# Patient Record
Sex: FEMALE | Race: WHITE | Employment: OTHER | URBAN - METROPOLITAN AREA
[De-identification: names, ages, dates, MRNs, and addresses within clinical notes are randomized per-mention and may not be internally consistent; named-entity substitution may affect disease eponyms.]

---

## 2015-11-24 LAB — DIABETIC RETINOPATHY: NEGATIVE

## 2017-11-14 PROBLEM — R10.10 PAIN OF UPPER ABDOMEN: Status: ACTIVE | Noted: 2017-11-14

## 2018-01-24 LAB
CHOLESTEROL, TOTAL: 176 MG/DL
CHOLESTEROL, TOTAL: 176 MG/DL
CHOLESTEROL/HDL RATIO: 2.9
CHOLESTEROL/HDL RATIO: 2.9
HDLC SERPL-MCNC: 60 MG/DL (ref 35–70)
HDLC SERPL-MCNC: 60 MG/DL (ref 35–70)
LDL CHOLESTEROL CALCULATED: 87 MG/DL (ref 0–160)
LDL CHOLESTEROL CALCULATED: 87 MG/DL (ref 0–160)
TRIGL SERPL-MCNC: 203 MG/DL
TRIGL SERPL-MCNC: 203 MG/DL
VLDLC SERPL CALC-MCNC: NORMAL MG/DL
VLDLC SERPL CALC-MCNC: NORMAL MG/DL

## 2018-08-08 ENCOUNTER — HOSPITAL ENCOUNTER (OUTPATIENT)
Dept: ULTRASOUND IMAGING | Age: 73
Discharge: HOME | End: 2018-08-10
Payer: MEDICARE

## 2018-08-08 DIAGNOSIS — R10.9 ABDOMINAL PAIN, UNSPECIFIED ABDOMINAL LOCATION: ICD-10-CM

## 2018-08-08 PROCEDURE — 76700 US EXAM ABDOM COMPLETE: CPT

## 2018-10-21 ENCOUNTER — APPOINTMENT (OUTPATIENT)
Dept: GENERAL RADIOLOGY | Age: 73
End: 2018-10-21
Payer: MEDICARE

## 2018-10-21 ENCOUNTER — HOSPITAL ENCOUNTER (EMERGENCY)
Age: 73
Discharge: HOME | End: 2018-10-21
Attending: EMERGENCY MEDICINE
Payer: MEDICARE

## 2018-10-21 ENCOUNTER — APPOINTMENT (OUTPATIENT)
Dept: CT IMAGING | Age: 73
End: 2018-10-21
Payer: MEDICARE

## 2018-10-21 VITALS
RESPIRATION RATE: 16 BRPM | TEMPERATURE: 98.1 F | WEIGHT: 145 LBS | HEIGHT: 67 IN | OXYGEN SATURATION: 96 % | DIASTOLIC BLOOD PRESSURE: 82 MMHG | HEART RATE: 91 BPM | SYSTOLIC BLOOD PRESSURE: 162 MMHG | BODY MASS INDEX: 22.76 KG/M2

## 2018-10-21 DIAGNOSIS — S09.90XA INJURY OF HEAD, INITIAL ENCOUNTER: ICD-10-CM

## 2018-10-21 DIAGNOSIS — S22.42XA MULTIPLE FRACTURES OF RIBS, LEFT SIDE, INITIAL ENCOUNTER FOR CLOSED FRACTURE: Primary | ICD-10-CM
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
## 2018-10-21 DIAGNOSIS — M79.672 LEFT FOOT PAIN: ICD-10-CM

## 2018-10-21 PROCEDURE — 70450 CT HEAD/BRAIN W/O DYE: CPT

## 2018-10-21 PROCEDURE — 6370000000 HC RX 637 (ALT 250 FOR IP): Performed by: EMERGENCY MEDICINE

## 2018-10-21 PROCEDURE — 71250 CT THORAX DX C-: CPT

## 2018-10-21 PROCEDURE — 99284 EMERGENCY DEPT VISIT MOD MDM: CPT

## 2018-10-21 PROCEDURE — 73630 X-RAY EXAM OF FOOT: CPT

## 2018-10-21 PROCEDURE — 72125 CT NECK SPINE W/O DYE: CPT

## 2018-10-21 PROCEDURE — 73010 X-RAY EXAM OF SHOULDER BLADE: CPT

## 2018-10-21 RX ORDER — IBUPROFEN 600 MG/1
600 TABLET ORAL ONCE
Status: COMPLETED | OUTPATIENT
Start: 2018-10-21 | End: 2018-10-21

## 2018-10-21 RX ORDER — HYDROCODONE BITARTRATE AND ACETAMINOPHEN 5; 325 MG/1; MG/1
1 TABLET ORAL ONCE
Status: COMPLETED | OUTPATIENT
Start: 2018-10-21 | End: 2018-10-21

## 2018-10-21 RX ORDER — HYDROCODONE BITARTRATE AND ACETAMINOPHEN 5; 325 MG/1; MG/1
1 TABLET ORAL EVERY 6 HOURS PRN
Qty: 12 TABLET | Refills: 0 | Status: SHIPPED | OUTPATIENT
Start: 2018-10-21 | End: 2018-10-25

## 2018-10-21 RX ADMIN — HYDROCODONE BITARTRATE AND ACETAMINOPHEN 1 TABLET: 5; 325 TABLET ORAL at 12:56

## 2018-10-21 RX ADMIN — IBUPROFEN 600 MG: 600 TABLET ORAL at 10:53

## 2018-10-21 ASSESSMENT — ENCOUNTER SYMPTOMS
SHORTNESS OF BREATH: 0
VOMITING: 0
BLOOD IN STOOL: 0
NAUSEA: 0
VISUAL CHANGE: 0
BACK PAIN: 1
COUGH: 0
ABDOMINAL PAIN: 0

## 2018-10-21 ASSESSMENT — PAIN DESCRIPTION - FREQUENCY
FREQUENCY: INTERMITTENT
FREQUENCY: CONTINUOUS

## 2018-10-21 ASSESSMENT — PAIN DESCRIPTION - ORIENTATION
ORIENTATION: LEFT
ORIENTATION: LEFT

## 2018-10-21 ASSESSMENT — PAIN SCALES - GENERAL
PAINLEVEL_OUTOF10: 7

## 2018-10-21 ASSESSMENT — PAIN DESCRIPTION - DESCRIPTORS: DESCRIPTORS: ACHING

## 2018-10-21 ASSESSMENT — PAIN DESCRIPTION - PAIN TYPE
TYPE: ACUTE PAIN
TYPE: ACUTE PAIN

## 2018-10-21 ASSESSMENT — PAIN DESCRIPTION - PROGRESSION: CLINICAL_PROGRESSION: GRADUALLY WORSENING

## 2018-10-21 ASSESSMENT — PAIN DESCRIPTION - LOCATION: LOCATION: BACK;SHOULDER

## 2018-10-21 ASSESSMENT — PAIN DESCRIPTION - ONSET: ONSET: GRADUAL

## 2018-10-21 NOTE — ED PROVIDER NOTES
in addition to providing specific details for the plan of care and counseling regarding the diagnosis and prognosis. Their questions are answered at this time and they are agreeable with the plan. I discussed at length with them reasons for immediate return here for re evaluation. They will followup with their primary care physician by calling their office tomorrow. --------------------------------- ADDITIONAL PROVIDER NOTES ---------------------------------  At this time the patient is without objective evidence of an acute process requiring hospitalization or inpatient management. They have remained hemodynamically stable throughout their entire ED visit and are stable for discharge with outpatient follow-up. The plan has been discussed in detail and they are aware of the specific conditions for emergent return, as well as the importance of follow-up. New Prescriptions    HYDROCODONE-ACETAMINOPHEN (NORCO) 5-325 MG PER TABLET    Take 1 tablet by mouth every 6 hours as needed for Pain for up to 4 days. Take 1 to 2 every 6 hours as needed for pain. Diagnosis:  1. Multiple fractures of ribs, left side, initial encounter for closed fracture    2. Fall (on) (from) other stairs and steps, initial encounter    3. Left foot pain    4. Injury of head, initial encounter        Disposition:  Patient's disposition: Discharge to home  Patient's condition is stable.        Marisel Sheikh DO  Resident  10/21/18 [id number]

## 2018-10-31 ENCOUNTER — APPOINTMENT (OUTPATIENT)
Dept: ULTRASOUND IMAGING | Age: 73
DRG: 603 | End: 2018-10-31
Payer: MEDICARE

## 2018-10-31 ENCOUNTER — APPOINTMENT (OUTPATIENT)
Dept: GENERAL RADIOLOGY | Age: 73
DRG: 603 | End: 2018-10-31
Payer: MEDICARE

## 2018-10-31 ENCOUNTER — HOSPITAL ENCOUNTER (INPATIENT)
Age: 73
LOS: 3 days | Discharge: HOME | DRG: 603 | End: 2018-11-03
Attending: EMERGENCY MEDICINE | Admitting: INTERNAL MEDICINE
Payer: MEDICARE

## 2018-10-31 DIAGNOSIS — L03.119 CELLULITIS OF LOWER EXTREMITY, UNSPECIFIED LATERALITY: Primary | ICD-10-CM

## 2018-10-31 PROBLEM — L03.90 CELLULITIS: Status: ACTIVE | Noted: 2018-10-31

## 2018-10-31 LAB
ANION GAP SERPL CALCULATED.3IONS-SCNC: 12 MMOL/L (ref 7–16)
APTT: 30.4 SEC (ref 24.5–35.1)
BASOPHILS ABSOLUTE: 0.03 E9/L (ref 0–0.2)
BASOPHILS RELATIVE PERCENT: 0.4 % (ref 0–2)
BUN BLDV-MCNC: 13 MG/DL (ref 8–23)
CALCIUM SERPL-MCNC: 8.9 MG/DL (ref 8.6–10.2)
CHLORIDE BLD-SCNC: 104 MMOL/L (ref 98–107)
CO2: 26 MMOL/L (ref 22–29)
CREAT SERPL-MCNC: 0.5 MG/DL (ref 0.5–1)
EOSINOPHILS ABSOLUTE: 0.09 E9/L (ref 0.05–0.5)
EOSINOPHILS RELATIVE PERCENT: 1.3 % (ref 0–6)
GFR AFRICAN AMERICAN: >60
GFR NON-AFRICAN AMERICAN: >60 ML/MIN/1.73
GLUCOSE BLD-MCNC: 142 MG/DL (ref 74–109)
HCT VFR BLD CALC: 36.8 % (ref 34–48)
HEMOGLOBIN: 11.5 G/DL (ref 11.5–15.5)
IMMATURE GRANULOCYTES #: 0.04 E9/L
IMMATURE GRANULOCYTES %: 0.6 % (ref 0–5)
INR BLD: 1
LYMPHOCYTES ABSOLUTE: 1.04 E9/L (ref 1.5–4)
LYMPHOCYTES RELATIVE PERCENT: 15.1 % (ref 20–42)
MCH RBC QN AUTO: 30.7 PG (ref 26–35)
MCHC RBC AUTO-ENTMCNC: 31.3 % (ref 32–34.5)
MCV RBC AUTO: 98.4 FL (ref 80–99.9)
MONOCYTES ABSOLUTE: 0.74 E9/L (ref 0.1–0.95)
MONOCYTES RELATIVE PERCENT: 10.7 % (ref 2–12)
NEUTROPHILS ABSOLUTE: 4.95 E9/L (ref 1.8–7.3)
NEUTROPHILS RELATIVE PERCENT: 71.9 % (ref 43–80)
PDW BLD-RTO: 14.6 FL (ref 11.5–15)
PLATELET # BLD: 227 E9/L (ref 130–450)
PMV BLD AUTO: 10 FL (ref 7–12)
POTASSIUM SERPL-SCNC: 4.6 MMOL/L (ref 3.5–5)
PROTHROMBIN TIME: 11.2 SEC (ref 9.3–12.4)
RBC # BLD: 3.74 E12/L (ref 3.5–5.5)
SODIUM BLD-SCNC: 142 MMOL/L (ref 132–146)
WBC # BLD: 6.9 E9/L (ref 4.5–11.5)

## 2018-10-31 PROCEDURE — 90715 TDAP VACCINE 7 YRS/> IM: CPT | Performed by: STUDENT IN AN ORGANIZED HEALTH CARE EDUCATION/TRAINING PROGRAM

## 2018-10-31 PROCEDURE — 99222 1ST HOSP IP/OBS MODERATE 55: CPT | Performed by: INTERNAL MEDICINE

## 2018-10-31 PROCEDURE — 80048 BASIC METABOLIC PNL TOTAL CA: CPT

## 2018-10-31 PROCEDURE — 90471 IMMUNIZATION ADMIN: CPT | Performed by: STUDENT IN AN ORGANIZED HEALTH CARE EDUCATION/TRAINING PROGRAM

## 2018-10-31 PROCEDURE — 87040 BLOOD CULTURE FOR BACTERIA: CPT

## 2018-10-31 PROCEDURE — 2580000003 HC RX 258: Performed by: EMERGENCY MEDICINE

## 2018-10-31 PROCEDURE — 99285 EMERGENCY DEPT VISIT HI MDM: CPT

## 2018-10-31 PROCEDURE — 93971 EXTREMITY STUDY: CPT

## 2018-10-31 PROCEDURE — 6360000002 HC RX W HCPCS: Performed by: STUDENT IN AN ORGANIZED HEALTH CARE EDUCATION/TRAINING PROGRAM

## 2018-10-31 PROCEDURE — 85025 COMPLETE CBC W/AUTO DIFF WBC: CPT

## 2018-10-31 PROCEDURE — 85730 THROMBOPLASTIN TIME PARTIAL: CPT

## 2018-10-31 PROCEDURE — 6360000002 HC RX W HCPCS: Performed by: EMERGENCY MEDICINE

## 2018-10-31 PROCEDURE — 1200000000 HC SEMI PRIVATE

## 2018-10-31 PROCEDURE — 73590 X-RAY EXAM OF LOWER LEG: CPT

## 2018-10-31 PROCEDURE — 2580000003 HC RX 258: Performed by: INTERNAL MEDICINE

## 2018-10-31 PROCEDURE — 85610 PROTHROMBIN TIME: CPT

## 2018-10-31 RX ORDER — SODIUM CHLORIDE 0.9 % (FLUSH) 0.9 %
10 SYRINGE (ML) INJECTION PRN
Status: DISCONTINUED | OUTPATIENT
Start: 2018-10-31 | End: 2018-11-01 | Stop reason: SDUPTHER

## 2018-10-31 RX ORDER — 0.9 % SODIUM CHLORIDE 0.9 %
10 VIAL (ML) INJECTION PRN
Status: DISCONTINUED | OUTPATIENT
Start: 2018-10-31 | End: 2018-11-01 | Stop reason: SDUPTHER

## 2018-10-31 RX ORDER — ACETAMINOPHEN 325 MG/1
650 TABLET ORAL EVERY 4 HOURS PRN
Status: DISCONTINUED | OUTPATIENT
Start: 2018-10-31 | End: 2018-11-03 | Stop reason: HOSPADM

## 2018-10-31 RX ORDER — CLINDAMYCIN HYDROCHLORIDE 150 MG/1
300 CAPSULE ORAL ONCE
Status: DISCONTINUED | OUTPATIENT
Start: 2018-10-31 | End: 2018-10-31

## 2018-10-31 RX ORDER — CEPHALEXIN 500 MG/1
500 CAPSULE ORAL EVERY 4 HOURS
Status: ON HOLD | COMMUNITY
Start: 2018-10-29 | End: 2018-11-02 | Stop reason: HOSPADM

## 2018-10-31 RX ORDER — SODIUM CHLORIDE 0.9 % (FLUSH) 0.9 %
10 SYRINGE (ML) INJECTION EVERY 12 HOURS SCHEDULED
Status: DISCONTINUED | OUTPATIENT
Start: 2018-10-31 | End: 2018-11-01 | Stop reason: SDUPTHER

## 2018-10-31 RX ADMIN — Medication 10 ML: at 21:30

## 2018-10-31 RX ADMIN — TETANUS TOXOID, REDUCED DIPHTHERIA TOXOID AND ACELLULAR PERTUSSIS VACCINE, ADSORBED 0.5 ML: 5; 2.5; 8; 8; 2.5 SUSPENSION INTRAMUSCULAR at 16:11

## 2018-10-31 RX ADMIN — CEFAZOLIN 2 G: 1 INJECTION, POWDER, FOR SOLUTION INTRAMUSCULAR; INTRAVENOUS at 16:11

## 2018-10-31 RX ADMIN — VANCOMYCIN HYDROCHLORIDE 1250 MG: 5 INJECTION, POWDER, LYOPHILIZED, FOR SOLUTION INTRAVENOUS at 16:50

## 2018-10-31 ASSESSMENT — PAIN DESCRIPTION - DESCRIPTORS: DESCRIPTORS: ACHING;CONSTANT;DULL

## 2018-10-31 ASSESSMENT — PAIN SCALES - GENERAL
PAINLEVEL_OUTOF10: 10
PAINLEVEL_OUTOF10: 8

## 2018-10-31 ASSESSMENT — ENCOUNTER SYMPTOMS
DIARRHEA: 0
WHEEZING: 0
EYE PAIN: 0
NAUSEA: 0
COUGH: 0
VOMITING: 0
SHORTNESS OF BREATH: 0
SORE THROAT: 0
EYE REDNESS: 0
SINUS PRESSURE: 0
COLOR CHANGE: 0
BACK PAIN: 0
ABDOMINAL DISTENTION: 0
EYE DISCHARGE: 0

## 2018-10-31 ASSESSMENT — PAIN DESCRIPTION - PAIN TYPE
TYPE: ACUTE PAIN
TYPE: ACUTE PAIN

## 2018-10-31 ASSESSMENT — PAIN DESCRIPTION - ORIENTATION
ORIENTATION: RIGHT
ORIENTATION: RIGHT

## 2018-10-31 ASSESSMENT — PAIN DESCRIPTION - LOCATION
LOCATION: LEG
LOCATION: LEG

## 2018-10-31 NOTE — ED PROVIDER NOTES
Negative for agitation, behavioral problems, confusion and decreased concentration. All other systems reviewed and are negative. Physical Exam   Constitutional: She is oriented to person, place, and time. She appears well-developed and well-nourished. No distress. HENT:   Head: Normocephalic and atraumatic. Eyes: Conjunctivae and EOM are normal. Right eye exhibits no discharge. Left eye exhibits no discharge. Neck: Normal range of motion. Neck supple. Cardiovascular: Normal rate, regular rhythm, normal heart sounds and intact distal pulses. No murmur heard. Pulmonary/Chest: Effort normal and breath sounds normal. No respiratory distress. She has no wheezes. She has no rales. Abdominal: Soft. Bowel sounds are normal. There is no tenderness. There is no rebound and no guarding. Musculoskeletal: She exhibits edema (2+ pitting edema LE bilaterally R>L) and tenderness. She exhibits no deformity. Neurological: She is alert and oriented to person, place, and time. No cranial nerve deficit. Coordination normal.   Skin: Skin is warm and dry. No rash noted. She is not diaphoretic. There is erythema. No pallor. Psychiatric: She has a normal mood and affect. Her behavior is normal.   Nursing note and vitals reviewed. Procedures    MDM  Number of Diagnoses or Management Options  Cellulitis of lower extremity, unspecified laterality:   Diagnosis management comments: Patient did not have a leukocytosis. Patient is afebrile, vitals have been stable. Blood cultures pending. Patient failed outpatient antibiotics with bactrim and omnicef. Dr. Gloria Childers requested patient be admitted for IV abx per patient. Dr. Lindsey Khanna will admit patient. Patient's vitals were stable while in the ED.                --------------------------------------------- PAST HISTORY ---------------------------------------------  Past Medical History:  has a past medical history of Diabetes type 2, controlled ([address]  Former smoker; Hyperlipidemia; Hypertension; and Macular degeneration. Past Surgical History:  has a past surgical history that includes Tubal ligation (1970'a); Foot surgery (1976); and Liposuction. Social History:  reports that she has quit smoking. Her smoking use included Cigarettes. She has a 120.00 pack-year smoking history. She has never used smokeless tobacco. She reports that she does not drink alcohol or use drugs. Family History: family history includes Diabetes in her mother; High Blood Pressure in her father and mother; Lexington Grosser in her father and sister. The patients home medications have been reviewed. Allergies: Patient has no known allergies.     -------------------------------------------------- RESULTS -------------------------------------------------    LABS:  Results for orders placed or performed during the hospital encounter of 10/31/18   Protime-INR   Result Value Ref Range    Protime 11.2 9.3 - 12.4 sec    INR 1.0    APTT   Result Value Ref Range    aPTT 30.4 24.5 - 35.1 sec   CBC Auto Differential   Result Value Ref Range    WBC 6.9 4.5 - 11.5 E9/L    RBC 3.74 3.50 - 5.50 E12/L    Hemoglobin 11.5 11.5 - 15.5 g/dL    Hematocrit 36.8 34.0 - 48.0 %    MCV 98.4 80.0 - 99.9 fL    MCH 30.7 26.0 - 35.0 pg    MCHC 31.3 (L) 32.0 - 34.5 %    RDW 14.6 11.5 - 15.0 fL    Platelets 750 515 - 985 E9/L    MPV 10.0 7.0 - 12.0 fL    Neutrophils % 71.9 43.0 - 80.0 %    Immature Granulocytes % 0.6 0.0 - 5.0 %    Lymphocytes % 15.1 (L) 20.0 - 42.0 %    Monocytes % 10.7 2.0 - 12.0 %    Eosinophils % 1.3 0.0 - 6.0 %    Basophils % 0.4 0.0 - 2.0 %    Neutrophils # 4.95 1.80 - 7.30 E9/L    Immature Granulocytes # 0.04 E9/L    Lymphocytes # 1.04 (L) 1.50 - 4.00 E9/L    Monocytes # 0.74 0.10 - 0.95 E9/L    Eosinophils # 0.09 0.05 - 0.50 E9/L    Basophils # 0.03 0.00 - 0.20 S3/R   Basic Metabolic Panel   Result Value Ref Range    Sodium 142 132 - 146 mmol/L    Potassium 4.6 3.5 - 5.0 mmol/L    Chloride 104 98 -

## 2018-11-01 PROBLEM — J44.9 COPD (CHRONIC OBSTRUCTIVE PULMONARY DISEASE) (HCC): Chronic | Status: ACTIVE | Noted: 2018-10-31

## 2018-11-01 PROBLEM — L03.115 CELLULITIS OF RIGHT LOWER EXTREMITY: Status: ACTIVE | Noted: 2018-10-31

## 2018-11-01 LAB
ALBUMIN SERPL-MCNC: 3.5 G/DL (ref 3.5–5.2)
ALP BLD-CCNC: 116 U/L (ref 35–104)
ALT SERPL-CCNC: 16 U/L (ref 0–32)
ANION GAP SERPL CALCULATED.3IONS-SCNC: 11 MMOL/L (ref 7–16)
AST SERPL-CCNC: 19 U/L (ref 0–31)
BASOPHILS ABSOLUTE: 0.03 E9/L (ref 0–0.2)
BASOPHILS RELATIVE PERCENT: 0.5 % (ref 0–2)
BILIRUB SERPL-MCNC: 0.2 MG/DL (ref 0–1.2)
BILIRUBIN DIRECT: <0.2 MG/DL (ref 0–0.3)
BILIRUBIN, INDIRECT: ABNORMAL MG/DL (ref 0–1)
BUN BLDV-MCNC: 13 MG/DL (ref 8–23)
C-REACTIVE PROTEIN: 4.3 MG/DL (ref 0–0.4)
CALCIUM SERPL-MCNC: 9.1 MG/DL (ref 8.6–10.2)
CHLORIDE BLD-SCNC: 104 MMOL/L (ref 98–107)
CO2: 24 MMOL/L (ref 22–29)
CREAT SERPL-MCNC: 0.6 MG/DL (ref 0.5–1)
EOSINOPHILS ABSOLUTE: 0.12 E9/L (ref 0.05–0.5)
EOSINOPHILS RELATIVE PERCENT: 1.8 % (ref 0–6)
GFR AFRICAN AMERICAN: >60
GFR NON-AFRICAN AMERICAN: >60 ML/MIN/1.73
GLUCOSE BLD-MCNC: 137 MG/DL (ref 74–109)
HCT VFR BLD CALC: 36.4 % (ref 34–48)
HEMOGLOBIN: 11.8 G/DL (ref 11.5–15.5)
IMMATURE GRANULOCYTES #: 0.03 E9/L
IMMATURE GRANULOCYTES %: 0.5 % (ref 0–5)
LYMPHOCYTES ABSOLUTE: 0.99 E9/L (ref 1.5–4)
LYMPHOCYTES RELATIVE PERCENT: 15 % (ref 20–42)
MCH RBC QN AUTO: 31 PG (ref 26–35)
MCHC RBC AUTO-ENTMCNC: 32.4 % (ref 32–34.5)
MCV RBC AUTO: 95.5 FL (ref 80–99.9)
METER GLUCOSE: 133 MG/DL (ref 70–110)
METER GLUCOSE: 137 MG/DL (ref 70–110)
METER GLUCOSE: 164 MG/DL (ref 70–110)
METER GLUCOSE: 176 MG/DL (ref 70–110)
MONOCYTES ABSOLUTE: 0.77 E9/L (ref 0.1–0.95)
MONOCYTES RELATIVE PERCENT: 11.6 % (ref 2–12)
NEUTROPHILS ABSOLUTE: 4.68 E9/L (ref 1.8–7.3)
NEUTROPHILS RELATIVE PERCENT: 70.6 % (ref 43–80)
PDW BLD-RTO: 14.7 FL (ref 11.5–15)
PLATELET # BLD: 231 E9/L (ref 130–450)
PMV BLD AUTO: 10.1 FL (ref 7–12)
POTASSIUM REFLEX MAGNESIUM: 4.5 MMOL/L (ref 3.5–5)
RBC # BLD: 3.81 E12/L (ref 3.5–5.5)
SEDIMENTATION RATE, ERYTHROCYTE: 89 MM/HR (ref 0–20)
SODIUM BLD-SCNC: 139 MMOL/L (ref 132–146)
TOTAL PROTEIN: 6.9 G/DL (ref 6.4–8.3)
WBC # BLD: 6.6 E9/L (ref 4.5–11.5)

## 2018-11-01 PROCEDURE — 2580000003 HC RX 258: Performed by: INTERNAL MEDICINE

## 2018-11-01 PROCEDURE — 80048 BASIC METABOLIC PNL TOTAL CA: CPT

## 2018-11-01 PROCEDURE — 6370000000 HC RX 637 (ALT 250 FOR IP): Performed by: INTERNAL MEDICINE

## 2018-11-01 PROCEDURE — 1200000000 HC SEMI PRIVATE

## 2018-11-01 PROCEDURE — 99233 SBSQ HOSP IP/OBS HIGH 50: CPT | Performed by: INTERNAL MEDICINE

## 2018-11-01 PROCEDURE — APPSS30 APP SPLIT SHARED TIME 16-30 MINUTES: Performed by: NURSE PRACTITIONER

## 2018-11-01 PROCEDURE — 6360000002 HC RX W HCPCS: Performed by: INTERNAL MEDICINE

## 2018-11-01 PROCEDURE — 82962 GLUCOSE BLOOD TEST: CPT

## 2018-11-01 PROCEDURE — 6360000002 HC RX W HCPCS: Performed by: SPECIALIST

## 2018-11-01 PROCEDURE — 86140 C-REACTIVE PROTEIN: CPT

## 2018-11-01 PROCEDURE — 85651 RBC SED RATE NONAUTOMATED: CPT

## 2018-11-01 PROCEDURE — 6370000000 HC RX 637 (ALT 250 FOR IP): Performed by: SPECIALIST

## 2018-11-01 PROCEDURE — 80076 HEPATIC FUNCTION PANEL: CPT

## 2018-11-01 PROCEDURE — 85025 COMPLETE CBC W/AUTO DIFF WBC: CPT

## 2018-11-01 PROCEDURE — 36415 COLL VENOUS BLD VENIPUNCTURE: CPT

## 2018-11-01 PROCEDURE — 94640 AIRWAY INHALATION TREATMENT: CPT

## 2018-11-01 RX ORDER — CEFAZOLIN SODIUM 1 G/50ML
1 SOLUTION INTRAVENOUS EVERY 8 HOURS
Status: DISCONTINUED | OUTPATIENT
Start: 2018-11-01 | End: 2018-11-01

## 2018-11-01 RX ORDER — PRAVASTATIN SODIUM 20 MG
40 TABLET ORAL NIGHTLY
Status: DISCONTINUED | OUTPATIENT
Start: 2018-11-01 | End: 2018-11-03 | Stop reason: HOSPADM

## 2018-11-01 RX ORDER — TRAMADOL HYDROCHLORIDE 50 MG/1
50 TABLET ORAL EVERY 6 HOURS PRN
Status: DISCONTINUED | OUTPATIENT
Start: 2018-11-01 | End: 2018-11-03 | Stop reason: HOSPADM

## 2018-11-01 RX ORDER — ALBUTEROL SULFATE 2.5 MG/3ML
2.5 SOLUTION RESPIRATORY (INHALATION) EVERY 4 HOURS PRN
Status: DISCONTINUED | OUTPATIENT
Start: 2018-11-01 | End: 2018-11-03 | Stop reason: HOSPADM

## 2018-11-01 RX ORDER — SODIUM CHLORIDE 0.9 % (FLUSH) 0.9 %
10 SYRINGE (ML) INJECTION PRN
Status: DISCONTINUED | OUTPATIENT
Start: 2018-11-01 | End: 2018-11-03 | Stop reason: HOSPADM

## 2018-11-01 RX ORDER — MORPHINE SULFATE 2 MG/ML
1 INJECTION, SOLUTION INTRAMUSCULAR; INTRAVENOUS EVERY 4 HOURS PRN
Status: DISCONTINUED | OUTPATIENT
Start: 2018-11-01 | End: 2018-11-03 | Stop reason: HOSPADM

## 2018-11-01 RX ORDER — DEXTROSE MONOHYDRATE 50 MG/ML
100 INJECTION, SOLUTION INTRAVENOUS PRN
Status: DISCONTINUED | OUTPATIENT
Start: 2018-11-01 | End: 2018-11-03 | Stop reason: HOSPADM

## 2018-11-01 RX ORDER — AMOXICILLIN 500 MG
2400 CAPSULE ORAL EVERY MORNING
Status: DISCONTINUED | OUTPATIENT
Start: 2018-11-01 | End: 2018-11-01 | Stop reason: RX

## 2018-11-01 RX ORDER — VITAMIN E 268 MG
400 CAPSULE ORAL EVERY MORNING
Status: DISCONTINUED | OUTPATIENT
Start: 2018-11-01 | End: 2018-11-03 | Stop reason: HOSPADM

## 2018-11-01 RX ORDER — NICOTINE POLACRILEX 4 MG
15 LOZENGE BUCCAL PRN
Status: DISCONTINUED | OUTPATIENT
Start: 2018-11-01 | End: 2018-11-03 | Stop reason: HOSPADM

## 2018-11-01 RX ORDER — SODIUM CHLORIDE 0.9 % (FLUSH) 0.9 %
10 SYRINGE (ML) INJECTION EVERY 12 HOURS SCHEDULED
Status: DISCONTINUED | OUTPATIENT
Start: 2018-11-01 | End: 2018-11-03 | Stop reason: HOSPADM

## 2018-11-01 RX ORDER — ASCORBIC ACID 500 MG
1000 TABLET ORAL EVERY MORNING
Status: DISCONTINUED | OUTPATIENT
Start: 2018-11-01 | End: 2018-11-03 | Stop reason: HOSPADM

## 2018-11-01 RX ORDER — DEXTROSE MONOHYDRATE 25 G/50ML
12.5 INJECTION, SOLUTION INTRAVENOUS PRN
Status: DISCONTINUED | OUTPATIENT
Start: 2018-11-01 | End: 2018-11-03 | Stop reason: HOSPADM

## 2018-11-01 RX ORDER — SODIUM CHLORIDE 9 MG/ML
INJECTION, SOLUTION INTRAVENOUS CONTINUOUS
Status: ACTIVE | OUTPATIENT
Start: 2018-11-01 | End: 2018-11-01

## 2018-11-01 RX ORDER — ZINC 25 MG
25 TABLET ORAL EVERY MORNING
Status: DISCONTINUED | OUTPATIENT
Start: 2018-11-01 | End: 2018-11-01 | Stop reason: RX

## 2018-11-01 RX ORDER — DOXYCYCLINE HYCLATE 100 MG/1
100 CAPSULE ORAL EVERY 12 HOURS SCHEDULED
Status: DISCONTINUED | OUTPATIENT
Start: 2018-11-01 | End: 2018-11-03 | Stop reason: HOSPADM

## 2018-11-01 RX ORDER — CHOLECALCIFEROL (VITAMIN D3) 50 MCG
2000 TABLET ORAL EVERY MORNING
Status: DISCONTINUED | OUTPATIENT
Start: 2018-11-01 | End: 2018-11-03 | Stop reason: HOSPADM

## 2018-11-01 RX ORDER — ONDANSETRON 2 MG/ML
4 INJECTION INTRAMUSCULAR; INTRAVENOUS EVERY 6 HOURS PRN
Status: DISCONTINUED | OUTPATIENT
Start: 2018-11-01 | End: 2018-11-03 | Stop reason: HOSPADM

## 2018-11-01 RX ORDER — VITAMIN C
1 TAB ORAL EVERY MORNING
Status: DISCONTINUED | OUTPATIENT
Start: 2018-11-01 | End: 2018-11-03 | Stop reason: HOSPADM

## 2018-11-01 RX ORDER — FLUTICASONE FUROATE AND VILANTEROL 200; 25 UG/1; UG/1
1 POWDER RESPIRATORY (INHALATION) EVERY MORNING
Status: DISCONTINUED | OUTPATIENT
Start: 2018-11-01 | End: 2018-11-01 | Stop reason: CLARIF

## 2018-11-01 RX ORDER — FAMOTIDINE 20 MG/1
20 TABLET, FILM COATED ORAL 2 TIMES DAILY
Status: DISCONTINUED | OUTPATIENT
Start: 2018-11-01 | End: 2018-11-03 | Stop reason: HOSPADM

## 2018-11-01 RX ORDER — CEFAZOLIN SODIUM 2 G/50ML
2 SOLUTION INTRAVENOUS EVERY 8 HOURS
Status: DISCONTINUED | OUTPATIENT
Start: 2018-11-01 | End: 2018-11-02

## 2018-11-01 RX ADMIN — TRAMADOL HYDROCHLORIDE 50 MG: 50 TABLET, FILM COATED ORAL at 17:22

## 2018-11-01 RX ADMIN — FAMOTIDINE 20 MG: 20 TABLET ORAL at 22:10

## 2018-11-01 RX ADMIN — CHOLECALCIFEROL TAB 50 MCG (2000 UNIT) 2000 UNITS: 50 TAB at 08:32

## 2018-11-01 RX ADMIN — TRAMADOL HYDROCHLORIDE 50 MG: 50 TABLET, FILM COATED ORAL at 08:33

## 2018-11-01 RX ADMIN — FAMOTIDINE 20 MG: 20 TABLET ORAL at 08:33

## 2018-11-01 RX ADMIN — CEFAZOLIN SODIUM 1 G: 1 SOLUTION INTRAVENOUS at 01:30

## 2018-11-01 RX ADMIN — CEFAZOLIN SODIUM 1 G: 1 SOLUTION INTRAVENOUS at 08:32

## 2018-11-01 RX ADMIN — SODIUM CHLORIDE: 9 INJECTION, SOLUTION INTRAVENOUS at 02:07

## 2018-11-01 RX ADMIN — Medication 1000 MG: at 08:32

## 2018-11-01 RX ADMIN — VITAMIN C 1 TABLET: TAB at 08:32

## 2018-11-01 RX ADMIN — PRAVASTATIN SODIUM 40 MG: 20 TABLET ORAL at 02:06

## 2018-11-01 RX ADMIN — INSULIN LISPRO 2 UNITS: 100 INJECTION, SOLUTION INTRAVENOUS; SUBCUTANEOUS at 22:09

## 2018-11-01 RX ADMIN — FAMOTIDINE 20 MG: 20 TABLET ORAL at 02:06

## 2018-11-01 RX ADMIN — INSULIN LISPRO 2 UNITS: 100 INJECTION, SOLUTION INTRAVENOUS; SUBCUTANEOUS at 11:24

## 2018-11-01 RX ADMIN — MOMETASONE FUROATE AND FORMOTEROL FUMARATE DIHYDRATE 2 PUFF: 200; 5 AEROSOL RESPIRATORY (INHALATION) at 19:14

## 2018-11-01 RX ADMIN — VITAMIN E CAP 400 UNIT 400 UNITS: 400 CAP at 08:32

## 2018-11-01 RX ADMIN — ENOXAPARIN SODIUM 40 MG: 40 INJECTION SUBCUTANEOUS at 08:32

## 2018-11-01 RX ADMIN — DOXYCYCLINE HYCLATE 100 MG: 100 CAPSULE ORAL at 22:10

## 2018-11-01 RX ADMIN — TRAMADOL HYDROCHLORIDE 50 MG: 50 TABLET, FILM COATED ORAL at 02:06

## 2018-11-01 RX ADMIN — PRAVASTATIN SODIUM 40 MG: 20 TABLET ORAL at 22:10

## 2018-11-01 RX ADMIN — MOMETASONE FUROATE AND FORMOTEROL FUMARATE DIHYDRATE 2 PUFF: 200; 5 AEROSOL RESPIRATORY (INHALATION) at 07:55

## 2018-11-01 RX ADMIN — CEFAZOLIN SODIUM 2 G: 2 SOLUTION INTRAVENOUS at 17:22

## 2018-11-01 ASSESSMENT — PAIN DESCRIPTION - PAIN TYPE: TYPE: ACUTE PAIN

## 2018-11-01 ASSESSMENT — PAIN SCALES - GENERAL
PAINLEVEL_OUTOF10: 0
PAINLEVEL_OUTOF10: 8
PAINLEVEL_OUTOF10: 8
PAINLEVEL_OUTOF10: 0
PAINLEVEL_OUTOF10: 5

## 2018-11-01 ASSESSMENT — PAIN DESCRIPTION - LOCATION: LOCATION: LEG

## 2018-11-01 ASSESSMENT — PAIN DESCRIPTION - ONSET: ONSET: PROGRESSIVE

## 2018-11-01 ASSESSMENT — PAIN DESCRIPTION - ORIENTATION: ORIENTATION: RIGHT

## 2018-11-01 ASSESSMENT — PAIN DESCRIPTION - PROGRESSION: CLINICAL_PROGRESSION: GRADUALLY WORSENING

## 2018-11-01 ASSESSMENT — PAIN DESCRIPTION - DESCRIPTORS: DESCRIPTORS: ACHING;DISCOMFORT;SORE;TENDER;THROBBING

## 2018-11-01 ASSESSMENT — PAIN DESCRIPTION - FREQUENCY: FREQUENCY: CONTINUOUS

## 2018-11-01 NOTE — H&P
clubbing, cyanosis. LLE nl. Feet dry and warm. Skin: Warm and dry, no open lesions. Neuro: Pt is awake, alert and oriented to time, place and person. Speech and cognition normal. Cranial nerves intact with no focal deficits. Motor strength 5/5 in UEs and LEs b/l. KJs symmetric and normal. Light touch sense at the toes normal b/l. Breast: deferred  Rectal: deferred  Genitalia:  Deferred      LABS:  Recent Labs      10/31/18   1352   NA  142   K  4.6   CL  104   CO2  26   BUN  13   CREATININE  0.5   GLUCOSE  142*   CALCIUM  8.9   eGFR non-aa >60. AG 12.    Recent Labs      10/31/18   1352   WBC  6.9   RBC  3.74   HGB  11.5   HCT  36.8   MCV  98.4   MCH  30.7   MCHC  31.3*   RDW  14.6   PLT  227   MPV  10.0   Diff: N 71.9, L 15.1, M 10.7, E 1.3, B 0.4%. Recent Labs      10/31/18   1352   GLUCOSE  142*      Ref. Range 10/31/2018 13:52   Prothrombin Time Latest Ref Range: 9.3 - 12.4 sec 11.2   INR Unknown 1.0   aPTT Latest Ref Range: 24.5 - 35.1 sec 30.4       Radiology:   Xr Tibia Fibula Right (2 Views)  Result Date: 10/31/2018  Indication: Fall. Pain. Comparison: None Technique: 2 views of the right tibia and fibula were obtained. Findings:  No acute fracture or dislocation. The bones are demineralized. No aggressive-appearing osteolytic or osteoblastic lesions. Mild enthesopathy changes of the plantar and posterior calcaneus. Soft tissue edema of the lower extremity from the knee to the foot without radial opaque foreign body or soft tissue gas. No acute fracture or dislocation. Mild enthesopathy changes of the plantar and posterior calcaneus. 3. Diffuse soft tissue edema.     Us Dup Lower Extremity Right Fauzia  Result Date: 10/31/2018  Patient MRN:  [id number] : 1945 Age: 75 years Gender: Female Order Date:  10/31/2018 1:45 PM EXAM: US DUP LOWER EXTREMITY RIGHT FAUZIA NUMBER OF IMAGES:  37 INDICATION:  pain redness and swelling COMPARISON: None There is no evidence for deep venous thrombosis There is good

## 2018-11-01 NOTE — CONSULTS
and S2 are rhythmic and regular. No murmurs appreciated. Abdomen: Positive bowel sounds to auscultation. Benign to palpation. No masses felt. No hepatosplenomegaly. Extremities: The right leg shows a hematoma on the distal 3rd. There is a significant erythema with warmth and pain over the distal two thirds of the leg. Warm to the touch. Lines: peripheral      CBC+dif:  Recent Labs      10/31/18   1352  11/01/18   0411   WBC  6.9  6.6   HGB  11.5  11.8   HCT  36.8  36.4   MCV  98.4  95.5   PLT  227  231   NEUTROABS  4.95  4.68     No results found for: CRP   No results found for: CRP  Lab Results   Component Value Date    SEDRATE 89 (H) 11/01/2018     Lab Results   Component Value Date    ALT 16 11/01/2018    AST 19 11/01/2018    ALKPHOS 116 (H) 11/01/2018    BILITOT 0.2 11/01/2018     Lab Results   Component Value Date     11/01/2018    K 4.5 11/01/2018     11/01/2018    CO2 24 11/01/2018    BUN 13 11/01/2018    CREATININE 0.6 11/01/2018    GFRAA >60 11/01/2018    LABGLOM >60 11/01/2018    GLUCOSE 137 11/01/2018    PROT 6.9 11/01/2018    LABALBU 3.5 11/01/2018    CALCIUM 9.1 11/01/2018    BILITOT 0.2 11/01/2018    ALKPHOS 116 11/01/2018    AST 19 11/01/2018    ALT 16 11/01/2018       Lab Results   Component Value Date    PROTIME 11.2 10/31/2018    INR 1.0 10/31/2018       No results found for: TSH    Lab Results   Component Value Date    COLORU Yellow 11/15/2017    PHUR 5.5 11/15/2017    WBCUA >20 11/15/2017    RBCUA 5-10 11/15/2017    BACTERIA MODERATE 11/15/2017    CLARITYU CLOUDY 11/15/2017    SPECGRAV 1.025 11/15/2017    LEUKOCYTESUR MODERATE 11/15/2017    UROBILINOGEN 0.2 11/15/2017    BILIRUBINUR Negative 11/15/2017    BLOODU TRACE 11/15/2017    GLUCOSEU Negative 11/15/2017     Radiology:  Noted    Microbiology:  Pending  No results for input(s): BC in the last 72 hours. No results for input(s): ORG in the last 72 hours. No results for input(s): Angy Garcia in the last 72 hours.   No results

## 2018-11-02 LAB
ANION GAP SERPL CALCULATED.3IONS-SCNC: 11 MMOL/L (ref 7–16)
ANION GAP SERPL CALCULATED.3IONS-SCNC: 11 MMOL/L (ref 7–16)
ANTISTREPTOLYSIN-O: <20 IU/ML (ref 0–200)
BASOPHILS ABSOLUTE: 0.03 E9/L (ref 0–0.2)
BASOPHILS RELATIVE PERCENT: 0.3 % (ref 0–2)
BUN BLDV-MCNC: 11 MG/DL (ref 8–23)
BUN BLDV-MCNC: 13 MG/DL (ref 8–23)
C-REACTIVE PROTEIN: 4.2 MG/DL (ref 0–0.4)
CALCIUM SERPL-MCNC: 9.3 MG/DL (ref 8.6–10.2)
CALCIUM SERPL-MCNC: 9.5 MG/DL (ref 8.6–10.2)
CHLORIDE BLD-SCNC: 100 MMOL/L (ref 98–107)
CHLORIDE BLD-SCNC: 104 MMOL/L (ref 98–107)
CO2: 26 MMOL/L (ref 22–29)
CO2: 26 MMOL/L (ref 22–29)
CREAT SERPL-MCNC: 0.4 MG/DL (ref 0.5–1)
CREAT SERPL-MCNC: 0.5 MG/DL (ref 0.5–1)
EOSINOPHILS ABSOLUTE: 0.11 E9/L (ref 0.05–0.5)
EOSINOPHILS RELATIVE PERCENT: 1.2 % (ref 0–6)
GFR AFRICAN AMERICAN: >60
GFR AFRICAN AMERICAN: >60
GFR NON-AFRICAN AMERICAN: >60 ML/MIN/1.73
GFR NON-AFRICAN AMERICAN: >60 ML/MIN/1.73
GLUCOSE BLD-MCNC: 138 MG/DL (ref 74–109)
GLUCOSE BLD-MCNC: 160 MG/DL (ref 74–109)
HCT VFR BLD CALC: 38.2 % (ref 34–48)
HEMOGLOBIN: 12 G/DL (ref 11.5–15.5)
IMMATURE GRANULOCYTES #: 0.08 E9/L
IMMATURE GRANULOCYTES %: 0.9 % (ref 0–5)
LYMPHOCYTES ABSOLUTE: 1.24 E9/L (ref 1.5–4)
LYMPHOCYTES RELATIVE PERCENT: 13.3 % (ref 20–42)
MCH RBC QN AUTO: 30.5 PG (ref 26–35)
MCHC RBC AUTO-ENTMCNC: 31.4 % (ref 32–34.5)
MCV RBC AUTO: 97.2 FL (ref 80–99.9)
METER GLUCOSE: 154 MG/DL (ref 70–110)
METER GLUCOSE: 155 MG/DL (ref 70–110)
METER GLUCOSE: 156 MG/DL (ref 70–110)
MONOCYTES ABSOLUTE: 0.78 E9/L (ref 0.1–0.95)
MONOCYTES RELATIVE PERCENT: 8.4 % (ref 2–12)
NEUTROPHILS ABSOLUTE: 7.09 E9/L (ref 1.8–7.3)
NEUTROPHILS RELATIVE PERCENT: 75.9 % (ref 43–80)
PDW BLD-RTO: 14.8 FL (ref 11.5–15)
PLATELET # BLD: 243 E9/L (ref 130–450)
PMV BLD AUTO: 10.2 FL (ref 7–12)
POTASSIUM REFLEX MAGNESIUM: 5.2 MMOL/L (ref 3.5–5)
POTASSIUM SERPL-SCNC: 4 MMOL/L (ref 3.5–5)
RBC # BLD: 3.93 E12/L (ref 3.5–5.5)
SEDIMENTATION RATE, ERYTHROCYTE: 105 MM/HR (ref 0–20)
SODIUM BLD-SCNC: 137 MMOL/L (ref 132–146)
SODIUM BLD-SCNC: 141 MMOL/L (ref 132–146)
WBC # BLD: 9.3 E9/L (ref 4.5–11.5)

## 2018-11-02 PROCEDURE — 6370000000 HC RX 637 (ALT 250 FOR IP): Performed by: SPECIALIST

## 2018-11-02 PROCEDURE — 6370000000 HC RX 637 (ALT 250 FOR IP): Performed by: INTERNAL MEDICINE

## 2018-11-02 PROCEDURE — 94640 AIRWAY INHALATION TREATMENT: CPT

## 2018-11-02 PROCEDURE — 99239 HOSP IP/OBS DSCHRG MGMT >30: CPT | Performed by: INTERNAL MEDICINE

## 2018-11-02 PROCEDURE — 85651 RBC SED RATE NONAUTOMATED: CPT

## 2018-11-02 PROCEDURE — 6360000002 HC RX W HCPCS: Performed by: INTERNAL MEDICINE

## 2018-11-02 PROCEDURE — 86140 C-REACTIVE PROTEIN: CPT

## 2018-11-02 PROCEDURE — 6360000002 HC RX W HCPCS: Performed by: SPECIALIST

## 2018-11-02 PROCEDURE — 36415 COLL VENOUS BLD VENIPUNCTURE: CPT

## 2018-11-02 PROCEDURE — 82962 GLUCOSE BLOOD TEST: CPT

## 2018-11-02 PROCEDURE — 80048 BASIC METABOLIC PNL TOTAL CA: CPT

## 2018-11-02 PROCEDURE — 2580000003 HC RX 258: Performed by: INTERNAL MEDICINE

## 2018-11-02 PROCEDURE — APPSS45 APP SPLIT SHARED TIME 31-45 MINUTES: Performed by: NURSE PRACTITIONER

## 2018-11-02 PROCEDURE — 86060 ANTISTREPTOLYSIN O TITER: CPT

## 2018-11-02 PROCEDURE — 1200000000 HC SEMI PRIVATE

## 2018-11-02 PROCEDURE — 85025 COMPLETE CBC W/AUTO DIFF WBC: CPT

## 2018-11-02 RX ORDER — DOXYCYCLINE HYCLATE 100 MG/1
100 CAPSULE ORAL EVERY 12 HOURS SCHEDULED
Qty: 20 CAPSULE | Refills: 0 | Status: SHIPPED | OUTPATIENT
Start: 2018-11-02 | End: 2018-11-12

## 2018-11-02 RX ORDER — CEPHALEXIN 500 MG/1
1000 CAPSULE ORAL 3 TIMES DAILY
Qty: 60 CAPSULE | Refills: 0 | Status: SHIPPED | OUTPATIENT
Start: 2018-11-02 | End: 2018-11-12

## 2018-11-02 RX ORDER — CEPHALEXIN 500 MG/1
1000 CAPSULE ORAL EVERY 8 HOURS SCHEDULED
Status: DISCONTINUED | OUTPATIENT
Start: 2018-11-02 | End: 2018-11-03 | Stop reason: HOSPADM

## 2018-11-02 RX ADMIN — MOMETASONE FUROATE AND FORMOTEROL FUMARATE DIHYDRATE 2 PUFF: 200; 5 AEROSOL RESPIRATORY (INHALATION) at 08:36

## 2018-11-02 RX ADMIN — Medication 10 ML: at 21:59

## 2018-11-02 RX ADMIN — MOMETASONE FUROATE AND FORMOTEROL FUMARATE DIHYDRATE 2 PUFF: 200; 5 AEROSOL RESPIRATORY (INHALATION) at 19:38

## 2018-11-02 RX ADMIN — CEFAZOLIN SODIUM 2 G: 2 SOLUTION INTRAVENOUS at 08:47

## 2018-11-02 RX ADMIN — VITAMIN C 1 TABLET: TAB at 08:48

## 2018-11-02 RX ADMIN — FAMOTIDINE 20 MG: 20 TABLET ORAL at 21:58

## 2018-11-02 RX ADMIN — Medication 10 ML: at 01:07

## 2018-11-02 RX ADMIN — CHOLECALCIFEROL TAB 50 MCG (2000 UNIT) 2000 UNITS: 50 TAB at 08:48

## 2018-11-02 RX ADMIN — VITAMIN E CAP 400 UNIT 400 UNITS: 400 CAP at 08:48

## 2018-11-02 RX ADMIN — CEFAZOLIN SODIUM 2 G: 2 SOLUTION INTRAVENOUS at 01:07

## 2018-11-02 RX ADMIN — FAMOTIDINE 20 MG: 20 TABLET ORAL at 08:48

## 2018-11-02 RX ADMIN — PRAVASTATIN SODIUM 40 MG: 20 TABLET ORAL at 21:58

## 2018-11-02 RX ADMIN — DOXYCYCLINE HYCLATE 100 MG: 100 CAPSULE ORAL at 21:58

## 2018-11-02 RX ADMIN — INSULIN LISPRO 2 UNITS: 100 INJECTION, SOLUTION INTRAVENOUS; SUBCUTANEOUS at 11:15

## 2018-11-02 RX ADMIN — ENOXAPARIN SODIUM 40 MG: 40 INJECTION SUBCUTANEOUS at 08:47

## 2018-11-02 RX ADMIN — INSULIN LISPRO 2 UNITS: 100 INJECTION, SOLUTION INTRAVENOUS; SUBCUTANEOUS at 22:01

## 2018-11-02 RX ADMIN — CEPHALEXIN 1000 MG: 500 CAPSULE ORAL at 14:30

## 2018-11-02 RX ADMIN — TRAMADOL HYDROCHLORIDE 50 MG: 50 TABLET, FILM COATED ORAL at 08:48

## 2018-11-02 RX ADMIN — Medication 1000 MG: at 08:48

## 2018-11-02 RX ADMIN — Medication 10 ML: at 08:48

## 2018-11-02 RX ADMIN — INSULIN LISPRO 2 UNITS: 100 INJECTION, SOLUTION INTRAVENOUS; SUBCUTANEOUS at 06:18

## 2018-11-02 RX ADMIN — DOXYCYCLINE HYCLATE 100 MG: 100 CAPSULE ORAL at 08:48

## 2018-11-02 RX ADMIN — CEPHALEXIN 1000 MG: 500 CAPSULE ORAL at 21:58

## 2018-11-02 RX ADMIN — TRAMADOL HYDROCHLORIDE 50 MG: 50 TABLET, FILM COATED ORAL at 19:39

## 2018-11-02 ASSESSMENT — PAIN DESCRIPTION - DESCRIPTORS
DESCRIPTORS: ACHING;DISCOMFORT;SORE;TENDER
DESCRIPTORS: ACHING;SORE;TENDER
DESCRIPTORS: ACHING;SORE;TENDER

## 2018-11-02 ASSESSMENT — PAIN DESCRIPTION - LOCATION
LOCATION: LEG

## 2018-11-02 ASSESSMENT — PAIN DESCRIPTION - PAIN TYPE
TYPE: ACUTE PAIN

## 2018-11-02 ASSESSMENT — PAIN SCALES - GENERAL
PAINLEVEL_OUTOF10: 4
PAINLEVEL_OUTOF10: 6
PAINLEVEL_OUTOF10: 4
PAINLEVEL_OUTOF10: 6
PAINLEVEL_OUTOF10: 0
PAINLEVEL_OUTOF10: 0

## 2018-11-02 ASSESSMENT — PAIN DESCRIPTION - ONSET
ONSET: PROGRESSIVE
ONSET: ON-GOING
ONSET: ON-GOING

## 2018-11-02 ASSESSMENT — PAIN DESCRIPTION - FREQUENCY
FREQUENCY: CONTINUOUS

## 2018-11-02 ASSESSMENT — PAIN DESCRIPTION - ORIENTATION
ORIENTATION: RIGHT

## 2018-11-02 ASSESSMENT — PAIN DESCRIPTION - PROGRESSION: CLINICAL_PROGRESSION: GRADUALLY WORSENING

## 2018-11-02 NOTE — DISCHARGE SUMMARY
[facility] Physician Discharge Summary       [address] [facility], [address] [facility] [address]  [phone]    In 1 week  hospital follow up      Activity level: As tolerated    Diet: DIET CARB CONTROL; Carb Control: 4 carb choices (60 gms)/meal    Dispo:Home    Patient ID:  Gilberto De La Cruz  [id number]  75 y.o.  1945    Admit date: 10/31/2018    Discharge date and time:  11/2/2018  4:41 PM    Admission Diagnoses: Principal Problem:    Cellulitis of right lower extremity  Active Problems:    Former smoker    COPD (chronic obstructive pulmonary disease) ([facility] [address]    Diabetes type 2, controlled ([facility] [address]    Hypertension    Macular degeneration    Hyperlipidemia  Resolved Problems:    * No resolved hospital problems. *      Discharge Diagnoses: Principal Problem:    Cellulitis of right lower extremity  Active Problems:    Former smoker    COPD (chronic obstructive pulmonary disease) ([facility] [address]    Diabetes type 2, controlled ([facility] [address]    Hypertension    Macular degeneration    Hyperlipidemia  Resolved Problems:    * No resolved hospital problems. *      Consults:  IP CONSULT TO INFECTIOUS DISEASES    Procedures: None    CHIEF COMPLAINT: Rt leg swelling, pain, redness, increased temperature.     History of Present Illness: The pt is a 75 y.o. female with a history of DM2, HTN and hyperlipidemia, who was in [facility] till about 2 weeks ago when she tripped on stairs going down to the basement to do laundry. She fell off the steps and in the process injured the left side of the head (frontal area), ribs anteriorly and the anteromedial aspect of the right leg. She came to the ER 10/21 and imaging was notable for left 4th rib fracture and was negative for other acute abnormalities. Although pt hadn't sustained any open wound on the right leg, she developed erythema around the bruise on the anteromedial aspect of the leg. Last week pt saw her PCP and was Rx oral abx for cellulitis despite which it worsened a lot. D/t increasing tense swelling of the leg, circumferential erythema extending down past the ankle and almost up to the knee, increased warmth to touch and severe pain, pt came to the ER today. Currently, after meds given in the ER, pt says the leg feels better. Hospital Course:   Patient Ramona Mcnair is a 75 y.o. presented with Cellulitis [L03.90]  Cellulitis [L03.90]   Patient was admitted with RLE cellulitis related to trauma to her right leg after a fall. She failed oral antibiotic treatment as an outpatient. She was evaluated by ID. BC have remained negative so far. She was originally placed on Cefazolin and then changed to Cephalexin and Doxycycline. She was cleared for discharge per ID after 24 hours of IV antibiotic therapy. She was discharged with oral Doxycycline and Cephalexin. She was discharged home in stable condition with the following medications, instructions and follow-ups. Discharge Exam:  General Appearance: alert and oriented to person, place and time and in no acute distress  Skin: warm and dry, RL with erythema and warm to touch from mid-upper calf down. Very tender to palpation. 2+ edema. Head: normocephalic and atraumatic  Eyes: pupils equal, round, and reactive to light, extraocular eye movements intact, conjunctivae normal  Neck: neck supple and non tender without mass   Pulmonary/Chest: clear to auscultation bilaterally- no wheezes, rales or rhonchi, normal air movement, no respiratory distress. On room ir. Cardiovascular: normal rate, normal S1 and S2   Abdomen: soft, non-tender, non-distended, normal bowel sounds, no masses or organomegaly  Extremities: no cyanosis, no clubbing and 1+ edema RLE  Neurologic: speech normal    I/O last 3 completed shifts: In: 420 [P.O.:420]  Out: 1200 [Urine:1200]  No intake/output data recorded.       LABS:  Recent Labs      10/31/18   1352  11/01/18   0411  11/02/18   0455   NA  142  139  141   K  4.6  4.5  5.2*   CL  104  104  104   CO2  26

## 2018-11-02 NOTE — PLAN OF CARE
Problem: Pain:  Goal: Control of acute pain  Control of acute pain   Outcome: Met This Shift      Problem: Falls - Risk of:  Goal: Will remain free from falls  Will remain free from falls   Outcome: Met This Shift      Problem: Skin Integrity:  Goal: Absence of new skin breakdown  Absence of new skin breakdown   Outcome: Met This Shift

## 2018-11-03 VITALS
TEMPERATURE: 98.4 F | HEART RATE: 91 BPM | OXYGEN SATURATION: 93 % | SYSTOLIC BLOOD PRESSURE: 191 MMHG | DIASTOLIC BLOOD PRESSURE: 79 MMHG | BODY MASS INDEX: 24.12 KG/M2 | RESPIRATION RATE: 18 BRPM | WEIGHT: 150.1 LBS | HEIGHT: 66 IN

## 2018-11-03 LAB
ANION GAP SERPL CALCULATED.3IONS-SCNC: 12 MMOL/L (ref 7–16)
BASOPHILS ABSOLUTE: 0.02 E9/L (ref 0–0.2)
BASOPHILS RELATIVE PERCENT: 0.3 % (ref 0–2)
BUN BLDV-MCNC: 13 MG/DL (ref 8–23)
CALCIUM SERPL-MCNC: 9.2 MG/DL (ref 8.6–10.2)
CHLORIDE BLD-SCNC: 101 MMOL/L (ref 98–107)
CO2: 24 MMOL/L (ref 22–29)
CREAT SERPL-MCNC: 0.4 MG/DL (ref 0.5–1)
EOSINOPHILS ABSOLUTE: 0.09 E9/L (ref 0.05–0.5)
EOSINOPHILS RELATIVE PERCENT: 1.4 % (ref 0–6)
GFR AFRICAN AMERICAN: >60
GFR NON-AFRICAN AMERICAN: >60 ML/MIN/1.73
GLUCOSE BLD-MCNC: 295 MG/DL (ref 74–109)
HCT VFR BLD CALC: 37 % (ref 34–48)
HEMOGLOBIN: 11.7 G/DL (ref 11.5–15.5)
IMMATURE GRANULOCYTES #: 0.06 E9/L
IMMATURE GRANULOCYTES %: 0.9 % (ref 0–5)
LYMPHOCYTES ABSOLUTE: 0.74 E9/L (ref 1.5–4)
LYMPHOCYTES RELATIVE PERCENT: 11.6 % (ref 20–42)
MCH RBC QN AUTO: 30.5 PG (ref 26–35)
MCHC RBC AUTO-ENTMCNC: 31.6 % (ref 32–34.5)
MCV RBC AUTO: 96.4 FL (ref 80–99.9)
METER GLUCOSE: 139 MG/DL (ref 70–110)
MONOCYTES ABSOLUTE: 0.6 E9/L (ref 0.1–0.95)
MONOCYTES RELATIVE PERCENT: 9.4 % (ref 2–12)
NEUTROPHILS ABSOLUTE: 4.89 E9/L (ref 1.8–7.3)
NEUTROPHILS RELATIVE PERCENT: 76.4 % (ref 43–80)
PDW BLD-RTO: 14.9 FL (ref 11.5–15)
PLATELET # BLD: 226 E9/L (ref 130–450)
PMV BLD AUTO: 10.1 FL (ref 7–12)
POTASSIUM REFLEX MAGNESIUM: 4.9 MMOL/L (ref 3.5–5)
RBC # BLD: 3.84 E12/L (ref 3.5–5.5)
SODIUM BLD-SCNC: 137 MMOL/L (ref 132–146)
WBC # BLD: 6.4 E9/L (ref 4.5–11.5)

## 2018-11-03 PROCEDURE — 36415 COLL VENOUS BLD VENIPUNCTURE: CPT

## 2018-11-03 PROCEDURE — 6370000000 HC RX 637 (ALT 250 FOR IP): Performed by: INTERNAL MEDICINE

## 2018-11-03 PROCEDURE — 94640 AIRWAY INHALATION TREATMENT: CPT

## 2018-11-03 PROCEDURE — 80048 BASIC METABOLIC PNL TOTAL CA: CPT

## 2018-11-03 PROCEDURE — 6370000000 HC RX 637 (ALT 250 FOR IP): Performed by: SPECIALIST

## 2018-11-03 PROCEDURE — 82962 GLUCOSE BLOOD TEST: CPT

## 2018-11-03 PROCEDURE — 85025 COMPLETE CBC W/AUTO DIFF WBC: CPT

## 2018-11-03 RX ADMIN — FAMOTIDINE 20 MG: 20 TABLET ORAL at 07:48

## 2018-11-03 RX ADMIN — DOXYCYCLINE HYCLATE 100 MG: 100 CAPSULE ORAL at 07:48

## 2018-11-03 RX ADMIN — VITAMIN E CAP 400 UNIT 400 UNITS: 400 CAP at 07:49

## 2018-11-03 RX ADMIN — CEPHALEXIN 1000 MG: 500 CAPSULE ORAL at 05:41

## 2018-11-03 RX ADMIN — Medication 1000 MG: at 07:49

## 2018-11-03 RX ADMIN — CHOLECALCIFEROL TAB 50 MCG (2000 UNIT) 2000 UNITS: 50 TAB at 07:48

## 2018-11-03 RX ADMIN — MOMETASONE FUROATE AND FORMOTEROL FUMARATE DIHYDRATE 2 PUFF: 200; 5 AEROSOL RESPIRATORY (INHALATION) at 07:28

## 2018-11-03 RX ADMIN — VITAMIN C 1 TABLET: TAB at 07:49

## 2018-11-03 RX ADMIN — TRAMADOL HYDROCHLORIDE 50 MG: 50 TABLET, FILM COATED ORAL at 07:49

## 2018-11-03 ASSESSMENT — PAIN SCALES - GENERAL: PAINLEVEL_OUTOF10: 6

## 2018-11-05 LAB
BLOOD CULTURE, ROUTINE: NORMAL
CULTURE, BLOOD 2: NORMAL

## 2019-01-23 LAB
AVERAGE GLUCOSE: NORMAL
AVERAGE GLUCOSE: NORMAL
CREATININE: 0.5 MG/DL
HBA1C MFR BLD: 7.3 %
HBA1C MFR BLD: 7.3 %
POTASSIUM (K+): 4.3

## 2019-03-22 ENCOUNTER — HOSPITAL ENCOUNTER (OUTPATIENT)
Dept: GENERAL RADIOLOGY | Age: 74
Discharge: HOME | End: 2019-03-24
Payer: MEDICARE

## 2019-03-22 ENCOUNTER — HOSPITAL ENCOUNTER (OUTPATIENT)
Age: 74
Discharge: HOME | End: 2019-03-24
Payer: MEDICARE

## 2019-03-22 DIAGNOSIS — J44.1 OBSTRUCTIVE CHRONIC BRONCHITIS WITH EXACERBATION (HCC): ICD-10-CM

## 2019-03-22 PROCEDURE — 71046 X-RAY EXAM CHEST 2 VIEWS: CPT

## 2019-05-10 ENCOUNTER — TELEPHONE (OUTPATIENT)
Dept: PRIMARY CARE CLINIC | Age: 74
End: 2019-05-10

## 2019-05-10 NOTE — TELEPHONE ENCOUNTER
Pt called in and stated that she called a couple weeks ago regarding a colon screenning kit but she ended up getting a urine kit to her house instead. Wanted to know if she could have one sent to her house.

## 2019-06-11 ENCOUNTER — TELEPHONE (OUTPATIENT)
Dept: FAMILY MEDICINE CLINIC | Age: 74
End: 2019-06-11

## 2019-06-11 ENCOUNTER — OFFICE VISIT (OUTPATIENT)
Dept: FAMILY MEDICINE CLINIC | Age: 74
End: 2019-06-11
Payer: MEDICARE

## 2019-06-11 ENCOUNTER — HOSPITAL ENCOUNTER (OUTPATIENT)
Age: 74
Discharge: HOME | End: 2019-06-13
Payer: MEDICARE

## 2019-06-11 VITALS
BODY MASS INDEX: 23.14 KG/M2 | TEMPERATURE: 97.8 F | OXYGEN SATURATION: 95 % | DIASTOLIC BLOOD PRESSURE: 74 MMHG | HEIGHT: 66 IN | SYSTOLIC BLOOD PRESSURE: 122 MMHG | HEART RATE: 60 BPM | WEIGHT: 144 LBS

## 2019-06-11 DIAGNOSIS — R30.0 DYSURIA: ICD-10-CM

## 2019-06-11 DIAGNOSIS — J44.9 CHRONIC OBSTRUCTIVE PULMONARY DISEASE, UNSPECIFIED COPD TYPE (HCC): Primary | ICD-10-CM

## 2019-06-11 DIAGNOSIS — N39.0 URINARY TRACT INFECTION WITHOUT HEMATURIA, SITE UNSPECIFIED: ICD-10-CM

## 2019-06-11 DIAGNOSIS — R30.0 DYSURIA: Primary | ICD-10-CM

## 2019-06-11 LAB
BILIRUBIN, POC: NORMAL
BLOOD URINE, POC: NORMAL
CLARITY, POC: CLEAR
COLOR, POC: YELLOW
GLUCOSE URINE, POC: NORMAL
KETONES, POC: NORMAL
LEUKOCYTE EST, POC: NORMAL
NITRITE, POC: NORMAL
PH, POC: 5
PROTEIN, POC: NORMAL
SPECIFIC GRAVITY, POC: 1.01
UROBILINOGEN, POC: NORMAL

## 2019-06-11 PROCEDURE — 87186 SC STD MICRODIL/AGAR DIL: CPT

## 2019-06-11 PROCEDURE — 99213 OFFICE O/P EST LOW 20 MIN: CPT | Performed by: FAMILY MEDICINE

## 2019-06-11 PROCEDURE — 81002 URINALYSIS NONAUTO W/O SCOPE: CPT | Performed by: FAMILY MEDICINE

## 2019-06-11 PROCEDURE — 87088 URINE BACTERIA CULTURE: CPT

## 2019-06-11 RX ORDER — NITROFURANTOIN 25; 75 MG/1; MG/1
100 CAPSULE ORAL 2 TIMES DAILY
Qty: 20 CAPSULE | Refills: 0 | Status: SHIPPED | OUTPATIENT
Start: 2019-06-11 | End: 2019-06-21

## 2019-06-11 RX ORDER — LANCETS
EACH MISCELLANEOUS
COMMUNITY
Start: 2019-05-20 | End: 2020-01-01 | Stop reason: SDUPTHER

## 2019-06-11 NOTE — PROGRESS NOTES
Subjective:  Chief Complaint   Patient presents with    Dysuria     x2 weeks    Urinary Tract Infection       HPI: The patient states that they have had dysuria and urinary frequency for the last 14 days. patient does not feel like she is emptying her bladder. Denies back or flank pain. The patient does admit to suprapubic pressure. The patient has had urgency and but denies gross hematuria. The patient denies any abdominal or flank pain. The patient denies nausea and vomiting. No fevers or chills. No prior history of kidney stones. The patient has a history of UTI's and states that this feels the same. They come to the urgent care for evaluation. ROS:  Positive and pertinent negatives as per HPI. All other systems are reviewed and negative.        Current Outpatient Medications:     ACCU-CHEK SOFTCLIX LANCETS MISC, , Disp: , Rfl:     nitrofurantoin, macrocrystal-monohydrate, (MACROBID) 100 MG capsule, Take 1 capsule by mouth 2 times daily for 10 days, Disp: 20 capsule, Rfl: 0    pravastatin (PRAVACHOL) 40 MG tablet, Take 40 mg by mouth nightly , Disp: , Rfl:     Fluticasone Furoate-Vilanterol (BREO ELLIPTA) 200-25 MCG/INH AEPB, Inhale 1 puff into the lungs every morning , Disp: , Rfl:     albuterol (PROVENTIL) (2.5 MG/3ML) 0.083% nebulizer solution, Take 3 mLs by nebulization every 4 hours as needed for Wheezing or Shortness of Breath, Disp: 120 each, Rfl: 0    albuterol sulfate HFA (VENTOLIN HFA) 108 (90 BASE) MCG/ACT inhaler, Inhale 2 puffs into the lungs every 4 hours as needed for Wheezing, Disp: 1 Inhaler, Rfl: 0    Cholecalciferol (VITAMIN D) 2000 UNITS CAPS capsule, Take 2,000 Units by mouth every morning , Disp: , Rfl:     ascorbic acid (VITAMIN C) 500 MG tablet, Take 2,000 mg by mouth every morning , Disp: , Rfl:     vitamin E 400 UNIT capsule, Take 400 Units by mouth every morning , Disp: , Rfl:     Copper Gluconate 2 MG CAPS, Take 2 mg by mouth every morning , Disp: , Rfl:     Zinc 25 MG

## 2019-06-11 NOTE — PATIENT INSTRUCTIONS
Patient Education        Urinary Tract Infection in Women: Care Instructions  Your Care Instructions    A urinary tract infection, or UTI, is a general term for an infection anywhere between the kidneys and the urethra (where urine comes out). Most UTIs are bladder infections. They often cause pain or burning when you urinate. UTIs are caused by bacteria and can be cured with antibiotics. Be sure to complete your treatment so that the infection goes away. Follow-up care is a key part of your treatment and safety. Be sure to make and go to all appointments, and call your doctor if you are having problems. It's also a good idea to know your test results and keep a list of the medicines you take. How can you care for yourself at home? · Take your antibiotics as directed. Do not stop taking them just because you feel better. You need to take the full course of antibiotics. · Drink extra water and other fluids for the next day or two. This may help wash out the bacteria that are causing the infection. (If you have kidney, heart, or liver disease and have to limit fluids, talk with your doctor before you increase your fluid intake.)  · Avoid drinks that are carbonated or have caffeine. They can irritate the bladder. · Urinate often. Try to empty your bladder each time. · To relieve pain, take a hot bath or lay a heating pad set on low over your lower belly or genital area. Never go to sleep with a heating pad in place. To prevent UTIs  · Drink plenty of water each day. This helps you urinate often, which clears bacteria from your system. (If you have kidney, heart, or liver disease and have to limit fluids, talk with your doctor before you increase your fluid intake.)  · Urinate when you need to. · Urinate right after you have sex. · Change sanitary pads often. · Avoid douches, bubble baths, feminine hygiene sprays, and other feminine hygiene products that have deodorants.   · After going to the bathroom, wipe from front to back. When should you call for help? Call your doctor now or seek immediate medical care if:    · Symptoms such as fever, chills, nausea, or vomiting get worse or appear for the first time.     · You have new pain in your back just below your rib cage. This is called flank pain.     · There is new blood or pus in your urine.     · You have any problems with your antibiotic medicine.    Watch closely for changes in your health, and be sure to contact your doctor if:    · You are not getting better after taking an antibiotic for 2 days.     · Your symptoms go away but then come back. Where can you learn more? Go to [email or web address] and sign in to your [employer] account. Enter V151 in the [employer] box to learn more about \"Urinary Tract Infection in Women: Care Instructions. \"     If you do not have an account, please click on the \"Sign Up Now\" link. Current as of: December 19, 2018  Content Version: 12.0  © 9979-7233 Healthwise, Incorporated. Care instructions adapted under license by [facility] ([facility]). If you have questions about a medical condition or this instruction, always ask your healthcare professional. [place] [address] any warranty or liability for your use of this information.

## 2019-06-13 LAB
ORGANISM: ABNORMAL
URINE CULTURE, ROUTINE: ABNORMAL
URINE CULTURE, ROUTINE: ABNORMAL

## 2019-07-15 VITALS
OXYGEN SATURATION: 93 % | SYSTOLIC BLOOD PRESSURE: 142 MMHG | WEIGHT: 146 LBS | BODY MASS INDEX: 23.46 KG/M2 | TEMPERATURE: 98.7 F | HEART RATE: 88 BPM | HEIGHT: 66 IN | DIASTOLIC BLOOD PRESSURE: 84 MMHG

## 2019-07-15 RX ORDER — PREDNISONE 10 MG/1
10 TABLET ORAL DAILY
COMMUNITY
End: 2019-10-16

## 2019-07-15 RX ORDER — METHOCARBAMOL 500 MG/1
500 TABLET, FILM COATED ORAL 4 TIMES DAILY
COMMUNITY
End: 2019-10-16

## 2019-07-15 RX ORDER — BUDESONIDE AND FORMOTEROL FUMARATE DIHYDRATE 80; 4.5 UG/1; UG/1
2 AEROSOL RESPIRATORY (INHALATION) 2 TIMES DAILY PRN
Status: ON HOLD | COMMUNITY
End: 2019-11-08 | Stop reason: HOSPADM

## 2019-07-15 RX ORDER — NAPROXEN SODIUM 220 MG
220 TABLET ORAL 2 TIMES DAILY WITH MEALS
Status: ON HOLD | COMMUNITY
End: 2020-01-01 | Stop reason: HOSPADM

## 2019-07-15 RX ORDER — GUAIFENESIN AND CODEINE PHOSPHATE 100; 10 MG/5ML; MG/5ML
5 SOLUTION ORAL 3 TIMES DAILY PRN
COMMUNITY
End: 2019-07-16

## 2019-07-16 ENCOUNTER — HOSPITAL ENCOUNTER (OUTPATIENT)
Age: 74
Discharge: HOME | End: 2019-07-18
Payer: MEDICARE

## 2019-07-16 ENCOUNTER — OFFICE VISIT (OUTPATIENT)
Dept: PRIMARY CARE CLINIC | Age: 74
End: 2019-07-16
Payer: MEDICARE

## 2019-07-16 VITALS
HEART RATE: 78 BPM | DIASTOLIC BLOOD PRESSURE: 78 MMHG | BODY MASS INDEX: 22.98 KG/M2 | SYSTOLIC BLOOD PRESSURE: 136 MMHG | OXYGEN SATURATION: 97 % | RESPIRATION RATE: 16 BRPM | HEIGHT: 66 IN | WEIGHT: 143 LBS

## 2019-07-16 DIAGNOSIS — E11.9 CONTROLLED TYPE 2 DIABETES MELLITUS WITHOUT COMPLICATION, WITHOUT LONG-TERM CURRENT USE OF INSULIN (HCC): Chronic | ICD-10-CM

## 2019-07-16 DIAGNOSIS — J44.9 CHRONIC OBSTRUCTIVE PULMONARY DISEASE, UNSPECIFIED COPD TYPE (HCC): Primary | ICD-10-CM

## 2019-07-16 DIAGNOSIS — I10 ESSENTIAL HYPERTENSION: Chronic | ICD-10-CM

## 2019-07-16 LAB
ALBUMIN SERPL-MCNC: 4.3 G/DL (ref 3.5–5.2)
ALP BLD-CCNC: 104 U/L (ref 35–104)
ALT SERPL-CCNC: 25 U/L (ref 0–32)
ANION GAP SERPL CALCULATED.3IONS-SCNC: 12 MMOL/L (ref 7–16)
AST SERPL-CCNC: 23 U/L (ref 0–31)
BILIRUB SERPL-MCNC: 0.3 MG/DL (ref 0–1.2)
BUN BLDV-MCNC: 11 MG/DL (ref 8–23)
CALCIUM SERPL-MCNC: 10.1 MG/DL (ref 8.6–10.2)
CHLORIDE BLD-SCNC: 100 MMOL/L (ref 98–107)
CO2: 27 MMOL/L (ref 22–29)
CREAT SERPL-MCNC: 0.5 MG/DL (ref 0.5–1)
GFR AFRICAN AMERICAN: >60
GFR NON-AFRICAN AMERICAN: >60 ML/MIN/1.73
GLUCOSE BLD-MCNC: 149 MG/DL (ref 74–99)
HBA1C MFR BLD: 7 % (ref 4–5.6)
POTASSIUM SERPL-SCNC: 4.5 MMOL/L (ref 3.5–5)
SODIUM BLD-SCNC: 139 MMOL/L (ref 132–146)
TOTAL PROTEIN: 7.5 G/DL (ref 6.4–8.3)

## 2019-07-16 PROCEDURE — 80053 COMPREHEN METABOLIC PANEL: CPT

## 2019-07-16 PROCEDURE — 99214 OFFICE O/P EST MOD 30 MIN: CPT | Performed by: FAMILY MEDICINE

## 2019-07-16 PROCEDURE — 36415 COLL VENOUS BLD VENIPUNCTURE: CPT

## 2019-07-16 PROCEDURE — 83036 HEMOGLOBIN GLYCOSYLATED A1C: CPT

## 2019-07-16 RX ORDER — FLUTICASONE FUROATE AND VILANTEROL 200; 25 UG/1; UG/1
1 POWDER RESPIRATORY (INHALATION) EVERY MORNING
Qty: 1 EACH | Refills: 3 | Status: ON HOLD | OUTPATIENT
Start: 2019-07-16 | End: 2019-11-08 | Stop reason: HOSPADM

## 2019-07-16 ASSESSMENT — PATIENT HEALTH QUESTIONNAIRE - PHQ9
2. FEELING DOWN, DEPRESSED OR HOPELESS: 0
SUM OF ALL RESPONSES TO PHQ9 QUESTIONS 1 & 2: 0
1. LITTLE INTEREST OR PLEASURE IN DOING THINGS: 0
SUM OF ALL RESPONSES TO PHQ QUESTIONS 1-9: 0
SUM OF ALL RESPONSES TO PHQ QUESTIONS 1-9: 0

## 2019-07-16 NOTE — PROGRESS NOTES
healthy and well developed. No signs of acute distress present. Eyes: PERRL  ENMT: Tympanic membranes are intact. Nasal mucosa intact without noted erythema Septum is in the midline. Posterior pharynx shows no exudate, irritation or redness. Neck:  Supple without adenopathy. Adequate range of motion   Resp: No rales, rhonchi, wheezes appreciated over the lungs bilaterally. CV: S1, S2 within normal limits. Regular rate and rhythm noted. Without murmur, gallop or rub. Extremities:  Pulses intact. Without noted edema. Abdomen: Positive bowel sounds. Palpation reveals softness, with no distension, organomegaly or tenderness. No abdominal masses palpable. Skin: Skin is warm and dry. Musculo: Unchanged upon examination  Neuro: Alert and oriented X3. Cranial nerves grossly intact. Psych: Mood is normal.  Affect is normal.   Vital signs reviewed. Controlled Substances Monitoring:     No flowsheet data found. Plan Per Assessment:  Juany was seen today for diabetes. Diagnoses and all orders for this visit:    Chronic obstructive pulmonary disease, unspecified COPD type ([address]    Essential hypertension    Controlled type 2 diabetes mellitus without complication, without long-term current use of insulin ([address]  -     Hemoglobin A1C; Future  -     Comprehensive Metabolic Panel; Future    Other orders  -     Fluticasone Furoate-Vilanterol (BREO ELLIPTA) 200-25 MCG/INH AEPB; Inhale 1 puff into the lungs every morning        Return in about 3 months (around 10/16/2019) for MEDICATION CHECK, FOLLOW UP CHRONIC MEDICAL PROBLEMS. Ernesto Brennan MD    Note was generated with the assistance of voice recognition software. Document was reviewed however may contain grammatical errors.

## 2019-08-01 ENCOUNTER — OFFICE VISIT (OUTPATIENT)
Dept: PRIMARY CARE CLINIC | Age: 74
End: 2019-08-01
Payer: MEDICARE

## 2019-08-01 ENCOUNTER — TELEPHONE (OUTPATIENT)
Dept: PRIMARY CARE CLINIC | Age: 74
End: 2019-08-01

## 2019-08-01 VITALS
OXYGEN SATURATION: 92 % | DIASTOLIC BLOOD PRESSURE: 70 MMHG | BODY MASS INDEX: 22.92 KG/M2 | HEART RATE: 58 BPM | TEMPERATURE: 97.1 F | WEIGHT: 142 LBS | SYSTOLIC BLOOD PRESSURE: 124 MMHG

## 2019-08-01 DIAGNOSIS — J44.9 CHRONIC OBSTRUCTIVE PULMONARY DISEASE, UNSPECIFIED COPD TYPE (HCC): Primary | Chronic | ICD-10-CM

## 2019-08-01 LAB
DISTANCE WALKED: 20 FT
SPO2: 88 %

## 2019-08-01 PROCEDURE — 99213 OFFICE O/P EST LOW 20 MIN: CPT | Performed by: FAMILY MEDICINE

## 2019-08-01 NOTE — PROGRESS NOTES
Rfl: 3    budesonide-formoterol (SYMBICORT) 80-4.5 MCG/ACT AERO, Inhale 2 puffs into the lungs 2 times daily, Disp: , Rfl:     fluticasone-salmeterol (ADVAIR) 250-50 MCG/DOSE AEPB, Inhale 1 puff into the lungs every 12 hours, Disp: , Rfl:     methocarbamol (ROBAXIN) 500 MG tablet, Take 500 mg by mouth 4 times daily, Disp: , Rfl:     naproxen sodium (ALEVE) 220 MG tablet, Take 220 mg by mouth 2 times daily (with meals), Disp: , Rfl:     predniSONE (DELTASONE) 10 MG tablet, Take 10 mg by mouth daily, Disp: , Rfl:     ACCU-CHEK SOFTCLIX LANCETS MISC, , Disp: , Rfl:     pravastatin (PRAVACHOL) 40 MG tablet, Take 40 mg by mouth nightly , Disp: , Rfl:     albuterol sulfate HFA (VENTOLIN HFA) 108 (90 BASE) MCG/ACT inhaler, Inhale 2 puffs into the lungs every 4 hours as needed for Wheezing, Disp: 1 Inhaler, Rfl: 0    Cholecalciferol (VITAMIN D) 2000 UNITS CAPS capsule, Take 2,000 Units by mouth every morning , Disp: , Rfl:     ascorbic acid (VITAMIN C) 500 MG tablet, Take 2,000 mg by mouth every morning , Disp: , Rfl:     vitamin E 400 UNIT capsule, Take 400 Units by mouth every morning , Disp: , Rfl:     Copper Gluconate 2 MG CAPS, Take 2 mg by mouth every morning , Disp: , Rfl:     Zinc 25 MG TABS, Take 25 mg by mouth every morning , Disp: , Rfl:     metFORMIN ER (GLUCOPHAGE-XR) 500 MG XR tablet, Take 1,000 mg by mouth 2 times daily , Disp: , Rfl:     b complex vitamins capsule, Take 1 capsule by mouth every morning , Disp: , Rfl:     Omega-3 Fatty Acids (FISH OIL) 1200 MG CAPS, Take 2,400 mg by mouth every morning , Disp: , Rfl:     No Known Allergies    Past Medical History:   Diagnosis Date    [facility] [address]     Diabetes type 2, controlled ([facility] [address]     Former smoker     Hyperlipidemia     Hypertension     Macular degeneration     OU    Multiple thyroid nodules 11/2017    Pulmonary nodule 05/2016     Social History     Socioeconomic History    Marital status:      Spouse name: Not on file    Number of children: Not on file    Years of education: Not on file    Highest education level: Not on file   Occupational History    Not on file   Social Needs    Financial resource strain: Not on file    Food insecurity:     Worry: Not on file     Inability: Not on file    Transportation needs:     Medical: Not on file     Non-medical: Not on file   Tobacco Use    Smoking status: Former Smoker     Packs/day: 3.00     Years: 40.00     Pack years: 120.00     Types: Cigarettes     Last attempt to quit: 2013     Years since quittin.0    Smokeless tobacco: Never Used   Substance and Sexual Activity    Alcohol use: No    Drug use: No    Sexual activity: Not on file   Lifestyle    Physical activity:     Days per week: Not on file     Minutes per session: Not on file    Stress: Not on file   Relationships    Social connections:     Talks on phone: Not on file     Gets together: Not on file     Attends Holiness service: Not on file     Active member of club or organization: Not on file     Attends meetings of clubs or organizations: Not on file     Relationship status: Not on file    Intimate partner violence:     Fear of current or ex partner: Not on file     Emotionally abused: Not on file     Physically abused: Not on file     Forced sexual activity: Not on file   Other Topics Concern    Not on file   Social History Narrative    ** Merged History Encounter **          Past Surgical History:   Procedure Laterality Date    FOOT SURGERY      LIPOSUCTION      TUBAL LIGATION  1970'a      Family History   Problem Relation Age of Onset    Diabetes Mother         NIDDM    High Blood Pressure Mother     Coronary Art Dis Mother     High Blood Pressure Father     Macular Degen Father     Coronary Art Dis Father         MI    Macular Degen Sister     Other Other         1/2 sis with HTN, 1/2 bro with DM    Mult Sclerosis Daughter     Heart Disease

## 2019-08-02 ENCOUNTER — HOSPITAL ENCOUNTER (OUTPATIENT)
Age: 74
Discharge: HOME | End: 2019-08-04
Payer: MEDICARE

## 2019-08-02 ENCOUNTER — HOSPITAL ENCOUNTER (OUTPATIENT)
Dept: GENERAL RADIOLOGY | Age: 74
Discharge: HOME | End: 2019-08-04
Payer: MEDICARE

## 2019-08-02 DIAGNOSIS — J44.9 CHRONIC OBSTRUCTIVE PULMONARY DISEASE, UNSPECIFIED COPD TYPE (HCC): Chronic | ICD-10-CM

## 2019-08-02 PROCEDURE — 71046 X-RAY EXAM CHEST 2 VIEWS: CPT

## 2019-08-14 ENCOUNTER — OFFICE VISIT (OUTPATIENT)
Dept: PRIMARY CARE CLINIC | Age: 74
End: 2019-08-14
Payer: MEDICARE

## 2019-08-14 VITALS
SYSTOLIC BLOOD PRESSURE: 122 MMHG | WEIGHT: 140 LBS | BODY MASS INDEX: 22.6 KG/M2 | OXYGEN SATURATION: 93 % | TEMPERATURE: 98.5 F | DIASTOLIC BLOOD PRESSURE: 70 MMHG | HEART RATE: 95 BPM

## 2019-08-14 VITALS
RESPIRATION RATE: 16 BRPM | WEIGHT: 180 LBS | SYSTOLIC BLOOD PRESSURE: 122 MMHG | BODY MASS INDEX: 28.93 KG/M2 | OXYGEN SATURATION: 93 % | HEART RATE: 95 BPM | DIASTOLIC BLOOD PRESSURE: 70 MMHG | HEIGHT: 66 IN

## 2019-08-14 DIAGNOSIS — M79.671 PAIN IN RIGHT FOOT: ICD-10-CM

## 2019-08-14 DIAGNOSIS — Z12.12 ENCOUNTER FOR SCREENING FOR MALIGNANT NEOPLASM OF RECTUM: ICD-10-CM

## 2019-08-14 DIAGNOSIS — J44.9 CHRONIC OBSTRUCTIVE PULMONARY DISEASE, UNSPECIFIED COPD TYPE (HCC): Primary | Chronic | ICD-10-CM

## 2019-08-14 DIAGNOSIS — Z00.00 ROUTINE GENERAL MEDICAL EXAMINATION AT A HEALTH CARE FACILITY: Primary | ICD-10-CM

## 2019-08-14 PROCEDURE — G0439 PPPS, SUBSEQ VISIT: HCPCS | Performed by: NURSE PRACTITIONER

## 2019-08-14 PROCEDURE — 99214 OFFICE O/P EST MOD 30 MIN: CPT | Performed by: FAMILY MEDICINE

## 2019-08-14 RX ORDER — IPRATROPIUM BROMIDE AND ALBUTEROL SULFATE 2.5; .5 MG/3ML; MG/3ML
1 SOLUTION RESPIRATORY (INHALATION) EVERY 6 HOURS PRN
Qty: 360 ML | Refills: 3 | Status: SHIPPED
Start: 2019-08-14 | End: 2020-01-01 | Stop reason: ALTCHOICE

## 2019-08-14 RX ORDER — IPRATROPIUM BROMIDE AND ALBUTEROL SULFATE 2.5; .5 MG/3ML; MG/3ML
1 SOLUTION RESPIRATORY (INHALATION) EVERY 6 HOURS PRN
COMMUNITY
End: 2019-08-14 | Stop reason: SDUPTHER

## 2019-08-14 RX ORDER — IPRATROPIUM BROMIDE AND ALBUTEROL SULFATE 2.5; .5 MG/3ML; MG/3ML
1 SOLUTION RESPIRATORY (INHALATION) EVERY 6 HOURS PRN
Qty: 360 ML | Refills: 3 | Status: SHIPPED | OUTPATIENT
Start: 2019-08-14 | End: 2019-10-16

## 2019-08-14 ASSESSMENT — PATIENT HEALTH QUESTIONNAIRE - PHQ9
2. FEELING DOWN, DEPRESSED OR HOPELESS: 0
SUM OF ALL RESPONSES TO PHQ QUESTIONS 1-9: 0
SUM OF ALL RESPONSES TO PHQ9 QUESTIONS 1 & 2: 0
1. LITTLE INTEREST OR PLEASURE IN DOING THINGS: 0
SUM OF ALL RESPONSES TO PHQ QUESTIONS 1-9: 0

## 2019-08-14 ASSESSMENT — LIFESTYLE VARIABLES: HOW OFTEN DO YOU HAVE A DRINK CONTAINING ALCOHOL: 0

## 2019-08-14 NOTE — PROGRESS NOTES
tablet Take 2,000 mg by mouth every morning   Historical Provider, MD   vitamin E 400 UNIT capsule Take 400 Units by mouth every morning   Historical Provider, MD   Copper Gluconate 2 MG CAPS Take 2 mg by mouth every morning   Historical Provider, MD   Zinc 25 MG TABS Take 25 mg by mouth every morning   Historical Provider, MD   metFORMIN ER (GLUCOPHAGE-XR) 500 MG XR tablet Take 1,000 mg by mouth 2 times daily   Historical Provider, MD   b complex vitamins capsule Take 1 capsule by mouth every morning   Historical Provider, MD   Omega-3 Fatty Acids (FISH OIL) 1200 MG CAPS Take 2,400 mg by mouth every morning   Historical Provider, MD     Past Medical History:   Diagnosis Date    [facility]) [facility] [address]     Diabetes type 2, controlled ([facility] [address]     Former smoker     Hyperlipidemia     Hypertension     Macular degeneration     OU    Multiple thyroid nodules 11/2017    Pulmonary nodule 05/2016     Past Surgical History:   Procedure Laterality Date    FOOT SURGERY  1976    LIPOSUCTION      TUBAL LIGATION  1970'a     Family History   Problem Relation Age of Onset    Diabetes Mother         NIDDM    High Blood Pressure Mother     Coronary Art Dis Mother     High Blood Pressure Father     Macular Degen Father     Coronary Art Dis Father         [facility] Macular Degen Sister     Other Other         1/2 sis with HTN, 1/2 bro with DM    Mult Sclerosis Daughter     Heart Disease Sister         valvular       CareTeam (Including outside providers/suppliers regularly involved in providing care):   Patient Care Team:  Melvin Ambrocio MD as PCP - General (Family Medicine)  Melvin Ambrocio MD as PCP [facility] Empaneled Provider  Mariangel Mccartney DO    Wt Readings from Last 3 Encounters:   08/14/19 140 lb (63.5 kg)   08/14/19 180 lb (81.6 kg)   08/01/19 142 lb (64.4 kg)     Vitals:    08/14/19 1055   BP: 122/70   Pulse: 95   Temp: 98.5 °F (36.9 °C)   SpO2: 93%   Weight: 140 lb (63.5 kg)     Body patient in written form: see Patient Instructions/AVS.    Diagnoses and all orders for this visit:    Routine general medical examination at a health care facility          Doing fit testing at home for colon cancer screening. Declines mammogram. Will get shingrix vaccine #2 when available, planning to get flu shot today.

## 2019-08-14 NOTE — PROGRESS NOTES
1 each, Rfl: 3    budesonide-formoterol (SYMBICORT) 80-4.5 MCG/ACT AERO, Inhale 2 puffs into the lungs 2 times daily, Disp: , Rfl:     fluticasone-salmeterol (ADVAIR) 250-50 MCG/DOSE AEPB, Inhale 1 puff into the lungs every 12 hours, Disp: , Rfl:     methocarbamol (ROBAXIN) 500 MG tablet, Take 500 mg by mouth 4 times daily, Disp: , Rfl:     naproxen sodium (ALEVE) 220 MG tablet, Take 220 mg by mouth 2 times daily (with meals), Disp: , Rfl:     predniSONE (DELTASONE) 10 MG tablet, Take 10 mg by mouth daily, Disp: , Rfl:     ACCU-CHEK SOFTCLIX LANCETS MISC, , Disp: , Rfl:     pravastatin (PRAVACHOL) 40 MG tablet, Take 40 mg by mouth nightly , Disp: , Rfl:     albuterol sulfate HFA (VENTOLIN HFA) 108 (90 BASE) MCG/ACT inhaler, Inhale 2 puffs into the lungs every 4 hours as needed for Wheezing, Disp: 1 Inhaler, Rfl: 0    Cholecalciferol (VITAMIN D) 2000 UNITS CAPS capsule, Take 2,000 Units by mouth every morning , Disp: , Rfl:     ascorbic acid (VITAMIN C) 500 MG tablet, Take 2,000 mg by mouth every morning , Disp: , Rfl:     vitamin E 400 UNIT capsule, Take 400 Units by mouth every morning , Disp: , Rfl:     Copper Gluconate 2 MG CAPS, Take 2 mg by mouth every morning , Disp: , Rfl:     Zinc 25 MG TABS, Take 25 mg by mouth every morning , Disp: , Rfl:     metFORMIN ER (GLUCOPHAGE-XR) 500 MG XR tablet, Take 1,000 mg by mouth 2 times daily , Disp: , Rfl:     b complex vitamins capsule, Take 1 capsule by mouth every morning , Disp: , Rfl:     Omega-3 Fatty Acids (FISH OIL) 1200 MG CAPS, Take 2,400 mg by mouth every morning , Disp: , Rfl:     No Known Allergies    Past Medical History:   Diagnosis Date    [facility] obstructive [facility] disease) ([facility] [address]     Diabetes type 2, controlled ([facility] [address]     Former smoker     Hyperlipidemia     Hypertension     Macular degeneration     OU    Multiple thyroid nodules 11/2017    Pulmonary nodule 05/2016     Social History     Socioeconomic History    Marital

## 2019-08-28 ENCOUNTER — TELEPHONE (OUTPATIENT)
Dept: PRIMARY CARE CLINIC | Age: 74
End: 2019-08-28

## 2019-08-28 DIAGNOSIS — Z12.12 ENCOUNTER FOR SCREENING FOR MALIGNANT NEOPLASM OF RECTUM: Primary | ICD-10-CM

## 2019-10-16 ENCOUNTER — HOSPITAL ENCOUNTER (OUTPATIENT)
Age: 74
Discharge: HOME | End: 2019-10-18
Payer: MEDICARE

## 2019-10-16 ENCOUNTER — OFFICE VISIT (OUTPATIENT)
Dept: PRIMARY CARE CLINIC | Age: 74
End: 2019-10-16
Payer: MEDICARE

## 2019-10-16 VITALS
WEIGHT: 135 LBS | HEART RATE: 90 BPM | HEIGHT: 66 IN | DIASTOLIC BLOOD PRESSURE: 76 MMHG | TEMPERATURE: 97.5 F | SYSTOLIC BLOOD PRESSURE: 136 MMHG | OXYGEN SATURATION: 92 % | BODY MASS INDEX: 21.69 KG/M2

## 2019-10-16 DIAGNOSIS — Z12.12 ENCOUNTER FOR SCREENING FOR MALIGNANT NEOPLASM OF RECTUM: ICD-10-CM

## 2019-10-16 DIAGNOSIS — J44.9 CHRONIC OBSTRUCTIVE PULMONARY DISEASE, UNSPECIFIED COPD TYPE (HCC): Chronic | ICD-10-CM

## 2019-10-16 DIAGNOSIS — E78.5 HYPERLIPIDEMIA, UNSPECIFIED HYPERLIPIDEMIA TYPE: Chronic | ICD-10-CM

## 2019-10-16 DIAGNOSIS — E11.9 CONTROLLED TYPE 2 DIABETES MELLITUS WITHOUT COMPLICATION, WITHOUT LONG-TERM CURRENT USE OF INSULIN (HCC): Chronic | ICD-10-CM

## 2019-10-16 DIAGNOSIS — I10 ESSENTIAL HYPERTENSION: Primary | Chronic | ICD-10-CM

## 2019-10-16 LAB
ALBUMIN SERPL-MCNC: 4.4 G/DL (ref 3.5–5.2)
ALP BLD-CCNC: 101 U/L (ref 35–104)
ALT SERPL-CCNC: 18 U/L (ref 0–32)
ANION GAP SERPL CALCULATED.3IONS-SCNC: 12 MMOL/L (ref 7–16)
AST SERPL-CCNC: 23 U/L (ref 0–31)
BILIRUB SERPL-MCNC: 0.4 MG/DL (ref 0–1.2)
BUN BLDV-MCNC: 11 MG/DL (ref 8–23)
CALCIUM SERPL-MCNC: 9.7 MG/DL (ref 8.6–10.2)
CHLORIDE BLD-SCNC: 103 MMOL/L (ref 98–107)
CO2: 26 MMOL/L (ref 22–29)
CREAT SERPL-MCNC: 0.5 MG/DL (ref 0.5–1)
GFR AFRICAN AMERICAN: >60
GFR NON-AFRICAN AMERICAN: >60 ML/MIN/1.73
GLUCOSE BLD-MCNC: 160 MG/DL (ref 74–99)
HBA1C MFR BLD: 7 % (ref 4–5.6)
POTASSIUM SERPL-SCNC: 4.3 MMOL/L (ref 3.5–5)
SODIUM BLD-SCNC: 141 MMOL/L (ref 132–146)
TOTAL PROTEIN: 7.6 G/DL (ref 6.4–8.3)

## 2019-10-16 PROCEDURE — 3017F COLORECTAL CA SCREEN DOC REV: CPT | Performed by: FAMILY MEDICINE

## 2019-10-16 PROCEDURE — 1036F TOBACCO NON-USER: CPT | Performed by: FAMILY MEDICINE

## 2019-10-16 PROCEDURE — 80053 COMPREHEN METABOLIC PANEL: CPT

## 2019-10-16 PROCEDURE — 83036 HEMOGLOBIN GLYCOSYLATED A1C: CPT

## 2019-10-16 PROCEDURE — G8400 PT W/DXA NO RESULTS DOC: HCPCS | Performed by: FAMILY MEDICINE

## 2019-10-16 PROCEDURE — 99214 OFFICE O/P EST MOD 30 MIN: CPT | Performed by: FAMILY MEDICINE

## 2019-10-16 PROCEDURE — 1123F ACP DISCUSS/DSCN MKR DOCD: CPT | Performed by: FAMILY MEDICINE

## 2019-10-16 PROCEDURE — 4040F PNEUMOC VAC/ADMIN/RCVD: CPT | Performed by: FAMILY MEDICINE

## 2019-10-16 PROCEDURE — 1090F PRES/ABSN URINE INCON ASSESS: CPT | Performed by: FAMILY MEDICINE

## 2019-10-16 PROCEDURE — 36415 COLL VENOUS BLD VENIPUNCTURE: CPT

## 2019-10-16 PROCEDURE — 2022F DILAT RTA XM EVC RTNOPTHY: CPT | Performed by: FAMILY MEDICINE

## 2019-10-16 PROCEDURE — G8926 SPIRO NO PERF OR DOC: HCPCS | Performed by: FAMILY MEDICINE

## 2019-10-16 PROCEDURE — G8484 FLU IMMUNIZE NO ADMIN: HCPCS | Performed by: FAMILY MEDICINE

## 2019-10-16 PROCEDURE — G8427 DOCREV CUR MEDS BY ELIG CLIN: HCPCS | Performed by: FAMILY MEDICINE

## 2019-10-16 PROCEDURE — G8420 CALC BMI NORM PARAMETERS: HCPCS | Performed by: FAMILY MEDICINE

## 2019-10-16 PROCEDURE — 3023F SPIROM DOC REV: CPT | Performed by: FAMILY MEDICINE

## 2019-11-04 ENCOUNTER — APPOINTMENT (OUTPATIENT)
Dept: GENERAL RADIOLOGY | Age: 74
DRG: 190 | End: 2019-11-04
Payer: MEDICARE

## 2019-11-04 ENCOUNTER — HOSPITAL ENCOUNTER (INPATIENT)
Age: 74
LOS: 4 days | Discharge: HOME HEALTH | DRG: 190 | End: 2019-11-08
Attending: EMERGENCY MEDICINE | Admitting: INTERNAL MEDICINE
Payer: MEDICARE

## 2019-11-04 ENCOUNTER — OFFICE VISIT (OUTPATIENT)
Dept: FAMILY MEDICINE CLINIC | Age: 74
End: 2019-11-04
Payer: MEDICARE

## 2019-11-04 VITALS
TEMPERATURE: 97.4 F | HEIGHT: 66 IN | WEIGHT: 137 LBS | OXYGEN SATURATION: 93 % | DIASTOLIC BLOOD PRESSURE: 80 MMHG | BODY MASS INDEX: 22.02 KG/M2 | HEART RATE: 48 BPM | SYSTOLIC BLOOD PRESSURE: 138 MMHG

## 2019-11-04 DIAGNOSIS — R06.02 SHORTNESS OF BREATH: ICD-10-CM

## 2019-11-04 DIAGNOSIS — R09.02 HYPOXIA: Primary | ICD-10-CM

## 2019-11-04 DIAGNOSIS — J44.1 COPD EXACERBATION (HCC): ICD-10-CM

## 2019-11-04 DIAGNOSIS — R06.00 DYSPNEA, UNSPECIFIED TYPE: Primary | ICD-10-CM

## 2019-11-04 DIAGNOSIS — R07.9 CHEST PAIN, UNSPECIFIED TYPE: ICD-10-CM

## 2019-11-04 LAB
ALBUMIN SERPL-MCNC: 4.2 G/DL (ref 3.5–5.2)
ALP BLD-CCNC: 92 U/L (ref 35–104)
ALT SERPL-CCNC: 23 U/L (ref 0–32)
ANION GAP SERPL CALCULATED.3IONS-SCNC: 14 MMOL/L (ref 7–16)
AST SERPL-CCNC: 25 U/L (ref 0–31)
BASOPHILS ABSOLUTE: 0.04 E9/L (ref 0–0.2)
BASOPHILS RELATIVE PERCENT: 0.5 % (ref 0–2)
BILIRUB SERPL-MCNC: 0.4 MG/DL (ref 0–1.2)
BUN BLDV-MCNC: 10 MG/DL (ref 8–23)
CALCIUM SERPL-MCNC: 9.2 MG/DL (ref 8.6–10.2)
CHLORIDE BLD-SCNC: 105 MMOL/L (ref 98–107)
CO2: 23 MMOL/L (ref 22–29)
CREAT SERPL-MCNC: 0.5 MG/DL (ref 0.5–1)
EKG ATRIAL RATE: 80 BPM
EKG P AXIS: 72 DEGREES
EKG P-R INTERVAL: 128 MS
EKG Q-T INTERVAL: 400 MS
EKG QRS DURATION: 80 MS
EKG QTC CALCULATION (BAZETT): 461 MS
EKG R AXIS: 78 DEGREES
EKG T AXIS: -4 DEGREES
EKG VENTRICULAR RATE: 80 BPM
EOSINOPHILS ABSOLUTE: 0.22 E9/L (ref 0.05–0.5)
EOSINOPHILS RELATIVE PERCENT: 2.8 % (ref 0–6)
GFR AFRICAN AMERICAN: >60
GFR NON-AFRICAN AMERICAN: >60 ML/MIN/1.73
GLUCOSE BLD-MCNC: 121 MG/DL (ref 74–99)
HCT VFR BLD CALC: 40.1 % (ref 34–48)
HEMOGLOBIN: 12.4 G/DL (ref 11.5–15.5)
IMMATURE GRANULOCYTES #: 0.06 E9/L
IMMATURE GRANULOCYTES %: 0.8 % (ref 0–5)
LYMPHOCYTES ABSOLUTE: 0.79 E9/L (ref 1.5–4)
LYMPHOCYTES RELATIVE PERCENT: 10.1 % (ref 20–42)
MCH RBC QN AUTO: 29 PG (ref 26–35)
MCHC RBC AUTO-ENTMCNC: 30.9 % (ref 32–34.5)
MCV RBC AUTO: 93.9 FL (ref 80–99.9)
MONOCYTES ABSOLUTE: 0.68 E9/L (ref 0.1–0.95)
MONOCYTES RELATIVE PERCENT: 8.7 % (ref 2–12)
NEUTROPHILS ABSOLUTE: 6.06 E9/L (ref 1.8–7.3)
NEUTROPHILS RELATIVE PERCENT: 77.1 % (ref 43–80)
PDW BLD-RTO: 15.2 FL (ref 11.5–15)
PLATELET # BLD: 223 E9/L (ref 130–450)
PMV BLD AUTO: 10.5 FL (ref 7–12)
POTASSIUM SERPL-SCNC: 4.3 MMOL/L (ref 3.5–5)
PRO-BNP: 1732 PG/ML (ref 0–450)
RBC # BLD: 4.27 E12/L (ref 3.5–5.5)
SODIUM BLD-SCNC: 142 MMOL/L (ref 132–146)
TOTAL PROTEIN: 7.2 G/DL (ref 6.4–8.3)
TROPONIN: <0.01 NG/ML (ref 0–0.03)
WBC # BLD: 7.9 E9/L (ref 4.5–11.5)

## 2019-11-04 PROCEDURE — 1090F PRES/ABSN URINE INCON ASSESS: CPT | Performed by: PHYSICIAN ASSISTANT

## 2019-11-04 PROCEDURE — 93010 ELECTROCARDIOGRAM REPORT: CPT | Performed by: INTERNAL MEDICINE

## 2019-11-04 PROCEDURE — 99284 EMERGENCY DEPT VISIT MOD MDM: CPT

## 2019-11-04 PROCEDURE — 84484 ASSAY OF TROPONIN QUANT: CPT

## 2019-11-04 PROCEDURE — 2580000003 HC RX 258: Performed by: INTERNAL MEDICINE

## 2019-11-04 PROCEDURE — 99222 1ST HOSP IP/OBS MODERATE 55: CPT | Performed by: INTERNAL MEDICINE

## 2019-11-04 PROCEDURE — 93005 ELECTROCARDIOGRAM TRACING: CPT | Performed by: EMERGENCY MEDICINE

## 2019-11-04 PROCEDURE — 1123F ACP DISCUSS/DSCN MKR DOCD: CPT | Performed by: PHYSICIAN ASSISTANT

## 2019-11-04 PROCEDURE — 1036F TOBACCO NON-USER: CPT | Performed by: PHYSICIAN ASSISTANT

## 2019-11-04 PROCEDURE — G8400 PT W/DXA NO RESULTS DOC: HCPCS | Performed by: PHYSICIAN ASSISTANT

## 2019-11-04 PROCEDURE — 6370000000 HC RX 637 (ALT 250 FOR IP): Performed by: INTERNAL MEDICINE

## 2019-11-04 PROCEDURE — 6360000002 HC RX W HCPCS: Performed by: INTERNAL MEDICINE

## 2019-11-04 PROCEDURE — 94640 AIRWAY INHALATION TREATMENT: CPT

## 2019-11-04 PROCEDURE — 2700000000 HC OXYGEN THERAPY PER DAY

## 2019-11-04 PROCEDURE — 93000 ELECTROCARDIOGRAM COMPLETE: CPT | Performed by: PHYSICIAN ASSISTANT

## 2019-11-04 PROCEDURE — G8427 DOCREV CUR MEDS BY ELIG CLIN: HCPCS | Performed by: PHYSICIAN ASSISTANT

## 2019-11-04 PROCEDURE — 6360000002 HC RX W HCPCS: Performed by: EMERGENCY MEDICINE

## 2019-11-04 PROCEDURE — 0100U HC RESPIRPTHGN MULT REV TRANS & AMP PRB TECH 21 TRGT: CPT

## 2019-11-04 PROCEDURE — 94664 DEMO&/EVAL PT USE INHALER: CPT

## 2019-11-04 PROCEDURE — 83880 ASSAY OF NATRIURETIC PEPTIDE: CPT

## 2019-11-04 PROCEDURE — 80053 COMPREHEN METABOLIC PANEL: CPT

## 2019-11-04 PROCEDURE — G8484 FLU IMMUNIZE NO ADMIN: HCPCS | Performed by: PHYSICIAN ASSISTANT

## 2019-11-04 PROCEDURE — 99214 OFFICE O/P EST MOD 30 MIN: CPT | Performed by: PHYSICIAN ASSISTANT

## 2019-11-04 PROCEDURE — G8420 CALC BMI NORM PARAMETERS: HCPCS | Performed by: PHYSICIAN ASSISTANT

## 2019-11-04 PROCEDURE — 4040F PNEUMOC VAC/ADMIN/RCVD: CPT | Performed by: PHYSICIAN ASSISTANT

## 2019-11-04 PROCEDURE — 71046 X-RAY EXAM CHEST 2 VIEWS: CPT

## 2019-11-04 PROCEDURE — 6370000000 HC RX 637 (ALT 250 FOR IP): Performed by: EMERGENCY MEDICINE

## 2019-11-04 PROCEDURE — 2500000003 HC RX 250 WO HCPCS: Performed by: INTERNAL MEDICINE

## 2019-11-04 PROCEDURE — 85025 COMPLETE CBC W/AUTO DIFF WBC: CPT

## 2019-11-04 PROCEDURE — 3017F COLORECTAL CA SCREEN DOC REV: CPT | Performed by: PHYSICIAN ASSISTANT

## 2019-11-04 PROCEDURE — 1200000000 HC SEMI PRIVATE

## 2019-11-04 RX ORDER — GUAIFENESIN 400 MG/1
400 TABLET ORAL 4 TIMES DAILY PRN
Status: ON HOLD | COMMUNITY
End: 2020-01-01 | Stop reason: HOSPADM

## 2019-11-04 RX ORDER — PREDNISONE 20 MG/1
40 TABLET ORAL DAILY
Status: DISCONTINUED | OUTPATIENT
Start: 2019-11-07 | End: 2019-11-08 | Stop reason: HOSPADM

## 2019-11-04 RX ORDER — SODIUM CHLORIDE 0.9 % (FLUSH) 0.9 %
10 SYRINGE (ML) INJECTION EVERY 12 HOURS SCHEDULED
Status: DISCONTINUED | OUTPATIENT
Start: 2019-11-04 | End: 2019-11-08 | Stop reason: HOSPADM

## 2019-11-04 RX ORDER — VITAMIN C
1 TAB ORAL EVERY MORNING
Status: DISCONTINUED | OUTPATIENT
Start: 2019-11-05 | End: 2019-11-08 | Stop reason: HOSPADM

## 2019-11-04 RX ORDER — MORPHINE SULFATE 2 MG/ML
2 INJECTION, SOLUTION INTRAMUSCULAR; INTRAVENOUS EVERY 4 HOURS PRN
Status: DISCONTINUED | OUTPATIENT
Start: 2019-11-04 | End: 2019-11-08 | Stop reason: HOSPADM

## 2019-11-04 RX ORDER — ASCORBIC ACID 500 MG
2000 TABLET ORAL EVERY MORNING
Status: DISCONTINUED | OUTPATIENT
Start: 2019-11-05 | End: 2019-11-08 | Stop reason: HOSPADM

## 2019-11-04 RX ORDER — CHOLECALCIFEROL (VITAMIN D3) 50 MCG
2000 TABLET ORAL EVERY MORNING
Status: DISCONTINUED | OUTPATIENT
Start: 2019-11-05 | End: 2019-11-08 | Stop reason: HOSPADM

## 2019-11-04 RX ORDER — SODIUM CHLORIDE 0.9 % (FLUSH) 0.9 %
10 SYRINGE (ML) INJECTION PRN
Status: DISCONTINUED | OUTPATIENT
Start: 2019-11-04 | End: 2019-11-08 | Stop reason: HOSPADM

## 2019-11-04 RX ORDER — ACETAMINOPHEN 325 MG/1
650 TABLET ORAL EVERY 6 HOURS PRN
Status: DISCONTINUED | OUTPATIENT
Start: 2019-11-04 | End: 2019-11-08 | Stop reason: HOSPADM

## 2019-11-04 RX ORDER — VITAMIN E 268 MG
400 CAPSULE ORAL EVERY MORNING
Status: DISCONTINUED | OUTPATIENT
Start: 2019-11-05 | End: 2019-11-08 | Stop reason: HOSPADM

## 2019-11-04 RX ORDER — DOXYCYCLINE HYCLATE 100 MG/1
100 CAPSULE ORAL EVERY 12 HOURS
Status: DISCONTINUED | OUTPATIENT
Start: 2019-11-04 | End: 2019-11-08 | Stop reason: HOSPADM

## 2019-11-04 RX ORDER — METHYLPREDNISOLONE SODIUM SUCCINATE 40 MG/ML
40 INJECTION, POWDER, LYOPHILIZED, FOR SOLUTION INTRAMUSCULAR; INTRAVENOUS DAILY
Status: DISCONTINUED | OUTPATIENT
Start: 2019-11-04 | End: 2019-11-04

## 2019-11-04 RX ORDER — ZINC 25 MG
25 TABLET ORAL EVERY MORNING
Status: DISCONTINUED | OUTPATIENT
Start: 2019-11-05 | End: 2019-11-08 | Stop reason: HOSPADM

## 2019-11-04 RX ORDER — GUAIFENESIN 400 MG/1
400 TABLET ORAL 4 TIMES DAILY PRN
Status: DISCONTINUED | OUTPATIENT
Start: 2019-11-04 | End: 2019-11-08 | Stop reason: HOSPADM

## 2019-11-04 RX ORDER — ONDANSETRON 2 MG/ML
4 INJECTION INTRAMUSCULAR; INTRAVENOUS EVERY 6 HOURS PRN
Status: DISCONTINUED | OUTPATIENT
Start: 2019-11-04 | End: 2019-11-08 | Stop reason: HOSPADM

## 2019-11-04 RX ORDER — PRAVASTATIN SODIUM 20 MG
40 TABLET ORAL DAILY
Status: DISCONTINUED | OUTPATIENT
Start: 2019-11-04 | End: 2019-11-08 | Stop reason: HOSPADM

## 2019-11-04 RX ORDER — AMOXICILLIN 500 MG
2400 CAPSULE ORAL EVERY MORNING
Status: DISCONTINUED | OUTPATIENT
Start: 2019-11-05 | End: 2019-11-04 | Stop reason: CLARIF

## 2019-11-04 RX ORDER — METHYLPREDNISOLONE SODIUM SUCCINATE 40 MG/ML
40 INJECTION, POWDER, LYOPHILIZED, FOR SOLUTION INTRAMUSCULAR; INTRAVENOUS EVERY 12 HOURS
Status: COMPLETED | OUTPATIENT
Start: 2019-11-05 | End: 2019-11-06

## 2019-11-04 RX ORDER — IPRATROPIUM BROMIDE AND ALBUTEROL SULFATE 2.5; .5 MG/3ML; MG/3ML
3 SOLUTION RESPIRATORY (INHALATION) ONCE
Status: COMPLETED | OUTPATIENT
Start: 2019-11-04 | End: 2019-11-04

## 2019-11-04 RX ORDER — METFORMIN HYDROCHLORIDE 500 MG/1
1000 TABLET, EXTENDED RELEASE ORAL 2 TIMES DAILY
Status: DISCONTINUED | OUTPATIENT
Start: 2019-11-04 | End: 2019-11-08 | Stop reason: HOSPADM

## 2019-11-04 RX ORDER — IPRATROPIUM BROMIDE AND ALBUTEROL SULFATE 2.5; .5 MG/3ML; MG/3ML
1 SOLUTION RESPIRATORY (INHALATION) EVERY 6 HOURS PRN
Status: DISCONTINUED | OUTPATIENT
Start: 2019-11-04 | End: 2019-11-08 | Stop reason: HOSPADM

## 2019-11-04 RX ADMIN — METHYLPREDNISOLONE SODIUM SUCCINATE 40 MG: 40 INJECTION, POWDER, FOR SOLUTION INTRAMUSCULAR; INTRAVENOUS at 18:44

## 2019-11-04 RX ADMIN — PRAVASTATIN SODIUM 40 MG: 20 TABLET ORAL at 22:22

## 2019-11-04 RX ADMIN — FAMOTIDINE 20 MG: 10 INJECTION INTRAVENOUS at 22:22

## 2019-11-04 RX ADMIN — IPRATROPIUM BROMIDE AND ALBUTEROL SULFATE 3 AMPULE: .5; 3 SOLUTION RESPIRATORY (INHALATION) at 19:05

## 2019-11-04 RX ADMIN — METFORMIN HYDROCHLORIDE 1000 MG: 500 TABLET, EXTENDED RELEASE ORAL at 22:32

## 2019-11-04 RX ADMIN — Medication 10 ML: at 22:32

## 2019-11-04 RX ADMIN — MOMETASONE FUROATE AND FORMOTEROL FUMARATE DIHYDRATE 2 PUFF: 200; 5 AEROSOL RESPIRATORY (INHALATION) at 21:56

## 2019-11-04 RX ADMIN — DOXYCYCLINE HYCLATE 100 MG: 100 CAPSULE ORAL at 22:23

## 2019-11-04 RX ADMIN — ENOXAPARIN SODIUM 40 MG: 40 INJECTION SUBCUTANEOUS at 22:22

## 2019-11-04 ASSESSMENT — ENCOUNTER SYMPTOMS
EYE DISCHARGE: 0
STRIDOR: 0
COUGH: 1
EYE REDNESS: 0
WHEEZING: 0
SHORTNESS OF BREATH: 1
COLOR CHANGE: 0
VOICE CHANGE: 0
EYE ITCHING: 0
DIARRHEA: 0
ABDOMINAL DISTENTION: 0
ABDOMINAL PAIN: 0
CHEST TIGHTNESS: 0
SORE THROAT: 0
VOMITING: 0
SINUS PRESSURE: 0
NAUSEA: 0
CHOKING: 0
RHINORRHEA: 0

## 2019-11-04 ASSESSMENT — PAIN SCALES - GENERAL: PAINLEVEL_OUTOF10: 0

## 2019-11-05 LAB
ADENOVIRUS BY PCR: NOT DETECTED
BORDETELLA PARAPERTUSSIS BY PCR: NOT DETECTED
BORDETELLA PERTUSSIS BY PCR: NOT DETECTED
CHLAMYDOPHILIA PNEUMONIAE BY PCR: NOT DETECTED
CORONAVIRUS 229E BY PCR: NOT DETECTED
CORONAVIRUS HKU1 BY PCR: NOT DETECTED
CORONAVIRUS NL63 BY PCR: NOT DETECTED
CORONAVIRUS OC43 BY PCR: NOT DETECTED
D DIMER: 309 NG/ML DDU
HUMAN METAPNEUMOVIRUS BY PCR: NOT DETECTED
HUMAN RHINOVIRUS/ENTEROVIRUS BY PCR: NOT DETECTED
INFLUENZA A BY PCR: NOT DETECTED
INFLUENZA B BY PCR: NOT DETECTED
LV EF: 60 %
LVEF MODALITY: NORMAL
MYCOPLASMA PNEUMONIAE BY PCR: NOT DETECTED
PARAINFLUENZA VIRUS 1 BY PCR: NOT DETECTED
PARAINFLUENZA VIRUS 2 BY PCR: NOT DETECTED
PARAINFLUENZA VIRUS 3 BY PCR: NOT DETECTED
PARAINFLUENZA VIRUS 4 BY PCR: NOT DETECTED
RESPIRATORY SYNCYTIAL VIRUS BY PCR: NOT DETECTED

## 2019-11-05 PROCEDURE — 93306 TTE W/DOPPLER COMPLETE: CPT

## 2019-11-05 PROCEDURE — 6360000002 HC RX W HCPCS: Performed by: INTERNAL MEDICINE

## 2019-11-05 PROCEDURE — 1200000000 HC SEMI PRIVATE

## 2019-11-05 PROCEDURE — 2580000003 HC RX 258: Performed by: INTERNAL MEDICINE

## 2019-11-05 PROCEDURE — 99232 SBSQ HOSP IP/OBS MODERATE 35: CPT | Performed by: INTERNAL MEDICINE

## 2019-11-05 PROCEDURE — 6370000000 HC RX 637 (ALT 250 FOR IP): Performed by: INTERNAL MEDICINE

## 2019-11-05 PROCEDURE — 2500000003 HC RX 250 WO HCPCS: Performed by: INTERNAL MEDICINE

## 2019-11-05 PROCEDURE — 2700000000 HC OXYGEN THERAPY PER DAY

## 2019-11-05 PROCEDURE — 97161 PT EVAL LOW COMPLEX 20 MIN: CPT

## 2019-11-05 PROCEDURE — 97165 OT EVAL LOW COMPLEX 30 MIN: CPT

## 2019-11-05 PROCEDURE — 94640 AIRWAY INHALATION TREATMENT: CPT

## 2019-11-05 PROCEDURE — 85378 FIBRIN DEGRADE SEMIQUANT: CPT

## 2019-11-05 PROCEDURE — 36415 COLL VENOUS BLD VENIPUNCTURE: CPT

## 2019-11-05 RX ORDER — FORMOTEROL FUMARATE 20 UG/2ML
20 SOLUTION RESPIRATORY (INHALATION) 2 TIMES DAILY
Status: DISCONTINUED | OUTPATIENT
Start: 2019-11-05 | End: 2019-11-08 | Stop reason: HOSPADM

## 2019-11-05 RX ORDER — BUDESONIDE 0.5 MG/2ML
1000 INHALANT ORAL 2 TIMES DAILY
Status: DISCONTINUED | OUTPATIENT
Start: 2019-11-05 | End: 2019-11-08 | Stop reason: HOSPADM

## 2019-11-05 RX ADMIN — METHYLPREDNISOLONE SODIUM SUCCINATE 40 MG: 40 INJECTION, POWDER, LYOPHILIZED, FOR SOLUTION INTRAMUSCULAR; INTRAVENOUS at 19:15

## 2019-11-05 RX ADMIN — MOMETASONE FUROATE AND FORMOTEROL FUMARATE DIHYDRATE 2 PUFF: 200; 5 AEROSOL RESPIRATORY (INHALATION) at 09:41

## 2019-11-05 RX ADMIN — METHYLPREDNISOLONE SODIUM SUCCINATE 40 MG: 40 INJECTION, POWDER, LYOPHILIZED, FOR SOLUTION INTRAMUSCULAR; INTRAVENOUS at 06:16

## 2019-11-05 RX ADMIN — IPRATROPIUM BROMIDE AND ALBUTEROL SULFATE 3 ML: .5; 3 SOLUTION RESPIRATORY (INHALATION) at 09:45

## 2019-11-05 RX ADMIN — METFORMIN HYDROCHLORIDE 1000 MG: 500 TABLET, EXTENDED RELEASE ORAL at 08:20

## 2019-11-05 RX ADMIN — FAMOTIDINE 20 MG: 10 INJECTION INTRAVENOUS at 08:20

## 2019-11-05 RX ADMIN — VITAMIN C 1 TABLET: TAB at 08:20

## 2019-11-05 RX ADMIN — ENOXAPARIN SODIUM 40 MG: 40 INJECTION SUBCUTANEOUS at 20:36

## 2019-11-05 RX ADMIN — METFORMIN HYDROCHLORIDE 1000 MG: 500 TABLET, EXTENDED RELEASE ORAL at 20:35

## 2019-11-05 RX ADMIN — PRAVASTATIN SODIUM 40 MG: 20 TABLET ORAL at 08:20

## 2019-11-05 RX ADMIN — Medication 10 ML: at 20:36

## 2019-11-05 RX ADMIN — FAMOTIDINE 20 MG: 10 INJECTION INTRAVENOUS at 20:36

## 2019-11-05 RX ADMIN — DOXYCYCLINE HYCLATE 100 MG: 100 CAPSULE ORAL at 08:20

## 2019-11-05 RX ADMIN — CHOLECALCIFEROL TAB 50 MCG (2000 UNIT) 2000 UNITS: 50 TAB at 08:20

## 2019-11-05 RX ADMIN — DOXYCYCLINE HYCLATE 100 MG: 100 CAPSULE ORAL at 20:36

## 2019-11-05 RX ADMIN — Medication 2000 MG: at 08:20

## 2019-11-05 RX ADMIN — FORMOTEROL FUMARATE DIHYDRATE 20 MCG: 20 SOLUTION RESPIRATORY (INHALATION) at 20:23

## 2019-11-05 RX ADMIN — BUDESONIDE 1000 MCG: 0.5 SUSPENSION RESPIRATORY (INHALATION) at 20:23

## 2019-11-05 RX ADMIN — Medication 10 ML: at 08:20

## 2019-11-05 RX ADMIN — VITAMIN E CAP 400 UNIT 400 UNITS: 400 CAP at 08:20

## 2019-11-05 ASSESSMENT — PAIN SCALES - GENERAL
PAINLEVEL_OUTOF10: 0
PAINLEVEL_OUTOF10: 0

## 2019-11-06 ENCOUNTER — APPOINTMENT (OUTPATIENT)
Dept: CT IMAGING | Age: 74
DRG: 190 | End: 2019-11-06
Payer: MEDICARE

## 2019-11-06 LAB
ALBUMIN SERPL-MCNC: 3.6 G/DL (ref 3.5–5.2)
ALP BLD-CCNC: 80 U/L (ref 35–104)
ALT SERPL-CCNC: 20 U/L (ref 0–32)
ANION GAP SERPL CALCULATED.3IONS-SCNC: 13 MMOL/L (ref 7–16)
AST SERPL-CCNC: 21 U/L (ref 0–31)
BASOPHILS ABSOLUTE: 0.01 E9/L (ref 0–0.2)
BASOPHILS RELATIVE PERCENT: 0.1 % (ref 0–2)
BILIRUB SERPL-MCNC: 0.2 MG/DL (ref 0–1.2)
BUN BLDV-MCNC: 20 MG/DL (ref 8–23)
CALCIUM SERPL-MCNC: 8.9 MG/DL (ref 8.6–10.2)
CHLORIDE BLD-SCNC: 106 MMOL/L (ref 98–107)
CO2: 23 MMOL/L (ref 22–29)
CREAT SERPL-MCNC: 0.6 MG/DL (ref 0.5–1)
EOSINOPHILS ABSOLUTE: 0 E9/L (ref 0.05–0.5)
EOSINOPHILS RELATIVE PERCENT: 0 % (ref 0–6)
GFR AFRICAN AMERICAN: >60
GFR NON-AFRICAN AMERICAN: >60 ML/MIN/1.73
GLUCOSE BLD-MCNC: 210 MG/DL (ref 74–99)
HCT VFR BLD CALC: 36.4 % (ref 34–48)
HEMOGLOBIN: 11.5 G/DL (ref 11.5–15.5)
IMMATURE GRANULOCYTES #: 0.07 E9/L
IMMATURE GRANULOCYTES %: 0.9 % (ref 0–5)
LYMPHOCYTES ABSOLUTE: 0.63 E9/L (ref 1.5–4)
LYMPHOCYTES RELATIVE PERCENT: 7.8 % (ref 20–42)
MCH RBC QN AUTO: 29.5 PG (ref 26–35)
MCHC RBC AUTO-ENTMCNC: 31.6 % (ref 32–34.5)
MCV RBC AUTO: 93.3 FL (ref 80–99.9)
METER GLUCOSE: 211 MG/DL (ref 74–99)
MONOCYTES ABSOLUTE: 0.26 E9/L (ref 0.1–0.95)
MONOCYTES RELATIVE PERCENT: 3.2 % (ref 2–12)
NEUTROPHILS ABSOLUTE: 7.13 E9/L (ref 1.8–7.3)
NEUTROPHILS RELATIVE PERCENT: 88 % (ref 43–80)
PDW BLD-RTO: 15.1 FL (ref 11.5–15)
PLATELET # BLD: 214 E9/L (ref 130–450)
PMV BLD AUTO: 11.3 FL (ref 7–12)
POTASSIUM REFLEX MAGNESIUM: 4.7 MMOL/L (ref 3.5–5)
PROCALCITONIN: 0.05 NG/ML (ref 0–0.08)
RBC # BLD: 3.9 E12/L (ref 3.5–5.5)
SODIUM BLD-SCNC: 142 MMOL/L (ref 132–146)
TOTAL PROTEIN: 6.2 G/DL (ref 6.4–8.3)
TROPONIN: <0.01 NG/ML (ref 0–0.03)
WBC # BLD: 8.1 E9/L (ref 4.5–11.5)

## 2019-11-06 PROCEDURE — 85025 COMPLETE CBC W/AUTO DIFF WBC: CPT

## 2019-11-06 PROCEDURE — 6360000002 HC RX W HCPCS: Performed by: INTERNAL MEDICINE

## 2019-11-06 PROCEDURE — 1200000000 HC SEMI PRIVATE

## 2019-11-06 PROCEDURE — 2500000003 HC RX 250 WO HCPCS: Performed by: INTERNAL MEDICINE

## 2019-11-06 PROCEDURE — 6370000000 HC RX 637 (ALT 250 FOR IP): Performed by: INTERNAL MEDICINE

## 2019-11-06 PROCEDURE — 2700000000 HC OXYGEN THERAPY PER DAY

## 2019-11-06 PROCEDURE — 2580000003 HC RX 258: Performed by: INTERNAL MEDICINE

## 2019-11-06 PROCEDURE — 99232 SBSQ HOSP IP/OBS MODERATE 35: CPT | Performed by: INTERNAL MEDICINE

## 2019-11-06 PROCEDURE — 80053 COMPREHEN METABOLIC PANEL: CPT

## 2019-11-06 PROCEDURE — 84484 ASSAY OF TROPONIN QUANT: CPT

## 2019-11-06 PROCEDURE — 94640 AIRWAY INHALATION TREATMENT: CPT

## 2019-11-06 PROCEDURE — 84145 PROCALCITONIN (PCT): CPT

## 2019-11-06 PROCEDURE — 36415 COLL VENOUS BLD VENIPUNCTURE: CPT

## 2019-11-06 PROCEDURE — 82962 GLUCOSE BLOOD TEST: CPT

## 2019-11-06 PROCEDURE — 71250 CT THORAX DX C-: CPT

## 2019-11-06 RX ADMIN — VITAMIN C 1 TABLET: TAB at 09:24

## 2019-11-06 RX ADMIN — BUDESONIDE 1000 MCG: 0.5 SUSPENSION RESPIRATORY (INHALATION) at 19:50

## 2019-11-06 RX ADMIN — CHOLECALCIFEROL TAB 50 MCG (2000 UNIT) 2000 UNITS: 50 TAB at 09:24

## 2019-11-06 RX ADMIN — BUDESONIDE 1000 MCG: 0.5 SUSPENSION RESPIRATORY (INHALATION) at 08:39

## 2019-11-06 RX ADMIN — FORMOTEROL FUMARATE DIHYDRATE 20 MCG: 20 SOLUTION RESPIRATORY (INHALATION) at 19:50

## 2019-11-06 RX ADMIN — METHYLPREDNISOLONE SODIUM SUCCINATE 40 MG: 40 INJECTION, POWDER, LYOPHILIZED, FOR SOLUTION INTRAMUSCULAR; INTRAVENOUS at 07:01

## 2019-11-06 RX ADMIN — Medication 10 ML: at 21:40

## 2019-11-06 RX ADMIN — DOXYCYCLINE HYCLATE 100 MG: 100 CAPSULE ORAL at 09:24

## 2019-11-06 RX ADMIN — VITAMIN E CAP 400 UNIT 400 UNITS: 400 CAP at 09:24

## 2019-11-06 RX ADMIN — ENOXAPARIN SODIUM 40 MG: 40 INJECTION SUBCUTANEOUS at 21:39

## 2019-11-06 RX ADMIN — FAMOTIDINE 20 MG: 10 INJECTION INTRAVENOUS at 09:25

## 2019-11-06 RX ADMIN — Medication 10 ML: at 09:00

## 2019-11-06 RX ADMIN — FORMOTEROL FUMARATE DIHYDRATE 20 MCG: 20 SOLUTION RESPIRATORY (INHALATION) at 08:39

## 2019-11-06 RX ADMIN — DOXYCYCLINE HYCLATE 100 MG: 100 CAPSULE ORAL at 21:39

## 2019-11-06 RX ADMIN — PRAVASTATIN SODIUM 40 MG: 20 TABLET ORAL at 09:24

## 2019-11-06 RX ADMIN — Medication 2000 MG: at 09:24

## 2019-11-06 RX ADMIN — METHYLPREDNISOLONE SODIUM SUCCINATE 40 MG: 40 INJECTION, POWDER, LYOPHILIZED, FOR SOLUTION INTRAMUSCULAR; INTRAVENOUS at 17:44

## 2019-11-06 RX ADMIN — FAMOTIDINE 20 MG: 10 INJECTION INTRAVENOUS at 21:39

## 2019-11-06 RX ADMIN — METFORMIN HYDROCHLORIDE 1000 MG: 500 TABLET, EXTENDED RELEASE ORAL at 21:39

## 2019-11-06 RX ADMIN — METFORMIN HYDROCHLORIDE 1000 MG: 500 TABLET, EXTENDED RELEASE ORAL at 09:24

## 2019-11-06 ASSESSMENT — PAIN SCALES - GENERAL
PAINLEVEL_OUTOF10: 0
PAINLEVEL_OUTOF10: 0

## 2019-11-07 LAB — METER GLUCOSE: 115 MG/DL (ref 74–99)

## 2019-11-07 PROCEDURE — 94729 DIFFUSING CAPACITY: CPT

## 2019-11-07 PROCEDURE — 94726 PLETHYSMOGRAPHY LUNG VOLUMES: CPT

## 2019-11-07 PROCEDURE — 2580000003 HC RX 258: Performed by: INTERNAL MEDICINE

## 2019-11-07 PROCEDURE — 99232 SBSQ HOSP IP/OBS MODERATE 35: CPT | Performed by: INTERNAL MEDICINE

## 2019-11-07 PROCEDURE — 6370000000 HC RX 637 (ALT 250 FOR IP): Performed by: INTERNAL MEDICINE

## 2019-11-07 PROCEDURE — 1200000000 HC SEMI PRIVATE

## 2019-11-07 PROCEDURE — 82962 GLUCOSE BLOOD TEST: CPT

## 2019-11-07 PROCEDURE — 2700000000 HC OXYGEN THERAPY PER DAY

## 2019-11-07 PROCEDURE — 6360000002 HC RX W HCPCS: Performed by: INTERNAL MEDICINE

## 2019-11-07 PROCEDURE — 94060 EVALUATION OF WHEEZING: CPT

## 2019-11-07 PROCEDURE — 2500000003 HC RX 250 WO HCPCS: Performed by: INTERNAL MEDICINE

## 2019-11-07 PROCEDURE — 94640 AIRWAY INHALATION TREATMENT: CPT

## 2019-11-07 RX ADMIN — Medication 10 ML: at 21:42

## 2019-11-07 RX ADMIN — DOXYCYCLINE HYCLATE 100 MG: 100 CAPSULE ORAL at 21:42

## 2019-11-07 RX ADMIN — METFORMIN HYDROCHLORIDE 1000 MG: 500 TABLET, EXTENDED RELEASE ORAL at 21:42

## 2019-11-07 RX ADMIN — DOXYCYCLINE HYCLATE 100 MG: 100 CAPSULE ORAL at 08:53

## 2019-11-07 RX ADMIN — CHOLECALCIFEROL TAB 50 MCG (2000 UNIT) 2000 UNITS: 50 TAB at 08:54

## 2019-11-07 RX ADMIN — FAMOTIDINE 20 MG: 10 INJECTION INTRAVENOUS at 08:54

## 2019-11-07 RX ADMIN — FORMOTEROL FUMARATE DIHYDRATE 20 MCG: 20 SOLUTION RESPIRATORY (INHALATION) at 20:36

## 2019-11-07 RX ADMIN — PRAVASTATIN SODIUM 40 MG: 20 TABLET ORAL at 08:54

## 2019-11-07 RX ADMIN — Medication 10 ML: at 09:00

## 2019-11-07 RX ADMIN — PREDNISONE 40 MG: 20 TABLET ORAL at 08:53

## 2019-11-07 RX ADMIN — ENOXAPARIN SODIUM 40 MG: 40 INJECTION SUBCUTANEOUS at 21:43

## 2019-11-07 RX ADMIN — METFORMIN HYDROCHLORIDE 1000 MG: 500 TABLET, EXTENDED RELEASE ORAL at 08:54

## 2019-11-07 RX ADMIN — BUDESONIDE 1000 MCG: 0.5 SUSPENSION RESPIRATORY (INHALATION) at 20:36

## 2019-11-07 RX ADMIN — VITAMIN C 1 TABLET: TAB at 08:54

## 2019-11-07 RX ADMIN — Medication 2000 MG: at 08:53

## 2019-11-07 RX ADMIN — VITAMIN E CAP 400 UNIT 400 UNITS: 400 CAP at 08:54

## 2019-11-07 ASSESSMENT — PAIN SCALES - GENERAL
PAINLEVEL_OUTOF10: 0
PAINLEVEL_OUTOF10: 0

## 2019-11-08 VITALS
HEIGHT: 66 IN | OXYGEN SATURATION: 99 % | BODY MASS INDEX: 22.9 KG/M2 | TEMPERATURE: 97.4 F | WEIGHT: 142.5 LBS | SYSTOLIC BLOOD PRESSURE: 141 MMHG | HEART RATE: 80 BPM | DIASTOLIC BLOOD PRESSURE: 90 MMHG | RESPIRATION RATE: 18 BRPM

## 2019-11-08 PROCEDURE — 99239 HOSP IP/OBS DSCHRG MGMT >30: CPT | Performed by: INTERNAL MEDICINE

## 2019-11-08 PROCEDURE — 2700000000 HC OXYGEN THERAPY PER DAY

## 2019-11-08 PROCEDURE — 6360000002 HC RX W HCPCS: Performed by: INTERNAL MEDICINE

## 2019-11-08 PROCEDURE — 6370000000 HC RX 637 (ALT 250 FOR IP): Performed by: INTERNAL MEDICINE

## 2019-11-08 PROCEDURE — 94640 AIRWAY INHALATION TREATMENT: CPT

## 2019-11-08 RX ORDER — BUDESONIDE 0.5 MG/2ML
1000 INHALANT ORAL 2 TIMES DAILY
Qty: 60 AMPULE | Refills: 3 | Status: SHIPPED | OUTPATIENT
Start: 2019-11-08 | End: 2020-01-01 | Stop reason: ALTCHOICE

## 2019-11-08 RX ORDER — PREDNISONE 20 MG/1
TABLET ORAL
Qty: 10 TABLET | Refills: 0 | Status: SHIPPED | OUTPATIENT
Start: 2019-11-08 | End: 2020-01-01 | Stop reason: ALTCHOICE

## 2019-11-08 RX ORDER — FORMOTEROL FUMARATE 20 UG/2ML
20 SOLUTION RESPIRATORY (INHALATION) 2 TIMES DAILY
Qty: 120 ML | Refills: 0 | Status: SHIPPED | OUTPATIENT
Start: 2019-11-08 | End: 2019-11-11 | Stop reason: SDUPTHER

## 2019-11-08 RX ADMIN — BUDESONIDE 1000 MCG: 0.5 SUSPENSION RESPIRATORY (INHALATION) at 06:08

## 2019-11-08 RX ADMIN — METFORMIN HYDROCHLORIDE 1000 MG: 500 TABLET, EXTENDED RELEASE ORAL at 08:00

## 2019-11-08 RX ADMIN — FORMOTEROL FUMARATE DIHYDRATE 20 MCG: 20 SOLUTION RESPIRATORY (INHALATION) at 06:08

## 2019-11-08 RX ADMIN — VITAMIN E CAP 400 UNIT 400 UNITS: 400 CAP at 08:00

## 2019-11-08 RX ADMIN — DOXYCYCLINE HYCLATE 100 MG: 100 CAPSULE ORAL at 08:00

## 2019-11-08 RX ADMIN — CHOLECALCIFEROL TAB 50 MCG (2000 UNIT) 2000 UNITS: 50 TAB at 08:00

## 2019-11-08 RX ADMIN — PREDNISONE 40 MG: 20 TABLET ORAL at 08:00

## 2019-11-08 RX ADMIN — PRAVASTATIN SODIUM 40 MG: 20 TABLET ORAL at 08:00

## 2019-11-08 RX ADMIN — Medication 2000 MG: at 08:00

## 2019-11-08 RX ADMIN — VITAMIN C 1 TABLET: TAB at 08:00

## 2019-11-08 ASSESSMENT — PAIN SCALES - GENERAL: PAINLEVEL_OUTOF10: 0

## 2019-11-18 ENCOUNTER — OFFICE VISIT (OUTPATIENT)
Dept: PRIMARY CARE CLINIC | Age: 74
End: 2019-11-18
Payer: MEDICARE

## 2019-11-18 VITALS
SYSTOLIC BLOOD PRESSURE: 138 MMHG | HEIGHT: 66 IN | RESPIRATION RATE: 16 BRPM | OXYGEN SATURATION: 92 % | DIASTOLIC BLOOD PRESSURE: 78 MMHG | WEIGHT: 132 LBS | HEART RATE: 84 BPM | BODY MASS INDEX: 21.21 KG/M2

## 2019-11-18 DIAGNOSIS — J44.9 CHRONIC OBSTRUCTIVE PULMONARY DISEASE, UNSPECIFIED COPD TYPE (HCC): Primary | Chronic | ICD-10-CM

## 2019-11-18 PROCEDURE — G8420 CALC BMI NORM PARAMETERS: HCPCS | Performed by: FAMILY MEDICINE

## 2019-11-18 PROCEDURE — 1123F ACP DISCUSS/DSCN MKR DOCD: CPT | Performed by: FAMILY MEDICINE

## 2019-11-18 PROCEDURE — 1111F DSCHRG MED/CURRENT MED MERGE: CPT | Performed by: FAMILY MEDICINE

## 2019-11-18 PROCEDURE — G8926 SPIRO NO PERF OR DOC: HCPCS | Performed by: FAMILY MEDICINE

## 2019-11-18 PROCEDURE — G8427 DOCREV CUR MEDS BY ELIG CLIN: HCPCS | Performed by: FAMILY MEDICINE

## 2019-11-18 PROCEDURE — 1036F TOBACCO NON-USER: CPT | Performed by: FAMILY MEDICINE

## 2019-11-18 PROCEDURE — G8400 PT W/DXA NO RESULTS DOC: HCPCS | Performed by: FAMILY MEDICINE

## 2019-11-18 PROCEDURE — 3017F COLORECTAL CA SCREEN DOC REV: CPT | Performed by: FAMILY MEDICINE

## 2019-11-18 PROCEDURE — G8484 FLU IMMUNIZE NO ADMIN: HCPCS | Performed by: FAMILY MEDICINE

## 2019-11-18 PROCEDURE — 99213 OFFICE O/P EST LOW 20 MIN: CPT | Performed by: FAMILY MEDICINE

## 2019-11-18 PROCEDURE — 3023F SPIROM DOC REV: CPT | Performed by: FAMILY MEDICINE

## 2019-11-18 PROCEDURE — 4040F PNEUMOC VAC/ADMIN/RCVD: CPT | Performed by: FAMILY MEDICINE

## 2019-11-18 PROCEDURE — 1090F PRES/ABSN URINE INCON ASSESS: CPT | Performed by: FAMILY MEDICINE

## 2019-11-29 LAB
CONTROL: NORMAL
HEMOCCULT STL QL: NEGATIVE

## 2019-12-02 ENCOUNTER — TELEPHONE (OUTPATIENT)
Dept: PRIMARY CARE CLINIC | Age: 74
End: 2019-12-02

## 2019-12-16 ENCOUNTER — TELEPHONE (OUTPATIENT)
Dept: PRIMARY CARE CLINIC | Age: 74
End: 2019-12-16

## 2019-12-20 ENCOUNTER — TELEPHONE (OUTPATIENT)
Dept: PRIMARY CARE CLINIC | Age: 74
End: 2019-12-20

## 2019-12-20 DIAGNOSIS — E11.9 CONTROLLED TYPE 2 DIABETES MELLITUS WITHOUT COMPLICATION, WITH LONG-TERM CURRENT USE OF INSULIN (HCC): Primary | ICD-10-CM

## 2019-12-20 DIAGNOSIS — Z79.4 CONTROLLED TYPE 2 DIABETES MELLITUS WITHOUT COMPLICATION, WITH LONG-TERM CURRENT USE OF INSULIN (HCC): Primary | ICD-10-CM

## 2019-12-20 RX ORDER — BLOOD-GLUCOSE METER
EACH MISCELLANEOUS
Qty: 1 KIT | Refills: 0 | Status: SHIPPED
Start: 2019-12-20 | End: 2020-01-01

## 2020-01-01 ENCOUNTER — APPOINTMENT (OUTPATIENT)
Dept: GENERAL RADIOLOGY | Age: 75
DRG: 004 | End: 2020-01-01
Payer: MEDICARE

## 2020-01-01 ENCOUNTER — APPOINTMENT (OUTPATIENT)
Dept: GENERAL RADIOLOGY | Age: 75
End: 2020-01-01
Payer: MEDICARE

## 2020-01-01 ENCOUNTER — HOSPITAL ENCOUNTER (INPATIENT)
Age: 75
LOS: 19 days | Discharge: NURSING, REHAB OR LONG-TERM CARE | DRG: 004 | End: 2020-12-02
Attending: EMERGENCY MEDICINE | Admitting: INTERNAL MEDICINE
Payer: MEDICARE

## 2020-01-01 ENCOUNTER — APPOINTMENT (OUTPATIENT)
Dept: CT IMAGING | Age: 75
DRG: 004 | End: 2020-01-01
Payer: MEDICARE

## 2020-01-01 ENCOUNTER — OFFICE VISIT (OUTPATIENT)
Dept: PRIMARY CARE CLINIC | Age: 75
End: 2020-01-01
Payer: MEDICARE

## 2020-01-01 ENCOUNTER — HOSPITAL ENCOUNTER (OUTPATIENT)
Age: 75
Discharge: HOME | End: 2020-10-31
Payer: MEDICARE

## 2020-01-01 ENCOUNTER — APPOINTMENT (OUTPATIENT)
Dept: CT IMAGING | Age: 75
End: 2020-01-01
Payer: MEDICARE

## 2020-01-01 ENCOUNTER — TELEPHONE (OUTPATIENT)
Dept: PRIMARY CARE CLINIC | Age: 75
End: 2020-01-01

## 2020-01-01 ENCOUNTER — HOSPITAL ENCOUNTER (EMERGENCY)
Age: 75
Discharge: HOME | End: 2020-09-12
Attending: EMERGENCY MEDICINE
Payer: MEDICARE

## 2020-01-01 ENCOUNTER — VIRTUAL VISIT (OUTPATIENT)
Dept: PRIMARY CARE CLINIC | Age: 75
End: 2020-01-01
Payer: MEDICARE

## 2020-01-01 ENCOUNTER — TELEPHONE (OUTPATIENT)
Dept: ADMINISTRATIVE | Age: 75
End: 2020-01-01

## 2020-01-01 ENCOUNTER — APPOINTMENT (OUTPATIENT)
Dept: ULTRASOUND IMAGING | Age: 75
DRG: 004 | End: 2020-01-01
Payer: MEDICARE

## 2020-01-01 ENCOUNTER — ANESTHESIA (OUTPATIENT)
Dept: OPERATING ROOM | Age: 75
DRG: 004 | End: 2020-01-01
Payer: MEDICARE

## 2020-01-01 ENCOUNTER — HOSPITAL ENCOUNTER (OUTPATIENT)
Age: 75
Discharge: HOME | End: 2020-09-25
Payer: MEDICARE

## 2020-01-01 ENCOUNTER — ANESTHESIA EVENT (OUTPATIENT)
Dept: OPERATING ROOM | Age: 75
DRG: 004 | End: 2020-01-01
Payer: MEDICARE

## 2020-01-01 VITALS
BODY MASS INDEX: 22.5 KG/M2 | DIASTOLIC BLOOD PRESSURE: 80 MMHG | HEIGHT: 66 IN | OXYGEN SATURATION: 94 % | WEIGHT: 140 LBS | RESPIRATION RATE: 16 BRPM | HEART RATE: 59 BPM | TEMPERATURE: 96.9 F | SYSTOLIC BLOOD PRESSURE: 144 MMHG

## 2020-01-01 VITALS
TEMPERATURE: 96.9 F | BODY MASS INDEX: 22.66 KG/M2 | OXYGEN SATURATION: 93 % | WEIGHT: 141 LBS | SYSTOLIC BLOOD PRESSURE: 150 MMHG | HEIGHT: 66 IN | DIASTOLIC BLOOD PRESSURE: 102 MMHG | HEART RATE: 104 BPM | RESPIRATION RATE: 18 BRPM

## 2020-01-01 VITALS
HEIGHT: 66 IN | RESPIRATION RATE: 18 BRPM | OXYGEN SATURATION: 97 % | SYSTOLIC BLOOD PRESSURE: 170 MMHG | TEMPERATURE: 98.2 F | DIASTOLIC BLOOD PRESSURE: 94 MMHG | WEIGHT: 135 LBS | BODY MASS INDEX: 21.69 KG/M2 | HEART RATE: 98 BPM

## 2020-01-01 VITALS
OXYGEN SATURATION: 100 % | RESPIRATION RATE: 22 BRPM | SYSTOLIC BLOOD PRESSURE: 152 MMHG | DIASTOLIC BLOOD PRESSURE: 66 MMHG

## 2020-01-01 VITALS
HEART RATE: 101 BPM | DIASTOLIC BLOOD PRESSURE: 55 MMHG | RESPIRATION RATE: 31 BRPM | WEIGHT: 161.82 LBS | TEMPERATURE: 99.7 F | OXYGEN SATURATION: 95 % | HEIGHT: 66 IN | SYSTOLIC BLOOD PRESSURE: 158 MMHG | BODY MASS INDEX: 26.01 KG/M2

## 2020-01-01 LAB
(1,3)-BETA-D-GLUCAN (FUNGITELL) INTERPRETATION: ABNORMAL
(1,3)-BETA-D-GLUCAN (FUNGITELL): 66 PG/ML
AADO2: 195.7 MMHG
AADO2: 202 MMHG
AADO2: 215.7 MMHG
AADO2: 235.1 MMHG
AADO2: 253.4 MMHG
AADO2: 257.3 MMHG
AADO2: 259.8 MMHG
AADO2: 284 MMHG
AADO2: 284.9 MMHG
AADO2: 288.7 MMHG
AADO2: 293.8 MMHG
AADO2: 299.4 MMHG
AADO2: 312.3 MMHG
AADO2: 473 MMHG
AADO2: 506.8 MMHG
AADO2: 541.3 MMHG
ABO/RH: NORMAL
ADENOVIRUS BY PCR: NOT DETECTED
ALBUMIN SERPL-MCNC: 2 G/DL (ref 3.5–5.2)
ALBUMIN SERPL-MCNC: 2 G/DL (ref 3.5–5.2)
ALBUMIN SERPL-MCNC: 2.1 G/DL (ref 3.5–5.2)
ALBUMIN SERPL-MCNC: 2.3 G/DL (ref 3.5–5.2)
ALBUMIN SERPL-MCNC: 2.4 G/DL (ref 3.5–5.2)
ALBUMIN SERPL-MCNC: 2.5 G/DL (ref 3.5–5.2)
ALBUMIN SERPL-MCNC: 2.5 G/DL (ref 3.5–5.2)
ALBUMIN SERPL-MCNC: 2.6 G/DL (ref 3.5–5.2)
ALBUMIN SERPL-MCNC: 2.7 G/DL (ref 3.5–5.2)
ALBUMIN SERPL-MCNC: 2.8 G/DL (ref 3.5–5.2)
ALBUMIN SERPL-MCNC: 3.5 G/DL (ref 3.5–5.2)
ALBUMIN SERPL-MCNC: 4.3 G/DL (ref 3.5–5.2)
ALK PHOS OTHER CALC: 0 U/L
ALK PHOSPHATASE: 117 U/L (ref 40–120)
ALKALINE PHOSPHATASE BONE FRACTION: 43 U/L (ref 0–55)
ALKALINE PHOSPHATASE LIVER FRACTION: 74 U/L (ref 0–94)
ALP BLD-CCNC: 102 U/L (ref 35–104)
ALP BLD-CCNC: 105 U/L (ref 35–104)
ALP BLD-CCNC: 107 U/L (ref 35–104)
ALP BLD-CCNC: 109 U/L (ref 35–104)
ALP BLD-CCNC: 110 U/L (ref 35–104)
ALP BLD-CCNC: 118 U/L (ref 35–104)
ALP BLD-CCNC: 119 U/L (ref 35–104)
ALP BLD-CCNC: 125 U/L (ref 35–104)
ALP BLD-CCNC: 125 U/L (ref 35–104)
ALP BLD-CCNC: 127 U/L (ref 35–104)
ALP BLD-CCNC: 128 U/L (ref 35–104)
ALP BLD-CCNC: 132 U/L (ref 35–104)
ALP BLD-CCNC: 74 U/L (ref 35–104)
ALP BLD-CCNC: 85 U/L (ref 35–104)
ALP BLD-CCNC: 86 U/L (ref 35–104)
ALP BLD-CCNC: 91 U/L (ref 35–104)
ALP BLD-CCNC: 96 U/L (ref 35–104)
ALP BLD-CCNC: 97 U/L (ref 35–104)
ALT SERPL-CCNC: 23 U/L (ref 0–32)
ALT SERPL-CCNC: 24 U/L (ref 0–32)
ALT SERPL-CCNC: 26 U/L (ref 0–32)
ALT SERPL-CCNC: 26 U/L (ref 0–32)
ALT SERPL-CCNC: 27 U/L (ref 0–32)
ALT SERPL-CCNC: 27 U/L (ref 0–32)
ALT SERPL-CCNC: 28 U/L (ref 0–32)
ALT SERPL-CCNC: 29 U/L (ref 0–32)
ALT SERPL-CCNC: 31 U/L (ref 0–32)
ALT SERPL-CCNC: 33 U/L (ref 0–32)
ALT SERPL-CCNC: 34 U/L (ref 0–32)
ALT SERPL-CCNC: 38 U/L (ref 0–32)
ALT SERPL-CCNC: 40 U/L (ref 0–32)
ALT SERPL-CCNC: 41 U/L (ref 0–32)
ALT SERPL-CCNC: 42 U/L (ref 0–32)
ALT SERPL-CCNC: 43 U/L (ref 0–32)
ALT SERPL-CCNC: 43 U/L (ref 0–32)
ALT SERPL-CCNC: 46 U/L (ref 0–32)
ALT SERPL-CCNC: 57 U/L (ref 0–32)
ALT SERPL-CCNC: 59 U/L (ref 0–32)
ANION GAP SERPL CALCULATED.3IONS-SCNC: 10 MMOL/L (ref 7–16)
ANION GAP SERPL CALCULATED.3IONS-SCNC: 13 MMOL/L (ref 7–16)
ANION GAP SERPL CALCULATED.3IONS-SCNC: 15 MMOL/L (ref 7–16)
ANION GAP SERPL CALCULATED.3IONS-SCNC: 5 MMOL/L (ref 7–16)
ANION GAP SERPL CALCULATED.3IONS-SCNC: 6 MMOL/L (ref 7–16)
ANION GAP SERPL CALCULATED.3IONS-SCNC: 7 MMOL/L (ref 7–16)
ANION GAP SERPL CALCULATED.3IONS-SCNC: 8 MMOL/L (ref 7–16)
ANION GAP SERPL CALCULATED.3IONS-SCNC: 9 MMOL/L (ref 7–16)
ANISOCYTOSIS: ABNORMAL
ANTIBODY SCREEN: NORMAL
APTT: 23.6 SEC (ref 24.5–35.1)
APTT: 24.3 SEC (ref 24.5–35.1)
APTT: 24.6 SEC (ref 24.5–35.1)
APTT: 24.6 SEC (ref 24.5–35.1)
APTT: 24.9 SEC (ref 24.5–35.1)
APTT: 25.6 SEC (ref 24.5–35.1)
APTT: 26.1 SEC (ref 24.5–35.1)
APTT: 26.3 SEC (ref 24.5–35.1)
APTT: 26.4 SEC (ref 24.5–35.1)
APTT: 26.5 SEC (ref 24.5–35.1)
APTT: 26.5 SEC (ref 24.5–35.1)
APTT: 26.6 SEC (ref 24.5–35.1)
APTT: 26.7 SEC (ref 24.5–35.1)
APTT: 27.2 SEC (ref 24.5–35.1)
APTT: 27.2 SEC (ref 24.5–35.1)
APTT: 28 SEC (ref 24.5–35.1)
APTT: 28 SEC (ref 24.5–35.1)
APTT: 28.7 SEC (ref 24.5–35.1)
AST SERPL-CCNC: 16 U/L (ref 0–31)
AST SERPL-CCNC: 21 U/L (ref 0–31)
AST SERPL-CCNC: 21 U/L (ref 0–31)
AST SERPL-CCNC: 25 U/L (ref 0–31)
AST SERPL-CCNC: 28 U/L (ref 0–31)
AST SERPL-CCNC: 28 U/L (ref 0–31)
AST SERPL-CCNC: 30 U/L (ref 0–31)
AST SERPL-CCNC: 45 U/L (ref 0–31)
AST SERPL-CCNC: 45 U/L (ref 0–31)
AST SERPL-CCNC: 47 U/L (ref 0–31)
AST SERPL-CCNC: 48 U/L (ref 0–31)
AST SERPL-CCNC: 50 U/L (ref 0–31)
AST SERPL-CCNC: 50 U/L (ref 0–31)
AST SERPL-CCNC: 56 U/L (ref 0–31)
AST SERPL-CCNC: 58 U/L (ref 0–31)
AST SERPL-CCNC: 66 U/L (ref 0–31)
AST SERPL-CCNC: 67 U/L (ref 0–31)
AST SERPL-CCNC: 70 U/L (ref 0–31)
AST SERPL-CCNC: 71 U/L (ref 0–31)
AST SERPL-CCNC: 80 U/L (ref 0–31)
AT-III ACTIVITY: 92 % ACTIVITY (ref 83–121)
ATYPICAL LYMPHOCYTE RELATIVE PERCENT: 0.8 % (ref 0–4)
B.E.: -1.2 MMOL/L (ref -3–3)
B.E.: -2.1 MMOL/L (ref -3–3)
B.E.: -3 MMOL/L (ref -3–3)
B.E.: -4 MMOL/L (ref -3–3)
B.E.: -5.3 MMOL/L (ref -3–3)
B.E.: -6.7 MMOL/L (ref -3–3)
B.E.: -9.2 MMOL/L (ref -3–3)
B.E.: 0 MMOL/L (ref -3–3)
B.E.: 10.9 MMOL/L (ref -3–3)
B.E.: 12.6 MMOL/L (ref -3–3)
B.E.: 2.3 MMOL/L (ref -3–3)
B.E.: 5.3 MMOL/L (ref -3–3)
B.E.: 6 MMOL/L (ref -3–3)
B.E.: 6.7 MMOL/L (ref -3–3)
B.E.: 8.1 MMOL/L (ref -3–3)
B.E.: 8.5 MMOL/L (ref -3–3)
B.E.: 8.5 MMOL/L (ref -3–3)
B.E.: 8.9 MMOL/L (ref -3–0)
B.E.: 9.6 MMOL/L (ref -3–0)
BASOPHILIC STIPPLING: ABNORMAL
BASOPHILIC STIPPLING: ABNORMAL
BASOPHILS ABSOLUTE: 0 E9/L (ref 0–0.2)
BASOPHILS ABSOLUTE: 0.01 E9/L (ref 0–0.2)
BASOPHILS ABSOLUTE: 0.02 E9/L (ref 0–0.2)
BASOPHILS ABSOLUTE: 0.03 E9/L (ref 0–0.2)
BASOPHILS ABSOLUTE: 0.08 E9/L (ref 0–0.2)
BASOPHILS RELATIVE PERCENT: 0 % (ref 0–2)
BASOPHILS RELATIVE PERCENT: 0.1 % (ref 0–2)
BASOPHILS RELATIVE PERCENT: 0.2 % (ref 0–2)
BASOPHILS RELATIVE PERCENT: 0.3 % (ref 0–2)
BASOPHILS RELATIVE PERCENT: 0.9 % (ref 0–2)
BILIRUB SERPL-MCNC: 0.2 MG/DL (ref 0–1.2)
BILIRUB SERPL-MCNC: 0.2 MG/DL (ref 0–1.2)
BILIRUB SERPL-MCNC: 0.3 MG/DL (ref 0–1.2)
BILIRUB SERPL-MCNC: 0.3 MG/DL (ref 0–1.2)
BILIRUB SERPL-MCNC: 0.4 MG/DL (ref 0–1.2)
BILIRUB SERPL-MCNC: 0.5 MG/DL (ref 0–1.2)
BILIRUB SERPL-MCNC: 0.5 MG/DL (ref 0–1.2)
BILIRUB SERPL-MCNC: 0.6 MG/DL (ref 0–1.2)
BILIRUB SERPL-MCNC: 0.6 MG/DL (ref 0–1.2)
BILIRUB SERPL-MCNC: 0.7 MG/DL (ref 0–1.2)
BILIRUB SERPL-MCNC: 0.8 MG/DL (ref 0–1.2)
BILIRUB SERPL-MCNC: 0.9 MG/DL (ref 0–1.2)
BILIRUB SERPL-MCNC: 1.1 MG/DL (ref 0–1.2)
BLOOD BANK DISPENSE STATUS: NORMAL
BLOOD BANK DISPENSE STATUS: NORMAL
BLOOD BANK PRODUCT CODE: NORMAL
BLOOD BANK PRODUCT CODE: NORMAL
BLOOD CULTURE, ROUTINE: NORMAL
BLOOD CULTURE, ROUTINE: NORMAL
BORDETELLA PARAPERTUSSIS BY PCR: NOT DETECTED
BORDETELLA PERTUSSIS BY PCR: NOT DETECTED
BPU ID: NORMAL
BPU ID: NORMAL
BUN BLDV-MCNC: 10 MG/DL (ref 8–23)
BUN BLDV-MCNC: 11 MG/DL (ref 8–23)
BUN BLDV-MCNC: 17 MG/DL (ref 8–23)
BUN BLDV-MCNC: 24 MG/DL (ref 8–23)
BUN BLDV-MCNC: 25 MG/DL (ref 8–23)
BUN BLDV-MCNC: 25 MG/DL (ref 8–23)
BUN BLDV-MCNC: 26 MG/DL (ref 8–23)
BUN BLDV-MCNC: 26 MG/DL (ref 8–23)
BUN BLDV-MCNC: 27 MG/DL (ref 8–23)
BUN BLDV-MCNC: 27 MG/DL (ref 8–23)
BUN BLDV-MCNC: 28 MG/DL (ref 8–23)
BUN BLDV-MCNC: 29 MG/DL (ref 8–23)
BUN BLDV-MCNC: 31 MG/DL (ref 8–23)
BUN BLDV-MCNC: 31 MG/DL (ref 8–23)
BUN BLDV-MCNC: 33 MG/DL (ref 8–23)
BUN BLDV-MCNC: 38 MG/DL (ref 8–23)
BUN BLDV-MCNC: 39 MG/DL (ref 8–23)
BUN BLDV-MCNC: 39 MG/DL (ref 8–23)
BUN BLDV-MCNC: 40 MG/DL (ref 8–23)
BUN BLDV-MCNC: 40 MG/DL (ref 8–23)
BUN BLDV-MCNC: 43 MG/DL (ref 8–23)
BURR CELLS: ABNORMAL
CALCIUM SERPL-MCNC: 7.6 MG/DL (ref 8.6–10.2)
CALCIUM SERPL-MCNC: 7.9 MG/DL (ref 8.6–10.2)
CALCIUM SERPL-MCNC: 8 MG/DL (ref 8.6–10.2)
CALCIUM SERPL-MCNC: 8 MG/DL (ref 8.6–10.2)
CALCIUM SERPL-MCNC: 8.1 MG/DL (ref 8.6–10.2)
CALCIUM SERPL-MCNC: 8.2 MG/DL (ref 8.6–10.2)
CALCIUM SERPL-MCNC: 8.3 MG/DL (ref 8.6–10.2)
CALCIUM SERPL-MCNC: 8.3 MG/DL (ref 8.6–10.2)
CALCIUM SERPL-MCNC: 8.4 MG/DL (ref 8.6–10.2)
CALCIUM SERPL-MCNC: 8.4 MG/DL (ref 8.6–10.2)
CALCIUM SERPL-MCNC: 8.5 MG/DL (ref 8.6–10.2)
CALCIUM SERPL-MCNC: 8.7 MG/DL (ref 8.6–10.2)
CALCIUM SERPL-MCNC: 8.8 MG/DL (ref 8.6–10.2)
CALCIUM SERPL-MCNC: 8.9 MG/DL (ref 8.6–10.2)
CALCIUM SERPL-MCNC: 9 MG/DL (ref 8.6–10.2)
CALCIUM SERPL-MCNC: 9 MG/DL (ref 8.6–10.2)
CALCIUM SERPL-MCNC: 9.5 MG/DL (ref 8.6–10.2)
CHLAMYDOPHILIA PNEUMONIAE BY PCR: NOT DETECTED
CHLORIDE BLD-SCNC: 100 MMOL/L (ref 98–107)
CHLORIDE BLD-SCNC: 101 MMOL/L (ref 98–107)
CHLORIDE BLD-SCNC: 102 MMOL/L (ref 98–107)
CHLORIDE BLD-SCNC: 104 MMOL/L (ref 98–107)
CHLORIDE BLD-SCNC: 108 MMOL/L (ref 98–107)
CHLORIDE BLD-SCNC: 108 MMOL/L (ref 98–107)
CHLORIDE BLD-SCNC: 109 MMOL/L (ref 98–107)
CHLORIDE BLD-SCNC: 111 MMOL/L (ref 98–107)
CHLORIDE BLD-SCNC: 111 MMOL/L (ref 98–107)
CHLORIDE BLD-SCNC: 96 MMOL/L (ref 98–107)
CHLORIDE BLD-SCNC: 97 MMOL/L (ref 98–107)
CHLORIDE BLD-SCNC: 98 MMOL/L (ref 98–107)
CHLORIDE BLD-SCNC: 98 MMOL/L (ref 98–107)
CHLORIDE BLD-SCNC: 99 MMOL/L (ref 98–107)
CHLORIDE BLD-SCNC: 99 MMOL/L (ref 98–107)
CO2: 21 MMOL/L (ref 22–29)
CO2: 23 MMOL/L (ref 22–29)
CO2: 24 MMOL/L (ref 22–29)
CO2: 25 MMOL/L (ref 22–29)
CO2: 26 MMOL/L (ref 22–29)
CO2: 29 MMOL/L (ref 22–29)
CO2: 32 MMOL/L (ref 22–29)
CO2: 33 MMOL/L (ref 22–29)
CO2: 34 MMOL/L (ref 22–29)
CO2: 35 MMOL/L (ref 22–29)
CO2: 36 MMOL/L (ref 22–29)
CO2: 37 MMOL/L (ref 22–29)
COHB: 0.1 % (ref 0–1.5)
COHB: 0.2 % (ref 0–1.5)
COHB: 0.2 % (ref 0–1.5)
COHB: 0.3 % (ref 0–1.5)
COHB: 0.3 % (ref 0–1.5)
COHB: 0.4 % (ref 0–1.5)
COHB: 0.5 % (ref 0–1.5)
COHB: 0.5 % (ref 0–1.5)
COHB: 0.6 % (ref 0–1.5)
COHB: 0.6 % (ref 0–1.5)
COHB: 0.7 % (ref 0–1.5)
COHB: 0.7 % (ref 0–1.5)
COHB: 0.8 % (ref 0–1.5)
COHB: 1.5 % (ref 0–1.5)
COHB: 1.8 % (ref 0–1.5)
COHB: 2.2 % (ref 0–1.5)
COHB: 2.3 % (ref 0–1.5)
CORONAVIRUS 229E BY PCR: NOT DETECTED
CORONAVIRUS HKU1 BY PCR: NOT DETECTED
CORONAVIRUS NL63 BY PCR: NOT DETECTED
CORONAVIRUS OC43 BY PCR: NOT DETECTED
CORTISOL TOTAL: 16.04 MCG/DL (ref 2.68–18.4)
CREAT SERPL-MCNC: 0.3 MG/DL (ref 0.5–1)
CREAT SERPL-MCNC: 0.4 MG/DL (ref 0.5–1)
CREAT SERPL-MCNC: 0.5 MG/DL (ref 0.5–1)
CREAT SERPL-MCNC: 0.7 MG/DL (ref 0.5–1)
CREAT SERPL-MCNC: 0.7 MG/DL (ref 0.5–1)
CREAT SERPL-MCNC: 0.8 MG/DL (ref 0.5–1)
CRITICAL: ABNORMAL
CULTURE, BLOOD 2: NORMAL
CULTURE, BLOOD 2: NORMAL
CULTURE, RESPIRATORY: ABNORMAL
D DIMER: 665 NG/ML DDU
D DIMER: 708 NG/ML DDU
D DIMER: 887 NG/ML DDU
D DIMER: 970 NG/ML DDU
DATE ANALYZED: ABNORMAL
DATE OF COLLECTION: ABNORMAL
DELIVERY SYSTEMS: ABNORMAL
DESCRIPTION BLOOD BANK: NORMAL
DESCRIPTION BLOOD BANK: NORMAL
DEVICE: ABNORMAL
DEVICE: ABNORMAL
EKG ATRIAL RATE: 116 BPM
EKG ATRIAL RATE: 123 BPM
EKG ATRIAL RATE: 70 BPM
EKG ATRIAL RATE: 97 BPM
EKG P AXIS: 62 DEGREES
EKG P AXIS: 65 DEGREES
EKG P AXIS: 78 DEGREES
EKG P-R INTERVAL: 104 MS
EKG P-R INTERVAL: 130 MS
EKG P-R INTERVAL: 140 MS
EKG P-R INTERVAL: 142 MS
EKG Q-T INTERVAL: 324 MS
EKG Q-T INTERVAL: 396 MS
EKG Q-T INTERVAL: 450 MS
EKG Q-T INTERVAL: 544 MS
EKG QRS DURATION: 76 MS
EKG QRS DURATION: 82 MS
EKG QRS DURATION: 86 MS
EKG QRS DURATION: 88 MS
EKG QTC CALCULATION (BAZETT): 463 MS
EKG QTC CALCULATION (BAZETT): 502 MS
EKG QTC CALCULATION (BAZETT): 587 MS
EKG QTC CALCULATION (BAZETT): 625 MS
EKG R AXIS: 65 DEGREES
EKG R AXIS: 68 DEGREES
EKG R AXIS: 74 DEGREES
EKG R AXIS: 82 DEGREES
EKG T AXIS: 105 DEGREES
EKG T AXIS: 128 DEGREES
EKG T AXIS: 54 DEGREES
EKG T AXIS: 86 DEGREES
EKG VENTRICULAR RATE: 116 BPM
EKG VENTRICULAR RATE: 123 BPM
EKG VENTRICULAR RATE: 70 BPM
EKG VENTRICULAR RATE: 97 BPM
EOSINOPHILS ABSOLUTE: 0 E9/L (ref 0.05–0.5)
EOSINOPHILS ABSOLUTE: 0.01 E9/L (ref 0.05–0.5)
EOSINOPHILS ABSOLUTE: 0.01 E9/L (ref 0.05–0.5)
EOSINOPHILS ABSOLUTE: 0.02 E9/L (ref 0.05–0.5)
EOSINOPHILS ABSOLUTE: 0.03 E9/L (ref 0.05–0.5)
EOSINOPHILS ABSOLUTE: 0.07 E9/L (ref 0.05–0.5)
EOSINOPHILS ABSOLUTE: 0.07 E9/L (ref 0.05–0.5)
EOSINOPHILS ABSOLUTE: 0.08 E9/L (ref 0.05–0.5)
EOSINOPHILS ABSOLUTE: 0.11 E9/L (ref 0.05–0.5)
EOSINOPHILS ABSOLUTE: 0.12 E9/L (ref 0.05–0.5)
EOSINOPHILS ABSOLUTE: 0.12 E9/L (ref 0.05–0.5)
EOSINOPHILS ABSOLUTE: 0.13 E9/L (ref 0.05–0.5)
EOSINOPHILS ABSOLUTE: 0.16 E9/L (ref 0.05–0.5)
EOSINOPHILS ABSOLUTE: 0.24 E9/L (ref 0.05–0.5)
EOSINOPHILS ABSOLUTE: 0.3 E9/L (ref 0.05–0.5)
EOSINOPHILS ABSOLUTE: 0.31 E9/L (ref 0.05–0.5)
EOSINOPHILS RELATIVE PERCENT: 0 % (ref 0–6)
EOSINOPHILS RELATIVE PERCENT: 0.2 % (ref 0–6)
EOSINOPHILS RELATIVE PERCENT: 0.5 % (ref 0–6)
EOSINOPHILS RELATIVE PERCENT: 0.6 % (ref 0–6)
EOSINOPHILS RELATIVE PERCENT: 0.7 % (ref 0–6)
EOSINOPHILS RELATIVE PERCENT: 0.8 % (ref 0–6)
EOSINOPHILS RELATIVE PERCENT: 1 % (ref 0–6)
EOSINOPHILS RELATIVE PERCENT: 1 % (ref 0–6)
EOSINOPHILS RELATIVE PERCENT: 1.1 % (ref 0–6)
EOSINOPHILS RELATIVE PERCENT: 1.6 % (ref 0–6)
EOSINOPHILS RELATIVE PERCENT: 2.6 % (ref 0–6)
EOSINOPHILS RELATIVE PERCENT: 3 % (ref 0–6)
EOSINOPHILS RELATIVE PERCENT: 3.5 % (ref 0–6)
FERRITIN: 544 NG/ML
FIBRINOGEN: 622 MG/DL (ref 225–540)
FIBRINOGEN: 655 MG/DL (ref 225–540)
FIBRINOGEN: >700 MG/DL (ref 225–540)
FIO2 ARTERIAL: 60
FIO2: 100 %
FIO2: 50 %
FIO2: 55 %
FIO2: 60 %
GFR AFRICAN AMERICAN: >60
GFR NON-AFRICAN AMERICAN: >60 ML/MIN/1.73
GLUCOSE BLD-MCNC: 103 MG/DL (ref 74–99)
GLUCOSE BLD-MCNC: 108 MG/DL (ref 74–99)
GLUCOSE BLD-MCNC: 123 MG/DL (ref 74–99)
GLUCOSE BLD-MCNC: 132 MG/DL (ref 74–99)
GLUCOSE BLD-MCNC: 134 MG/DL (ref 74–99)
GLUCOSE BLD-MCNC: 139 MG/DL (ref 74–99)
GLUCOSE BLD-MCNC: 151 MG/DL (ref 74–99)
GLUCOSE BLD-MCNC: 152 MG/DL (ref 74–99)
GLUCOSE BLD-MCNC: 158 MG/DL (ref 74–99)
GLUCOSE BLD-MCNC: 160 MG/DL (ref 74–99)
GLUCOSE BLD-MCNC: 165 MG/DL (ref 74–99)
GLUCOSE BLD-MCNC: 167 MG/DL (ref 74–99)
GLUCOSE BLD-MCNC: 176 MG/DL (ref 74–99)
GLUCOSE BLD-MCNC: 182 MG/DL (ref 74–99)
GLUCOSE BLD-MCNC: 206 MG/DL (ref 74–99)
GLUCOSE BLD-MCNC: 208 MG/DL (ref 74–99)
GLUCOSE BLD-MCNC: 244 MG/DL (ref 74–99)
GLUCOSE BLD-MCNC: 262 MG/DL (ref 74–99)
GLUCOSE BLD-MCNC: 291 MG/DL (ref 74–99)
GLUCOSE BLD-MCNC: 340 MG/DL (ref 74–99)
GLUCOSE BLD-MCNC: 82 MG/DL (ref 74–99)
HCO3 ARTERIAL: 35.2 MMOL/L (ref 22–26)
HCO3 ARTERIAL: 35.4 MMOL/L (ref 22–26)
HCO3: 20.4 MMOL/L (ref 22–26)
HCO3: 22 MMOL/L (ref 22–26)
HCO3: 23 MMOL/L (ref 22–26)
HCO3: 23.1 MMOL/L (ref 22–26)
HCO3: 23.4 MMOL/L (ref 22–26)
HCO3: 24.6 MMOL/L (ref 22–26)
HCO3: 24.7 MMOL/L (ref 22–26)
HCO3: 26.4 MMOL/L (ref 22–26)
HCO3: 26.9 MMOL/L (ref 22–26)
HCO3: 29.9 MMOL/L (ref 22–26)
HCO3: 31 MMOL/L (ref 22–26)
HCO3: 32.3 MMOL/L (ref 22–26)
HCO3: 32.7 MMOL/L (ref 22–26)
HCO3: 33.2 MMOL/L (ref 22–26)
HCO3: 34.8 MMOL/L (ref 22–26)
HCO3: 35.9 MMOL/L (ref 22–26)
HCO3: 37.7 MMOL/L (ref 22–26)
HCT (EST): 23 % (ref 34–48)
HCT VFR BLD CALC: 20 % (ref 34–48)
HCT VFR BLD CALC: 22.6 % (ref 34–48)
HCT VFR BLD CALC: 24 % (ref 34–48)
HCT VFR BLD CALC: 24.1 % (ref 34–48)
HCT VFR BLD CALC: 24.4 % (ref 34–48)
HCT VFR BLD CALC: 25 % (ref 34–48)
HCT VFR BLD CALC: 25.3 % (ref 34–48)
HCT VFR BLD CALC: 25.7 % (ref 34–48)
HCT VFR BLD CALC: 26.2 % (ref 34–48)
HCT VFR BLD CALC: 26.6 % (ref 34–48)
HCT VFR BLD CALC: 27.1 % (ref 34–48)
HCT VFR BLD CALC: 28.5 % (ref 34–48)
HCT VFR BLD CALC: 28.8 % (ref 34–48)
HCT VFR BLD CALC: 28.8 % (ref 34–48)
HCT VFR BLD CALC: 30.7 % (ref 34–48)
HCT VFR BLD CALC: 31.8 % (ref 34–48)
HCT VFR BLD CALC: 31.9 % (ref 34–48)
HCT VFR BLD CALC: 32.3 % (ref 34–48)
HCT VFR BLD CALC: 32.3 % (ref 34–48)
HCT VFR BLD CALC: 36.7 % (ref 34–48)
HCT VFR BLD CALC: 38.3 % (ref 34–48)
HEMOGLOBIN: 10.2 G/DL (ref 11.5–15.5)
HEMOGLOBIN: 10.9 G/DL (ref 11.5–15.5)
HEMOGLOBIN: 12 G/DL (ref 11.5–15.5)
HEMOGLOBIN: 6.1 G/DL (ref 11.5–15.5)
HEMOGLOBIN: 6.8 G/DL (ref 11.5–15.5)
HEMOGLOBIN: 7.1 G/DL (ref 11.5–15.5)
HEMOGLOBIN: 7.2 G/DL (ref 11.5–15.5)
HEMOGLOBIN: 7.4 G/DL (ref 11.5–15.5)
HEMOGLOBIN: 7.4 G/DL (ref 11.5–15.5)
HEMOGLOBIN: 7.6 G/DL (ref 11.5–15.5)
HEMOGLOBIN: 7.7 G/DL (ref 11.5–15.5)
HEMOGLOBIN: 7.8 G/DL (ref 11.5–15.5)
HEMOGLOBIN: 8.2 G/DL (ref 11.5–15.5)
HEMOGLOBIN: 8.4 G/DL (ref 11.5–15.5)
HEMOGLOBIN: 8.5 G/DL (ref 11.5–15.5)
HEMOGLOBIN: 8.7 G/DL (ref 11.5–15.5)
HEMOGLOBIN: 8.9 G/DL (ref 11.5–15.5)
HEMOGLOBIN: 9.3 G/DL (ref 11.5–15.5)
HEMOGLOBIN: 9.4 G/DL (ref 11.5–15.5)
HEMOGLOBIN: 9.6 G/DL (ref 11.5–15.5)
HEMOGLOBIN: 9.7 G/DL (ref 11.5–15.5)
HGB, (EST): 7.8 G/DL (ref 11.5–15.5)
HHB: 0.9 % (ref 0–5)
HHB: 1.4 % (ref 0–5)
HHB: 10.4 % (ref 0–5)
HHB: 10.5 % (ref 0–5)
HHB: 2.2 % (ref 0–5)
HHB: 2.6 % (ref 0–5)
HHB: 2.7 % (ref 0–5)
HHB: 3 % (ref 0–5)
HHB: 3.2 % (ref 0–5)
HHB: 4.8 % (ref 0–5)
HHB: 6.5 % (ref 0–5)
HHB: 6.8 % (ref 0–5)
HHB: 7.1 % (ref 0–5)
HHB: 7.7 % (ref 0–5)
HHB: 8 % (ref 0–5)
HHB: 8.2 % (ref 0–5)
HHB: 8.9 % (ref 0–5)
HUMAN METAPNEUMOVIRUS BY PCR: NOT DETECTED
HUMAN RHINOVIRUS/ENTEROVIRUS BY PCR: NOT DETECTED
HYPOCHROMIA: ABNORMAL
IMMATURE GRANULOCYTES #: 0.03 E9/L
IMMATURE GRANULOCYTES #: 0.03 E9/L
IMMATURE GRANULOCYTES #: 0.04 E9/L
IMMATURE GRANULOCYTES #: 0.06 E9/L
IMMATURE GRANULOCYTES #: 0.08 E9/L
IMMATURE GRANULOCYTES #: 0.1 E9/L
IMMATURE GRANULOCYTES #: 0.13 E9/L
IMMATURE GRANULOCYTES #: 0.14 E9/L
IMMATURE GRANULOCYTES #: 0.15 E9/L
IMMATURE GRANULOCYTES #: 0.16 E9/L
IMMATURE GRANULOCYTES #: 0.19 E9/L
IMMATURE GRANULOCYTES #: 0.26 E9/L
IMMATURE GRANULOCYTES #: 0.28 E9/L
IMMATURE GRANULOCYTES #: 0.3 E9/L
IMMATURE GRANULOCYTES #: 0.42 E9/L
IMMATURE GRANULOCYTES %: 0.6 % (ref 0–5)
IMMATURE GRANULOCYTES %: 0.8 % (ref 0–5)
IMMATURE GRANULOCYTES %: 1 % (ref 0–5)
IMMATURE GRANULOCYTES %: 1.1 % (ref 0–5)
IMMATURE GRANULOCYTES %: 1.2 % (ref 0–5)
IMMATURE GRANULOCYTES %: 1.3 % (ref 0–5)
IMMATURE GRANULOCYTES %: 1.4 % (ref 0–5)
IMMATURE GRANULOCYTES %: 1.6 % (ref 0–5)
IMMATURE GRANULOCYTES %: 1.8 % (ref 0–5)
IMMATURE GRANULOCYTES %: 2 % (ref 0–5)
IMMATURE GRANULOCYTES %: 2.2 % (ref 0–5)
IMMATURE GRANULOCYTES %: 2.4 % (ref 0–5)
IMMATURE GRANULOCYTES %: 4.1 % (ref 0–5)
INFLUENZA A BY PCR: NOT DETECTED
INFLUENZA B BY PCR: NOT DETECTED
INR BLD: 0.9
INR BLD: 1
INR BLD: 1.1
LAB: ABNORMAL
LACTATE DEHYDROGENASE: 408 U/L (ref 135–214)
LACTIC ACID: 0.6 MMOL/L (ref 0.5–2.2)
LACTIC ACID: 0.8 MMOL/L (ref 0.5–2.2)
LACTIC ACID: 1 MMOL/L (ref 0.5–2.2)
LYMPHOCYTES ABSOLUTE: 0.11 E9/L (ref 1.5–4)
LYMPHOCYTES ABSOLUTE: 0.18 E9/L (ref 1.5–4)
LYMPHOCYTES ABSOLUTE: 0.29 E9/L (ref 1.5–4)
LYMPHOCYTES ABSOLUTE: 0.29 E9/L (ref 1.5–4)
LYMPHOCYTES ABSOLUTE: 0.31 E9/L (ref 1.5–4)
LYMPHOCYTES ABSOLUTE: 0.32 E9/L (ref 1.5–4)
LYMPHOCYTES ABSOLUTE: 0.37 E9/L (ref 1.5–4)
LYMPHOCYTES ABSOLUTE: 0.41 E9/L (ref 1.5–4)
LYMPHOCYTES ABSOLUTE: 0.42 E9/L (ref 1.5–4)
LYMPHOCYTES ABSOLUTE: 0.48 E9/L (ref 1.5–4)
LYMPHOCYTES ABSOLUTE: 0.49 E9/L (ref 1.5–4)
LYMPHOCYTES ABSOLUTE: 0.52 E9/L (ref 1.5–4)
LYMPHOCYTES ABSOLUTE: 0.53 E9/L (ref 1.5–4)
LYMPHOCYTES ABSOLUTE: 0.54 E9/L (ref 1.5–4)
LYMPHOCYTES ABSOLUTE: 0.54 E9/L (ref 1.5–4)
LYMPHOCYTES ABSOLUTE: 0.56 E9/L (ref 1.5–4)
LYMPHOCYTES ABSOLUTE: 0.64 E9/L (ref 1.5–4)
LYMPHOCYTES ABSOLUTE: 0.72 E9/L (ref 1.5–4)
LYMPHOCYTES ABSOLUTE: 0.8 E9/L (ref 1.5–4)
LYMPHOCYTES ABSOLUTE: 0.85 E9/L (ref 1.5–4)
LYMPHOCYTES RELATIVE PERCENT: 0 % (ref 20–42)
LYMPHOCYTES RELATIVE PERCENT: 10.4 % (ref 20–42)
LYMPHOCYTES RELATIVE PERCENT: 10.8 % (ref 20–42)
LYMPHOCYTES RELATIVE PERCENT: 2.5 % (ref 20–42)
LYMPHOCYTES RELATIVE PERCENT: 2.6 % (ref 20–42)
LYMPHOCYTES RELATIVE PERCENT: 2.7 % (ref 20–42)
LYMPHOCYTES RELATIVE PERCENT: 3.3 % (ref 20–42)
LYMPHOCYTES RELATIVE PERCENT: 3.8 % (ref 20–42)
LYMPHOCYTES RELATIVE PERCENT: 3.9 % (ref 20–42)
LYMPHOCYTES RELATIVE PERCENT: 4 % (ref 20–42)
LYMPHOCYTES RELATIVE PERCENT: 4.3 % (ref 20–42)
LYMPHOCYTES RELATIVE PERCENT: 4.5 % (ref 20–42)
LYMPHOCYTES RELATIVE PERCENT: 5.2 % (ref 20–42)
LYMPHOCYTES RELATIVE PERCENT: 6.3 % (ref 20–42)
LYMPHOCYTES RELATIVE PERCENT: 6.6 % (ref 20–42)
LYMPHOCYTES RELATIVE PERCENT: 7 % (ref 20–42)
LYMPHOCYTES RELATIVE PERCENT: 7 % (ref 20–42)
LYMPHOCYTES RELATIVE PERCENT: 8.4 % (ref 20–42)
Lab: ABNORMAL
MAGNESIUM: 1.7 MG/DL (ref 1.6–2.6)
MAGNESIUM: 1.9 MG/DL (ref 1.6–2.6)
MAGNESIUM: 2 MG/DL (ref 1.6–2.6)
MAGNESIUM: 2.1 MG/DL (ref 1.6–2.6)
MAGNESIUM: 2.2 MG/DL (ref 1.6–2.6)
MAGNESIUM: 2.3 MG/DL (ref 1.6–2.6)
MAGNESIUM: 2.4 MG/DL (ref 1.6–2.6)
MAGNESIUM: 2.5 MG/DL (ref 1.6–2.6)
MCH RBC QN AUTO: 27.5 PG (ref 26–35)
MCH RBC QN AUTO: 27.8 PG (ref 26–35)
MCH RBC QN AUTO: 28 PG (ref 26–35)
MCH RBC QN AUTO: 28 PG (ref 26–35)
MCH RBC QN AUTO: 28.1 PG (ref 26–35)
MCH RBC QN AUTO: 28.3 PG (ref 26–35)
MCH RBC QN AUTO: 28.3 PG (ref 26–35)
MCH RBC QN AUTO: 28.4 PG (ref 26–35)
MCH RBC QN AUTO: 28.4 PG (ref 26–35)
MCH RBC QN AUTO: 28.5 PG (ref 26–35)
MCH RBC QN AUTO: 28.5 PG (ref 26–35)
MCH RBC QN AUTO: 28.6 PG (ref 26–35)
MCH RBC QN AUTO: 29 PG (ref 26–35)
MCH RBC QN AUTO: 29.6 PG (ref 26–35)
MCH RBC QN AUTO: 30.1 PG (ref 26–35)
MCH RBC QN AUTO: 30.1 PG (ref 26–35)
MCH RBC QN AUTO: 30.3 PG (ref 26–35)
MCH RBC QN AUTO: 30.3 PG (ref 26–35)
MCHC RBC AUTO-ENTMCNC: 29 % (ref 32–34.5)
MCHC RBC AUTO-ENTMCNC: 29.5 % (ref 32–34.5)
MCHC RBC AUTO-ENTMCNC: 29.6 % (ref 32–34.5)
MCHC RBC AUTO-ENTMCNC: 29.6 % (ref 32–34.5)
MCHC RBC AUTO-ENTMCNC: 29.7 % (ref 32–34.5)
MCHC RBC AUTO-ENTMCNC: 30 % (ref 32–34.5)
MCHC RBC AUTO-ENTMCNC: 30.1 % (ref 32–34.5)
MCHC RBC AUTO-ENTMCNC: 30.1 % (ref 32–34.5)
MCHC RBC AUTO-ENTMCNC: 30.3 % (ref 32–34.5)
MCHC RBC AUTO-ENTMCNC: 30.4 % (ref 32–34.5)
MCHC RBC AUTO-ENTMCNC: 30.5 % (ref 32–34.5)
MCHC RBC AUTO-ENTMCNC: 30.5 % (ref 32–34.5)
MCHC RBC AUTO-ENTMCNC: 30.8 % (ref 32–34.5)
MCHC RBC AUTO-ENTMCNC: 30.9 % (ref 32–34.5)
MCHC RBC AUTO-ENTMCNC: 31 % (ref 32–34.5)
MCHC RBC AUTO-ENTMCNC: 31.3 % (ref 32–34.5)
MCHC RBC AUTO-ENTMCNC: 31.6 % (ref 32–34.5)
MCHC RBC AUTO-ENTMCNC: 32.1 % (ref 32–34.5)
MCV RBC AUTO: 100.4 FL (ref 80–99.9)
MCV RBC AUTO: 102.1 FL (ref 80–99.9)
MCV RBC AUTO: 90.5 FL (ref 80–99.9)
MCV RBC AUTO: 90.5 FL (ref 80–99.9)
MCV RBC AUTO: 91.4 FL (ref 80–99.9)
MCV RBC AUTO: 91.4 FL (ref 80–99.9)
MCV RBC AUTO: 91.9 FL (ref 80–99.9)
MCV RBC AUTO: 92.7 FL (ref 80–99.9)
MCV RBC AUTO: 92.8 FL (ref 80–99.9)
MCV RBC AUTO: 93.4 FL (ref 80–99.9)
MCV RBC AUTO: 93.5 FL (ref 80–99.9)
MCV RBC AUTO: 94.1 FL (ref 80–99.9)
MCV RBC AUTO: 94.1 FL (ref 80–99.9)
MCV RBC AUTO: 94.3 FL (ref 80–99.9)
MCV RBC AUTO: 94.5 FL (ref 80–99.9)
MCV RBC AUTO: 94.6 FL (ref 80–99.9)
MCV RBC AUTO: 96 FL (ref 80–99.9)
MCV RBC AUTO: 97.8 FL (ref 80–99.9)
MCV RBC AUTO: 98 FL (ref 80–99.9)
MCV RBC AUTO: 99.5 FL (ref 80–99.9)
METAMYELOCYTES RELATIVE PERCENT: 0.9 % (ref 0–1)
METER GLUCOSE: 100 MG/DL (ref 74–99)
METER GLUCOSE: 102 MG/DL (ref 74–99)
METER GLUCOSE: 105 MG/DL (ref 74–99)
METER GLUCOSE: 110 MG/DL (ref 74–99)
METER GLUCOSE: 114 MG/DL (ref 74–99)
METER GLUCOSE: 121 MG/DL (ref 74–99)
METER GLUCOSE: 122 MG/DL (ref 74–99)
METER GLUCOSE: 123 MG/DL (ref 74–99)
METER GLUCOSE: 123 MG/DL (ref 74–99)
METER GLUCOSE: 127 MG/DL (ref 74–99)
METER GLUCOSE: 131 MG/DL (ref 74–99)
METER GLUCOSE: 132 MG/DL (ref 74–99)
METER GLUCOSE: 133 MG/DL (ref 74–99)
METER GLUCOSE: 140 MG/DL (ref 74–99)
METER GLUCOSE: 142 MG/DL (ref 74–99)
METER GLUCOSE: 143 MG/DL (ref 74–99)
METER GLUCOSE: 153 MG/DL (ref 74–99)
METER GLUCOSE: 159 MG/DL (ref 74–99)
METER GLUCOSE: 163 MG/DL (ref 74–99)
METER GLUCOSE: 165 MG/DL (ref 74–99)
METER GLUCOSE: 166 MG/DL (ref 74–99)
METER GLUCOSE: 167 MG/DL (ref 74–99)
METER GLUCOSE: 171 MG/DL (ref 74–99)
METER GLUCOSE: 172 MG/DL (ref 74–99)
METER GLUCOSE: 178 MG/DL (ref 74–99)
METER GLUCOSE: 178 MG/DL (ref 74–99)
METER GLUCOSE: 183 MG/DL (ref 74–99)
METER GLUCOSE: 184 MG/DL (ref 74–99)
METER GLUCOSE: 184 MG/DL (ref 74–99)
METER GLUCOSE: 186 MG/DL (ref 74–99)
METER GLUCOSE: 187 MG/DL (ref 74–99)
METER GLUCOSE: 190 MG/DL (ref 74–99)
METER GLUCOSE: 192 MG/DL (ref 74–99)
METER GLUCOSE: 193 MG/DL (ref 74–99)
METER GLUCOSE: 195 MG/DL (ref 74–99)
METER GLUCOSE: 210 MG/DL (ref 74–99)
METER GLUCOSE: 213 MG/DL (ref 74–99)
METER GLUCOSE: 213 MG/DL (ref 74–99)
METER GLUCOSE: 215 MG/DL (ref 74–99)
METER GLUCOSE: 218 MG/DL (ref 74–99)
METER GLUCOSE: 227 MG/DL (ref 74–99)
METER GLUCOSE: 228 MG/DL (ref 74–99)
METER GLUCOSE: 229 MG/DL (ref 74–99)
METER GLUCOSE: 234 MG/DL (ref 74–99)
METER GLUCOSE: 242 MG/DL (ref 74–99)
METER GLUCOSE: 255 MG/DL (ref 74–99)
METER GLUCOSE: 255 MG/DL (ref 74–99)
METER GLUCOSE: 256 MG/DL (ref 74–99)
METER GLUCOSE: 257 MG/DL (ref 74–99)
METER GLUCOSE: 268 MG/DL (ref 74–99)
METER GLUCOSE: 272 MG/DL (ref 74–99)
METER GLUCOSE: 277 MG/DL (ref 74–99)
METER GLUCOSE: 278 MG/DL (ref 74–99)
METER GLUCOSE: 278 MG/DL (ref 74–99)
METER GLUCOSE: 281 MG/DL (ref 74–99)
METER GLUCOSE: 288 MG/DL (ref 74–99)
METER GLUCOSE: 289 MG/DL (ref 74–99)
METER GLUCOSE: 291 MG/DL (ref 74–99)
METER GLUCOSE: 299 MG/DL (ref 74–99)
METER GLUCOSE: 300 MG/DL (ref 74–99)
METER GLUCOSE: 302 MG/DL (ref 74–99)
METER GLUCOSE: 316 MG/DL (ref 74–99)
METER GLUCOSE: 323 MG/DL (ref 74–99)
METER GLUCOSE: 327 MG/DL (ref 74–99)
METER GLUCOSE: 83 MG/DL (ref 74–99)
METER GLUCOSE: 83 MG/DL (ref 74–99)
METER GLUCOSE: 91 MG/DL (ref 74–99)
METER GLUCOSE: 94 MG/DL (ref 74–99)
METER GLUCOSE: >500 MG/DL (ref 74–99)
METHB: 0 % (ref 0–1.5)
METHB: 0 % (ref 0–1.5)
METHB: 0.1 % (ref 0–1.5)
METHB: 0.2 % (ref 0–1.5)
METHB: 0.3 % (ref 0–1.5)
MODE: ABNORMAL
MODE: AC
MONOCYTES ABSOLUTE: 0.1 E9/L (ref 0.1–0.95)
MONOCYTES ABSOLUTE: 0.17 E9/L (ref 0.1–0.95)
MONOCYTES ABSOLUTE: 0.18 E9/L (ref 0.1–0.95)
MONOCYTES ABSOLUTE: 0.42 E9/L (ref 0.1–0.95)
MONOCYTES ABSOLUTE: 0.44 E9/L (ref 0.1–0.95)
MONOCYTES ABSOLUTE: 0.46 E9/L (ref 0.1–0.95)
MONOCYTES ABSOLUTE: 0.47 E9/L (ref 0.1–0.95)
MONOCYTES ABSOLUTE: 0.5 E9/L (ref 0.1–0.95)
MONOCYTES ABSOLUTE: 0.5 E9/L (ref 0.1–0.95)
MONOCYTES ABSOLUTE: 0.51 E9/L (ref 0.1–0.95)
MONOCYTES ABSOLUTE: 0.53 E9/L (ref 0.1–0.95)
MONOCYTES ABSOLUTE: 0.55 E9/L (ref 0.1–0.95)
MONOCYTES ABSOLUTE: 0.55 E9/L (ref 0.1–0.95)
MONOCYTES ABSOLUTE: 0.59 E9/L (ref 0.1–0.95)
MONOCYTES ABSOLUTE: 0.63 E9/L (ref 0.1–0.95)
MONOCYTES ABSOLUTE: 0.7 E9/L (ref 0.1–0.95)
MONOCYTES ABSOLUTE: 0.85 E9/L (ref 0.1–0.95)
MONOCYTES ABSOLUTE: 0.97 E9/L (ref 0.1–0.95)
MONOCYTES ABSOLUTE: 1.15 E9/L (ref 0.1–0.95)
MONOCYTES ABSOLUTE: 1.16 E9/L (ref 0.1–0.95)
MONOCYTES RELATIVE PERCENT: 1 % (ref 2–12)
MONOCYTES RELATIVE PERCENT: 1.7 % (ref 2–12)
MONOCYTES RELATIVE PERCENT: 3.6 % (ref 2–12)
MONOCYTES RELATIVE PERCENT: 4.1 % (ref 2–12)
MONOCYTES RELATIVE PERCENT: 4.3 % (ref 2–12)
MONOCYTES RELATIVE PERCENT: 4.5 % (ref 2–12)
MONOCYTES RELATIVE PERCENT: 4.6 % (ref 2–12)
MONOCYTES RELATIVE PERCENT: 5.1 % (ref 2–12)
MONOCYTES RELATIVE PERCENT: 5.2 % (ref 2–12)
MONOCYTES RELATIVE PERCENT: 5.2 % (ref 2–12)
MONOCYTES RELATIVE PERCENT: 5.7 % (ref 2–12)
MONOCYTES RELATIVE PERCENT: 6.4 % (ref 2–12)
MONOCYTES RELATIVE PERCENT: 7.3 % (ref 2–12)
MONOCYTES RELATIVE PERCENT: 7.5 % (ref 2–12)
MONOCYTES RELATIVE PERCENT: 7.8 % (ref 2–12)
MONOCYTES RELATIVE PERCENT: 7.8 % (ref 2–12)
MONOCYTES RELATIVE PERCENT: 8.6 % (ref 2–12)
MONOCYTES RELATIVE PERCENT: 8.8 % (ref 2–12)
MONOCYTES RELATIVE PERCENT: 9.2 % (ref 2–12)
MONOCYTES RELATIVE PERCENT: 9.7 % (ref 2–12)
MRSA CULTURE ONLY: NORMAL
MYCOPLASMA PNEUMONIAE BY PCR: NOT DETECTED
MYELOCYTE PERCENT: 0.9 % (ref 0–0)
MYELOCYTE PERCENT: 1 % (ref 0–0)
MYELOCYTE PERCENT: 2.6 % (ref 0–0)
NEUTROPHILS ABSOLUTE: 10.21 E9/L (ref 1.8–7.3)
NEUTROPHILS ABSOLUTE: 10.27 E9/L (ref 1.8–7.3)
NEUTROPHILS ABSOLUTE: 10.45 E9/L (ref 1.8–7.3)
NEUTROPHILS ABSOLUTE: 10.78 E9/L (ref 1.8–7.3)
NEUTROPHILS ABSOLUTE: 10.88 E9/L (ref 1.8–7.3)
NEUTROPHILS ABSOLUTE: 11.12 E9/L (ref 1.8–7.3)
NEUTROPHILS ABSOLUTE: 12.23 E9/L (ref 1.8–7.3)
NEUTROPHILS ABSOLUTE: 12.74 E9/L (ref 1.8–7.3)
NEUTROPHILS ABSOLUTE: 3.8 E9/L (ref 1.8–7.3)
NEUTROPHILS ABSOLUTE: 4.19 E9/L (ref 1.8–7.3)
NEUTROPHILS ABSOLUTE: 5.2 E9/L (ref 1.8–7.3)
NEUTROPHILS ABSOLUTE: 5.51 E9/L (ref 1.8–7.3)
NEUTROPHILS ABSOLUTE: 6.51 E9/L (ref 1.8–7.3)
NEUTROPHILS ABSOLUTE: 6.63 E9/L (ref 1.8–7.3)
NEUTROPHILS ABSOLUTE: 6.8 E9/L (ref 1.8–7.3)
NEUTROPHILS ABSOLUTE: 8.1 E9/L (ref 1.8–7.3)
NEUTROPHILS ABSOLUTE: 8.35 E9/L (ref 1.8–7.3)
NEUTROPHILS ABSOLUTE: 9.28 E9/L (ref 1.8–7.3)
NEUTROPHILS ABSOLUTE: 9.48 E9/L (ref 1.8–7.3)
NEUTROPHILS ABSOLUTE: 9.9 E9/L (ref 1.8–7.3)
NEUTROPHILS RELATIVE PERCENT: 77.4 % (ref 43–80)
NEUTROPHILS RELATIVE PERCENT: 78.7 % (ref 43–80)
NEUTROPHILS RELATIVE PERCENT: 81.3 % (ref 43–80)
NEUTROPHILS RELATIVE PERCENT: 81.3 % (ref 43–80)
NEUTROPHILS RELATIVE PERCENT: 83.1 % (ref 43–80)
NEUTROPHILS RELATIVE PERCENT: 84.4 % (ref 43–80)
NEUTROPHILS RELATIVE PERCENT: 84.9 % (ref 43–80)
NEUTROPHILS RELATIVE PERCENT: 85.6 % (ref 43–80)
NEUTROPHILS RELATIVE PERCENT: 86.2 % (ref 43–80)
NEUTROPHILS RELATIVE PERCENT: 87.2 % (ref 43–80)
NEUTROPHILS RELATIVE PERCENT: 88 % (ref 43–80)
NEUTROPHILS RELATIVE PERCENT: 88.7 % (ref 43–80)
NEUTROPHILS RELATIVE PERCENT: 89.2 % (ref 43–80)
NEUTROPHILS RELATIVE PERCENT: 89.6 % (ref 43–80)
NEUTROPHILS RELATIVE PERCENT: 90 % (ref 43–80)
NEUTROPHILS RELATIVE PERCENT: 90.3 % (ref 43–80)
NEUTROPHILS RELATIVE PERCENT: 90.6 % (ref 43–80)
NEUTROPHILS RELATIVE PERCENT: 91 % (ref 43–80)
NEUTROPHILS RELATIVE PERCENT: 91.4 % (ref 43–80)
NEUTROPHILS RELATIVE PERCENT: 93.1 % (ref 43–80)
NUCLEATED RED BLOOD CELLS: 0 /100 WBC
NUCLEATED RED BLOOD CELLS: 2 /100 WBC
NUCLEATED RED BLOOD CELLS: 3.5 /100 WBC
O2 CONTENT: 10.1 ML/DL
O2 CONTENT: 10.2 ML/DL
O2 CONTENT: 10.8 ML/DL
O2 CONTENT: 11.8 ML/DL
O2 CONTENT: 12.8 ML/DL
O2 CONTENT: 12.9 ML/DL
O2 CONTENT: 13.4 ML/DL
O2 CONTENT: 13.5 ML/DL
O2 CONTENT: 13.6 ML/DL
O2 CONTENT: 14.1 ML/DL
O2 CONTENT: 14.2 ML/DL
O2 CONTENT: 14.2 ML/DL
O2 CONTENT: 14.3 ML/DL
O2 CONTENT: 15.5 ML/DL
O2 CONTENT: 16.5 ML/DL
O2 CONTENT: 16.5 ML/DL
O2 CONTENT: 17.6 ML/DL
O2 SATURATION: 89.3 % (ref 92–98.5)
O2 SATURATION: 89.4 % (ref 92–98.5)
O2 SATURATION: 91 % (ref 92–98.5)
O2 SATURATION: 91.8 % (ref 92–98.5)
O2 SATURATION: 91.9 % (ref 92–98.5)
O2 SATURATION: 92.2 % (ref 92–98.5)
O2 SATURATION: 92.3 % (ref 92–98.5)
O2 SATURATION: 92.8 % (ref 92–98.5)
O2 SATURATION: 93.2 % (ref 92–98.5)
O2 SATURATION: 93.3 % (ref 92–98.5)
O2 SATURATION: 95.1 % (ref 92–98.5)
O2 SATURATION: 95.5 % (ref 92–98.5)
O2 SATURATION: 96.8 % (ref 92–98.5)
O2 SATURATION: 97 % (ref 92–98.5)
O2 SATURATION: 97.3 % (ref 92–98.5)
O2 SATURATION: 97.4 % (ref 92–98.5)
O2 SATURATION: 97.8 % (ref 92–98.5)
O2 SATURATION: 98.6 % (ref 92–98.5)
O2 SATURATION: 99.1 % (ref 92–98.5)
O2HB: 87 % (ref 94–97)
O2HB: 88.8 % (ref 94–97)
O2HB: 90 % (ref 94–97)
O2HB: 90.5 % (ref 94–97)
O2HB: 91 % (ref 94–97)
O2HB: 91.2 % (ref 94–97)
O2HB: 91.3 % (ref 94–97)
O2HB: 92.2 % (ref 94–97)
O2HB: 92.8 % (ref 94–97)
O2HB: 93.7 % (ref 94–97)
O2HB: 96.1 % (ref 94–97)
O2HB: 96.7 % (ref 94–97)
O2HB: 96.7 % (ref 94–97)
O2HB: 96.8 % (ref 94–97)
O2HB: 96.9 % (ref 94–97)
O2HB: 98 % (ref 94–97)
O2HB: 98.6 % (ref 94–97)
OPERATOR ID: 166
OPERATOR ID: 1687
OPERATOR ID: 2070
OPERATOR ID: 2485
OPERATOR ID: 2485
OPERATOR ID: 2593
OPERATOR ID: 3114
OPERATOR ID: 3114
OPERATOR ID: 3186
OPERATOR ID: 3342
OPERATOR ID: 366
OPERATOR ID: ABNORMAL
ORGANISM: ABNORMAL
OVALOCYTES: ABNORMAL
PARAINFLUENZA VIRUS 1 BY PCR: NOT DETECTED
PARAINFLUENZA VIRUS 2 BY PCR: NOT DETECTED
PARAINFLUENZA VIRUS 3 BY PCR: NOT DETECTED
PARAINFLUENZA VIRUS 4 BY PCR: NOT DETECTED
PATIENT TEMP: 37 C
PCO2 ARTERIAL: 55.7 MMHG (ref 35–45)
PCO2 ARTERIAL: 59.3 MMHG (ref 35–45)
PCO2: 128.8 MMHG (ref 35–45)
PCO2: 39.8 MMHG (ref 35–45)
PCO2: 41.4 MMHG (ref 35–45)
PCO2: 41.6 MMHG (ref 35–45)
PCO2: 43.9 MMHG (ref 35–45)
PCO2: 46.7 MMHG (ref 35–45)
PCO2: 47.1 MMHG (ref 35–45)
PCO2: 47.3 MMHG (ref 35–45)
PCO2: 47.4 MMHG (ref 35–45)
PCO2: 47.7 MMHG (ref 35–45)
PCO2: 48.1 MMHG (ref 35–45)
PCO2: 49.9 MMHG (ref 35–45)
PCO2: 50.4 MMHG (ref 35–45)
PCO2: 51.5 MMHG (ref 35–45)
PCO2: 51.7 MMHG (ref 35–45)
PCO2: 52.8 MMHG (ref 35–45)
PCO2: 57.3 MMHG (ref 35–45)
PDW BLD-RTO: 15.4 FL (ref 11.5–15)
PDW BLD-RTO: 15.5 FL (ref 11.5–15)
PDW BLD-RTO: 15.6 FL (ref 11.5–15)
PDW BLD-RTO: 15.6 FL (ref 11.5–15)
PDW BLD-RTO: 15.7 FL (ref 11.5–15)
PDW BLD-RTO: 15.8 FL (ref 11.5–15)
PDW BLD-RTO: 15.8 FL (ref 11.5–15)
PDW BLD-RTO: 16 FL (ref 11.5–15)
PDW BLD-RTO: 16.3 FL (ref 11.5–15)
PDW BLD-RTO: 16.3 FL (ref 11.5–15)
PDW BLD-RTO: 16.5 FL (ref 11.5–15)
PDW BLD-RTO: 16.7 FL (ref 11.5–15)
PDW BLD-RTO: 16.7 FL (ref 11.5–15)
PDW BLD-RTO: 17.2 FL (ref 11.5–15)
PDW BLD-RTO: 17.2 FL (ref 11.5–15)
PDW BLD-RTO: 17.6 FL (ref 11.5–15)
PDW BLD-RTO: 18.9 FL (ref 11.5–15)
PDW BLD-RTO: 19.9 FL (ref 11.5–15)
PDW BLD-RTO: 20.4 FL (ref 11.5–15)
PDW BLD-RTO: 21.1 FL (ref 11.5–15)
PEEP/CPAP: 5 CMH2O
PEEP/CPAP: 6 CMH2O
PEEP/CPAP: 8 CMH2O
PFO2: 0.91 MMHG/%
PFO2: 0.96 MMHG/%
PFO2: 1.04 MMHG/%
PFO2: 1.08 MMHG/%
PFO2: 1.1 MMHG/%
PFO2: 1.13 MMHG/%
PFO2: 1.19 MMHG/%
PFO2: 1.21 MMHG/%
PFO2: 1.26 MMHG/%
PFO2: 1.31 MMHG/%
PFO2: 1.49 MMHG/%
PFO2: 1.64 MMHG/%
PFO2: 1.68 MMHG/%
PFO2: 1.75 MMHG/%
PFO2: 1.91 MMHG/%
PFO2: 1.91 MMHG/%
PH BLOOD GAS: 6.93 (ref 7.35–7.45)
PH BLOOD GAS: 7.25 (ref 7.35–7.45)
PH BLOOD GAS: 7.26 (ref 7.35–7.45)
PH BLOOD GAS: 7.28 (ref 7.35–7.45)
PH BLOOD GAS: 7.31 (ref 7.35–7.45)
PH BLOOD GAS: 7.34 (ref 7.35–7.45)
PH BLOOD GAS: 7.37 (ref 7.35–7.45)
PH BLOOD GAS: 7.38 (ref 7.35–7.45)
PH BLOOD GAS: 7.4 (ref 7.35–7.45)
PH BLOOD GAS: 7.41 (ref 7.35–7.45)
PH BLOOD GAS: 7.43 (ref 7.35–7.45)
PH BLOOD GAS: 7.43 (ref 7.35–7.45)
PH BLOOD GAS: 7.45 (ref 7.35–7.45)
PH BLOOD GAS: 7.46 (ref 7.35–7.45)
PH BLOOD GAS: 7.47 (ref 7.35–7.45)
PHOSPHORUS: 2.4 MG/DL (ref 2.5–4.5)
PHOSPHORUS: 2.5 MG/DL (ref 2.5–4.5)
PHOSPHORUS: 2.6 MG/DL (ref 2.5–4.5)
PHOSPHORUS: 2.7 MG/DL (ref 2.5–4.5)
PHOSPHORUS: 2.8 MG/DL (ref 2.5–4.5)
PHOSPHORUS: 2.8 MG/DL (ref 2.5–4.5)
PHOSPHORUS: 2.9 MG/DL (ref 2.5–4.5)
PHOSPHORUS: 3 MG/DL (ref 2.5–4.5)
PHOSPHORUS: 3.1 MG/DL (ref 2.5–4.5)
PHOSPHORUS: 3.1 MG/DL (ref 2.5–4.5)
PHOSPHORUS: 3.2 MG/DL (ref 2.5–4.5)
PHOSPHORUS: 3.2 MG/DL (ref 2.5–4.5)
PHOSPHORUS: 3.3 MG/DL (ref 2.5–4.5)
PHOSPHORUS: 3.4 MG/DL (ref 2.5–4.5)
PHOSPHORUS: 3.5 MG/DL (ref 2.5–4.5)
PHOSPHORUS: 3.7 MG/DL (ref 2.5–4.5)
PHOSPHORUS: 3.7 MG/DL (ref 2.5–4.5)
PHOSPHORUS: 3.9 MG/DL (ref 2.5–4.5)
PHOSPHORUS: 5.2 MG/DL (ref 2.5–4.5)
PLATELET # BLD: 157 E9/L (ref 130–450)
PLATELET # BLD: 162 E9/L (ref 130–450)
PLATELET # BLD: 173 E9/L (ref 130–450)
PLATELET # BLD: 176 E9/L (ref 130–450)
PLATELET # BLD: 179 E9/L (ref 130–450)
PLATELET # BLD: 192 E9/L (ref 130–450)
PLATELET # BLD: 196 E9/L (ref 130–450)
PLATELET # BLD: 201 E9/L (ref 130–450)
PLATELET # BLD: 201 E9/L (ref 130–450)
PLATELET # BLD: 204 E9/L (ref 130–450)
PLATELET # BLD: 207 E9/L (ref 130–450)
PLATELET # BLD: 209 E9/L (ref 130–450)
PLATELET # BLD: 212 E9/L (ref 130–450)
PLATELET # BLD: 215 E9/L (ref 130–450)
PLATELET # BLD: 228 E9/L (ref 130–450)
PLATELET # BLD: 246 E9/L (ref 130–450)
PLATELET # BLD: 280 E9/L (ref 130–450)
PLATELET # BLD: 295 E9/L (ref 130–450)
PLATELET # BLD: 317 E9/L (ref 130–450)
PLATELET # BLD: 339 E9/L (ref 130–450)
PMV BLD AUTO: 10.4 FL (ref 7–12)
PMV BLD AUTO: 10.5 FL (ref 7–12)
PMV BLD AUTO: 10.6 FL (ref 7–12)
PMV BLD AUTO: 10.7 FL (ref 7–12)
PMV BLD AUTO: 10.8 FL (ref 7–12)
PMV BLD AUTO: 10.9 FL (ref 7–12)
PMV BLD AUTO: 11 FL (ref 7–12)
PMV BLD AUTO: 11.1 FL (ref 7–12)
PMV BLD AUTO: 11.4 FL (ref 7–12)
PMV BLD AUTO: 11.6 FL (ref 7–12)
PO2 ARTERIAL: 65.8 MMHG (ref 60–80)
PO2 ARTERIAL: 82.4 MMHG (ref 60–80)
PO2: 101 MMHG (ref 75–100)
PO2: 131.3 MMHG (ref 75–100)
PO2: 175.2 MMHG (ref 75–100)
PO2: 54.7 MMHG (ref 75–100)
PO2: 56.4 MMHG (ref 75–100)
PO2: 62.4 MMHG (ref 75–100)
PO2: 65.1 MMHG (ref 75–100)
PO2: 65.8 MMHG (ref 75–100)
PO2: 66.3 MMHG (ref 75–100)
PO2: 71.2 MMHG (ref 75–100)
PO2: 74.6 MMHG (ref 75–100)
PO2: 75.8 MMHG (ref 75–100)
PO2: 91.5 MMHG (ref 75–100)
PO2: 95.3 MMHG (ref 75–100)
PO2: 95.4 MMHG (ref 75–100)
PO2: 96.3 MMHG (ref 75–100)
PO2: 98.6 MMHG (ref 75–100)
POIKILOCYTES: ABNORMAL
POLYCHROMASIA: ABNORMAL
POSITIVE END EXP PRESS: 6 CMH2O
POTASSIUM REFLEX MAGNESIUM: 5.7 MMOL/L (ref 3.5–5)
POTASSIUM SERPL-SCNC: 3.2 MMOL/L (ref 3.5–5)
POTASSIUM SERPL-SCNC: 3.2 MMOL/L (ref 3.5–5)
POTASSIUM SERPL-SCNC: 3.3 MMOL/L (ref 3.5–5)
POTASSIUM SERPL-SCNC: 3.3 MMOL/L (ref 3.5–5)
POTASSIUM SERPL-SCNC: 3.5 MMOL/L (ref 3.5–5)
POTASSIUM SERPL-SCNC: 3.6 MMOL/L (ref 3.5–5)
POTASSIUM SERPL-SCNC: 3.7 MMOL/L (ref 3.5–5)
POTASSIUM SERPL-SCNC: 3.8 MMOL/L (ref 3.5–5)
POTASSIUM SERPL-SCNC: 3.8 MMOL/L (ref 3.5–5)
POTASSIUM SERPL-SCNC: 3.9 MMOL/L (ref 3.5–5)
POTASSIUM SERPL-SCNC: 3.9 MMOL/L (ref 3.5–5)
POTASSIUM SERPL-SCNC: 4 MMOL/L (ref 3.5–5)
POTASSIUM SERPL-SCNC: 4 MMOL/L (ref 3.5–5)
POTASSIUM SERPL-SCNC: 4.2 MMOL/L (ref 3.5–5)
POTASSIUM SERPL-SCNC: 4.3 MMOL/L (ref 3.5–5)
POTASSIUM SERPL-SCNC: 4.5 MMOL/L (ref 3.5–5)
POTASSIUM SERPL-SCNC: 4.7 MMOL/L (ref 3.5–5)
POTASSIUM SERPL-SCNC: 4.8 MMOL/L (ref 3.5–5)
PROCALCITONIN: 2.54 NG/ML (ref 0–0.08)
PROTHROMBIN TIME: 10.5 SEC (ref 9.3–12.4)
PROTHROMBIN TIME: 11.2 SEC (ref 9.3–12.4)
PROTHROMBIN TIME: 11.5 SEC (ref 9.3–12.4)
PROTHROMBIN TIME: 11.7 SEC (ref 9.3–12.4)
PROTHROMBIN TIME: 11.7 SEC (ref 9.3–12.4)
PROTHROMBIN TIME: 11.8 SEC (ref 9.3–12.4)
PROTHROMBIN TIME: 11.8 SEC (ref 9.3–12.4)
PROTHROMBIN TIME: 11.9 SEC (ref 9.3–12.4)
PROTHROMBIN TIME: 12 SEC (ref 9.3–12.4)
PROTHROMBIN TIME: 12.1 SEC (ref 9.3–12.4)
PROTHROMBIN TIME: 12.2 SEC (ref 9.3–12.4)
PROTHROMBIN TIME: 12.2 SEC (ref 9.3–12.4)
PROTHROMBIN TIME: 12.3 SEC (ref 9.3–12.4)
PROTHROMBIN TIME: 12.3 SEC (ref 9.3–12.4)
PROTHROMBIN TIME: 12.5 SEC (ref 9.3–12.4)
PROTHROMBIN TIME: 12.6 SEC (ref 9.3–12.4)
PROTHROMBIN TIME: 12.8 SEC (ref 9.3–12.4)
PROTHROMBIN TIME: 13 SEC (ref 9.3–12.4)
PROTHROMBIN TIME: 13.6 SEC (ref 9.3–12.4)
RBC # BLD: 2.01 E12/L (ref 3.5–5.5)
RBC # BLD: 2.39 E12/L (ref 3.5–5.5)
RBC # BLD: 2.39 E12/L (ref 3.5–5.5)
RBC # BLD: 2.4 E12/L (ref 3.5–5.5)
RBC # BLD: 2.54 E12/L (ref 3.5–5.5)
RBC # BLD: 2.55 E12/L (ref 3.5–5.5)
RBC # BLD: 2.72 E12/L (ref 3.5–5.5)
RBC # BLD: 2.73 E12/L (ref 3.5–5.5)
RBC # BLD: 2.75 E12/L (ref 3.5–5.5)
RBC # BLD: 2.95 E12/L (ref 3.5–5.5)
RBC # BLD: 3.06 E12/L (ref 3.5–5.5)
RBC # BLD: 3.07 E12/L (ref 3.5–5.5)
RBC # BLD: 3.15 E12/L (ref 3.5–5.5)
RBC # BLD: 3.31 E12/L (ref 3.5–5.5)
RBC # BLD: 3.38 E12/L (ref 3.5–5.5)
RBC # BLD: 3.46 E12/L (ref 3.5–5.5)
RBC # BLD: 3.49 E12/L (ref 3.5–5.5)
RBC # BLD: 3.57 E12/L (ref 3.5–5.5)
RBC # BLD: 3.89 E12/L (ref 3.5–5.5)
RBC # BLD: 4.23 E12/L (ref 3.5–5.5)
RBC # BLD: NORMAL 10*6/UL
REASON FOR REJECTION: NORMAL
REJECTED TEST: NORMAL
RESPIRATORY RATE: 22 B/MIN
RESPIRATORY SYNCYTIAL VIRUS BY PCR: NOT DETECTED
RI(T): 205 %
RI(T): 212 %
RI(T): 257 %
RI(T): 261 %
RI(T): 270 %
RI(T): 289 %
RI(T): 376 %
RI(T): 382 %
RI(T): 386 %
RI(T): 405 %
RI(T): 417 %
RI(T): 432 %
RI(T): 460 %
RI(T): 471 %
RI(T): 562 %
RI(T): 571 %
ROULEAUX: ABNORMAL
RR MECHANICAL: 18 B/MIN
RR MECHANICAL: 22 B/MIN
SARS-COV-2, PCR: DETECTED
SCHISTOCYTES: ABNORMAL
SMEAR, RESPIRATORY: ABNORMAL
SMUDGE CELLS: ABNORMAL
SODIUM BLD-SCNC: 135 MMOL/L (ref 132–146)
SODIUM BLD-SCNC: 137 MMOL/L (ref 132–146)
SODIUM BLD-SCNC: 137 MMOL/L (ref 132–146)
SODIUM BLD-SCNC: 138 MMOL/L (ref 132–146)
SODIUM BLD-SCNC: 139 MMOL/L (ref 132–146)
SODIUM BLD-SCNC: 140 MMOL/L (ref 132–146)
SODIUM BLD-SCNC: 141 MMOL/L (ref 132–146)
SODIUM BLD-SCNC: 142 MMOL/L (ref 132–146)
SODIUM BLD-SCNC: 142 MMOL/L (ref 132–146)
SODIUM BLD-SCNC: 143 MMOL/L (ref 132–146)
SODIUM BLD-SCNC: 144 MMOL/L (ref 132–146)
SODIUM BLD-SCNC: 144 MMOL/L (ref 132–146)
SODIUM BLD-SCNC: 145 MMOL/L (ref 132–146)
SODIUM BLD-SCNC: 146 MMOL/L (ref 132–146)
SOURCE, BLOOD GAS: ABNORMAL
SPHEROCYTES: ABNORMAL
SPHEROCYTES: ABNORMAL
TARGET CELLS: ABNORMAL
THB: 10.6 G/DL (ref 11.5–16.5)
THB: 10.9 G/DL (ref 11.5–16.5)
THB: 11 G/DL (ref 11.5–16.5)
THB: 11.3 G/DL (ref 11.5–16.5)
THB: 11.8 G/DL (ref 11.5–16.5)
THB: 12.1 G/DL (ref 11.5–16.5)
THB: 13.8 G/DL (ref 11.5–16.5)
THB: 7.9 G/DL (ref 11.5–16.5)
THB: 8.3 G/DL (ref 11.5–16.5)
THB: 8.4 G/DL (ref 11.5–16.5)
THB: 8.9 G/DL (ref 11.5–16.5)
THB: 9.3 G/DL (ref 11.5–16.5)
THB: 9.4 G/DL (ref 11.5–16.5)
THB: 9.7 G/DL (ref 11.5–16.5)
THB: 9.8 G/DL (ref 11.5–16.5)
TIDAL VOLUME: 450 ML
TIME ANALYZED: 1414
TIME ANALYZED: 1604
TIME ANALYZED: 1908
TIME ANALYZED: 441
TIME ANALYZED: 524
TIME ANALYZED: 542
TIME ANALYZED: 549
TIME ANALYZED: 552
TIME ANALYZED: 602
TIME ANALYZED: 609
TIME ANALYZED: 611
TIME ANALYZED: 615
TIME ANALYZED: 618
TIME ANALYZED: 635
TIME ANALYZED: 636
TIME ANALYZED: 649
TIME ANALYZED: 7
TOTAL PROTEIN: 4.7 G/DL (ref 6.4–8.3)
TOTAL PROTEIN: 4.8 G/DL (ref 6.4–8.3)
TOTAL PROTEIN: 5.1 G/DL (ref 6.4–8.3)
TOTAL PROTEIN: 5.1 G/DL (ref 6.4–8.3)
TOTAL PROTEIN: 5.4 G/DL (ref 6.4–8.3)
TOTAL PROTEIN: 5.6 G/DL (ref 6.4–8.3)
TOTAL PROTEIN: 5.7 G/DL (ref 6.4–8.3)
TOTAL PROTEIN: 5.8 G/DL (ref 6.4–8.3)
TOTAL PROTEIN: 6 G/DL (ref 6.4–8.3)
TOTAL PROTEIN: 6.2 G/DL (ref 6.4–8.3)
TOTAL PROTEIN: 6.2 G/DL (ref 6.4–8.3)
TOTAL PROTEIN: 6.3 G/DL (ref 6.4–8.3)
TOTAL PROTEIN: 6.4 G/DL (ref 6.4–8.3)
TOTAL PROTEIN: 7.8 G/DL (ref 6.4–8.3)
TOTAL PROTEIN: 7.8 G/DL (ref 6.4–8.3)
TROPONIN: <0.01 NG/ML (ref 0–0.03)
URINE CULTURE, ROUTINE: NORMAL
URINE CULTURE, ROUTINE: NORMAL
VT MECHANICAL: 450 ML
VT MECHANICAL: 500 ML
WBC # BLD: 10.3 E9/L (ref 4.5–11.5)
WBC # BLD: 10.3 E9/L (ref 4.5–11.5)
WBC # BLD: 10.8 E9/L (ref 4.5–11.5)
WBC # BLD: 10.9 E9/L (ref 4.5–11.5)
WBC # BLD: 11 E9/L (ref 4.5–11.5)
WBC # BLD: 11.4 E9/L (ref 4.5–11.5)
WBC # BLD: 12.5 E9/L (ref 4.5–11.5)
WBC # BLD: 12.5 E9/L (ref 4.5–11.5)
WBC # BLD: 12.6 E9/L (ref 4.5–11.5)
WBC # BLD: 13.1 E9/L (ref 4.5–11.5)
WBC # BLD: 13.5 E9/L (ref 4.5–11.5)
WBC # BLD: 14 E9/L (ref 4.5–11.5)
WBC # BLD: 4.7 E9/L (ref 4.5–11.5)
WBC # BLD: 4.8 E9/L (ref 4.5–11.5)
WBC # BLD: 6.2 E9/L (ref 4.5–11.5)
WBC # BLD: 6.6 E9/L (ref 4.5–11.5)
WBC # BLD: 7.2 E9/L (ref 4.5–11.5)
WBC # BLD: 7.5 E9/L (ref 4.5–11.5)
WBC # BLD: 8.5 E9/L (ref 4.5–11.5)
WBC # BLD: 9.1 E9/L (ref 4.5–11.5)

## 2020-01-01 PROCEDURE — 94003 VENT MGMT INPAT SUBQ DAY: CPT

## 2020-01-01 PROCEDURE — 71045 X-RAY EXAM CHEST 1 VIEW: CPT

## 2020-01-01 PROCEDURE — 36600 WITHDRAWAL OF ARTERIAL BLOOD: CPT

## 2020-01-01 PROCEDURE — 2500000003 HC RX 250 WO HCPCS: Performed by: INTERNAL MEDICINE

## 2020-01-01 PROCEDURE — 2500000003 HC RX 250 WO HCPCS: Performed by: EMERGENCY MEDICINE

## 2020-01-01 PROCEDURE — 2000000000 HC ICU R&B

## 2020-01-01 PROCEDURE — 6360000002 HC RX W HCPCS: Performed by: EMERGENCY MEDICINE

## 2020-01-01 PROCEDURE — 73060 X-RAY EXAM OF HUMERUS: CPT

## 2020-01-01 PROCEDURE — 87070 CULTURE OTHR SPECIMN AEROBIC: CPT

## 2020-01-01 PROCEDURE — 85025 COMPLETE CBC W/AUTO DIFF WBC: CPT

## 2020-01-01 PROCEDURE — 93970 EXTREMITY STUDY: CPT

## 2020-01-01 PROCEDURE — 85730 THROMBOPLASTIN TIME PARTIAL: CPT

## 2020-01-01 PROCEDURE — 82728 ASSAY OF FERRITIN: CPT

## 2020-01-01 PROCEDURE — C9113 INJ PANTOPRAZOLE SODIUM, VIA: HCPCS | Performed by: EMERGENCY MEDICINE

## 2020-01-01 PROCEDURE — 36592 COLLECT BLOOD FROM PICC: CPT

## 2020-01-01 PROCEDURE — 6370000000 HC RX 637 (ALT 250 FOR IP): Performed by: SURGERY

## 2020-01-01 PROCEDURE — 6360000002 HC RX W HCPCS: Performed by: INTERNAL MEDICINE

## 2020-01-01 PROCEDURE — 85384 FIBRINOGEN ACTIVITY: CPT

## 2020-01-01 PROCEDURE — 2500000003 HC RX 250 WO HCPCS: Performed by: SURGERY

## 2020-01-01 PROCEDURE — 80053 COMPREHEN METABOLIC PANEL: CPT

## 2020-01-01 PROCEDURE — 2580000003 HC RX 258: Performed by: INTERNAL MEDICINE

## 2020-01-01 PROCEDURE — 6370000000 HC RX 637 (ALT 250 FOR IP): Performed by: INTERNAL MEDICINE

## 2020-01-01 PROCEDURE — 6370000000 HC RX 637 (ALT 250 FOR IP): Performed by: FAMILY MEDICINE

## 2020-01-01 PROCEDURE — 2580000003 HC RX 258: Performed by: SURGERY

## 2020-01-01 PROCEDURE — 0202U NFCT DS 22 TRGT SARS-COV-2: CPT

## 2020-01-01 PROCEDURE — 85610 PROTHROMBIN TIME: CPT

## 2020-01-01 PROCEDURE — 6370000000 HC RX 637 (ALT 250 FOR IP): Performed by: EMERGENCY MEDICINE

## 2020-01-01 PROCEDURE — 83605 ASSAY OF LACTIC ACID: CPT

## 2020-01-01 PROCEDURE — 6370000000 HC RX 637 (ALT 250 FOR IP)

## 2020-01-01 PROCEDURE — 85378 FIBRIN DEGRADE SEMIQUANT: CPT

## 2020-01-01 PROCEDURE — 36415 COLL VENOUS BLD VENIPUNCTURE: CPT

## 2020-01-01 PROCEDURE — 83735 ASSAY OF MAGNESIUM: CPT

## 2020-01-01 PROCEDURE — C1769 GUIDE WIRE: HCPCS | Performed by: SURGERY

## 2020-01-01 PROCEDURE — 2580000003 HC RX 258: Performed by: EMERGENCY MEDICINE

## 2020-01-01 PROCEDURE — 94002 VENT MGMT INPAT INIT DAY: CPT

## 2020-01-01 PROCEDURE — 74018 RADEX ABDOMEN 1 VIEW: CPT

## 2020-01-01 PROCEDURE — 6360000002 HC RX W HCPCS: Performed by: SURGERY

## 2020-01-01 PROCEDURE — 76937 US GUIDE VASCULAR ACCESS: CPT

## 2020-01-01 PROCEDURE — G8427 DOCREV CUR MEDS BY ELIG CLIN: HCPCS | Performed by: FAMILY MEDICINE

## 2020-01-01 PROCEDURE — 2580000003 HC RX 258: Performed by: FAMILY MEDICINE

## 2020-01-01 PROCEDURE — 84100 ASSAY OF PHOSPHORUS: CPT

## 2020-01-01 PROCEDURE — 99232 SBSQ HOSP IP/OBS MODERATE 35: CPT | Performed by: INTERNAL MEDICINE

## 2020-01-01 PROCEDURE — 70450 CT HEAD/BRAIN W/O DYE: CPT

## 2020-01-01 PROCEDURE — 84075 ASSAY ALKALINE PHOSPHATASE: CPT

## 2020-01-01 PROCEDURE — 37799 UNLISTED PX VASCULAR SURGERY: CPT

## 2020-01-01 PROCEDURE — 94640 AIRWAY INHALATION TREATMENT: CPT

## 2020-01-01 PROCEDURE — 82962 GLUCOSE BLOOD TEST: CPT

## 2020-01-01 PROCEDURE — 87186 SC STD MICRODIL/AGAR DIL: CPT

## 2020-01-01 PROCEDURE — 2700000000 HC OXYGEN THERAPY PER DAY

## 2020-01-01 PROCEDURE — 87206 SMEAR FLUORESCENT/ACID STAI: CPT

## 2020-01-01 PROCEDURE — C9113 INJ PANTOPRAZOLE SODIUM, VIA: HCPCS | Performed by: SURGERY

## 2020-01-01 PROCEDURE — 82805 BLOOD GASES W/O2 SATURATION: CPT

## 2020-01-01 PROCEDURE — 02HV33Z INSERTION OF INFUSION DEVICE INTO SUPERIOR VENA CAVA, PERCUTANEOUS APPROACH: ICD-10-PCS | Performed by: RADIOLOGY

## 2020-01-01 PROCEDURE — 0BJ08ZZ INSPECTION OF TRACHEOBRONCHIAL TREE, VIA NATURAL OR ARTIFICIAL OPENING ENDOSCOPIC: ICD-10-PCS | Performed by: SURGERY

## 2020-01-01 PROCEDURE — 87449 NOS EACH ORGANISM AG IA: CPT

## 2020-01-01 PROCEDURE — 99232 SBSQ HOSP IP/OBS MODERATE 35: CPT | Performed by: FAMILY MEDICINE

## 2020-01-01 PROCEDURE — 94770 HC ETCO2 MONITOR DAILY: CPT

## 2020-01-01 PROCEDURE — 6360000002 HC RX W HCPCS

## 2020-01-01 PROCEDURE — 36430 TRANSFUSION BLD/BLD COMPNT: CPT

## 2020-01-01 PROCEDURE — 85018 HEMOGLOBIN: CPT

## 2020-01-01 PROCEDURE — 82533 TOTAL CORTISOL: CPT

## 2020-01-01 PROCEDURE — 36620 INSERTION CATHETER ARTERY: CPT

## 2020-01-01 PROCEDURE — 73030 X-RAY EXAM OF SHOULDER: CPT

## 2020-01-01 PROCEDURE — G8420 CALC BMI NORM PARAMETERS: HCPCS | Performed by: FAMILY MEDICINE

## 2020-01-01 PROCEDURE — 86850 RBC ANTIBODY SCREEN: CPT

## 2020-01-01 PROCEDURE — 93005 ELECTROCARDIOGRAM TRACING: CPT | Performed by: EMERGENCY MEDICINE

## 2020-01-01 PROCEDURE — P9016 RBC LEUKOCYTES REDUCED: HCPCS

## 2020-01-01 PROCEDURE — C1751 CATH, INF, PER/CENT/MIDLINE: HCPCS

## 2020-01-01 PROCEDURE — 71250 CT THORAX DX C-: CPT

## 2020-01-01 PROCEDURE — 2580000003 HC RX 258: Performed by: NURSE ANESTHETIST, CERTIFIED REGISTERED

## 2020-01-01 PROCEDURE — 93010 ELECTROCARDIOGRAM REPORT: CPT | Performed by: INTERNAL MEDICINE

## 2020-01-01 PROCEDURE — 86900 BLOOD TYPING SEROLOGIC ABO: CPT

## 2020-01-01 PROCEDURE — 87088 URINE BACTERIA CULTURE: CPT

## 2020-01-01 PROCEDURE — 4040F PNEUMOC VAC/ADMIN/RCVD: CPT | Performed by: FAMILY MEDICINE

## 2020-01-01 PROCEDURE — 3700000001 HC ADD 15 MINUTES (ANESTHESIA): Performed by: SURGERY

## 2020-01-01 PROCEDURE — 3600000001 HC SURGERY LEVEL 1  BASE: Performed by: SURGERY

## 2020-01-01 PROCEDURE — 0DH63UZ INSERTION OF FEEDING DEVICE INTO STOMACH, PERCUTANEOUS APPROACH: ICD-10-PCS | Performed by: SURGERY

## 2020-01-01 PROCEDURE — 87077 CULTURE AEROBIC IDENTIFY: CPT

## 2020-01-01 PROCEDURE — 99285 EMERGENCY DEPT VISIT HI MDM: CPT

## 2020-01-01 PROCEDURE — 86901 BLOOD TYPING SEROLOGIC RH(D): CPT

## 2020-01-01 PROCEDURE — 84484 ASSAY OF TROPONIN QUANT: CPT

## 2020-01-01 PROCEDURE — 87040 BLOOD CULTURE FOR BACTERIA: CPT

## 2020-01-01 PROCEDURE — 74178 CT ABD&PLV WO CNTR FLWD CNTR: CPT

## 2020-01-01 PROCEDURE — 2500000003 HC RX 250 WO HCPCS: Performed by: NURSE ANESTHETIST, CERTIFIED REGISTERED

## 2020-01-01 PROCEDURE — 80048 BASIC METABOLIC PNL TOTAL CA: CPT

## 2020-01-01 PROCEDURE — 89220 SPUTUM SPECIMEN COLLECTION: CPT

## 2020-01-01 PROCEDURE — 82803 BLOOD GASES ANY COMBINATION: CPT

## 2020-01-01 PROCEDURE — 6360000004 HC RX CONTRAST MEDICATION: Performed by: RADIOLOGY

## 2020-01-01 PROCEDURE — 84145 PROCALCITONIN (PCT): CPT

## 2020-01-01 PROCEDURE — 99233 SBSQ HOSP IP/OBS HIGH 50: CPT | Performed by: FAMILY MEDICINE

## 2020-01-01 PROCEDURE — 6360000002 HC RX W HCPCS: Performed by: GENERAL PRACTICE

## 2020-01-01 PROCEDURE — 0B113F4 BYPASS TRACHEA TO CUTANEOUS WITH TRACHEOSTOMY DEVICE, PERCUTANEOUS APPROACH: ICD-10-PCS | Performed by: SURGERY

## 2020-01-01 PROCEDURE — 1036F TOBACCO NON-USER: CPT | Performed by: FAMILY MEDICINE

## 2020-01-01 PROCEDURE — 1090F PRES/ABSN URINE INCON ASSESS: CPT | Performed by: FAMILY MEDICINE

## 2020-01-01 PROCEDURE — 94660 CPAP INITIATION&MGMT: CPT

## 2020-01-01 PROCEDURE — G8400 PT W/DXA NO RESULTS DOC: HCPCS | Performed by: FAMILY MEDICINE

## 2020-01-01 PROCEDURE — 36556 INSERT NON-TUNNEL CV CATH: CPT

## 2020-01-01 PROCEDURE — 36593 DECLOT VASCULAR DEVICE: CPT

## 2020-01-01 PROCEDURE — 1123F ACP DISCUSS/DSCN MKR DOCD: CPT | Performed by: FAMILY MEDICINE

## 2020-01-01 PROCEDURE — G8484 FLU IMMUNIZE NO ADMIN: HCPCS | Performed by: FAMILY MEDICINE

## 2020-01-01 PROCEDURE — 31500 INSERT EMERGENCY AIRWAY: CPT

## 2020-01-01 PROCEDURE — 85014 HEMATOCRIT: CPT

## 2020-01-01 PROCEDURE — 3700000000 HC ANESTHESIA ATTENDED CARE: Performed by: SURGERY

## 2020-01-01 PROCEDURE — 6360000002 HC RX W HCPCS: Performed by: FAMILY MEDICINE

## 2020-01-01 PROCEDURE — 83615 LACTATE (LD) (LDH) ENZYME: CPT

## 2020-01-01 PROCEDURE — 99214 OFFICE O/P EST MOD 30 MIN: CPT | Performed by: FAMILY MEDICINE

## 2020-01-01 PROCEDURE — G0439 PPPS, SUBSEQ VISIT: HCPCS | Performed by: FAMILY MEDICINE

## 2020-01-01 PROCEDURE — 3600000011 HC SURGERY LEVEL 1  ADDTL 15MIN: Performed by: SURGERY

## 2020-01-01 PROCEDURE — 0BH17EZ INSERTION OF ENDOTRACHEAL AIRWAY INTO TRACHEA, VIA NATURAL OR ARTIFICIAL OPENING: ICD-10-PCS | Performed by: INTERNAL MEDICINE

## 2020-01-01 PROCEDURE — 84080 ASSAY ALKALINE PHOSPHATASES: CPT

## 2020-01-01 PROCEDURE — 71275 CT ANGIOGRAPHY CHEST: CPT

## 2020-01-01 PROCEDURE — XW033E5 INTRODUCTION OF REMDESIVIR ANTI-INFECTIVE INTO PERIPHERAL VEIN, PERCUTANEOUS APPROACH, NEW TECHNOLOGY GROUP 5: ICD-10-PCS | Performed by: INTERNAL MEDICINE

## 2020-01-01 PROCEDURE — 99223 1ST HOSP IP/OBS HIGH 75: CPT | Performed by: FAMILY MEDICINE

## 2020-01-01 PROCEDURE — 99239 HOSP IP/OBS DSCHRG MGMT >30: CPT | Performed by: FAMILY MEDICINE

## 2020-01-01 PROCEDURE — 2580000003 HC RX 258: Performed by: STUDENT IN AN ORGANIZED HEALTH CARE EDUCATION/TRAINING PROGRAM

## 2020-01-01 PROCEDURE — 99221 1ST HOSP IP/OBS SF/LOW 40: CPT | Performed by: STUDENT IN AN ORGANIZED HEALTH CARE EDUCATION/TRAINING PROGRAM

## 2020-01-01 PROCEDURE — 93005 ELECTROCARDIOGRAM TRACING: CPT | Performed by: STUDENT IN AN ORGANIZED HEALTH CARE EDUCATION/TRAINING PROGRAM

## 2020-01-01 PROCEDURE — 2709999900 HC NON-CHARGEABLE SUPPLY: Performed by: SURGERY

## 2020-01-01 PROCEDURE — 2500000003 HC RX 250 WO HCPCS

## 2020-01-01 PROCEDURE — 85300 ANTITHROMBIN III ACTIVITY: CPT

## 2020-01-01 PROCEDURE — 86920 COMPATIBILITY TEST SPIN: CPT

## 2020-01-01 PROCEDURE — 93005 ELECTROCARDIOGRAM TRACING: CPT | Performed by: GENERAL PRACTICE

## 2020-01-01 PROCEDURE — 5A1955Z RESPIRATORY VENTILATION, GREATER THAN 96 CONSECUTIVE HOURS: ICD-10-PCS | Performed by: INTERNAL MEDICINE

## 2020-01-01 PROCEDURE — 36569 INSJ PICC 5 YR+ W/O IMAGING: CPT

## 2020-01-01 PROCEDURE — 99442 PR PHYS/QHP TELEPHONE EVALUATION 11-20 MIN: CPT | Performed by: FAMILY MEDICINE

## 2020-01-01 PROCEDURE — 87081 CULTURE SCREEN ONLY: CPT

## 2020-01-01 RX ORDER — 0.9 % SODIUM CHLORIDE 0.9 %
500 INTRAVENOUS SOLUTION INTRAVENOUS ONCE
Status: COMPLETED | OUTPATIENT
Start: 2020-01-01 | End: 2020-01-01

## 2020-01-01 RX ORDER — MIDAZOLAM HYDROCHLORIDE 1 MG/ML
2 INJECTION INTRAMUSCULAR; INTRAVENOUS EVERY 4 HOURS
Status: DISCONTINUED | OUTPATIENT
Start: 2020-01-01 | End: 2020-01-01

## 2020-01-01 RX ORDER — OXYCODONE HYDROCHLORIDE 5 MG/1
5 TABLET ORAL EVERY 6 HOURS
Status: DISCONTINUED | OUTPATIENT
Start: 2020-01-01 | End: 2020-01-01

## 2020-01-01 RX ORDER — ASCORBIC ACID 500 MG
1000 TABLET ORAL EVERY MORNING
Status: DISCONTINUED | OUTPATIENT
Start: 2020-01-01 | End: 2020-01-01

## 2020-01-01 RX ORDER — DIMETHICONE, OXYBENZONE, AND PADIMATE O 2; 2.5; 6.6 G/100G; G/100G; G/100G
STICK TOPICAL
Status: COMPLETED
Start: 2020-01-01 | End: 2020-01-01

## 2020-01-01 RX ORDER — FENTANYL CITRATE 50 UG/ML
50 INJECTION, SOLUTION INTRAMUSCULAR; INTRAVENOUS
Refills: 0 | Status: SHIPPED | DISCHARGE
Start: 2020-01-01 | End: 2020-01-01

## 2020-01-01 RX ORDER — ETOMIDATE 2 MG/ML
20 INJECTION INTRAVENOUS ONCE
Status: COMPLETED | OUTPATIENT
Start: 2020-01-01 | End: 2020-01-01

## 2020-01-01 RX ORDER — LORAZEPAM 2 MG/1
2 TABLET ORAL
Status: SHIPPED | DISCHARGE
Start: 2020-01-01 | End: 2021-01-01

## 2020-01-01 RX ORDER — ASCORBIC ACID 500 MG
2000 TABLET ORAL EVERY MORNING
Status: DISCONTINUED | OUTPATIENT
Start: 2020-01-01 | End: 2020-01-01

## 2020-01-01 RX ORDER — INSULIN GLARGINE 100 [IU]/ML
15 INJECTION, SOLUTION SUBCUTANEOUS DAILY
Status: DISCONTINUED | OUTPATIENT
Start: 2020-01-01 | End: 2020-01-01

## 2020-01-01 RX ORDER — LORAZEPAM 2 MG/ML
2 INJECTION INTRAMUSCULAR
Status: DISCONTINUED | OUTPATIENT
Start: 2020-01-01 | End: 2020-01-01

## 2020-01-01 RX ORDER — PROPOFOL 10 MG/ML
10 INJECTION, EMULSION INTRAVENOUS
Status: DISCONTINUED | OUTPATIENT
Start: 2020-01-01 | End: 2020-01-01

## 2020-01-01 RX ORDER — PANTOPRAZOLE SODIUM 40 MG/10ML
40 INJECTION, POWDER, LYOPHILIZED, FOR SOLUTION INTRAVENOUS DAILY
Status: DISCONTINUED | OUTPATIENT
Start: 2020-01-01 | End: 2020-01-01

## 2020-01-01 RX ORDER — IPRATROPIUM BROMIDE AND ALBUTEROL SULFATE 2.5; .5 MG/3ML; MG/3ML
3 SOLUTION RESPIRATORY (INHALATION) EVERY 4 HOURS
Qty: 360 ML | DISCHARGE
Start: 2020-01-01

## 2020-01-01 RX ORDER — GUAIFENESIN 400 MG/1
400 TABLET ORAL 4 TIMES DAILY PRN
Status: DISCONTINUED | OUTPATIENT
Start: 2020-01-01 | End: 2020-01-01

## 2020-01-01 RX ORDER — SODIUM CHLORIDE 9 MG/ML
INJECTION, SOLUTION INTRAVENOUS EVERY 8 HOURS
Status: DISCONTINUED | OUTPATIENT
Start: 2020-01-01 | End: 2020-01-01

## 2020-01-01 RX ORDER — ROCURONIUM BROMIDE 10 MG/ML
INJECTION, SOLUTION INTRAVENOUS PRN
Status: DISCONTINUED | OUTPATIENT
Start: 2020-01-01 | End: 2020-01-01 | Stop reason: SDUPTHER

## 2020-01-01 RX ORDER — DEXAMETHASONE SODIUM PHOSPHATE 4 MG/ML
6 INJECTION, SOLUTION INTRA-ARTICULAR; INTRALESIONAL; INTRAMUSCULAR; INTRAVENOUS; SOFT TISSUE DAILY
Status: DISCONTINUED | OUTPATIENT
Start: 2020-01-01 | End: 2020-01-01

## 2020-01-01 RX ORDER — HEPARIN SODIUM (PORCINE) LOCK FLUSH IV SOLN 100 UNIT/ML 100 UNIT/ML
3 SOLUTION INTRAVENOUS EVERY 12 HOURS SCHEDULED
Status: DISCONTINUED | OUTPATIENT
Start: 2020-01-01 | End: 2020-01-01 | Stop reason: HOSPADM

## 2020-01-01 RX ORDER — FUROSEMIDE 10 MG/ML
40 INJECTION INTRAMUSCULAR; INTRAVENOUS 2 TIMES DAILY
Status: DISCONTINUED | OUTPATIENT
Start: 2020-01-01 | End: 2020-01-01

## 2020-01-01 RX ORDER — POTASSIUM CHLORIDE 29.8 MG/ML
40 INJECTION INTRAVENOUS ONCE
Status: COMPLETED | OUTPATIENT
Start: 2020-01-01 | End: 2020-01-01

## 2020-01-01 RX ORDER — VITAMIN B COMPLEX
1000 TABLET ORAL EVERY MORNING
Status: DISCONTINUED | OUTPATIENT
Start: 2020-01-01 | End: 2020-01-01

## 2020-01-01 RX ORDER — VITAMIN C
1 TAB ORAL EVERY MORNING
Refills: 0 | DISCHARGE
Start: 2020-01-01

## 2020-01-01 RX ORDER — NAPROXEN 500 MG/1
500 TABLET ORAL 2 TIMES DAILY
Qty: 14 TABLET | Refills: 0 | Status: SHIPPED | OUTPATIENT
Start: 2020-01-01 | End: 2020-01-01 | Stop reason: SDUPTHER

## 2020-01-01 RX ORDER — DEXAMETHASONE SODIUM PHOSPHATE 4 MG/ML
6 INJECTION, SOLUTION INTRA-ARTICULAR; INTRALESIONAL; INTRAMUSCULAR; INTRAVENOUS; SOFT TISSUE EVERY 12 HOURS
Status: DISCONTINUED | OUTPATIENT
Start: 2020-01-01 | End: 2020-01-01

## 2020-01-01 RX ORDER — INSULIN GLARGINE 100 [IU]/ML
5 INJECTION, SOLUTION SUBCUTANEOUS DAILY
Status: DISCONTINUED | OUTPATIENT
Start: 2020-01-01 | End: 2020-01-01

## 2020-01-01 RX ORDER — LANSOPRAZOLE
30 KIT
Status: DISCONTINUED | OUTPATIENT
Start: 2020-01-01 | End: 2020-01-01 | Stop reason: HOSPADM

## 2020-01-01 RX ORDER — GUAIFENESIN 400 MG/1
400 TABLET ORAL 4 TIMES DAILY
Status: DISCONTINUED | OUTPATIENT
Start: 2020-01-01 | End: 2020-01-01

## 2020-01-01 RX ORDER — SENNA AND DOCUSATE SODIUM 50; 8.6 MG/1; MG/1
2 TABLET, FILM COATED ORAL 2 TIMES DAILY
Status: DISCONTINUED | OUTPATIENT
Start: 2020-01-01 | End: 2020-01-01 | Stop reason: HOSPADM

## 2020-01-01 RX ORDER — FLUTICASONE PROPIONATE 50 MCG
2 SPRAY, SUSPENSION (ML) NASAL DAILY
Status: DISCONTINUED | OUTPATIENT
Start: 2020-01-01 | End: 2020-01-01

## 2020-01-01 RX ORDER — SUCCINYLCHOLINE CHLORIDE 20 MG/ML
INJECTION INTRAMUSCULAR; INTRAVENOUS
Status: COMPLETED
Start: 2020-01-01 | End: 2020-01-01

## 2020-01-01 RX ORDER — LACTULOSE 10 G/15ML
20 SOLUTION ORAL ONCE
Status: COMPLETED | OUTPATIENT
Start: 2020-01-01 | End: 2020-01-01

## 2020-01-01 RX ORDER — NAPROXEN 500 MG/1
500 TABLET ORAL 2 TIMES DAILY
Qty: 14 TABLET | Refills: 0 | Status: ON HOLD
Start: 2020-01-01 | End: 2020-01-01 | Stop reason: HOSPADM

## 2020-01-01 RX ORDER — POTASSIUM CHLORIDE 29.8 MG/ML
40 INJECTION INTRAVENOUS
Status: COMPLETED | OUTPATIENT
Start: 2020-01-01 | End: 2020-01-01

## 2020-01-01 RX ORDER — VITAMIN C
1 TAB ORAL EVERY MORNING
Status: DISCONTINUED | OUTPATIENT
Start: 2020-01-01 | End: 2020-01-01 | Stop reason: HOSPADM

## 2020-01-01 RX ORDER — DEXAMETHASONE SODIUM PHOSPHATE 10 MG/ML
6 INJECTION, SOLUTION INTRAMUSCULAR; INTRAVENOUS EVERY 12 HOURS
Status: DISCONTINUED | OUTPATIENT
Start: 2020-01-01 | End: 2020-01-01

## 2020-01-01 RX ORDER — ARFORMOTEROL TARTRATE 15 UG/2ML
15 SOLUTION RESPIRATORY (INHALATION) 2 TIMES DAILY
Qty: 120 ML | Refills: 3 | DISCHARGE
Start: 2020-01-01

## 2020-01-01 RX ORDER — ZINC SULFATE 50(220)MG
50 CAPSULE ORAL DAILY
Status: DISCONTINUED | OUTPATIENT
Start: 2020-01-01 | End: 2020-01-01

## 2020-01-01 RX ORDER — BLOOD SUGAR DIAGNOSTIC
1 STRIP MISCELLANEOUS DAILY
Qty: 200 EACH | Refills: 5 | Status: ON HOLD
Start: 2020-01-01 | End: 2020-01-01 | Stop reason: HOSPADM

## 2020-01-01 RX ORDER — DEXTROSE MONOHYDRATE 50 MG/ML
100 INJECTION, SOLUTION INTRAVENOUS PRN
Status: DISCONTINUED | OUTPATIENT
Start: 2020-01-01 | End: 2020-01-01 | Stop reason: HOSPADM

## 2020-01-01 RX ORDER — MIDAZOLAM HYDROCHLORIDE 1 MG/ML
2 INJECTION INTRAMUSCULAR; INTRAVENOUS ONCE
Status: DISCONTINUED | OUTPATIENT
Start: 2020-01-01 | End: 2020-01-01 | Stop reason: SDUPTHER

## 2020-01-01 RX ORDER — 0.9 % SODIUM CHLORIDE 0.9 %
20 INTRAVENOUS SOLUTION INTRAVENOUS ONCE
Status: COMPLETED | OUTPATIENT
Start: 2020-01-01 | End: 2020-01-01

## 2020-01-01 RX ORDER — SODIUM CHLORIDE 9 MG/ML
INJECTION, SOLUTION INTRAVENOUS CONTINUOUS PRN
Status: DISCONTINUED | OUTPATIENT
Start: 2020-01-01 | End: 2020-01-01 | Stop reason: SDUPTHER

## 2020-01-01 RX ORDER — DEXTROSE MONOHYDRATE 25 G/50ML
12.5 INJECTION, SOLUTION INTRAVENOUS PRN
Status: DISCONTINUED | OUTPATIENT
Start: 2020-01-01 | End: 2020-01-01 | Stop reason: HOSPADM

## 2020-01-01 RX ORDER — LIDOCAINE HYDROCHLORIDE 10 MG/ML
INJECTION, SOLUTION INFILTRATION; PERINEURAL PRN
Status: DISCONTINUED | OUTPATIENT
Start: 2020-01-01 | End: 2020-01-01 | Stop reason: HOSPADM

## 2020-01-01 RX ORDER — MIDAZOLAM HYDROCHLORIDE 1 MG/ML
2 INJECTION INTRAMUSCULAR; INTRAVENOUS ONCE
Status: COMPLETED | OUTPATIENT
Start: 2020-01-01 | End: 2020-01-01

## 2020-01-01 RX ORDER — BLOOD SUGAR DIAGNOSTIC
1 STRIP MISCELLANEOUS DAILY
Qty: 90 EACH | Refills: 11 | Status: SHIPPED
Start: 2020-01-01 | End: 2020-01-01 | Stop reason: SDUPTHER

## 2020-01-01 RX ORDER — MIDAZOLAM HYDROCHLORIDE 1 MG/ML
4 INJECTION INTRAMUSCULAR; INTRAVENOUS ONCE
Status: COMPLETED | OUTPATIENT
Start: 2020-01-01 | End: 2020-01-01

## 2020-01-01 RX ORDER — ONDANSETRON 2 MG/ML
4 INJECTION INTRAMUSCULAR; INTRAVENOUS EVERY 6 HOURS PRN
Status: DISCONTINUED | OUTPATIENT
Start: 2020-01-01 | End: 2020-01-01 | Stop reason: HOSPADM

## 2020-01-01 RX ORDER — LACTULOSE 10 G/15ML
20 SOLUTION ORAL 3 TIMES DAILY
Status: DISCONTINUED | OUTPATIENT
Start: 2020-01-01 | End: 2020-01-01

## 2020-01-01 RX ORDER — 0.9 % SODIUM CHLORIDE 0.9 %
30 INTRAVENOUS SOLUTION INTRAVENOUS PRN
Status: DISCONTINUED | OUTPATIENT
Start: 2020-01-01 | End: 2020-01-01

## 2020-01-01 RX ORDER — VITAMIN B COMPLEX
2000 TABLET ORAL EVERY MORNING
Status: DISCONTINUED | OUTPATIENT
Start: 2020-01-01 | End: 2020-01-01

## 2020-01-01 RX ORDER — MIDAZOLAM HYDROCHLORIDE 1 MG/ML
INJECTION INTRAMUSCULAR; INTRAVENOUS
Status: COMPLETED
Start: 2020-01-01 | End: 2020-01-01

## 2020-01-01 RX ORDER — BLOOD-GLUCOSE METER
EACH MISCELLANEOUS
Qty: 1 KIT | Refills: 0 | Status: SHIPPED
Start: 2020-01-01 | End: 2020-01-01

## 2020-01-01 RX ORDER — FENTANYL CITRATE 50 UG/ML
50 INJECTION, SOLUTION INTRAMUSCULAR; INTRAVENOUS ONCE
Status: COMPLETED | OUTPATIENT
Start: 2020-01-01 | End: 2020-01-01

## 2020-01-01 RX ORDER — FLUCONAZOLE 100 MG/1
200 TABLET ORAL DAILY
Status: DISCONTINUED | OUTPATIENT
Start: 2020-01-01 | End: 2020-01-01 | Stop reason: HOSPADM

## 2020-01-01 RX ORDER — BLOOD-GLUCOSE METER
EACH MISCELLANEOUS
Qty: 1 KIT | Refills: 0 | Status: ON HOLD
Start: 2020-01-01 | End: 2020-01-01 | Stop reason: HOSPADM

## 2020-01-01 RX ORDER — FLUCONAZOLE 200 MG/1
200 TABLET ORAL DAILY
Qty: 7 TABLET | Refills: 0 | DISCHARGE
Start: 2020-01-01 | End: 2020-01-01

## 2020-01-01 RX ORDER — HYDRALAZINE HYDROCHLORIDE 20 MG/ML
10 INJECTION INTRAMUSCULAR; INTRAVENOUS ONCE
Status: COMPLETED | OUTPATIENT
Start: 2020-01-01 | End: 2020-01-01

## 2020-01-01 RX ORDER — INSULIN GLARGINE 100 [IU]/ML
10 INJECTION, SOLUTION SUBCUTANEOUS NIGHTLY
Status: DISCONTINUED | OUTPATIENT
Start: 2020-01-01 | End: 2020-01-01

## 2020-01-01 RX ORDER — PRAVASTATIN SODIUM 40 MG
TABLET ORAL
Qty: 90 TABLET | Refills: 2 | Status: ON HOLD
Start: 2020-01-01 | End: 2020-01-01 | Stop reason: HOSPADM

## 2020-01-01 RX ORDER — FUROSEMIDE 10 MG/ML
20 INJECTION INTRAMUSCULAR; INTRAVENOUS 2 TIMES DAILY
Status: DISCONTINUED | OUTPATIENT
Start: 2020-01-01 | End: 2020-01-01

## 2020-01-01 RX ORDER — SODIUM CHLORIDE 0.9 % (FLUSH) 0.9 %
10 SYRINGE (ML) INJECTION PRN
Status: DISCONTINUED | OUTPATIENT
Start: 2020-01-01 | End: 2020-01-01

## 2020-01-01 RX ORDER — LEVOFLOXACIN 5 MG/ML
750 INJECTION, SOLUTION INTRAVENOUS EVERY 24 HOURS
Status: DISCONTINUED | OUTPATIENT
Start: 2020-01-01 | End: 2020-01-01

## 2020-01-01 RX ORDER — SUCCINYLCHOLINE CHLORIDE 20 MG/ML
80 INJECTION INTRAMUSCULAR; INTRAVENOUS ONCE
Status: COMPLETED | OUTPATIENT
Start: 2020-01-01 | End: 2020-01-01

## 2020-01-01 RX ORDER — MAGNESIUM SULFATE 1 G/100ML
1 INJECTION INTRAVENOUS PRN
Status: DISCONTINUED | OUTPATIENT
Start: 2020-01-01 | End: 2020-01-01 | Stop reason: HOSPADM

## 2020-01-01 RX ORDER — ACETAMINOPHEN 325 MG/1
650 TABLET ORAL EVERY 6 HOURS PRN
Status: DISCONTINUED | OUTPATIENT
Start: 2020-01-01 | End: 2020-01-01 | Stop reason: HOSPADM

## 2020-01-01 RX ORDER — SENNA PLUS 8.6 MG/1
1 TABLET ORAL NIGHTLY
Status: DISCONTINUED | OUTPATIENT
Start: 2020-01-01 | End: 2020-01-01

## 2020-01-01 RX ORDER — ETOMIDATE 2 MG/ML
INJECTION INTRAVENOUS
Status: COMPLETED
Start: 2020-01-01 | End: 2020-01-01

## 2020-01-01 RX ORDER — LIDOCAINE HYDROCHLORIDE 10 MG/ML
5 INJECTION, SOLUTION EPIDURAL; INFILTRATION; INTRACAUDAL; PERINEURAL ONCE
Status: DISCONTINUED | OUTPATIENT
Start: 2020-01-01 | End: 2020-01-01

## 2020-01-01 RX ORDER — INSULIN GLARGINE 100 [IU]/ML
25 INJECTION, SOLUTION SUBCUTANEOUS 2 TIMES DAILY
Qty: 1 VIAL | Refills: 3 | Status: ON HOLD | DISCHARGE
Start: 2020-01-01 | End: 2021-01-01

## 2020-01-01 RX ORDER — FENTANYL 50 UG/H
1 PATCH TRANSDERMAL
Status: DISCONTINUED | OUTPATIENT
Start: 2020-01-01 | End: 2020-01-01 | Stop reason: HOSPADM

## 2020-01-01 RX ORDER — SODIUM CHLORIDE 0.9 % (FLUSH) 0.9 %
10 SYRINGE (ML) INJECTION EVERY 12 HOURS SCHEDULED
Status: DISCONTINUED | OUTPATIENT
Start: 2020-01-01 | End: 2020-01-01 | Stop reason: HOSPADM

## 2020-01-01 RX ORDER — DEXAMETHASONE SODIUM PHOSPHATE 10 MG/ML
6 INJECTION, SOLUTION INTRAMUSCULAR; INTRAVENOUS EVERY 12 HOURS
Status: DISCONTINUED | OUTPATIENT
Start: 2020-01-01 | End: 2020-01-01 | Stop reason: SDUPTHER

## 2020-01-01 RX ORDER — INSULIN GLARGINE 100 [IU]/ML
25 INJECTION, SOLUTION SUBCUTANEOUS 2 TIMES DAILY
Status: DISCONTINUED | OUTPATIENT
Start: 2020-01-01 | End: 2020-01-01 | Stop reason: HOSPADM

## 2020-01-01 RX ORDER — POLYETHYLENE GLYCOL 3350 17 G/17G
17 POWDER, FOR SOLUTION ORAL DAILY PRN
Status: DISCONTINUED | OUTPATIENT
Start: 2020-01-01 | End: 2020-01-01 | Stop reason: HOSPADM

## 2020-01-01 RX ORDER — DEXTROSE MONOHYDRATE 50 MG/ML
INJECTION, SOLUTION INTRAVENOUS CONTINUOUS
Status: DISCONTINUED | OUTPATIENT
Start: 2020-01-01 | End: 2020-01-01

## 2020-01-01 RX ORDER — ALBUTEROL SULFATE 90 UG/1
2 AEROSOL, METERED RESPIRATORY (INHALATION) EVERY 6 HOURS PRN
Status: DISCONTINUED | OUTPATIENT
Start: 2020-01-01 | End: 2020-01-01 | Stop reason: HOSPADM

## 2020-01-01 RX ORDER — HEPARIN SODIUM (PORCINE) LOCK FLUSH IV SOLN 100 UNIT/ML 100 UNIT/ML
3 SOLUTION INTRAVENOUS PRN
Status: DISCONTINUED | OUTPATIENT
Start: 2020-01-01 | End: 2020-01-01 | Stop reason: HOSPADM

## 2020-01-01 RX ORDER — BUDESONIDE 0.5 MG/2ML
0.5 INHALANT ORAL 2 TIMES DAILY
Qty: 60 AMPULE | Refills: 3 | DISCHARGE
Start: 2020-01-01

## 2020-01-01 RX ORDER — ACETAMINOPHEN 500 MG
1000 TABLET ORAL ONCE
Status: COMPLETED | OUTPATIENT
Start: 2020-01-01 | End: 2020-01-01

## 2020-01-01 RX ORDER — IPRATROPIUM BROMIDE AND ALBUTEROL SULFATE 2.5; .5 MG/3ML; MG/3ML
1 SOLUTION RESPIRATORY (INHALATION) EVERY 4 HOURS
Status: DISCONTINUED | OUTPATIENT
Start: 2020-01-01 | End: 2020-01-01 | Stop reason: HOSPADM

## 2020-01-01 RX ORDER — DEXAMETHASONE SODIUM PHOSPHATE 10 MG/ML
6 INJECTION, SOLUTION INTRAMUSCULAR; INTRAVENOUS DAILY
Status: DISCONTINUED | OUTPATIENT
Start: 2020-01-01 | End: 2020-01-01

## 2020-01-01 RX ORDER — PROPOFOL 10 MG/ML
INJECTION, EMULSION INTRAVENOUS
Status: DISPENSED
Start: 2020-01-01 | End: 2020-01-01

## 2020-01-01 RX ORDER — INSULIN GLARGINE 100 [IU]/ML
20 INJECTION, SOLUTION SUBCUTANEOUS NIGHTLY
Status: DISCONTINUED | OUTPATIENT
Start: 2020-01-01 | End: 2020-01-01

## 2020-01-01 RX ORDER — 0.9 % SODIUM CHLORIDE 0.9 %
250 INTRAVENOUS SOLUTION INTRAVENOUS ONCE
Status: COMPLETED | OUTPATIENT
Start: 2020-01-01 | End: 2020-01-01

## 2020-01-01 RX ORDER — POTASSIUM CHLORIDE 29.8 MG/ML
20 INJECTION INTRAVENOUS PRN
Status: DISCONTINUED | OUTPATIENT
Start: 2020-01-01 | End: 2020-01-01 | Stop reason: HOSPADM

## 2020-01-01 RX ORDER — LANCETS
EACH MISCELLANEOUS
Qty: 100 EACH | Refills: 3 | Status: ON HOLD
Start: 2020-01-01 | End: 2020-01-01 | Stop reason: HOSPADM

## 2020-01-01 RX ORDER — METOPROLOL TARTRATE 5 MG/5ML
5 INJECTION INTRAVENOUS ONCE
Status: COMPLETED | OUTPATIENT
Start: 2020-01-01 | End: 2020-01-01

## 2020-01-01 RX ORDER — BUDESONIDE 0.5 MG/2ML
0.5 INHALANT ORAL 2 TIMES DAILY
Status: DISCONTINUED | OUTPATIENT
Start: 2020-01-01 | End: 2020-01-01 | Stop reason: HOSPADM

## 2020-01-01 RX ORDER — PROMETHAZINE HYDROCHLORIDE 25 MG/1
12.5 TABLET ORAL EVERY 6 HOURS PRN
Status: DISCONTINUED | OUTPATIENT
Start: 2020-01-01 | End: 2020-01-01 | Stop reason: HOSPADM

## 2020-01-01 RX ORDER — FENTANYL CITRATE 50 UG/ML
50 INJECTION, SOLUTION INTRAMUSCULAR; INTRAVENOUS
Status: DISCONTINUED | OUTPATIENT
Start: 2020-01-01 | End: 2020-01-01 | Stop reason: HOSPADM

## 2020-01-01 RX ORDER — NICOTINE POLACRILEX 4 MG
15 LOZENGE BUCCAL PRN
Status: DISCONTINUED | OUTPATIENT
Start: 2020-01-01 | End: 2020-01-01 | Stop reason: HOSPADM

## 2020-01-01 RX ORDER — LANCETS
EACH MISCELLANEOUS
Qty: 100 EACH | Refills: 3 | Status: SHIPPED
Start: 2020-01-01 | End: 2020-01-01

## 2020-01-01 RX ORDER — MAGNESIUM SULFATE IN WATER 40 MG/ML
2 INJECTION, SOLUTION INTRAVENOUS ONCE
Status: COMPLETED | OUTPATIENT
Start: 2020-01-01 | End: 2020-01-01

## 2020-01-01 RX ORDER — LANSOPRAZOLE
30 KIT
Qty: 300 ML | Status: ON HOLD | DISCHARGE
Start: 2020-01-01 | End: 2021-01-01

## 2020-01-01 RX ORDER — NAPROXEN 500 MG/1
500 TABLET ORAL 2 TIMES DAILY
Qty: 60 TABLET | Refills: 0 | Status: SHIPPED | OUTPATIENT
Start: 2020-01-01 | End: 2020-01-01 | Stop reason: SDUPTHER

## 2020-01-01 RX ORDER — ARFORMOTEROL TARTRATE 15 UG/2ML
15 SOLUTION RESPIRATORY (INHALATION) 2 TIMES DAILY
Status: DISCONTINUED | OUTPATIENT
Start: 2020-01-01 | End: 2020-01-01 | Stop reason: HOSPADM

## 2020-01-01 RX ORDER — METOPROLOL TARTRATE 5 MG/5ML
INJECTION INTRAVENOUS
Status: COMPLETED
Start: 2020-01-01 | End: 2020-01-01

## 2020-01-01 RX ORDER — SODIUM CHLORIDE 0.9 % (FLUSH) 0.9 %
10 SYRINGE (ML) INJECTION PRN
Status: DISCONTINUED | OUTPATIENT
Start: 2020-01-01 | End: 2020-01-01 | Stop reason: HOSPADM

## 2020-01-01 RX ORDER — BUDESONIDE 0.5 MG/2ML
1 INHALANT ORAL 2 TIMES DAILY
Status: ON HOLD | COMMUNITY
End: 2020-01-01 | Stop reason: HOSPADM

## 2020-01-01 RX ORDER — INSULIN GLARGINE 100 [IU]/ML
10 INJECTION, SOLUTION SUBCUTANEOUS ONCE
Status: COMPLETED | OUTPATIENT
Start: 2020-01-01 | End: 2020-01-01

## 2020-01-01 RX ORDER — LORAZEPAM 1 MG/1
2 TABLET ORAL
Status: DISCONTINUED | OUTPATIENT
Start: 2020-01-01 | End: 2020-01-01 | Stop reason: HOSPADM

## 2020-01-01 RX ORDER — ACETAMINOPHEN 650 MG/1
650 SUPPOSITORY RECTAL EVERY 6 HOURS PRN
Status: DISCONTINUED | OUTPATIENT
Start: 2020-01-01 | End: 2020-01-01 | Stop reason: HOSPADM

## 2020-01-01 RX ORDER — PROPOFOL 10 MG/ML
INJECTION, EMULSION INTRAVENOUS
Status: COMPLETED
Start: 2020-01-01 | End: 2020-01-01

## 2020-01-01 RX ORDER — FENTANYL 50 UG/H
1 PATCH TRANSDERMAL
Refills: 0 | Status: SHIPPED | DISCHARGE
Start: 2020-01-01 | End: 2021-01-01

## 2020-01-01 RX ADMIN — PIPERACILLIN AND TAZOBACTAM 3.38 G: 3; .375 INJECTION, POWDER, LYOPHILIZED, FOR SOLUTION INTRAVENOUS at 09:46

## 2020-01-01 RX ADMIN — Medication 2000 UNITS: at 08:19

## 2020-01-01 RX ADMIN — POLYETHYLENE GLYCOL 3350 17 G: 17 POWDER, FOR SOLUTION ORAL at 21:17

## 2020-01-01 RX ADMIN — IPRATROPIUM BROMIDE AND ALBUTEROL SULFATE 1 AMPULE: 2.5; .5 SOLUTION RESPIRATORY (INHALATION) at 16:09

## 2020-01-01 RX ADMIN — INSULIN LISPRO 3 UNITS: 100 INJECTION, SOLUTION INTRAVENOUS; SUBCUTANEOUS at 13:00

## 2020-01-01 RX ADMIN — BUDESONIDE 500 MCG: 0.5 SUSPENSION RESPIRATORY (INHALATION) at 20:37

## 2020-01-01 RX ADMIN — LORAZEPAM 2 MG: 2 INJECTION INTRAMUSCULAR; INTRAVENOUS at 08:25

## 2020-01-01 RX ADMIN — FUROSEMIDE 40 MG: 10 INJECTION, SOLUTION INTRAMUSCULAR; INTRAVENOUS at 17:45

## 2020-01-01 RX ADMIN — IPRATROPIUM BROMIDE AND ALBUTEROL SULFATE 1 AMPULE: 2.5; .5 SOLUTION RESPIRATORY (INHALATION) at 19:47

## 2020-01-01 RX ADMIN — BUDESONIDE 500 MCG: 0.5 SUSPENSION RESPIRATORY (INHALATION) at 07:59

## 2020-01-01 RX ADMIN — Medication 1000 MG: at 08:41

## 2020-01-01 RX ADMIN — SODIUM CHLORIDE, PRESERVATIVE FREE 300 UNITS: 5 INJECTION INTRAVENOUS at 08:38

## 2020-01-01 RX ADMIN — OXYCODONE HYDROCHLORIDE 5 MG: 5 TABLET ORAL at 22:45

## 2020-01-01 RX ADMIN — Medication 1000 MG: at 08:30

## 2020-01-01 RX ADMIN — IPRATROPIUM BROMIDE AND ALBUTEROL SULFATE 1 AMPULE: 2.5; .5 SOLUTION RESPIRATORY (INHALATION) at 20:12

## 2020-01-01 RX ADMIN — IPRATROPIUM BROMIDE AND ALBUTEROL SULFATE 1 AMPULE: 2.5; .5 SOLUTION RESPIRATORY (INHALATION) at 23:42

## 2020-01-01 RX ADMIN — GUAIFENESIN 400 MG: 400 TABLET ORAL at 20:54

## 2020-01-01 RX ADMIN — Medication 125 MCG/HR: at 01:48

## 2020-01-01 RX ADMIN — SODIUM CHLORIDE 0.9 MCG/KG/HR: 9 INJECTION, SOLUTION INTRAVENOUS at 06:49

## 2020-01-01 RX ADMIN — Medication 200 MCG/HR: at 01:36

## 2020-01-01 RX ADMIN — LACTULOSE 20 G: 20 SOLUTION ORAL at 09:38

## 2020-01-01 RX ADMIN — IPRATROPIUM BROMIDE AND ALBUTEROL SULFATE 1 AMPULE: 2.5; .5 SOLUTION RESPIRATORY (INHALATION) at 07:49

## 2020-01-01 RX ADMIN — Medication 10 ML: at 08:40

## 2020-01-01 RX ADMIN — ZINC SULFATE 220 MG (50 MG) CAPSULE 50 MG: CAPSULE at 13:03

## 2020-01-01 RX ADMIN — INSULIN GLARGINE 25 UNITS: 100 INJECTION, SOLUTION SUBCUTANEOUS at 20:15

## 2020-01-01 RX ADMIN — Medication 1 TABLET: at 09:29

## 2020-01-01 RX ADMIN — IPRATROPIUM BROMIDE AND ALBUTEROL SULFATE 1 AMPULE: 2.5; .5 SOLUTION RESPIRATORY (INHALATION) at 03:24

## 2020-01-01 RX ADMIN — Medication 1 TABLET: at 08:30

## 2020-01-01 RX ADMIN — Medication 100 MCG/HR: at 00:09

## 2020-01-01 RX ADMIN — IPRATROPIUM BROMIDE AND ALBUTEROL SULFATE 1 AMPULE: 2.5; .5 SOLUTION RESPIRATORY (INHALATION) at 03:23

## 2020-01-01 RX ADMIN — GUAIFENESIN 400 MG: 400 TABLET ORAL at 17:57

## 2020-01-01 RX ADMIN — POTASSIUM CHLORIDE 40 MEQ: 29.8 INJECTION, SOLUTION INTRAVENOUS at 17:43

## 2020-01-01 RX ADMIN — FUROSEMIDE 40 MG: 10 INJECTION, SOLUTION INTRAMUSCULAR; INTRAVENOUS at 08:38

## 2020-01-01 RX ADMIN — PROPOFOL 20 MCG/KG/MIN: 10 INJECTION, EMULSION INTRAVENOUS at 16:00

## 2020-01-01 RX ADMIN — INSULIN GLARGINE 10 UNITS: 100 INJECTION, SOLUTION SUBCUTANEOUS at 10:54

## 2020-01-01 RX ADMIN — SODIUM CHLORIDE 250 ML: 9 INJECTION, SOLUTION INTRAVENOUS at 10:03

## 2020-01-01 RX ADMIN — IPRATROPIUM BROMIDE 2 PUFF: 17 AEROSOL, METERED RESPIRATORY (INHALATION) at 13:00

## 2020-01-01 RX ADMIN — INSULIN LISPRO 3 UNITS: 100 INJECTION, SOLUTION INTRAVENOUS; SUBCUTANEOUS at 13:46

## 2020-01-01 RX ADMIN — BUDESONIDE 500 MCG: 0.5 SUSPENSION RESPIRATORY (INHALATION) at 20:12

## 2020-01-01 RX ADMIN — IPRATROPIUM BROMIDE AND ALBUTEROL SULFATE 1 AMPULE: 2.5; .5 SOLUTION RESPIRATORY (INHALATION) at 13:43

## 2020-01-01 RX ADMIN — IPRATROPIUM BROMIDE AND ALBUTEROL SULFATE 1 AMPULE: 2.5; .5 SOLUTION RESPIRATORY (INHALATION) at 04:11

## 2020-01-01 RX ADMIN — INSULIN LISPRO 2 UNITS: 100 INJECTION, SOLUTION INTRAVENOUS; SUBCUTANEOUS at 00:47

## 2020-01-01 RX ADMIN — IPRATROPIUM BROMIDE AND ALBUTEROL SULFATE 1 AMPULE: 2.5; .5 SOLUTION RESPIRATORY (INHALATION) at 12:04

## 2020-01-01 RX ADMIN — DEXAMETHASONE SODIUM PHOSPHATE 6 MG: 10 INJECTION, SOLUTION INTRAMUSCULAR; INTRAVENOUS at 08:22

## 2020-01-01 RX ADMIN — BUDESONIDE 500 MCG: 0.5 SUSPENSION RESPIRATORY (INHALATION) at 19:48

## 2020-01-01 RX ADMIN — IPRATROPIUM BROMIDE AND ALBUTEROL SULFATE 1 AMPULE: 2.5; .5 SOLUTION RESPIRATORY (INHALATION) at 19:40

## 2020-01-01 RX ADMIN — Medication 10 ML: at 09:27

## 2020-01-01 RX ADMIN — Medication 150 MCG/HR: at 13:04

## 2020-01-01 RX ADMIN — BUDESONIDE 500 MCG: 0.5 SUSPENSION RESPIRATORY (INHALATION) at 19:35

## 2020-01-01 RX ADMIN — Medication 10 ML: at 08:22

## 2020-01-01 RX ADMIN — INSULIN LISPRO 4 UNITS: 100 INJECTION, SOLUTION INTRAVENOUS; SUBCUTANEOUS at 18:01

## 2020-01-01 RX ADMIN — IPRATROPIUM BROMIDE AND ALBUTEROL SULFATE 1 AMPULE: 2.5; .5 SOLUTION RESPIRATORY (INHALATION) at 01:09

## 2020-01-01 RX ADMIN — SENNOSIDES 8.6 MG: 8.6 TABLET, FILM COATED ORAL at 20:23

## 2020-01-01 RX ADMIN — POLYETHYLENE GLYCOL 3350 17 G: 17 POWDER, FOR SOLUTION ORAL at 08:26

## 2020-01-01 RX ADMIN — SODIUM CHLORIDE: 9 INJECTION, SOLUTION INTRAVENOUS at 06:47

## 2020-01-01 RX ADMIN — BUDESONIDE 500 MCG: 0.5 SUSPENSION RESPIRATORY (INHALATION) at 08:44

## 2020-01-01 RX ADMIN — PROPOFOL 28 MCG/KG/MIN: 10 INJECTION, EMULSION INTRAVENOUS at 21:04

## 2020-01-01 RX ADMIN — Medication 10 ML: at 20:28

## 2020-01-01 RX ADMIN — Medication 200 MCG/HR: at 06:54

## 2020-01-01 RX ADMIN — IOPAMIDOL 75 ML: 755 INJECTION, SOLUTION INTRAVENOUS at 18:00

## 2020-01-01 RX ADMIN — PIPERACILLIN AND TAZOBACTAM 3.38 G: 3; .375 INJECTION, POWDER, LYOPHILIZED, FOR SOLUTION INTRAVENOUS at 10:18

## 2020-01-01 RX ADMIN — SODIUM CHLORIDE, PRESERVATIVE FREE 300 UNITS: 5 INJECTION INTRAVENOUS at 08:39

## 2020-01-01 RX ADMIN — BUDESONIDE 500 MCG: 0.5 SUSPENSION RESPIRATORY (INHALATION) at 19:42

## 2020-01-01 RX ADMIN — ENOXAPARIN SODIUM 40 MG: 40 INJECTION SUBCUTANEOUS at 10:17

## 2020-01-01 RX ADMIN — IPRATROPIUM BROMIDE AND ALBUTEROL SULFATE 1 AMPULE: 2.5; .5 SOLUTION RESPIRATORY (INHALATION) at 04:28

## 2020-01-01 RX ADMIN — SODIUM CHLORIDE 500 ML: 9 INJECTION, SOLUTION INTRAVENOUS at 00:40

## 2020-01-01 RX ADMIN — SODIUM CHLORIDE, PRESERVATIVE FREE 300 UNITS: 5 INJECTION INTRAVENOUS at 20:35

## 2020-01-01 RX ADMIN — Medication 125 MCG/ML: at 18:45

## 2020-01-01 RX ADMIN — INSULIN LISPRO 1 UNITS: 100 INJECTION, SOLUTION INTRAVENOUS; SUBCUTANEOUS at 21:45

## 2020-01-01 RX ADMIN — FLUTICASONE PROPIONATE 2 SPRAY: 50 SPRAY, METERED NASAL at 08:37

## 2020-01-01 RX ADMIN — PIPERACILLIN AND TAZOBACTAM 3.38 G: 3; .375 INJECTION, POWDER, LYOPHILIZED, FOR SOLUTION INTRAVENOUS at 09:50

## 2020-01-01 RX ADMIN — INSULIN GLARGINE 25 UNITS: 100 INJECTION, SOLUTION SUBCUTANEOUS at 20:30

## 2020-01-01 RX ADMIN — ACETAMINOPHEN 650 MG: 325 TABLET ORAL at 00:34

## 2020-01-01 RX ADMIN — MIDAZOLAM HYDROCHLORIDE 2 MG/HR: 5 INJECTION, SOLUTION INTRAMUSCULAR; INTRAVENOUS at 11:14

## 2020-01-01 RX ADMIN — PANTOPRAZOLE SODIUM 40 MG: 40 INJECTION, POWDER, LYOPHILIZED, FOR SOLUTION INTRAVENOUS at 08:21

## 2020-01-01 RX ADMIN — PIPERACILLIN AND TAZOBACTAM 3.38 G: 3; .375 INJECTION, POWDER, LYOPHILIZED, FOR SOLUTION INTRAVENOUS at 09:38

## 2020-01-01 RX ADMIN — Medication 75 MCG/HR: at 05:13

## 2020-01-01 RX ADMIN — Medication 10 ML: at 09:39

## 2020-01-01 RX ADMIN — BUDESONIDE 500 MCG: 0.5 SUSPENSION RESPIRATORY (INHALATION) at 08:18

## 2020-01-01 RX ADMIN — BUDESONIDE 500 MCG: 0.5 SUSPENSION RESPIRATORY (INHALATION) at 19:30

## 2020-01-01 RX ADMIN — GUAIFENESIN 400 MG: 400 TABLET ORAL at 13:00

## 2020-01-01 RX ADMIN — ARFORMOTEROL TARTRATE 15 MCG: 15 SOLUTION RESPIRATORY (INHALATION) at 19:40

## 2020-01-01 RX ADMIN — ARFORMOTEROL TARTRATE 15 MCG: 15 SOLUTION RESPIRATORY (INHALATION) at 19:47

## 2020-01-01 RX ADMIN — INSULIN LISPRO 9 UNITS: 100 INJECTION, SOLUTION INTRAVENOUS; SUBCUTANEOUS at 06:25

## 2020-01-01 RX ADMIN — BUDESONIDE 500 MCG: 0.5 SUSPENSION RESPIRATORY (INHALATION) at 19:47

## 2020-01-01 RX ADMIN — INSULIN LISPRO 8 UNITS: 100 INJECTION, SOLUTION INTRAVENOUS; SUBCUTANEOUS at 17:33

## 2020-01-01 RX ADMIN — Medication 2000 UNITS: at 10:11

## 2020-01-01 RX ADMIN — BUDESONIDE 500 MCG: 0.5 SUSPENSION RESPIRATORY (INHALATION) at 20:33

## 2020-01-01 RX ADMIN — Medication 2000 UNITS: at 08:37

## 2020-01-01 RX ADMIN — FUROSEMIDE 40 MG: 10 INJECTION, SOLUTION INTRAMUSCULAR; INTRAVENOUS at 18:40

## 2020-01-01 RX ADMIN — SODIUM CHLORIDE 1.4 MCG/KG/HR: 9 INJECTION, SOLUTION INTRAVENOUS at 13:28

## 2020-01-01 RX ADMIN — ENOXAPARIN SODIUM 40 MG: 40 INJECTION SUBCUTANEOUS at 11:08

## 2020-01-01 RX ADMIN — GUAIFENESIN 400 MG: 400 TABLET ORAL at 14:39

## 2020-01-01 RX ADMIN — INSULIN LISPRO 3 UNITS: 100 INJECTION, SOLUTION INTRAVENOUS; SUBCUTANEOUS at 00:21

## 2020-01-01 RX ADMIN — Medication 1 TABLET: at 08:07

## 2020-01-01 RX ADMIN — ARFORMOTEROL TARTRATE 15 MCG: 15 SOLUTION RESPIRATORY (INHALATION) at 20:12

## 2020-01-01 RX ADMIN — FUROSEMIDE 40 MG: 10 INJECTION, SOLUTION INTRAMUSCULAR; INTRAVENOUS at 09:36

## 2020-01-01 RX ADMIN — Medication 10 ML: at 08:08

## 2020-01-01 RX ADMIN — LACTULOSE 20 G: 20 SOLUTION ORAL at 17:51

## 2020-01-01 RX ADMIN — ACETAMINOPHEN 650 MG: 325 TABLET ORAL at 23:29

## 2020-01-01 RX ADMIN — BUDESONIDE 500 MCG: 0.5 SUSPENSION RESPIRATORY (INHALATION) at 19:54

## 2020-01-01 RX ADMIN — SODIUM CHLORIDE: 9 INJECTION, SOLUTION INTRAVENOUS at 11:37

## 2020-01-01 RX ADMIN — INSULIN GLARGINE 25 UNITS: 100 INJECTION, SOLUTION SUBCUTANEOUS at 08:03

## 2020-01-01 RX ADMIN — FUROSEMIDE 20 MG: 10 INJECTION, SOLUTION INTRAMUSCULAR; INTRAVENOUS at 08:07

## 2020-01-01 RX ADMIN — SODIUM CHLORIDE, PRESERVATIVE FREE 300 UNITS: 5 INJECTION INTRAVENOUS at 20:30

## 2020-01-01 RX ADMIN — Medication 10 ML: at 10:09

## 2020-01-01 RX ADMIN — INSULIN LISPRO 9 UNITS: 100 INJECTION, SOLUTION INTRAVENOUS; SUBCUTANEOUS at 17:35

## 2020-01-01 RX ADMIN — GUAIFENESIN 400 MG: 400 TABLET ORAL at 21:18

## 2020-01-01 RX ADMIN — Medication 200 MCG/HR: at 02:51

## 2020-01-01 RX ADMIN — Medication 1000 MG: at 08:32

## 2020-01-01 RX ADMIN — IPRATROPIUM BROMIDE AND ALBUTEROL SULFATE 1 AMPULE: 2.5; .5 SOLUTION RESPIRATORY (INHALATION) at 03:21

## 2020-01-01 RX ADMIN — ARFORMOTEROL TARTRATE 15 MCG: 15 SOLUTION RESPIRATORY (INHALATION) at 20:55

## 2020-01-01 RX ADMIN — GUAIFENESIN 400 MG: 400 TABLET ORAL at 12:52

## 2020-01-01 RX ADMIN — SODIUM CHLORIDE: 9 INJECTION, SOLUTION INTRAVENOUS at 04:00

## 2020-01-01 RX ADMIN — Medication 200 MCG/HR: at 00:47

## 2020-01-01 RX ADMIN — IPRATROPIUM BROMIDE AND ALBUTEROL SULFATE 1 AMPULE: 2.5; .5 SOLUTION RESPIRATORY (INHALATION) at 16:28

## 2020-01-01 RX ADMIN — ARFORMOTEROL TARTRATE 15 MCG: 15 SOLUTION RESPIRATORY (INHALATION) at 20:54

## 2020-01-01 RX ADMIN — GUAIFENESIN 400 MG: 400 TABLET ORAL at 12:38

## 2020-01-01 RX ADMIN — INSULIN LISPRO 3 UNITS: 100 INJECTION, SOLUTION INTRAVENOUS; SUBCUTANEOUS at 06:05

## 2020-01-01 RX ADMIN — METOPROLOL TARTRATE 5 MG: 5 INJECTION INTRAVENOUS at 13:55

## 2020-01-01 RX ADMIN — ENOXAPARIN SODIUM 70 MG: 80 INJECTION SUBCUTANEOUS at 16:43

## 2020-01-01 RX ADMIN — IPRATROPIUM BROMIDE AND ALBUTEROL SULFATE 1 AMPULE: 2.5; .5 SOLUTION RESPIRATORY (INHALATION) at 16:42

## 2020-01-01 RX ADMIN — Medication 1000 MG: at 09:45

## 2020-01-01 RX ADMIN — DEXAMETHASONE SODIUM PHOSPHATE 6 MG: 10 INJECTION, SOLUTION INTRAMUSCULAR; INTRAVENOUS at 09:04

## 2020-01-01 RX ADMIN — SENNOSIDES 8.6 MG: 8.6 TABLET, FILM COATED ORAL at 21:32

## 2020-01-01 RX ADMIN — IPRATROPIUM BROMIDE AND ALBUTEROL SULFATE 1 AMPULE: 2.5; .5 SOLUTION RESPIRATORY (INHALATION) at 22:59

## 2020-01-01 RX ADMIN — FUROSEMIDE 40 MG: 10 INJECTION, SOLUTION INTRAMUSCULAR; INTRAVENOUS at 20:35

## 2020-01-01 RX ADMIN — INSULIN GLARGINE 25 UNITS: 100 INJECTION, SOLUTION SUBCUTANEOUS at 20:12

## 2020-01-01 RX ADMIN — IPRATROPIUM BROMIDE AND ALBUTEROL SULFATE 1 AMPULE: 2.5; .5 SOLUTION RESPIRATORY (INHALATION) at 00:35

## 2020-01-01 RX ADMIN — INSULIN LISPRO 6 UNITS: 100 INJECTION, SOLUTION INTRAVENOUS; SUBCUTANEOUS at 11:45

## 2020-01-01 RX ADMIN — DEXTROSE MONOHYDRATE: 50 INJECTION, SOLUTION INTRAVENOUS at 12:03

## 2020-01-01 RX ADMIN — MIDAZOLAM HYDROCHLORIDE 4 MG: 1 INJECTION, SOLUTION INTRAMUSCULAR; INTRAVENOUS at 20:04

## 2020-01-01 RX ADMIN — Medication 75 MCG/HR: at 08:00

## 2020-01-01 RX ADMIN — PIPERACILLIN AND TAZOBACTAM 3.38 G: 3; .375 INJECTION, POWDER, LYOPHILIZED, FOR SOLUTION INTRAVENOUS at 10:02

## 2020-01-01 RX ADMIN — Medication 10 ML: at 21:39

## 2020-01-01 RX ADMIN — GUAIFENESIN 400 MG: 400 TABLET ORAL at 09:36

## 2020-01-01 RX ADMIN — PIPERACILLIN AND TAZOBACTAM 3.38 G: 3; .375 INJECTION, POWDER, LYOPHILIZED, FOR SOLUTION INTRAVENOUS at 02:00

## 2020-01-01 RX ADMIN — IPRATROPIUM BROMIDE AND ALBUTEROL SULFATE 1 AMPULE: 2.5; .5 SOLUTION RESPIRATORY (INHALATION) at 23:16

## 2020-01-01 RX ADMIN — Medication 10 ML: at 09:04

## 2020-01-01 RX ADMIN — Medication 10 ML: at 21:29

## 2020-01-01 RX ADMIN — MIDAZOLAM HYDROCHLORIDE 1 MG/HR: 5 INJECTION, SOLUTION INTRAMUSCULAR; INTRAVENOUS at 00:04

## 2020-01-01 RX ADMIN — DEXAMETHASONE SODIUM PHOSPHATE 6 MG: 4 INJECTION, SOLUTION INTRA-ARTICULAR; INTRALESIONAL; INTRAMUSCULAR; INTRAVENOUS; SOFT TISSUE at 08:19

## 2020-01-01 RX ADMIN — POTASSIUM CHLORIDE 40 MEQ: 29.8 INJECTION, SOLUTION INTRAVENOUS at 15:54

## 2020-01-01 RX ADMIN — SODIUM CHLORIDE 0.6 MCG/KG/HR: 9 INJECTION, SOLUTION INTRAVENOUS at 09:13

## 2020-01-01 RX ADMIN — IPRATROPIUM BROMIDE AND ALBUTEROL SULFATE 1 AMPULE: 2.5; .5 SOLUTION RESPIRATORY (INHALATION) at 20:32

## 2020-01-01 RX ADMIN — FUROSEMIDE 40 MG: 10 INJECTION, SOLUTION INTRAMUSCULAR; INTRAVENOUS at 10:05

## 2020-01-01 RX ADMIN — INSULIN GLARGINE 15 UNITS: 100 INJECTION, SOLUTION SUBCUTANEOUS at 10:17

## 2020-01-01 RX ADMIN — Medication 2000 UNITS: at 09:20

## 2020-01-01 RX ADMIN — SODIUM CHLORIDE: 9 INJECTION, SOLUTION INTRAVENOUS at 12:00

## 2020-01-01 RX ADMIN — ARFORMOTEROL TARTRATE 15 MCG: 15 SOLUTION RESPIRATORY (INHALATION) at 08:28

## 2020-01-01 RX ADMIN — INSULIN GLARGINE 25 UNITS: 100 INJECTION, SOLUTION SUBCUTANEOUS at 09:45

## 2020-01-01 RX ADMIN — IPRATROPIUM BROMIDE AND ALBUTEROL SULFATE 1 AMPULE: 2.5; .5 SOLUTION RESPIRATORY (INHALATION) at 08:08

## 2020-01-01 RX ADMIN — IPRATROPIUM BROMIDE AND ALBUTEROL SULFATE 1 AMPULE: 2.5; .5 SOLUTION RESPIRATORY (INHALATION) at 16:56

## 2020-01-01 RX ADMIN — PANTOPRAZOLE SODIUM 40 MG: 40 INJECTION, POWDER, LYOPHILIZED, FOR SOLUTION INTRAVENOUS at 08:30

## 2020-01-01 RX ADMIN — PIPERACILLIN AND TAZOBACTAM 3.38 G: 3; .375 INJECTION, POWDER, LYOPHILIZED, FOR SOLUTION INTRAVENOUS at 18:26

## 2020-01-01 RX ADMIN — INSULIN LISPRO 6 UNITS: 100 INJECTION, SOLUTION INTRAVENOUS; SUBCUTANEOUS at 12:00

## 2020-01-01 RX ADMIN — PANTOPRAZOLE SODIUM 40 MG: 40 INJECTION, POWDER, LYOPHILIZED, FOR SOLUTION INTRAVENOUS at 08:53

## 2020-01-01 RX ADMIN — SODIUM CHLORIDE: 9 INJECTION, SOLUTION INTRAVENOUS at 20:27

## 2020-01-01 RX ADMIN — INSULIN LISPRO 6 UNITS: 100 INJECTION, SOLUTION INTRAVENOUS; SUBCUTANEOUS at 12:40

## 2020-01-01 RX ADMIN — ARFORMOTEROL TARTRATE 15 MCG: 15 SOLUTION RESPIRATORY (INHALATION) at 19:42

## 2020-01-01 RX ADMIN — ZINC SULFATE 220 MG (50 MG) CAPSULE 50 MG: CAPSULE at 08:41

## 2020-01-01 RX ADMIN — SODIUM CHLORIDE, PRESERVATIVE FREE 10 ML: 5 INJECTION INTRAVENOUS at 09:45

## 2020-01-01 RX ADMIN — ACETAMINOPHEN 1000 MG: 500 TABLET ORAL at 19:36

## 2020-01-01 RX ADMIN — IPRATROPIUM BROMIDE AND ALBUTEROL SULFATE 1 AMPULE: 2.5; .5 SOLUTION RESPIRATORY (INHALATION) at 03:29

## 2020-01-01 RX ADMIN — Medication 100 MCG/HR: at 17:37

## 2020-01-01 RX ADMIN — ACETAMINOPHEN 650 MG: 325 TABLET ORAL at 10:04

## 2020-01-01 RX ADMIN — INSULIN GLARGINE 25 UNITS: 100 INJECTION, SOLUTION SUBCUTANEOUS at 20:35

## 2020-01-01 RX ADMIN — IPRATROPIUM BROMIDE AND ALBUTEROL SULFATE 1 AMPULE: 2.5; .5 SOLUTION RESPIRATORY (INHALATION) at 22:48

## 2020-01-01 RX ADMIN — INSULIN LISPRO 12 UNITS: 100 INJECTION, SOLUTION INTRAVENOUS; SUBCUTANEOUS at 12:02

## 2020-01-01 RX ADMIN — ZINC SULFATE 220 MG (50 MG) CAPSULE 50 MG: CAPSULE at 08:07

## 2020-01-01 RX ADMIN — Medication 175 MCG/HR: at 23:33

## 2020-01-01 RX ADMIN — PROPOFOL 20 MCG/KG/MIN: 10 INJECTION, EMULSION INTRAVENOUS at 02:58

## 2020-01-01 RX ADMIN — INSULIN LISPRO 6 UNITS: 100 INJECTION, SOLUTION INTRAVENOUS; SUBCUTANEOUS at 07:02

## 2020-01-01 RX ADMIN — Medication 200 MCG/HR: at 06:46

## 2020-01-01 RX ADMIN — ARFORMOTEROL TARTRATE 15 MCG: 15 SOLUTION RESPIRATORY (INHALATION) at 08:18

## 2020-01-01 RX ADMIN — IPRATROPIUM BROMIDE AND ALBUTEROL SULFATE 1 AMPULE: 2.5; .5 SOLUTION RESPIRATORY (INHALATION) at 07:46

## 2020-01-01 RX ADMIN — GUAIFENESIN 400 MG: 400 TABLET ORAL at 20:23

## 2020-01-01 RX ADMIN — ACETAMINOPHEN 650 MG: 325 TABLET ORAL at 09:56

## 2020-01-01 RX ADMIN — SODIUM CHLORIDE: 9 INJECTION, SOLUTION INTRAVENOUS at 12:58

## 2020-01-01 RX ADMIN — PANTOPRAZOLE SODIUM 40 MG: 40 INJECTION, POWDER, LYOPHILIZED, FOR SOLUTION INTRAVENOUS at 15:46

## 2020-01-01 RX ADMIN — INSULIN GLARGINE 25 UNITS: 100 INJECTION, SOLUTION SUBCUTANEOUS at 09:09

## 2020-01-01 RX ADMIN — FUROSEMIDE 40 MG: 10 INJECTION, SOLUTION INTRAMUSCULAR; INTRAVENOUS at 08:58

## 2020-01-01 RX ADMIN — BUDESONIDE 500 MCG: 0.5 SUSPENSION RESPIRATORY (INHALATION) at 07:57

## 2020-01-01 RX ADMIN — PIPERACILLIN AND TAZOBACTAM 3.38 G: 3; .375 INJECTION, POWDER, LYOPHILIZED, FOR SOLUTION INTRAVENOUS at 10:30

## 2020-01-01 RX ADMIN — SODIUM CHLORIDE 50 ML: 9 INJECTION, SOLUTION INTRAVENOUS at 19:46

## 2020-01-01 RX ADMIN — SODIUM CHLORIDE: 9 INJECTION, SOLUTION INTRAVENOUS at 03:30

## 2020-01-01 RX ADMIN — GUAIFENESIN 400 MG: 400 TABLET ORAL at 17:24

## 2020-01-01 RX ADMIN — SODIUM CHLORIDE, PRESERVATIVE FREE 300 UNITS: 5 INJECTION INTRAVENOUS at 10:08

## 2020-01-01 RX ADMIN — IPRATROPIUM BROMIDE AND ALBUTEROL SULFATE 1 AMPULE: 2.5; .5 SOLUTION RESPIRATORY (INHALATION) at 19:43

## 2020-01-01 RX ADMIN — SODIUM CHLORIDE 0.4 MCG/KG/HR: 9 INJECTION, SOLUTION INTRAVENOUS at 16:23

## 2020-01-01 RX ADMIN — PROPOFOL 10 MCG/KG/MIN: 10 INJECTION, EMULSION INTRAVENOUS at 15:45

## 2020-01-01 RX ADMIN — SODIUM CHLORIDE: 9 INJECTION, SOLUTION INTRAVENOUS at 20:30

## 2020-01-01 RX ADMIN — IPRATROPIUM BROMIDE AND ALBUTEROL SULFATE 1 AMPULE: 2.5; .5 SOLUTION RESPIRATORY (INHALATION) at 03:19

## 2020-01-01 RX ADMIN — Medication 1 TABLET: at 09:39

## 2020-01-01 RX ADMIN — Medication 175 MCG/HR: at 19:45

## 2020-01-01 RX ADMIN — INSULIN GLARGINE 5 UNITS: 100 INJECTION, SOLUTION SUBCUTANEOUS at 08:26

## 2020-01-01 RX ADMIN — IPRATROPIUM BROMIDE AND ALBUTEROL SULFATE 1 AMPULE: 2.5; .5 SOLUTION RESPIRATORY (INHALATION) at 23:38

## 2020-01-01 RX ADMIN — ROCURONIUM BROMIDE 30 MG: 10 INJECTION, SOLUTION INTRAVENOUS at 07:38

## 2020-01-01 RX ADMIN — SODIUM CHLORIDE, PRESERVATIVE FREE 300 UNITS: 5 INJECTION INTRAVENOUS at 20:54

## 2020-01-01 RX ADMIN — Medication 100 MCG/HR: at 09:51

## 2020-01-01 RX ADMIN — FUROSEMIDE 40 MG: 10 INJECTION, SOLUTION INTRAMUSCULAR; INTRAVENOUS at 17:48

## 2020-01-01 RX ADMIN — SODIUM CHLORIDE 1.4 MCG/KG/HR: 9 INJECTION, SOLUTION INTRAVENOUS at 21:08

## 2020-01-01 RX ADMIN — SODIUM CHLORIDE 500 ML: 9 INJECTION, SOLUTION INTRAVENOUS at 21:08

## 2020-01-01 RX ADMIN — SODIUM CHLORIDE: 9 INJECTION, SOLUTION INTRAVENOUS at 20:10

## 2020-01-01 RX ADMIN — INSULIN LISPRO 8 UNITS: 100 INJECTION, SOLUTION INTRAVENOUS; SUBCUTANEOUS at 06:40

## 2020-01-01 RX ADMIN — Medication: at 08:44

## 2020-01-01 RX ADMIN — INSULIN LISPRO 1 UNITS: 100 INJECTION, SOLUTION INTRAVENOUS; SUBCUTANEOUS at 18:45

## 2020-01-01 RX ADMIN — SODIUM CHLORIDE 0.6 MCG/KG/HR: 9 INJECTION, SOLUTION INTRAVENOUS at 11:08

## 2020-01-01 RX ADMIN — INSULIN GLARGINE 25 UNITS: 100 INJECTION, SOLUTION SUBCUTANEOUS at 09:00

## 2020-01-01 RX ADMIN — SODIUM CHLORIDE, PRESERVATIVE FREE 300 UNITS: 5 INJECTION INTRAVENOUS at 09:19

## 2020-01-01 RX ADMIN — PIPERACILLIN AND TAZOBACTAM 3.38 G: 3; .375 INJECTION, POWDER, LYOPHILIZED, FOR SOLUTION INTRAVENOUS at 18:15

## 2020-01-01 RX ADMIN — Medication 10 ML: at 08:38

## 2020-01-01 RX ADMIN — INSULIN GLARGINE 25 UNITS: 100 INJECTION, SOLUTION SUBCUTANEOUS at 21:09

## 2020-01-01 RX ADMIN — PIPERACILLIN AND TAZOBACTAM 3.38 G: 3; .375 INJECTION, POWDER, LYOPHILIZED, FOR SOLUTION INTRAVENOUS at 18:42

## 2020-01-01 RX ADMIN — INSULIN LISPRO 1 UNITS: 100 INJECTION, SOLUTION INTRAVENOUS; SUBCUTANEOUS at 08:32

## 2020-01-01 RX ADMIN — ENOXAPARIN SODIUM 30 MG: 30 INJECTION SUBCUTANEOUS at 08:33

## 2020-01-01 RX ADMIN — GUAIFENESIN 400 MG: 400 TABLET ORAL at 08:37

## 2020-01-01 RX ADMIN — INSULIN GLARGINE 25 UNITS: 100 INJECTION, SOLUTION SUBCUTANEOUS at 09:53

## 2020-01-01 RX ADMIN — IPRATROPIUM BROMIDE AND ALBUTEROL SULFATE 1 AMPULE: 2.5; .5 SOLUTION RESPIRATORY (INHALATION) at 20:54

## 2020-01-01 RX ADMIN — BUDESONIDE 500 MCG: 0.5 SUSPENSION RESPIRATORY (INHALATION) at 19:40

## 2020-01-01 RX ADMIN — GUAIFENESIN 400 MG: 400 TABLET ORAL at 10:05

## 2020-01-01 RX ADMIN — IPRATROPIUM BROMIDE AND ALBUTEROL SULFATE 1 AMPULE: 2.5; .5 SOLUTION RESPIRATORY (INHALATION) at 08:29

## 2020-01-01 RX ADMIN — INSULIN GLARGINE 25 UNITS: 100 INJECTION, SOLUTION SUBCUTANEOUS at 21:47

## 2020-01-01 RX ADMIN — Medication 2000 MG: at 08:20

## 2020-01-01 RX ADMIN — INSULIN LISPRO 2 UNITS: 100 INJECTION, SOLUTION INTRAVENOUS; SUBCUTANEOUS at 06:47

## 2020-01-01 RX ADMIN — PIPERACILLIN AND TAZOBACTAM 3.38 G: 3; .375 INJECTION, POWDER, LYOPHILIZED, FOR SOLUTION INTRAVENOUS at 09:17

## 2020-01-01 RX ADMIN — PIPERACILLIN AND TAZOBACTAM 3.38 G: 3; .375 INJECTION, POWDER, LYOPHILIZED, FOR SOLUTION INTRAVENOUS at 17:30

## 2020-01-01 RX ADMIN — ARFORMOTEROL TARTRATE 15 MCG: 15 SOLUTION RESPIRATORY (INHALATION) at 07:58

## 2020-01-01 RX ADMIN — INSULIN LISPRO 3 UNITS: 100 INJECTION, SOLUTION INTRAVENOUS; SUBCUTANEOUS at 00:30

## 2020-01-01 RX ADMIN — IPRATROPIUM BROMIDE AND ALBUTEROL SULFATE 1 AMPULE: 2.5; .5 SOLUTION RESPIRATORY (INHALATION) at 04:22

## 2020-01-01 RX ADMIN — INSULIN LISPRO 3 UNITS: 100 INJECTION, SOLUTION INTRAVENOUS; SUBCUTANEOUS at 18:33

## 2020-01-01 RX ADMIN — SODIUM CHLORIDE: 9 INJECTION, SOLUTION INTRAVENOUS at 12:37

## 2020-01-01 RX ADMIN — Medication 1 TABLET: at 08:38

## 2020-01-01 RX ADMIN — INSULIN LISPRO 9 UNITS: 100 INJECTION, SOLUTION INTRAVENOUS; SUBCUTANEOUS at 11:37

## 2020-01-01 RX ADMIN — PANTOPRAZOLE SODIUM 40 MG: 40 INJECTION, POWDER, LYOPHILIZED, FOR SOLUTION INTRAVENOUS at 09:38

## 2020-01-01 RX ADMIN — REMDESIVIR 100 MG: 5 INJECTION INTRAVENOUS at 17:25

## 2020-01-01 RX ADMIN — IPRATROPIUM BROMIDE AND ALBUTEROL SULFATE 1 AMPULE: 2.5; .5 SOLUTION RESPIRATORY (INHALATION) at 16:11

## 2020-01-01 RX ADMIN — Medication 10 ML: at 20:30

## 2020-01-01 RX ADMIN — PROPOFOL 30 MCG/KG/MIN: 10 INJECTION, EMULSION INTRAVENOUS at 11:40

## 2020-01-01 RX ADMIN — FLUTICASONE PROPIONATE 2 SPRAY: 50 SPRAY, METERED NASAL at 09:12

## 2020-01-01 RX ADMIN — DEXAMETHASONE SODIUM PHOSPHATE 6 MG: 4 INJECTION, SOLUTION INTRA-ARTICULAR; INTRALESIONAL; INTRAMUSCULAR; INTRAVENOUS; SOFT TISSUE at 08:33

## 2020-01-01 RX ADMIN — Medication 1 TABLET: at 08:41

## 2020-01-01 RX ADMIN — ERTAPENEM SODIUM 1000 MG: 1 INJECTION, POWDER, LYOPHILIZED, FOR SOLUTION INTRAMUSCULAR; INTRAVENOUS at 12:19

## 2020-01-01 RX ADMIN — DOCUSATE SODIUM 50 MG AND SENNOSIDES 8.6 MG 2 TABLET: 8.6; 5 TABLET, FILM COATED ORAL at 20:14

## 2020-01-01 RX ADMIN — IPRATROPIUM BROMIDE AND ALBUTEROL SULFATE 1 AMPULE: 2.5; .5 SOLUTION RESPIRATORY (INHALATION) at 15:41

## 2020-01-01 RX ADMIN — POTASSIUM PHOSPHATE, MONOBASIC AND POTASSIUM PHOSPHATE, DIBASIC 30 MMOL: 224; 236 INJECTION, SOLUTION, CONCENTRATE INTRAVENOUS at 09:47

## 2020-01-01 RX ADMIN — IPRATROPIUM BROMIDE AND ALBUTEROL SULFATE 1 AMPULE: 2.5; .5 SOLUTION RESPIRATORY (INHALATION) at 19:53

## 2020-01-01 RX ADMIN — LORAZEPAM 2 MG: 2 INJECTION INTRAMUSCULAR; INTRAVENOUS at 04:52

## 2020-01-01 RX ADMIN — ZINC SULFATE 220 MG (50 MG) CAPSULE 50 MG: CAPSULE at 08:38

## 2020-01-01 RX ADMIN — IPRATROPIUM BROMIDE AND ALBUTEROL SULFATE 1 AMPULE: 2.5; .5 SOLUTION RESPIRATORY (INHALATION) at 23:41

## 2020-01-01 RX ADMIN — FLUCONAZOLE 200 MG: 100 TABLET ORAL at 12:38

## 2020-01-01 RX ADMIN — PIPERACILLIN AND TAZOBACTAM 3.38 G: 3; .375 INJECTION, POWDER, LYOPHILIZED, FOR SOLUTION INTRAVENOUS at 17:38

## 2020-01-01 RX ADMIN — SODIUM CHLORIDE: 9 INJECTION, SOLUTION INTRAVENOUS at 20:00

## 2020-01-01 RX ADMIN — ARFORMOTEROL TARTRATE 15 MCG: 15 SOLUTION RESPIRATORY (INHALATION) at 08:08

## 2020-01-01 RX ADMIN — Medication 10 ML: at 20:35

## 2020-01-01 RX ADMIN — PANTOPRAZOLE SODIUM 40 MG: 40 INJECTION, POWDER, LYOPHILIZED, FOR SOLUTION INTRAVENOUS at 08:38

## 2020-01-01 RX ADMIN — INSULIN LISPRO 3 UNITS: 100 INJECTION, SOLUTION INTRAVENOUS; SUBCUTANEOUS at 06:55

## 2020-01-01 RX ADMIN — NOREPINEPHRINE BITARTRATE 8 MCG/MIN: 1 INJECTION, SOLUTION, CONCENTRATE INTRAVENOUS at 12:00

## 2020-01-01 RX ADMIN — GUAIFENESIN 400 MG: 400 TABLET ORAL at 20:40

## 2020-01-01 RX ADMIN — IPRATROPIUM BROMIDE AND ALBUTEROL SULFATE 1 AMPULE: 2.5; .5 SOLUTION RESPIRATORY (INHALATION) at 04:27

## 2020-01-01 RX ADMIN — PIPERACILLIN AND TAZOBACTAM 3.38 G: 3; .375 INJECTION, POWDER, LYOPHILIZED, FOR SOLUTION INTRAVENOUS at 17:00

## 2020-01-01 RX ADMIN — REMDESIVIR 200 MG: 5 INJECTION INTRAVENOUS at 08:14

## 2020-01-01 RX ADMIN — PROPOFOL 6 MCG/KG/MIN: 10 INJECTION, EMULSION INTRAVENOUS at 19:09

## 2020-01-01 RX ADMIN — IPRATROPIUM BROMIDE AND ALBUTEROL SULFATE 1 AMPULE: 2.5; .5 SOLUTION RESPIRATORY (INHALATION) at 04:19

## 2020-01-01 RX ADMIN — ACETAMINOPHEN 650 MG: 650 SUPPOSITORY RECTAL at 12:23

## 2020-01-01 RX ADMIN — IPRATROPIUM BROMIDE AND ALBUTEROL SULFATE 1 AMPULE: 2.5; .5 SOLUTION RESPIRATORY (INHALATION) at 11:56

## 2020-01-01 RX ADMIN — POTASSIUM CHLORIDE 20 MEQ: 400 INJECTION, SOLUTION INTRAVENOUS at 10:45

## 2020-01-01 RX ADMIN — PROPOFOL 15 MCG/KG/MIN: 10 INJECTION, EMULSION INTRAVENOUS at 16:59

## 2020-01-01 RX ADMIN — IPRATROPIUM BROMIDE AND ALBUTEROL SULFATE 1 AMPULE: 2.5; .5 SOLUTION RESPIRATORY (INHALATION) at 16:00

## 2020-01-01 RX ADMIN — PANTOPRAZOLE SODIUM 40 MG: 40 INJECTION, POWDER, LYOPHILIZED, FOR SOLUTION INTRAVENOUS at 10:09

## 2020-01-01 RX ADMIN — Medication 10 ML: at 21:20

## 2020-01-01 RX ADMIN — ACETAMINOPHEN 650 MG: 325 TABLET ORAL at 22:00

## 2020-01-01 RX ADMIN — Medication 2000 UNITS: at 08:53

## 2020-01-01 RX ADMIN — FUROSEMIDE 40 MG: 10 INJECTION, SOLUTION INTRAMUSCULAR; INTRAVENOUS at 18:06

## 2020-01-01 RX ADMIN — SENNOSIDES 8.6 MG: 8.6 TABLET, FILM COATED ORAL at 20:08

## 2020-01-01 RX ADMIN — LANSOPRAZOLE 30 MG: KIT at 06:58

## 2020-01-01 RX ADMIN — IPRATROPIUM BROMIDE AND ALBUTEROL SULFATE 1 AMPULE: 2.5; .5 SOLUTION RESPIRATORY (INHALATION) at 12:01

## 2020-01-01 RX ADMIN — POTASSIUM CHLORIDE 20 MEQ: 400 INJECTION, SOLUTION INTRAVENOUS at 11:44

## 2020-01-01 RX ADMIN — BUDESONIDE 500 MCG: 0.5 SUSPENSION RESPIRATORY (INHALATION) at 20:54

## 2020-01-01 RX ADMIN — SODIUM CHLORIDE 500 ML: 9 INJECTION, SOLUTION INTRAVENOUS at 23:12

## 2020-01-01 RX ADMIN — Medication 1000 MG: at 08:19

## 2020-01-01 RX ADMIN — HYDRALAZINE HYDROCHLORIDE 10 MG: 20 INJECTION INTRAMUSCULAR; INTRAVENOUS at 11:44

## 2020-01-01 RX ADMIN — Medication 200 MCG/HR: at 07:18

## 2020-01-01 RX ADMIN — INSULIN LISPRO 6 UNITS: 100 INJECTION, SOLUTION INTRAVENOUS; SUBCUTANEOUS at 23:19

## 2020-01-01 RX ADMIN — ARFORMOTEROL TARTRATE 15 MCG: 15 SOLUTION RESPIRATORY (INHALATION) at 09:22

## 2020-01-01 RX ADMIN — INSULIN LISPRO 10 UNITS: 100 INJECTION, SOLUTION INTRAVENOUS; SUBCUTANEOUS at 18:00

## 2020-01-01 RX ADMIN — PANTOPRAZOLE SODIUM 40 MG: 40 INJECTION, POWDER, LYOPHILIZED, FOR SOLUTION INTRAVENOUS at 09:26

## 2020-01-01 RX ADMIN — IPRATROPIUM BROMIDE AND ALBUTEROL SULFATE 1 AMPULE: 2.5; .5 SOLUTION RESPIRATORY (INHALATION) at 00:30

## 2020-01-01 RX ADMIN — INSULIN GLARGINE 5 UNITS: 100 INJECTION, SOLUTION SUBCUTANEOUS at 11:45

## 2020-01-01 RX ADMIN — FUROSEMIDE 40 MG: 10 INJECTION, SOLUTION INTRAMUSCULAR; INTRAVENOUS at 17:30

## 2020-01-01 RX ADMIN — FENTANYL CITRATE 50 MCG: 50 INJECTION, SOLUTION INTRAMUSCULAR; INTRAVENOUS at 16:42

## 2020-01-01 RX ADMIN — SODIUM CHLORIDE 0.6 MCG/KG/HR: 9 INJECTION, SOLUTION INTRAVENOUS at 22:59

## 2020-01-01 RX ADMIN — IPRATROPIUM BROMIDE AND ALBUTEROL SULFATE 1 AMPULE: 2.5; .5 SOLUTION RESPIRATORY (INHALATION) at 08:18

## 2020-01-01 RX ADMIN — ARFORMOTEROL TARTRATE 15 MCG: 15 SOLUTION RESPIRATORY (INHALATION) at 10:43

## 2020-01-01 RX ADMIN — Medication 175 MCG/HR: at 15:45

## 2020-01-01 RX ADMIN — FLUTICASONE PROPIONATE 2 SPRAY: 50 SPRAY, METERED NASAL at 09:17

## 2020-01-01 RX ADMIN — IPRATROPIUM BROMIDE AND ALBUTEROL SULFATE 1 AMPULE: 2.5; .5 SOLUTION RESPIRATORY (INHALATION) at 20:01

## 2020-01-01 RX ADMIN — ARFORMOTEROL TARTRATE 15 MCG: 15 SOLUTION RESPIRATORY (INHALATION) at 20:01

## 2020-01-01 RX ADMIN — Medication 1 TABLET: at 08:20

## 2020-01-01 RX ADMIN — IPRATROPIUM BROMIDE AND ALBUTEROL SULFATE 1 AMPULE: 2.5; .5 SOLUTION RESPIRATORY (INHALATION) at 22:41

## 2020-01-01 RX ADMIN — Medication 200 MCG/HR: at 08:23

## 2020-01-01 RX ADMIN — IPRATROPIUM BROMIDE AND ALBUTEROL SULFATE 1 AMPULE: 2.5; .5 SOLUTION RESPIRATORY (INHALATION) at 14:36

## 2020-01-01 RX ADMIN — IPRATROPIUM BROMIDE AND ALBUTEROL SULFATE 1 AMPULE: 2.5; .5 SOLUTION RESPIRATORY (INHALATION) at 19:49

## 2020-01-01 RX ADMIN — PANTOPRAZOLE SODIUM 40 MG: 40 INJECTION, POWDER, LYOPHILIZED, FOR SOLUTION INTRAVENOUS at 08:19

## 2020-01-01 RX ADMIN — IPRATROPIUM BROMIDE AND ALBUTEROL SULFATE 1 AMPULE: 2.5; .5 SOLUTION RESPIRATORY (INHALATION) at 00:04

## 2020-01-01 RX ADMIN — INSULIN GLARGINE 25 UNITS: 100 INJECTION, SOLUTION SUBCUTANEOUS at 10:11

## 2020-01-01 RX ADMIN — DOCUSATE SODIUM 50 MG AND SENNOSIDES 8.6 MG 2 TABLET: 8.6; 5 TABLET, FILM COATED ORAL at 11:09

## 2020-01-01 RX ADMIN — IPRATROPIUM BROMIDE AND ALBUTEROL SULFATE 1 AMPULE: 2.5; .5 SOLUTION RESPIRATORY (INHALATION) at 04:25

## 2020-01-01 RX ADMIN — SODIUM CHLORIDE 20 ML: 9 INJECTION, SOLUTION INTRAVENOUS at 09:34

## 2020-01-01 RX ADMIN — IPRATROPIUM BROMIDE AND ALBUTEROL SULFATE 1 AMPULE: 2.5; .5 SOLUTION RESPIRATORY (INHALATION) at 11:36

## 2020-01-01 RX ADMIN — Medication 100 MCG/HR: at 14:31

## 2020-01-01 RX ADMIN — IPRATROPIUM BROMIDE AND ALBUTEROL SULFATE 1 AMPULE: 2.5; .5 SOLUTION RESPIRATORY (INHALATION) at 00:19

## 2020-01-01 RX ADMIN — IPRATROPIUM BROMIDE AND ALBUTEROL SULFATE 1 AMPULE: 2.5; .5 SOLUTION RESPIRATORY (INHALATION) at 11:35

## 2020-01-01 RX ADMIN — SODIUM CHLORIDE: 9 INJECTION, SOLUTION INTRAVENOUS at 14:02

## 2020-01-01 RX ADMIN — INSULIN GLARGINE 10 UNITS: 100 INJECTION, SOLUTION SUBCUTANEOUS at 18:04

## 2020-01-01 RX ADMIN — IPRATROPIUM BROMIDE AND ALBUTEROL SULFATE 1 AMPULE: 2.5; .5 SOLUTION RESPIRATORY (INHALATION) at 16:13

## 2020-01-01 RX ADMIN — IPRATROPIUM BROMIDE AND ALBUTEROL SULFATE 1 AMPULE: 2.5; .5 SOLUTION RESPIRATORY (INHALATION) at 15:50

## 2020-01-01 RX ADMIN — PANTOPRAZOLE SODIUM 40 MG: 40 INJECTION, POWDER, LYOPHILIZED, FOR SOLUTION INTRAVENOUS at 08:26

## 2020-01-01 RX ADMIN — INSULIN LISPRO 1 UNITS: 100 INJECTION, SOLUTION INTRAVENOUS; SUBCUTANEOUS at 12:00

## 2020-01-01 RX ADMIN — PANTOPRAZOLE SODIUM 40 MG: 40 INJECTION, POWDER, LYOPHILIZED, FOR SOLUTION INTRAVENOUS at 08:08

## 2020-01-01 RX ADMIN — POLYETHYLENE GLYCOL 3350 17 G: 17 POWDER, FOR SOLUTION ORAL at 20:14

## 2020-01-01 RX ADMIN — INSULIN LISPRO 9 UNITS: 100 INJECTION, SOLUTION INTRAVENOUS; SUBCUTANEOUS at 18:02

## 2020-01-01 RX ADMIN — ARFORMOTEROL TARTRATE 15 MCG: 15 SOLUTION RESPIRATORY (INHALATION) at 08:44

## 2020-01-01 RX ADMIN — PANTOPRAZOLE SODIUM 40 MG: 40 INJECTION, POWDER, LYOPHILIZED, FOR SOLUTION INTRAVENOUS at 08:57

## 2020-01-01 RX ADMIN — ERTAPENEM SODIUM 1000 MG: 1 INJECTION, POWDER, LYOPHILIZED, FOR SOLUTION INTRAMUSCULAR; INTRAVENOUS at 14:11

## 2020-01-01 RX ADMIN — FUROSEMIDE 40 MG: 10 INJECTION, SOLUTION INTRAMUSCULAR; INTRAVENOUS at 18:14

## 2020-01-01 RX ADMIN — SODIUM CHLORIDE: 9 INJECTION, SOLUTION INTRAVENOUS at 11:09

## 2020-01-01 RX ADMIN — INSULIN GLARGINE 25 UNITS: 100 INJECTION, SOLUTION SUBCUTANEOUS at 08:04

## 2020-01-01 RX ADMIN — SODIUM CHLORIDE: 9 INJECTION, SOLUTION INTRAVENOUS at 03:00

## 2020-01-01 RX ADMIN — Medication 10 ML: at 08:34

## 2020-01-01 RX ADMIN — ARFORMOTEROL TARTRATE 15 MCG: 15 SOLUTION RESPIRATORY (INHALATION) at 09:40

## 2020-01-01 RX ADMIN — SODIUM CHLORIDE, PRESERVATIVE FREE 300 UNITS: 5 INJECTION INTRAVENOUS at 09:26

## 2020-01-01 RX ADMIN — BUDESONIDE 500 MCG: 0.5 SUSPENSION RESPIRATORY (INHALATION) at 07:49

## 2020-01-01 RX ADMIN — INSULIN LISPRO 9 UNITS: 100 INJECTION, SOLUTION INTRAVENOUS; SUBCUTANEOUS at 11:50

## 2020-01-01 RX ADMIN — BUDESONIDE 500 MCG: 0.5 SUSPENSION RESPIRATORY (INHALATION) at 08:29

## 2020-01-01 RX ADMIN — Medication 1000 MG: at 09:39

## 2020-01-01 RX ADMIN — SODIUM CHLORIDE: 9 INJECTION, SOLUTION INTRAVENOUS at 21:16

## 2020-01-01 RX ADMIN — IPRATROPIUM BROMIDE AND ALBUTEROL SULFATE 1 AMPULE: 2.5; .5 SOLUTION RESPIRATORY (INHALATION) at 02:52

## 2020-01-01 RX ADMIN — Medication 1000 MG: at 08:07

## 2020-01-01 RX ADMIN — Medication 175 MCG/HR: at 08:31

## 2020-01-01 RX ADMIN — Medication 2000 UNITS: at 08:33

## 2020-01-01 RX ADMIN — MIDAZOLAM HYDROCHLORIDE 2 MG: 1 INJECTION, SOLUTION INTRAMUSCULAR; INTRAVENOUS at 00:08

## 2020-01-01 RX ADMIN — BUDESONIDE 500 MCG: 0.5 SUSPENSION RESPIRATORY (INHALATION) at 20:56

## 2020-01-01 RX ADMIN — PIPERACILLIN AND TAZOBACTAM 3.38 G: 3; .375 INJECTION, POWDER, LYOPHILIZED, FOR SOLUTION INTRAVENOUS at 01:55

## 2020-01-01 RX ADMIN — INSULIN LISPRO 6 UNITS: 100 INJECTION, SOLUTION INTRAVENOUS; SUBCUTANEOUS at 00:30

## 2020-01-01 RX ADMIN — BUDESONIDE 500 MCG: 0.5 SUSPENSION RESPIRATORY (INHALATION) at 07:46

## 2020-01-01 RX ADMIN — OXYCODONE HYDROCHLORIDE 5 MG: 5 TABLET ORAL at 11:09

## 2020-01-01 RX ADMIN — REMDESIVIR 100 MG: 5 INJECTION INTRAVENOUS at 17:23

## 2020-01-01 RX ADMIN — Medication 1000 MG: at 08:38

## 2020-01-01 RX ADMIN — Medication 175 MCG/HR: at 01:56

## 2020-01-01 RX ADMIN — ARFORMOTEROL TARTRATE 15 MCG: 15 SOLUTION RESPIRATORY (INHALATION) at 07:49

## 2020-01-01 RX ADMIN — INSULIN LISPRO 3 UNITS: 100 INJECTION, SOLUTION INTRAVENOUS; SUBCUTANEOUS at 23:56

## 2020-01-01 RX ADMIN — ARFORMOTEROL TARTRATE 15 MCG: 15 SOLUTION RESPIRATORY (INHALATION) at 07:46

## 2020-01-01 RX ADMIN — ERTAPENEM SODIUM 1000 MG: 1 INJECTION, POWDER, LYOPHILIZED, FOR SOLUTION INTRAMUSCULAR; INTRAVENOUS at 11:35

## 2020-01-01 RX ADMIN — PIPERACILLIN AND TAZOBACTAM 3.38 G: 3; .375 INJECTION, POWDER, LYOPHILIZED, FOR SOLUTION INTRAVENOUS at 02:22

## 2020-01-01 RX ADMIN — PROPOFOL 20 MCG/KG/MIN: 10 INJECTION, EMULSION INTRAVENOUS at 14:40

## 2020-01-01 RX ADMIN — GUAIFENESIN 400 MG: 400 TABLET ORAL at 18:06

## 2020-01-01 RX ADMIN — PIPERACILLIN AND TAZOBACTAM 3.38 G: 3; .375 INJECTION, POWDER, LYOPHILIZED, FOR SOLUTION INTRAVENOUS at 02:14

## 2020-01-01 RX ADMIN — ENOXAPARIN SODIUM 40 MG: 40 INJECTION SUBCUTANEOUS at 08:08

## 2020-01-01 RX ADMIN — FUROSEMIDE 40 MG: 10 INJECTION, SOLUTION INTRAMUSCULAR; INTRAVENOUS at 08:30

## 2020-01-01 RX ADMIN — IPRATROPIUM BROMIDE AND ALBUTEROL SULFATE 1 AMPULE: 2.5; .5 SOLUTION RESPIRATORY (INHALATION) at 19:42

## 2020-01-01 RX ADMIN — SODIUM CHLORIDE 0.7 MCG/KG/HR: 9 INJECTION, SOLUTION INTRAVENOUS at 23:35

## 2020-01-01 RX ADMIN — INSULIN LISPRO 6 UNITS: 100 INJECTION, SOLUTION INTRAVENOUS; SUBCUTANEOUS at 00:53

## 2020-01-01 RX ADMIN — BUDESONIDE 500 MCG: 0.5 SUSPENSION RESPIRATORY (INHALATION) at 07:58

## 2020-01-01 RX ADMIN — PROPOFOL 10 MCG/KG/MIN: 10 INJECTION, EMULSION INTRAVENOUS at 20:27

## 2020-01-01 RX ADMIN — INSULIN GLARGINE 25 UNITS: 100 INJECTION, SOLUTION SUBCUTANEOUS at 08:30

## 2020-01-01 RX ADMIN — ARFORMOTEROL TARTRATE 15 MCG: 15 SOLUTION RESPIRATORY (INHALATION) at 08:29

## 2020-01-01 RX ADMIN — SODIUM CHLORIDE, PRESERVATIVE FREE 10 ML: 5 INJECTION INTRAVENOUS at 11:54

## 2020-01-01 RX ADMIN — IPRATROPIUM BROMIDE AND ALBUTEROL SULFATE 1 AMPULE: 2.5; .5 SOLUTION RESPIRATORY (INHALATION) at 11:01

## 2020-01-01 RX ADMIN — SENNOSIDES 8.6 MG: 8.6 TABLET, FILM COATED ORAL at 20:39

## 2020-01-01 RX ADMIN — IPRATROPIUM BROMIDE AND ALBUTEROL SULFATE 1 AMPULE: 2.5; .5 SOLUTION RESPIRATORY (INHALATION) at 22:30

## 2020-01-01 RX ADMIN — Medication 200 MCG/HR: at 17:21

## 2020-01-01 RX ADMIN — SODIUM CHLORIDE: 9 INJECTION, SOLUTION INTRAVENOUS at 20:34

## 2020-01-01 RX ADMIN — Medication 100 MCG/HR: at 21:57

## 2020-01-01 RX ADMIN — INSULIN LISPRO 6 UNITS: 100 INJECTION, SOLUTION INTRAVENOUS; SUBCUTANEOUS at 12:20

## 2020-01-01 RX ADMIN — Medication 150 MCG/HR: at 08:56

## 2020-01-01 RX ADMIN — Medication 1 TABLET: at 08:22

## 2020-01-01 RX ADMIN — SODIUM CHLORIDE: 9 INJECTION, SOLUTION INTRAVENOUS at 20:08

## 2020-01-01 RX ADMIN — IPRATROPIUM BROMIDE AND ALBUTEROL SULFATE 1 AMPULE: 2.5; .5 SOLUTION RESPIRATORY (INHALATION) at 09:23

## 2020-01-01 RX ADMIN — Medication 10 ML: at 09:00

## 2020-01-01 RX ADMIN — SODIUM CHLORIDE: 9 INJECTION, SOLUTION INTRAVENOUS at 12:06

## 2020-01-01 RX ADMIN — FUROSEMIDE 20 MG: 10 INJECTION, SOLUTION INTRAMUSCULAR; INTRAVENOUS at 17:21

## 2020-01-01 RX ADMIN — SODIUM CHLORIDE: 9 INJECTION, SOLUTION INTRAVENOUS at 19:57

## 2020-01-01 RX ADMIN — IPRATROPIUM BROMIDE AND ALBUTEROL SULFATE 1 AMPULE: 2.5; .5 SOLUTION RESPIRATORY (INHALATION) at 00:34

## 2020-01-01 RX ADMIN — Medication 1000 MG: at 09:46

## 2020-01-01 RX ADMIN — Medication 10 ML: at 08:33

## 2020-01-01 RX ADMIN — SODIUM CHLORIDE: 9 INJECTION, SOLUTION INTRAVENOUS at 04:39

## 2020-01-01 RX ADMIN — ENOXAPARIN SODIUM 40 MG: 40 INJECTION SUBCUTANEOUS at 08:31

## 2020-01-01 RX ADMIN — PROPOFOL 10 MCG/KG/MIN: 10 INJECTION, EMULSION INTRAVENOUS at 10:12

## 2020-01-01 RX ADMIN — PROPOFOL 20 MCG/KG/MIN: 10 INJECTION, EMULSION INTRAVENOUS at 05:30

## 2020-01-01 RX ADMIN — SENNOSIDES 8.6 MG: 8.6 TABLET, FILM COATED ORAL at 21:15

## 2020-01-01 RX ADMIN — Medication 1 TABLET: at 09:46

## 2020-01-01 RX ADMIN — SODIUM CHLORIDE, PRESERVATIVE FREE 300 UNITS: 5 INJECTION INTRAVENOUS at 08:24

## 2020-01-01 RX ADMIN — Medication 200 MCG/HR: at 14:40

## 2020-01-01 RX ADMIN — PANTOPRAZOLE SODIUM 40 MG: 40 INJECTION, POWDER, LYOPHILIZED, FOR SOLUTION INTRAVENOUS at 09:19

## 2020-01-01 RX ADMIN — ZINC SULFATE 220 MG (50 MG) CAPSULE 50 MG: CAPSULE at 09:21

## 2020-01-01 RX ADMIN — BUDESONIDE 500 MCG: 0.5 SUSPENSION RESPIRATORY (INHALATION) at 08:48

## 2020-01-01 RX ADMIN — GUAIFENESIN 400 MG: 400 TABLET ORAL at 14:19

## 2020-01-01 RX ADMIN — PIPERACILLIN AND TAZOBACTAM 3.38 G: 3; .375 INJECTION, POWDER, LYOPHILIZED, FOR SOLUTION INTRAVENOUS at 09:49

## 2020-01-01 RX ADMIN — ZINC SULFATE 220 MG (50 MG) CAPSULE 50 MG: CAPSULE at 10:11

## 2020-01-01 RX ADMIN — ERTAPENEM SODIUM 1000 MG: 1 INJECTION, POWDER, LYOPHILIZED, FOR SOLUTION INTRAMUSCULAR; INTRAVENOUS at 12:54

## 2020-01-01 RX ADMIN — FLUTICASONE PROPIONATE 2 SPRAY: 50 SPRAY, METERED NASAL at 10:04

## 2020-01-01 RX ADMIN — POLYETHYLENE GLYCOL 3350 17 G: 17 POWDER, FOR SOLUTION ORAL at 15:16

## 2020-01-01 RX ADMIN — FENTANYL CITRATE 50 MCG: 50 INJECTION, SOLUTION INTRAMUSCULAR; INTRAVENOUS at 09:14

## 2020-01-01 RX ADMIN — ZINC SULFATE 220 MG (50 MG) CAPSULE 50 MG: CAPSULE at 08:31

## 2020-01-01 RX ADMIN — IPRATROPIUM BROMIDE AND ALBUTEROL SULFATE 1 AMPULE: 2.5; .5 SOLUTION RESPIRATORY (INHALATION) at 20:37

## 2020-01-01 RX ADMIN — IOPAMIDOL 75 ML: 755 INJECTION, SOLUTION INTRAVENOUS at 15:20

## 2020-01-01 RX ADMIN — IPRATROPIUM BROMIDE AND ALBUTEROL SULFATE 1 AMPULE: 2.5; .5 SOLUTION RESPIRATORY (INHALATION) at 08:00

## 2020-01-01 RX ADMIN — INSULIN LISPRO 12 UNITS: 100 INJECTION, SOLUTION INTRAVENOUS; SUBCUTANEOUS at 00:35

## 2020-01-01 RX ADMIN — IPRATROPIUM BROMIDE 2 PUFF: 17 AEROSOL, METERED RESPIRATORY (INHALATION) at 17:45

## 2020-01-01 RX ADMIN — IPRATROPIUM BROMIDE AND ALBUTEROL SULFATE 1 AMPULE: 2.5; .5 SOLUTION RESPIRATORY (INHALATION) at 11:09

## 2020-01-01 RX ADMIN — IPRATROPIUM BROMIDE AND ALBUTEROL SULFATE 1 AMPULE: 2.5; .5 SOLUTION RESPIRATORY (INHALATION) at 15:38

## 2020-01-01 RX ADMIN — POTASSIUM CHLORIDE 40 MEQ: 29.8 INJECTION, SOLUTION INTRAVENOUS at 10:30

## 2020-01-01 RX ADMIN — GUAIFENESIN 400 MG: 400 TABLET ORAL at 09:18

## 2020-01-01 RX ADMIN — NOREPINEPHRINE BITARTRATE 6 MCG/MIN: 1 INJECTION, SOLUTION, CONCENTRATE INTRAVENOUS at 05:00

## 2020-01-01 RX ADMIN — Medication 1000 MG: at 08:55

## 2020-01-01 RX ADMIN — FUROSEMIDE 20 MG: 10 INJECTION, SOLUTION INTRAMUSCULAR; INTRAVENOUS at 08:33

## 2020-01-01 RX ADMIN — INSULIN LISPRO 6 UNITS: 100 INJECTION, SOLUTION INTRAVENOUS; SUBCUTANEOUS at 00:39

## 2020-01-01 RX ADMIN — INSULIN LISPRO 6 UNITS: 100 INJECTION, SOLUTION INTRAVENOUS; SUBCUTANEOUS at 11:10

## 2020-01-01 RX ADMIN — POLYETHYLENE GLYCOL 3350 17 G: 17 POWDER, FOR SOLUTION ORAL at 08:19

## 2020-01-01 RX ADMIN — BUDESONIDE 500 MCG: 0.5 SUSPENSION RESPIRATORY (INHALATION) at 10:43

## 2020-01-01 RX ADMIN — Medication 1000 MG: at 10:10

## 2020-01-01 RX ADMIN — INSULIN GLARGINE 10 UNITS: 100 INJECTION, SOLUTION SUBCUTANEOUS at 21:28

## 2020-01-01 RX ADMIN — IPRATROPIUM BROMIDE AND ALBUTEROL SULFATE 1 AMPULE: 2.5; .5 SOLUTION RESPIRATORY (INHALATION) at 03:00

## 2020-01-01 RX ADMIN — Medication 2000 UNITS: at 08:21

## 2020-01-01 RX ADMIN — SODIUM CHLORIDE: 9 INJECTION, SOLUTION INTRAVENOUS at 19:56

## 2020-01-01 RX ADMIN — IPRATROPIUM BROMIDE AND ALBUTEROL SULFATE 1 AMPULE: 2.5; .5 SOLUTION RESPIRATORY (INHALATION) at 00:15

## 2020-01-01 RX ADMIN — Medication 10 ML: at 20:09

## 2020-01-01 RX ADMIN — Medication 1 TABLET: at 08:35

## 2020-01-01 RX ADMIN — Medication 200 MCG/HR: at 00:19

## 2020-01-01 RX ADMIN — BUDESONIDE 500 MCG: 0.5 SUSPENSION RESPIRATORY (INHALATION) at 09:40

## 2020-01-01 RX ADMIN — POLYETHYLENE GLYCOL 3350 17 G: 17 POWDER, FOR SOLUTION ORAL at 20:40

## 2020-01-01 RX ADMIN — GUAIFENESIN 400 MG: 400 TABLET ORAL at 20:08

## 2020-01-01 RX ADMIN — PIPERACILLIN AND TAZOBACTAM 3.38 G: 3; .375 INJECTION, POWDER, LYOPHILIZED, FOR SOLUTION INTRAVENOUS at 09:18

## 2020-01-01 RX ADMIN — Medication 2000 UNITS: at 09:46

## 2020-01-01 RX ADMIN — SODIUM CHLORIDE: 9 INJECTION, SOLUTION INTRAVENOUS at 13:30

## 2020-01-01 RX ADMIN — IPRATROPIUM BROMIDE AND ALBUTEROL SULFATE 1 AMPULE: 2.5; .5 SOLUTION RESPIRATORY (INHALATION) at 08:48

## 2020-01-01 RX ADMIN — Medication 10 ML: at 20:55

## 2020-01-01 RX ADMIN — ENOXAPARIN SODIUM 40 MG: 40 INJECTION SUBCUTANEOUS at 09:24

## 2020-01-01 RX ADMIN — FUROSEMIDE 20 MG: 10 INJECTION, SOLUTION INTRAMUSCULAR; INTRAVENOUS at 10:53

## 2020-01-01 RX ADMIN — ENOXAPARIN SODIUM 30 MG: 30 INJECTION SUBCUTANEOUS at 08:21

## 2020-01-01 RX ADMIN — LACTULOSE 20 G: 20 SOLUTION ORAL at 21:56

## 2020-01-01 RX ADMIN — INSULIN GLARGINE 25 UNITS: 100 INJECTION, SOLUTION SUBCUTANEOUS at 09:50

## 2020-01-01 RX ADMIN — PIPERACILLIN AND TAZOBACTAM 3.38 G: 3; .375 INJECTION, POWDER, LYOPHILIZED, FOR SOLUTION INTRAVENOUS at 02:20

## 2020-01-01 RX ADMIN — IPRATROPIUM BROMIDE AND ALBUTEROL SULFATE 1 AMPULE: 2.5; .5 SOLUTION RESPIRATORY (INHALATION) at 08:13

## 2020-01-01 RX ADMIN — Medication 2000 UNITS: at 08:31

## 2020-01-01 RX ADMIN — IPRATROPIUM BROMIDE 2 PUFF: 17 AEROSOL, METERED RESPIRATORY (INHALATION) at 08:37

## 2020-01-01 RX ADMIN — IPRATROPIUM BROMIDE AND ALBUTEROL SULFATE 1 AMPULE: 2.5; .5 SOLUTION RESPIRATORY (INHALATION) at 09:40

## 2020-01-01 RX ADMIN — IPRATROPIUM BROMIDE AND ALBUTEROL SULFATE 1 AMPULE: 2.5; .5 SOLUTION RESPIRATORY (INHALATION) at 17:30

## 2020-01-01 RX ADMIN — ACETAMINOPHEN 650 MG: 325 TABLET ORAL at 08:08

## 2020-01-01 RX ADMIN — ARFORMOTEROL TARTRATE 15 MCG: 15 SOLUTION RESPIRATORY (INHALATION) at 19:54

## 2020-01-01 RX ADMIN — SODIUM CHLORIDE, PRESERVATIVE FREE 300 UNITS: 5 INJECTION INTRAVENOUS at 20:11

## 2020-01-01 RX ADMIN — SODIUM CHLORIDE, PRESERVATIVE FREE 300 UNITS: 5 INJECTION INTRAVENOUS at 09:37

## 2020-01-01 RX ADMIN — Medication 10 ML: at 08:27

## 2020-01-01 RX ADMIN — IPRATROPIUM BROMIDE AND ALBUTEROL SULFATE 1 AMPULE: 2.5; .5 SOLUTION RESPIRATORY (INHALATION) at 04:53

## 2020-01-01 RX ADMIN — IPRATROPIUM BROMIDE AND ALBUTEROL SULFATE 1 AMPULE: 2.5; .5 SOLUTION RESPIRATORY (INHALATION) at 16:15

## 2020-01-01 RX ADMIN — SODIUM CHLORIDE: 9 INJECTION, SOLUTION INTRAVENOUS at 04:30

## 2020-01-01 RX ADMIN — ENOXAPARIN SODIUM 40 MG: 40 INJECTION SUBCUTANEOUS at 09:45

## 2020-01-01 RX ADMIN — PROPOFOL 5 MCG/KG/MIN: 10 INJECTION, EMULSION INTRAVENOUS at 09:50

## 2020-01-01 RX ADMIN — FLUTICASONE PROPIONATE 2 SPRAY: 50 SPRAY, METERED NASAL at 17:43

## 2020-01-01 RX ADMIN — DEXAMETHASONE SODIUM PHOSPHATE 6 MG: 4 INJECTION, SOLUTION INTRA-ARTICULAR; INTRALESIONAL; INTRAMUSCULAR; INTRAVENOUS; SOFT TISSUE at 09:43

## 2020-01-01 RX ADMIN — Medication 10 ML: at 20:03

## 2020-01-01 RX ADMIN — ZINC SULFATE 220 MG (50 MG) CAPSULE 50 MG: CAPSULE at 08:25

## 2020-01-01 RX ADMIN — INSULIN LISPRO 3 UNITS: 100 INJECTION, SOLUTION INTRAVENOUS; SUBCUTANEOUS at 19:01

## 2020-01-01 RX ADMIN — SODIUM CHLORIDE: 9 INJECTION, SOLUTION INTRAVENOUS at 07:38

## 2020-01-01 RX ADMIN — BUDESONIDE 500 MCG: 0.5 SUSPENSION RESPIRATORY (INHALATION) at 09:04

## 2020-01-01 RX ADMIN — REMDESIVIR 100 MG: 5 INJECTION INTRAVENOUS at 17:40

## 2020-01-01 RX ADMIN — POTASSIUM CHLORIDE 20 MEQ: 400 INJECTION, SOLUTION INTRAVENOUS at 12:24

## 2020-01-01 RX ADMIN — SODIUM CHLORIDE 1.4 MCG/KG/HR: 9 INJECTION, SOLUTION INTRAVENOUS at 09:30

## 2020-01-01 RX ADMIN — ENOXAPARIN SODIUM 40 MG: 40 INJECTION SUBCUTANEOUS at 08:26

## 2020-01-01 RX ADMIN — ENOXAPARIN SODIUM 40 MG: 40 INJECTION SUBCUTANEOUS at 08:38

## 2020-01-01 RX ADMIN — Medication 2000 UNITS: at 09:40

## 2020-01-01 RX ADMIN — IPRATROPIUM BROMIDE AND ALBUTEROL SULFATE 1 AMPULE: 2.5; .5 SOLUTION RESPIRATORY (INHALATION) at 12:55

## 2020-01-01 RX ADMIN — ARFORMOTEROL TARTRATE 15 MCG: 15 SOLUTION RESPIRATORY (INHALATION) at 07:57

## 2020-01-01 RX ADMIN — GUAIFENESIN 400 MG: 400 TABLET ORAL at 17:31

## 2020-01-01 RX ADMIN — FUROSEMIDE 20 MG: 10 INJECTION, SOLUTION INTRAMUSCULAR; INTRAVENOUS at 09:45

## 2020-01-01 RX ADMIN — GUAIFENESIN 400 MG: 400 TABLET ORAL at 13:03

## 2020-01-01 RX ADMIN — GUAIFENESIN 400 MG: 400 TABLET ORAL at 18:44

## 2020-01-01 RX ADMIN — ARFORMOTEROL TARTRATE 15 MCG: 15 SOLUTION RESPIRATORY (INHALATION) at 19:30

## 2020-01-01 RX ADMIN — DEXAMETHASONE SODIUM PHOSPHATE 6 MG: 10 INJECTION, SOLUTION INTRAMUSCULAR; INTRAVENOUS at 21:27

## 2020-01-01 RX ADMIN — Medication 10 ML: at 08:58

## 2020-01-01 RX ADMIN — IPRATROPIUM BROMIDE AND ALBUTEROL SULFATE 1 AMPULE: 2.5; .5 SOLUTION RESPIRATORY (INHALATION) at 13:42

## 2020-01-01 RX ADMIN — ALTEPLASE 1 MG: 2.2 INJECTION, POWDER, LYOPHILIZED, FOR SOLUTION INTRAVENOUS at 11:40

## 2020-01-01 RX ADMIN — OXYCODONE HYDROCHLORIDE 5 MG: 5 TABLET ORAL at 18:40

## 2020-01-01 RX ADMIN — IPRATROPIUM BROMIDE AND ALBUTEROL SULFATE 1 AMPULE: 2.5; .5 SOLUTION RESPIRATORY (INHALATION) at 12:23

## 2020-01-01 RX ADMIN — IPRATROPIUM BROMIDE AND ALBUTEROL SULFATE 1 AMPULE: 2.5; .5 SOLUTION RESPIRATORY (INHALATION) at 03:16

## 2020-01-01 RX ADMIN — SODIUM CHLORIDE: 9 INJECTION, SOLUTION INTRAVENOUS at 21:37

## 2020-01-01 RX ADMIN — IPRATROPIUM BROMIDE AND ALBUTEROL SULFATE 1 AMPULE: 2.5; .5 SOLUTION RESPIRATORY (INHALATION) at 12:49

## 2020-01-01 RX ADMIN — LACTULOSE 20 G: 20 SOLUTION ORAL at 08:38

## 2020-01-01 RX ADMIN — IPRATROPIUM BROMIDE AND ALBUTEROL SULFATE 1 AMPULE: 2.5; .5 SOLUTION RESPIRATORY (INHALATION) at 16:05

## 2020-01-01 RX ADMIN — PANTOPRAZOLE SODIUM 40 MG: 40 INJECTION, POWDER, LYOPHILIZED, FOR SOLUTION INTRAVENOUS at 09:45

## 2020-01-01 RX ADMIN — ENOXAPARIN SODIUM 30 MG: 30 INJECTION SUBCUTANEOUS at 08:22

## 2020-01-01 RX ADMIN — Medication 150 MCG/HR: at 05:13

## 2020-01-01 RX ADMIN — INSULIN GLARGINE 25 UNITS: 100 INJECTION, SOLUTION SUBCUTANEOUS at 20:26

## 2020-01-01 RX ADMIN — INSULIN LISPRO 3 UNITS: 100 INJECTION, SOLUTION INTRAVENOUS; SUBCUTANEOUS at 23:51

## 2020-01-01 RX ADMIN — ARFORMOTEROL TARTRATE 15 MCG: 15 SOLUTION RESPIRATORY (INHALATION) at 08:13

## 2020-01-01 RX ADMIN — FUROSEMIDE 20 MG: 10 INJECTION, SOLUTION INTRAMUSCULAR; INTRAVENOUS at 17:49

## 2020-01-01 RX ADMIN — INSULIN LISPRO 3 UNITS: 100 INJECTION, SOLUTION INTRAVENOUS; SUBCUTANEOUS at 18:03

## 2020-01-01 RX ADMIN — INSULIN LISPRO 4 UNITS: 100 INJECTION, SOLUTION INTRAVENOUS; SUBCUTANEOUS at 05:32

## 2020-01-01 RX ADMIN — FLUCONAZOLE 200 MG: 100 TABLET ORAL at 08:37

## 2020-01-01 RX ADMIN — Medication 1 TABLET: at 08:19

## 2020-01-01 RX ADMIN — DEXAMETHASONE SODIUM PHOSPHATE 6 MG: 10 INJECTION, SOLUTION INTRAMUSCULAR; INTRAVENOUS at 20:30

## 2020-01-01 RX ADMIN — SODIUM CHLORIDE: 9 INJECTION, SOLUTION INTRAVENOUS at 21:31

## 2020-01-01 RX ADMIN — ZINC SULFATE 220 MG (50 MG) CAPSULE 50 MG: CAPSULE at 08:19

## 2020-01-01 RX ADMIN — SUCCINYLCHOLINE CHLORIDE 80 MG: 20 INJECTION INTRAMUSCULAR; INTRAVENOUS at 12:10

## 2020-01-01 RX ADMIN — Medication 100 MCG/HR: at 21:20

## 2020-01-01 RX ADMIN — Medication 10 ML: at 20:24

## 2020-01-01 RX ADMIN — FUROSEMIDE 40 MG: 10 INJECTION, SOLUTION INTRAMUSCULAR; INTRAVENOUS at 09:25

## 2020-01-01 RX ADMIN — INSULIN GLARGINE 20 UNITS: 100 INJECTION, SOLUTION SUBCUTANEOUS at 20:11

## 2020-01-01 RX ADMIN — IPRATROPIUM BROMIDE AND ALBUTEROL SULFATE 1 AMPULE: 2.5; .5 SOLUTION RESPIRATORY (INHALATION) at 12:53

## 2020-01-01 RX ADMIN — ARFORMOTEROL TARTRATE 15 MCG: 15 SOLUTION RESPIRATORY (INHALATION) at 08:48

## 2020-01-01 RX ADMIN — IPRATROPIUM BROMIDE AND ALBUTEROL SULFATE 1 AMPULE: 2.5; .5 SOLUTION RESPIRATORY (INHALATION) at 00:06

## 2020-01-01 RX ADMIN — ALTEPLASE 1 MG: 2.2 INJECTION, POWDER, LYOPHILIZED, FOR SOLUTION INTRAVENOUS at 16:31

## 2020-01-01 RX ADMIN — Medication 10 ML: at 08:24

## 2020-01-01 RX ADMIN — Medication 1000 MG: at 08:33

## 2020-01-01 RX ADMIN — SENNOSIDES 8.6 MG: 8.6 TABLET, FILM COATED ORAL at 21:18

## 2020-01-01 RX ADMIN — Medication 175 MCG/HR: at 22:50

## 2020-01-01 RX ADMIN — Medication 25 MCG/HR: at 14:54

## 2020-01-01 RX ADMIN — POTASSIUM CHLORIDE 20 MEQ: 400 INJECTION, SOLUTION INTRAVENOUS at 08:18

## 2020-01-01 RX ADMIN — IPRATROPIUM BROMIDE AND ALBUTEROL SULFATE 1 AMPULE: 2.5; .5 SOLUTION RESPIRATORY (INHALATION) at 04:36

## 2020-01-01 RX ADMIN — LORAZEPAM 2 MG: 2 INJECTION INTRAMUSCULAR; INTRAVENOUS at 02:02

## 2020-01-01 RX ADMIN — GUAIFENESIN 400 MG: 400 TABLET ORAL at 09:45

## 2020-01-01 RX ADMIN — INSULIN LISPRO 12 UNITS: 100 INJECTION, SOLUTION INTRAVENOUS; SUBCUTANEOUS at 18:18

## 2020-01-01 RX ADMIN — Medication 75 MCG/HR: at 16:28

## 2020-01-01 RX ADMIN — IOHEXOL 50 ML: 240 INJECTION, SOLUTION INTRATHECAL; INTRAVASCULAR; INTRAVENOUS; ORAL at 15:19

## 2020-01-01 RX ADMIN — DEXAMETHASONE SODIUM PHOSPHATE 6 MG: 4 INJECTION, SOLUTION INTRAMUSCULAR; INTRAVENOUS at 08:45

## 2020-01-01 RX ADMIN — PIPERACILLIN AND TAZOBACTAM 3.38 G: 3; .375 INJECTION, POWDER, LYOPHILIZED, FOR SOLUTION INTRAVENOUS at 18:14

## 2020-01-01 RX ADMIN — LACTULOSE 20 G: 20 SOLUTION ORAL at 13:08

## 2020-01-01 RX ADMIN — Medication 1000 MG: at 09:21

## 2020-01-01 RX ADMIN — ENOXAPARIN SODIUM 40 MG: 40 INJECTION SUBCUTANEOUS at 08:19

## 2020-01-01 RX ADMIN — Medication 1 TABLET: at 10:11

## 2020-01-01 RX ADMIN — GUAIFENESIN 400 MG: 400 TABLET ORAL at 21:15

## 2020-01-01 RX ADMIN — SODIUM CHLORIDE, PRESERVATIVE FREE 300 UNITS: 5 INJECTION INTRAVENOUS at 08:59

## 2020-01-01 RX ADMIN — ENOXAPARIN SODIUM 30 MG: 30 INJECTION SUBCUTANEOUS at 08:52

## 2020-01-01 RX ADMIN — PIPERACILLIN AND TAZOBACTAM 3.38 G: 3; .375 INJECTION, POWDER, LYOPHILIZED, FOR SOLUTION INTRAVENOUS at 18:01

## 2020-01-01 RX ADMIN — IPRATROPIUM BROMIDE AND ALBUTEROL SULFATE 1 AMPULE: 2.5; .5 SOLUTION RESPIRATORY (INHALATION) at 10:43

## 2020-01-01 RX ADMIN — INSULIN GLARGINE 25 UNITS: 100 INJECTION, SOLUTION SUBCUTANEOUS at 21:00

## 2020-01-01 RX ADMIN — Medication 1 TABLET: at 08:25

## 2020-01-01 RX ADMIN — PIPERACILLIN AND TAZOBACTAM 3.38 G: 3; .375 INJECTION, POWDER, LYOPHILIZED, FOR SOLUTION INTRAVENOUS at 02:10

## 2020-01-01 RX ADMIN — FUROSEMIDE 40 MG: 10 INJECTION, SOLUTION INTRAMUSCULAR; INTRAVENOUS at 09:18

## 2020-01-01 RX ADMIN — FLUCONAZOLE 200 MG: 100 TABLET ORAL at 10:10

## 2020-01-01 RX ADMIN — PIPERACILLIN AND TAZOBACTAM 3.38 G: 3; .375 INJECTION, POWDER, LYOPHILIZED, FOR SOLUTION INTRAVENOUS at 09:34

## 2020-01-01 RX ADMIN — FUROSEMIDE 40 MG: 10 INJECTION, SOLUTION INTRAMUSCULAR; INTRAVENOUS at 17:57

## 2020-01-01 RX ADMIN — SODIUM CHLORIDE 1.4 MCG/KG/HR: 9 INJECTION, SOLUTION INTRAVENOUS at 16:24

## 2020-01-01 RX ADMIN — LORAZEPAM 2 MG: 2 INJECTION INTRAMUSCULAR; INTRAVENOUS at 15:50

## 2020-01-01 RX ADMIN — FUROSEMIDE 40 MG: 10 INJECTION, SOLUTION INTRAMUSCULAR; INTRAVENOUS at 17:33

## 2020-01-01 RX ADMIN — PIPERACILLIN AND TAZOBACTAM 3.38 G: 3; .375 INJECTION, POWDER, LYOPHILIZED, FOR SOLUTION INTRAVENOUS at 09:32

## 2020-01-01 RX ADMIN — Medication 10 ML: at 08:30

## 2020-01-01 RX ADMIN — DEXAMETHASONE SODIUM PHOSPHATE 6 MG: 10 INJECTION, SOLUTION INTRAMUSCULAR; INTRAVENOUS at 20:23

## 2020-01-01 RX ADMIN — LORAZEPAM 2 MG: 2 INJECTION INTRAMUSCULAR; INTRAVENOUS at 21:56

## 2020-01-01 RX ADMIN — PIPERACILLIN AND TAZOBACTAM 3.38 G: 3; .375 INJECTION, POWDER, LYOPHILIZED, FOR SOLUTION INTRAVENOUS at 18:20

## 2020-01-01 RX ADMIN — INSULIN LISPRO 3 UNITS: 100 INJECTION, SOLUTION INTRAVENOUS; SUBCUTANEOUS at 12:45

## 2020-01-01 RX ADMIN — PIPERACILLIN AND TAZOBACTAM 3.38 G: 3; .375 INJECTION, POWDER, LYOPHILIZED, FOR SOLUTION INTRAVENOUS at 01:31

## 2020-01-01 RX ADMIN — IPRATROPIUM BROMIDE AND ALBUTEROL SULFATE 1 AMPULE: 2.5; .5 SOLUTION RESPIRATORY (INHALATION) at 12:18

## 2020-01-01 RX ADMIN — INSULIN LISPRO 6 UNITS: 100 INJECTION, SOLUTION INTRAVENOUS; SUBCUTANEOUS at 00:26

## 2020-01-01 RX ADMIN — ACETAMINOPHEN 650 MG: 325 TABLET ORAL at 14:01

## 2020-01-01 RX ADMIN — Medication 125 MCG/HR: at 06:12

## 2020-01-01 RX ADMIN — IPRATROPIUM BROMIDE AND ALBUTEROL SULFATE 1 AMPULE: 2.5; .5 SOLUTION RESPIRATORY (INHALATION) at 08:28

## 2020-01-01 RX ADMIN — IPRATROPIUM BROMIDE AND ALBUTEROL SULFATE 1 AMPULE: 2.5; .5 SOLUTION RESPIRATORY (INHALATION) at 07:57

## 2020-01-01 RX ADMIN — ENOXAPARIN SODIUM 30 MG: 30 INJECTION SUBCUTANEOUS at 21:28

## 2020-01-01 RX ADMIN — DEXAMETHASONE SODIUM PHOSPHATE 6 MG: 10 INJECTION, SOLUTION INTRAMUSCULAR; INTRAVENOUS at 08:20

## 2020-01-01 RX ADMIN — Medication 10 ML: at 21:32

## 2020-01-01 RX ADMIN — FUROSEMIDE 20 MG: 10 INJECTION, SOLUTION INTRAMUSCULAR; INTRAVENOUS at 18:15

## 2020-01-01 RX ADMIN — IPRATROPIUM BROMIDE AND ALBUTEROL SULFATE 1 AMPULE: 2.5; .5 SOLUTION RESPIRATORY (INHALATION) at 08:44

## 2020-01-01 RX ADMIN — SODIUM CHLORIDE, PRESERVATIVE FREE 300 UNITS: 5 INJECTION INTRAVENOUS at 21:18

## 2020-01-01 RX ADMIN — Medication 150 MCG/HR: at 01:41

## 2020-01-01 RX ADMIN — SENNOSIDES 8.6 MG: 8.6 TABLET, FILM COATED ORAL at 19:54

## 2020-01-01 RX ADMIN — PIPERACILLIN AND TAZOBACTAM 3.38 G: 3; .375 INJECTION, POWDER, LYOPHILIZED, FOR SOLUTION INTRAVENOUS at 17:56

## 2020-01-01 RX ADMIN — ARFORMOTEROL TARTRATE 15 MCG: 15 SOLUTION RESPIRATORY (INHALATION) at 09:04

## 2020-01-01 RX ADMIN — POTASSIUM BICARBONATE 40 MEQ: 782 TABLET, EFFERVESCENT ORAL at 08:43

## 2020-01-01 RX ADMIN — Medication 175 MCG/HR: at 05:17

## 2020-01-01 RX ADMIN — GUAIFENESIN 400 MG: 400 TABLET ORAL at 18:14

## 2020-01-01 RX ADMIN — PIPERACILLIN AND TAZOBACTAM 3.38 G: 3; .375 INJECTION, POWDER, LYOPHILIZED, FOR SOLUTION INTRAVENOUS at 02:30

## 2020-01-01 RX ADMIN — POTASSIUM CHLORIDE 40 MEQ: 29.8 INJECTION, SOLUTION INTRAVENOUS at 12:00

## 2020-01-01 RX ADMIN — Medication 1 TABLET: at 09:20

## 2020-01-01 RX ADMIN — PIPERACILLIN AND TAZOBACTAM 3.38 G: 3; .375 INJECTION, POWDER, LYOPHILIZED, FOR SOLUTION INTRAVENOUS at 18:00

## 2020-01-01 RX ADMIN — ACETAMINOPHEN 650 MG: 325 TABLET ORAL at 17:08

## 2020-01-01 RX ADMIN — ZINC SULFATE 220 MG (50 MG) CAPSULE 50 MG: CAPSULE at 09:31

## 2020-01-01 RX ADMIN — DEXAMETHASONE SODIUM PHOSPHATE 6 MG: 4 INJECTION, SOLUTION INTRA-ARTICULAR; INTRALESIONAL; INTRAMUSCULAR; INTRAVENOUS; SOFT TISSUE at 08:37

## 2020-01-01 RX ADMIN — ZINC SULFATE 220 MG (50 MG) CAPSULE 50 MG: CAPSULE at 09:46

## 2020-01-01 RX ADMIN — Medication 200 MCG/HR: at 22:00

## 2020-01-01 RX ADMIN — ENOXAPARIN SODIUM 30 MG: 30 INJECTION SUBCUTANEOUS at 20:10

## 2020-01-01 RX ADMIN — ARFORMOTEROL TARTRATE 15 MCG: 15 SOLUTION RESPIRATORY (INHALATION) at 19:49

## 2020-01-01 RX ADMIN — IPRATROPIUM BROMIDE AND ALBUTEROL SULFATE 1 AMPULE: 2.5; .5 SOLUTION RESPIRATORY (INHALATION) at 23:25

## 2020-01-01 RX ADMIN — Medication 200 MCG/HR: at 16:21

## 2020-01-01 RX ADMIN — IPRATROPIUM BROMIDE AND ALBUTEROL SULFATE 1 AMPULE: 2.5; .5 SOLUTION RESPIRATORY (INHALATION) at 19:30

## 2020-01-01 RX ADMIN — Medication 1 TABLET: at 08:40

## 2020-01-01 RX ADMIN — Medication 175 MCG/HR: at 08:05

## 2020-01-01 RX ADMIN — IPRATROPIUM BROMIDE AND ALBUTEROL SULFATE 1 AMPULE: 2.5; .5 SOLUTION RESPIRATORY (INHALATION) at 16:39

## 2020-01-01 RX ADMIN — FUROSEMIDE 40 MG: 10 INJECTION, SOLUTION INTRAMUSCULAR; INTRAVENOUS at 08:26

## 2020-01-01 RX ADMIN — PANTOPRAZOLE SODIUM 40 MG: 40 INJECTION, POWDER, LYOPHILIZED, FOR SOLUTION INTRAVENOUS at 08:40

## 2020-01-01 RX ADMIN — ARFORMOTEROL TARTRATE 15 MCG: 15 SOLUTION RESPIRATORY (INHALATION) at 08:22

## 2020-01-01 RX ADMIN — IPRATROPIUM BROMIDE AND ALBUTEROL SULFATE 1 AMPULE: 2.5; .5 SOLUTION RESPIRATORY (INHALATION) at 07:59

## 2020-01-01 RX ADMIN — ZINC SULFATE 220 MG (50 MG) CAPSULE 50 MG: CAPSULE at 09:39

## 2020-01-01 RX ADMIN — PROPOFOL 20 MCG/KG/MIN: 10 INJECTION, EMULSION INTRAVENOUS at 21:43

## 2020-01-01 RX ADMIN — SODIUM CHLORIDE: 9 INJECTION, SOLUTION INTRAVENOUS at 04:54

## 2020-01-01 RX ADMIN — GUAIFENESIN 400 MG: 400 TABLET ORAL at 21:33

## 2020-01-01 RX ADMIN — Medication 50 MCG/HR: at 13:07

## 2020-01-01 RX ADMIN — LEVOFLOXACIN 750 MG: 5 INJECTION, SOLUTION INTRAVENOUS at 16:50

## 2020-01-01 RX ADMIN — SODIUM CHLORIDE, PRESERVATIVE FREE 300 UNITS: 5 INJECTION INTRAVENOUS at 21:31

## 2020-01-01 RX ADMIN — SODIUM CHLORIDE: 9 INJECTION, SOLUTION INTRAVENOUS at 11:47

## 2020-01-01 RX ADMIN — OXYCODONE HYDROCHLORIDE 5 MG: 5 TABLET ORAL at 06:04

## 2020-01-01 RX ADMIN — IPRATROPIUM BROMIDE AND ALBUTEROL SULFATE 1 AMPULE: 2.5; .5 SOLUTION RESPIRATORY (INHALATION) at 14:00

## 2020-01-01 RX ADMIN — REMDESIVIR 100 MG: 5 INJECTION INTRAVENOUS at 18:45

## 2020-01-01 RX ADMIN — BUDESONIDE 500 MCG: 0.5 SUSPENSION RESPIRATORY (INHALATION) at 08:20

## 2020-01-01 RX ADMIN — ERTAPENEM SODIUM 1000 MG: 1 INJECTION, POWDER, LYOPHILIZED, FOR SOLUTION INTRAMUSCULAR; INTRAVENOUS at 13:35

## 2020-01-01 RX ADMIN — PANTOPRAZOLE SODIUM 40 MG: 40 INJECTION, POWDER, LYOPHILIZED, FOR SOLUTION INTRAVENOUS at 08:33

## 2020-01-01 RX ADMIN — ACETAMINOPHEN 650 MG: 650 SUPPOSITORY RECTAL at 03:00

## 2020-01-01 RX ADMIN — Medication 125 MCG/ML: at 11:15

## 2020-01-01 RX ADMIN — Medication 10 ML: at 08:26

## 2020-01-01 RX ADMIN — IPRATROPIUM BROMIDE AND ALBUTEROL SULFATE 1 AMPULE: 2.5; .5 SOLUTION RESPIRATORY (INHALATION) at 19:35

## 2020-01-01 RX ADMIN — Medication 10 ML: at 21:15

## 2020-01-01 RX ADMIN — PIPERACILLIN AND TAZOBACTAM 3.38 G: 3; .375 INJECTION, POWDER, LYOPHILIZED, FOR SOLUTION INTRAVENOUS at 17:39

## 2020-01-01 RX ADMIN — ETOMIDATE: 2 INJECTION INTRAVENOUS at 19:03

## 2020-01-01 RX ADMIN — INSULIN LISPRO 1 UNITS: 100 INJECTION, SOLUTION INTRAVENOUS; SUBCUTANEOUS at 18:47

## 2020-01-01 RX ADMIN — SODIUM CHLORIDE: 9 INJECTION, SOLUTION INTRAVENOUS at 20:24

## 2020-01-01 RX ADMIN — Medication 1000 MG: at 08:26

## 2020-01-01 RX ADMIN — PIPERACILLIN AND TAZOBACTAM 3.38 G: 3; .375 INJECTION, POWDER, LYOPHILIZED, FOR SOLUTION INTRAVENOUS at 02:05

## 2020-01-01 RX ADMIN — PROPOFOL 10 MCG/KG/MIN: 10 INJECTION, EMULSION INTRAVENOUS at 00:05

## 2020-01-01 RX ADMIN — Medication 1000 UNITS: at 08:40

## 2020-01-01 RX ADMIN — IPRATROPIUM BROMIDE AND ALBUTEROL SULFATE 1 AMPULE: 2.5; .5 SOLUTION RESPIRATORY (INHALATION) at 09:04

## 2020-01-01 RX ADMIN — Medication 1 TABLET: at 08:32

## 2020-01-01 RX ADMIN — INSULIN LISPRO 3 UNITS: 100 INJECTION, SOLUTION INTRAVENOUS; SUBCUTANEOUS at 17:59

## 2020-01-01 RX ADMIN — POTASSIUM CHLORIDE 40 MEQ: 29.8 INJECTION, SOLUTION INTRAVENOUS at 09:12

## 2020-01-01 RX ADMIN — Medication 150 MCG/HR: at 22:08

## 2020-01-01 RX ADMIN — Medication 2000 UNITS: at 08:07

## 2020-01-01 RX ADMIN — FLUTICASONE PROPIONATE 2 SPRAY: 50 SPRAY, METERED NASAL at 09:36

## 2020-01-01 RX ADMIN — SODIUM CHLORIDE: 9 INJECTION, SOLUTION INTRAVENOUS at 13:17

## 2020-01-01 RX ADMIN — GUAIFENESIN 400 MG: 400 TABLET ORAL at 08:38

## 2020-01-01 RX ADMIN — SODIUM CHLORIDE 1 MCG/KG/HR: 9 INJECTION, SOLUTION INTRAVENOUS at 08:23

## 2020-01-01 RX ADMIN — MIDAZOLAM HYDROCHLORIDE 2 MG: 1 INJECTION, SOLUTION INTRAMUSCULAR; INTRAVENOUS at 13:11

## 2020-01-01 RX ADMIN — IPRATROPIUM BROMIDE AND ALBUTEROL SULFATE 1 AMPULE: 2.5; .5 SOLUTION RESPIRATORY (INHALATION) at 19:46

## 2020-01-01 RX ADMIN — SODIUM CHLORIDE: 9 INJECTION, SOLUTION INTRAVENOUS at 11:51

## 2020-01-01 RX ADMIN — INSULIN LISPRO 3 UNITS: 100 INJECTION, SOLUTION INTRAVENOUS; SUBCUTANEOUS at 23:41

## 2020-01-01 RX ADMIN — LORAZEPAM 2 MG: 2 INJECTION INTRAMUSCULAR; INTRAVENOUS at 20:23

## 2020-01-01 RX ADMIN — Medication 1 TABLET: at 08:53

## 2020-01-01 RX ADMIN — SODIUM CHLORIDE, PRESERVATIVE FREE 300 UNITS: 5 INJECTION INTRAVENOUS at 21:17

## 2020-01-01 RX ADMIN — INSULIN GLARGINE 15 UNITS: 100 INJECTION, SOLUTION SUBCUTANEOUS at 08:24

## 2020-01-01 RX ADMIN — Medication 2000 MG: at 08:22

## 2020-01-01 RX ADMIN — SODIUM CHLORIDE, PRESERVATIVE FREE 300 UNITS: 5 INJECTION INTRAVENOUS at 20:04

## 2020-01-01 RX ADMIN — FLUCONAZOLE 200 MG: 100 TABLET ORAL at 08:23

## 2020-01-01 RX ADMIN — ETOMIDATE 20 MG: 2 INJECTION INTRAVENOUS at 12:10

## 2020-01-01 RX ADMIN — Medication 1000 MG: at 08:40

## 2020-01-01 RX ADMIN — IPRATROPIUM BROMIDE AND ALBUTEROL SULFATE 1 AMPULE: 2.5; .5 SOLUTION RESPIRATORY (INHALATION) at 15:45

## 2020-01-01 RX ADMIN — Medication 2000 UNITS: at 08:42

## 2020-01-01 RX ADMIN — BUDESONIDE 500 MCG: 0.5 SUSPENSION RESPIRATORY (INHALATION) at 08:13

## 2020-01-01 RX ADMIN — DOCUSATE SODIUM 50 MG AND SENNOSIDES 8.6 MG 2 TABLET: 8.6; 5 TABLET, FILM COATED ORAL at 08:39

## 2020-01-01 RX ADMIN — SUCCINYLCHOLINE CHLORIDE 80 MG: 20 INJECTION, SOLUTION INTRAMUSCULAR; INTRAVENOUS at 12:10

## 2020-01-01 RX ADMIN — ARFORMOTEROL TARTRATE 15 MCG: 15 SOLUTION RESPIRATORY (INHALATION) at 20:32

## 2020-01-01 RX ADMIN — SENNOSIDES 8.6 MG: 8.6 TABLET, FILM COATED ORAL at 20:04

## 2020-01-01 RX ADMIN — Medication 10 ML: at 21:28

## 2020-01-01 RX ADMIN — Medication 75 MCG/HR: at 17:43

## 2020-01-01 RX ADMIN — INSULIN GLARGINE 10 UNITS: 100 INJECTION, SOLUTION SUBCUTANEOUS at 21:37

## 2020-01-01 RX ADMIN — Medication 200 MCG/HR: at 08:55

## 2020-01-01 RX ADMIN — Medication 10 ML: at 08:39

## 2020-01-01 RX ADMIN — Medication 2000 UNITS: at 08:25

## 2020-01-01 RX ADMIN — ARFORMOTEROL TARTRATE 15 MCG: 15 SOLUTION RESPIRATORY (INHALATION) at 07:59

## 2020-01-01 RX ADMIN — Medication 200 MCG/HR: at 14:45

## 2020-01-01 RX ADMIN — SODIUM CHLORIDE 1.4 MCG/KG/HR: 9 INJECTION, SOLUTION INTRAVENOUS at 04:38

## 2020-01-01 RX ADMIN — ACETAMINOPHEN 650 MG: 325 TABLET ORAL at 10:18

## 2020-01-01 RX ADMIN — ENOXAPARIN SODIUM 40 MG: 40 INJECTION SUBCUTANEOUS at 08:57

## 2020-01-01 RX ADMIN — Medication 100 MCG/HR: at 09:49

## 2020-01-01 RX ADMIN — PIPERACILLIN AND TAZOBACTAM 3.38 G: 3; .375 INJECTION, POWDER, LYOPHILIZED, FOR SOLUTION INTRAVENOUS at 01:42

## 2020-01-01 RX ADMIN — MIDAZOLAM HYDROCHLORIDE 2 MG: 1 INJECTION INTRAMUSCULAR; INTRAVENOUS at 00:08

## 2020-01-01 RX ADMIN — SODIUM CHLORIDE 1 MCG/KG/HR: 9 INJECTION, SOLUTION INTRAVENOUS at 02:46

## 2020-01-01 RX ADMIN — Medication 10 ML: at 09:19

## 2020-01-01 RX ADMIN — Medication 200 MCG/HR: at 07:26

## 2020-01-01 RX ADMIN — IPRATROPIUM BROMIDE AND ALBUTEROL SULFATE 1 AMPULE: 2.5; .5 SOLUTION RESPIRATORY (INHALATION) at 13:18

## 2020-01-01 RX ADMIN — INSULIN LISPRO 6 UNITS: 100 INJECTION, SOLUTION INTRAVENOUS; SUBCUTANEOUS at 06:16

## 2020-01-01 RX ADMIN — INSULIN GLARGINE 25 UNITS: 100 INJECTION, SOLUTION SUBCUTANEOUS at 08:32

## 2020-01-01 RX ADMIN — FLUCONAZOLE 200 MG: 100 TABLET ORAL at 08:36

## 2020-01-01 RX ADMIN — IPRATROPIUM BROMIDE AND ALBUTEROL SULFATE 1 AMPULE: 2.5; .5 SOLUTION RESPIRATORY (INHALATION) at 11:38

## 2020-01-01 RX ADMIN — Medication 100 MCG/HR: at 00:21

## 2020-01-01 RX ADMIN — SENNOSIDES 8.6 MG: 8.6 TABLET, FILM COATED ORAL at 21:05

## 2020-01-01 RX ADMIN — PROPOFOL 10 MCG/KG/MIN: 10 INJECTION, EMULSION INTRAVENOUS at 16:50

## 2020-01-01 RX ADMIN — POTASSIUM CHLORIDE 20 MEQ: 400 INJECTION, SOLUTION INTRAVENOUS at 12:45

## 2020-01-01 RX ADMIN — IPRATROPIUM BROMIDE AND ALBUTEROL SULFATE 1 AMPULE: 2.5; .5 SOLUTION RESPIRATORY (INHALATION) at 16:30

## 2020-01-01 RX ADMIN — Medication 100 MCG/HR: at 21:28

## 2020-01-01 RX ADMIN — INSULIN LISPRO 3 UNITS: 100 INJECTION, SOLUTION INTRAVENOUS; SUBCUTANEOUS at 16:44

## 2020-01-01 RX ADMIN — Medication 200 MCG/HR: at 14:47

## 2020-01-01 RX ADMIN — Medication 125 MCG/HR: at 00:35

## 2020-01-01 RX ADMIN — Medication 200 MCG/HR: at 05:38

## 2020-01-01 RX ADMIN — PIPERACILLIN AND TAZOBACTAM 3.38 G: 3; .375 INJECTION, POWDER, LYOPHILIZED, FOR SOLUTION INTRAVENOUS at 17:41

## 2020-01-01 RX ADMIN — ZINC SULFATE 220 MG (50 MG) CAPSULE 50 MG: CAPSULE at 08:32

## 2020-01-01 RX ADMIN — IPRATROPIUM BROMIDE AND ALBUTEROL SULFATE 1 AMPULE: 2.5; .5 SOLUTION RESPIRATORY (INHALATION) at 03:52

## 2020-01-01 RX ADMIN — Medication 200 MCG/HR: at 00:06

## 2020-01-01 RX ADMIN — Medication 175 MCG/HR: at 00:13

## 2020-01-01 RX ADMIN — POTASSIUM CHLORIDE 40 MEQ: 29.8 INJECTION, SOLUTION INTRAVENOUS at 10:53

## 2020-01-01 RX ADMIN — INSULIN GLARGINE 25 UNITS: 100 INJECTION, SOLUTION SUBCUTANEOUS at 10:24

## 2020-01-01 RX ADMIN — ERTAPENEM SODIUM 1000 MG: 1 INJECTION, POWDER, LYOPHILIZED, FOR SOLUTION INTRAMUSCULAR; INTRAVENOUS at 13:00

## 2020-01-01 RX ADMIN — SODIUM CHLORIDE: 9 INJECTION, SOLUTION INTRAVENOUS at 20:54

## 2020-01-01 RX ADMIN — BUDESONIDE 500 MCG: 0.5 SUSPENSION RESPIRATORY (INHALATION) at 08:08

## 2020-01-01 RX ADMIN — ARFORMOTEROL TARTRATE 15 MCG: 15 SOLUTION RESPIRATORY (INHALATION) at 20:37

## 2020-01-01 RX ADMIN — NOREPINEPHRINE BITARTRATE 12 MCG/MIN: 1 INJECTION, SOLUTION, CONCENTRATE INTRAVENOUS at 18:00

## 2020-01-01 RX ADMIN — ENOXAPARIN SODIUM 40 MG: 40 INJECTION SUBCUTANEOUS at 08:33

## 2020-01-01 RX ADMIN — BUDESONIDE 500 MCG: 0.5 SUSPENSION RESPIRATORY (INHALATION) at 20:01

## 2020-01-01 RX ADMIN — INSULIN LISPRO 1 UNITS: 100 INJECTION, SOLUTION INTRAVENOUS; SUBCUTANEOUS at 11:42

## 2020-01-01 RX ADMIN — Medication 10 ML: at 08:21

## 2020-01-01 RX ADMIN — INSULIN LISPRO 3 UNITS: 100 INJECTION, SOLUTION INTRAVENOUS; SUBCUTANEOUS at 12:44

## 2020-01-01 RX ADMIN — SENNOSIDES 8.6 MG: 8.6 TABLET, FILM COATED ORAL at 20:11

## 2020-01-01 RX ADMIN — IPRATROPIUM BROMIDE AND ALBUTEROL SULFATE 1 AMPULE: 2.5; .5 SOLUTION RESPIRATORY (INHALATION) at 07:58

## 2020-01-01 RX ADMIN — Medication 10 ML: at 21:19

## 2020-01-01 RX ADMIN — ZINC SULFATE 220 MG (50 MG) CAPSULE 50 MG: CAPSULE at 08:42

## 2020-01-01 RX ADMIN — NOREPINEPHRINE BITARTRATE 5 MCG/MIN: 1 INJECTION, SOLUTION, CONCENTRATE INTRAVENOUS at 18:49

## 2020-01-01 RX ADMIN — INSULIN GLARGINE 25 UNITS: 100 INJECTION, SOLUTION SUBCUTANEOUS at 20:14

## 2020-01-01 RX ADMIN — IPRATROPIUM BROMIDE AND ALBUTEROL SULFATE 1 AMPULE: 2.5; .5 SOLUTION RESPIRATORY (INHALATION) at 23:34

## 2020-01-01 RX ADMIN — GUAIFENESIN 400 MG: 400 TABLET ORAL at 17:33

## 2020-01-01 RX ADMIN — Medication 75 MCG/HR: at 00:40

## 2020-01-01 RX ADMIN — DOCUSATE SODIUM 50 MG AND SENNOSIDES 8.6 MG 2 TABLET: 8.6; 5 TABLET, FILM COATED ORAL at 08:24

## 2020-01-01 RX ADMIN — SODIUM CHLORIDE: 9 INJECTION, SOLUTION INTRAVENOUS at 12:46

## 2020-01-01 RX ADMIN — SODIUM CHLORIDE 1 MCG/KG/HR: 9 INJECTION, SOLUTION INTRAVENOUS at 16:05

## 2020-01-01 RX ADMIN — PIPERACILLIN AND TAZOBACTAM 3.38 G: 3; .375 INJECTION, POWDER, LYOPHILIZED, FOR SOLUTION INTRAVENOUS at 02:15

## 2020-01-01 RX ADMIN — BUDESONIDE 500 MCG: 0.5 SUSPENSION RESPIRATORY (INHALATION) at 09:22

## 2020-01-01 RX ADMIN — Medication 2000 UNITS: at 08:20

## 2020-01-01 RX ADMIN — Medication 10 ML: at 20:10

## 2020-01-01 RX ADMIN — MAGNESIUM SULFATE 2 G: 2 INJECTION INTRAVENOUS at 12:54

## 2020-01-01 RX ADMIN — Medication 100 MCG/HR: at 13:53

## 2020-01-01 RX ADMIN — ARFORMOTEROL TARTRATE 15 MCG: 15 SOLUTION RESPIRATORY (INHALATION) at 19:35

## 2020-01-01 ASSESSMENT — PULMONARY FUNCTION TESTS
PIF_VALUE: 29
PIF_VALUE: 28
PIF_VALUE: 27
PIF_VALUE: 26
PIF_VALUE: 22
PIF_VALUE: 35
PIF_VALUE: 29
PIF_VALUE: 30
PIF_VALUE: 34
PIF_VALUE: 30
PIF_VALUE: 20
PIF_VALUE: 26
PIF_VALUE: 23
PIF_VALUE: 29
PIF_VALUE: 23
PIF_VALUE: 35
PIF_VALUE: 25
PIF_VALUE: 22
PIF_VALUE: 30
PIF_VALUE: 32
PIF_VALUE: 35
PIF_VALUE: 16
PIF_VALUE: 33
PIF_VALUE: 28
PIF_VALUE: 19
PIF_VALUE: 37
PIF_VALUE: 32
PIF_VALUE: 33
PIF_VALUE: 23
PIF_VALUE: 32
PIF_VALUE: 31
PIF_VALUE: 28
PIF_VALUE: 34
PIF_VALUE: 28
PIF_VALUE: 25
PIF_VALUE: 32
PIF_VALUE: 29
PIF_VALUE: 23
PIF_VALUE: 32
PIF_VALUE: 27
PIF_VALUE: 44
PIF_VALUE: 15
PIF_VALUE: 29
PIF_VALUE: 33
PIF_VALUE: 20
PIF_VALUE: 26
PIF_VALUE: 23
PIF_VALUE: 18
PIF_VALUE: 29
PIF_VALUE: 29
PIF_VALUE: 11
PIF_VALUE: 29
PIF_VALUE: 41
PIF_VALUE: 48
PIF_VALUE: 33
PIF_VALUE: 33
PIF_VALUE: 28
PIF_VALUE: 34
PIF_VALUE: 35
PIF_VALUE: 26
PIF_VALUE: 28
PIF_VALUE: 23
PIF_VALUE: 31
PIF_VALUE: 34
PIF_VALUE: 18
PIF_VALUE: 35
PIF_VALUE: 26
PIF_VALUE: 35
PIF_VALUE: 28
PIF_VALUE: 43
PIF_VALUE: 23
PIF_VALUE: 26
PIF_VALUE: 29
PIF_VALUE: 45
PIF_VALUE: 30
PIF_VALUE: 19
PIF_VALUE: 31
PIF_VALUE: 27
PIF_VALUE: 28
PIF_VALUE: 26
PIF_VALUE: 25
PIF_VALUE: 33
PIF_VALUE: 28
PIF_VALUE: 27
PIF_VALUE: 21
PIF_VALUE: 35
PIF_VALUE: 26
PIF_VALUE: 28
PIF_VALUE: 24
PIF_VALUE: 21
PIF_VALUE: 33
PIF_VALUE: 23
PIF_VALUE: 32
PIF_VALUE: 27
PIF_VALUE: 33
PIF_VALUE: 44
PIF_VALUE: 30
PIF_VALUE: 26
PIF_VALUE: 23
PIF_VALUE: 16
PIF_VALUE: 19
PIF_VALUE: 29
PIF_VALUE: 31
PIF_VALUE: 33
PIF_VALUE: 33
PIF_VALUE: 28
PIF_VALUE: 33
PIF_VALUE: 29
PIF_VALUE: 28
PIF_VALUE: 23
PIF_VALUE: 27
PIF_VALUE: 18
PIF_VALUE: 37
PIF_VALUE: 28
PIF_VALUE: 23
PIF_VALUE: 15
PIF_VALUE: 30
PIF_VALUE: 28
PIF_VALUE: 30
PIF_VALUE: 36
PIF_VALUE: 28
PIF_VALUE: 33
PIF_VALUE: 31
PIF_VALUE: 34
PIF_VALUE: 35
PIF_VALUE: 38
PIF_VALUE: 28
PIF_VALUE: 26
PIF_VALUE: 31
PIF_VALUE: 17
PIF_VALUE: 28
PIF_VALUE: 33
PIF_VALUE: 28
PIF_VALUE: 34
PIF_VALUE: 28
PIF_VALUE: 33
PIF_VALUE: 29
PIF_VALUE: 33
PIF_VALUE: 23
PIF_VALUE: 31
PIF_VALUE: 30
PIF_VALUE: 25
PIF_VALUE: 30
PIF_VALUE: 34
PIF_VALUE: 30
PIF_VALUE: 7
PIF_VALUE: 34
PIF_VALUE: 32
PIF_VALUE: 23
PIF_VALUE: 29
PIF_VALUE: 3
PIF_VALUE: 28
PIF_VALUE: 34
PIF_VALUE: 28
PIF_VALUE: 15
PIF_VALUE: 30
PIF_VALUE: 24
PIF_VALUE: 27
PIF_VALUE: 28
PIF_VALUE: 26
PIF_VALUE: 35
PIF_VALUE: 17
PIF_VALUE: 27
PIF_VALUE: 30
PIF_VALUE: 28
PIF_VALUE: 37
PIF_VALUE: 25
PIF_VALUE: 32
PIF_VALUE: 20
PIF_VALUE: 37
PIF_VALUE: 31
PIF_VALUE: 31
PIF_VALUE: 19
PIF_VALUE: 33
PIF_VALUE: 26
PIF_VALUE: 14
PIF_VALUE: 34
PIF_VALUE: 29
PIF_VALUE: 26
PIF_VALUE: 31
PIF_VALUE: 29
PIF_VALUE: 30
PIF_VALUE: 11
PIF_VALUE: 29
PIF_VALUE: 27
PIF_VALUE: 34
PIF_VALUE: 32
PIF_VALUE: 29
PIF_VALUE: 21
PIF_VALUE: 35
PIF_VALUE: 23
PIF_VALUE: 23
PIF_VALUE: 29
PIF_VALUE: 12
PIF_VALUE: 23
PIF_VALUE: 32
PIF_VALUE: 33
PIF_VALUE: 26
PIF_VALUE: 20
PIF_VALUE: 6
PIF_VALUE: 29
PIF_VALUE: 31
PIF_VALUE: 30
PIF_VALUE: 29
PIF_VALUE: 26
PIF_VALUE: 26
PIF_VALUE: 18
PIF_VALUE: 19
PIF_VALUE: 26
PIF_VALUE: 23
PIF_VALUE: 43
PIF_VALUE: 45
PIF_VALUE: 31
PIF_VALUE: 27
PIF_VALUE: 27
PIF_VALUE: 29
PIF_VALUE: 28
PIF_VALUE: 24
PIF_VALUE: 35
PIF_VALUE: 27
PIF_VALUE: 34
PIF_VALUE: 18
PIF_VALUE: 28
PIF_VALUE: 33
PIF_VALUE: 36
PIF_VALUE: 19
PIF_VALUE: 34
PIF_VALUE: 32
PIF_VALUE: 25
PIF_VALUE: 33
PIF_VALUE: 36
PIF_VALUE: 30
PIF_VALUE: 22
PIF_VALUE: 50
PIF_VALUE: 27
PIF_VALUE: 33
PIF_VALUE: 30
PIF_VALUE: 34
PIF_VALUE: 17
PIF_VALUE: 28
PIF_VALUE: 30
PIF_VALUE: 30
PIF_VALUE: 27
PIF_VALUE: 22
PIF_VALUE: 33
PIF_VALUE: 29
PIF_VALUE: 39
PIF_VALUE: 32
PIF_VALUE: 35
PIF_VALUE: 30
PIF_VALUE: 35
PIF_VALUE: 31
PIF_VALUE: 23
PIF_VALUE: 30
PIF_VALUE: 26
PIF_VALUE: 27
PIF_VALUE: 32
PIF_VALUE: 19
PIF_VALUE: 29
PIF_VALUE: 26
PIF_VALUE: 26
PIF_VALUE: 24
PIF_VALUE: 26
PIF_VALUE: 19
PIF_VALUE: 30
PIF_VALUE: 27
PIF_VALUE: 28
PIF_VALUE: 17
PIF_VALUE: 23
PIF_VALUE: 38
PIF_VALUE: 28
PIF_VALUE: 24
PIF_VALUE: 36
PIF_VALUE: 32
PIF_VALUE: 31
PIF_VALUE: 21
PIF_VALUE: 18
PIF_VALUE: 31
PIF_VALUE: 28
PIF_VALUE: 19
PIF_VALUE: 28
PIF_VALUE: 30
PIF_VALUE: 33
PIF_VALUE: 27
PIF_VALUE: 17
PIF_VALUE: 33
PIF_VALUE: 19
PIF_VALUE: 33
PIF_VALUE: 29
PIF_VALUE: 30
PIF_VALUE: 22
PIF_VALUE: 32
PIF_VALUE: 25
PIF_VALUE: 35
PIF_VALUE: 22
PIF_VALUE: 17
PIF_VALUE: 29
PIF_VALUE: 27
PIF_VALUE: 29
PIF_VALUE: 17
PIF_VALUE: 27
PIF_VALUE: 12
PIF_VALUE: 27
PIF_VALUE: 18
PIF_VALUE: 30
PIF_VALUE: 33
PIF_VALUE: 25
PIF_VALUE: 29
PIF_VALUE: 29
PIF_VALUE: 31
PIF_VALUE: 28
PIF_VALUE: 38
PIF_VALUE: 35
PIF_VALUE: 26
PIF_VALUE: 27
PIF_VALUE: 36
PIF_VALUE: 41
PIF_VALUE: 39
PIF_VALUE: 33
PIF_VALUE: 29
PIF_VALUE: 29
PIF_VALUE: 35
PIF_VALUE: 35
PIF_VALUE: 30
PIF_VALUE: 32
PIF_VALUE: 27
PIF_VALUE: 32
PIF_VALUE: 28
PIF_VALUE: 27
PIF_VALUE: 30
PIF_VALUE: 23
PIF_VALUE: 23
PIF_VALUE: 28
PIF_VALUE: 29
PIF_VALUE: 29
PIF_VALUE: 34
PIF_VALUE: 37
PIF_VALUE: 32
PIF_VALUE: 28
PIF_VALUE: 25
PIF_VALUE: 39
PIF_VALUE: 31
PIF_VALUE: 24
PIF_VALUE: 15
PIF_VALUE: 32
PIF_VALUE: 29
PIF_VALUE: 31
PIF_VALUE: 27
PIF_VALUE: 35
PIF_VALUE: 26
PIF_VALUE: 23
PIF_VALUE: 32
PIF_VALUE: 26
PIF_VALUE: 35
PIF_VALUE: 24
PIF_VALUE: 30
PIF_VALUE: 32
PIF_VALUE: 32
PIF_VALUE: 26
PIF_VALUE: 27
PIF_VALUE: 25
PIF_VALUE: 19
PIF_VALUE: 33
PIF_VALUE: 30
PIF_VALUE: 34
PIF_VALUE: 25
PIF_VALUE: 32
PIF_VALUE: 35
PIF_VALUE: 22
PIF_VALUE: 26
PIF_VALUE: 16
PIF_VALUE: 28
PIF_VALUE: 29
PIF_VALUE: 23
PIF_VALUE: 27
PIF_VALUE: 28
PIF_VALUE: 31
PIF_VALUE: 33
PIF_VALUE: 19
PIF_VALUE: 26
PIF_VALUE: 27
PIF_VALUE: 29
PIF_VALUE: 27
PIF_VALUE: 30
PIF_VALUE: 30
PIF_VALUE: 31
PIF_VALUE: 8
PIF_VALUE: 27
PIF_VALUE: 19
PIF_VALUE: 15
PIF_VALUE: 30
PIF_VALUE: 30
PIF_VALUE: 29
PIF_VALUE: 29
PIF_VALUE: 30
PIF_VALUE: 31
PIF_VALUE: 28
PIF_VALUE: 37
PIF_VALUE: 26
PIF_VALUE: 32
PIF_VALUE: 31
PIF_VALUE: 28
PIF_VALUE: 17
PIF_VALUE: 29
PIF_VALUE: 16
PIF_VALUE: 20
PIF_VALUE: 24
PIF_VALUE: 29
PIF_VALUE: 34
PIF_VALUE: 28
PIF_VALUE: 29
PIF_VALUE: 28
PIF_VALUE: 33
PIF_VALUE: 23
PIF_VALUE: 27
PIF_VALUE: 35
PIF_VALUE: 16
PIF_VALUE: 28
PIF_VALUE: 24
PIF_VALUE: 21
PIF_VALUE: 16
PIF_VALUE: 18
PIF_VALUE: 15
PIF_VALUE: 29
PIF_VALUE: 27
PIF_VALUE: 29
PIF_VALUE: 16
PIF_VALUE: 23
PIF_VALUE: 31
PIF_VALUE: 31
PIF_VALUE: 28
PIF_VALUE: 21
PIF_VALUE: 30
PIF_VALUE: 31
PIF_VALUE: 30
PIF_VALUE: 20
PIF_VALUE: 19
PIF_VALUE: 31
PIF_VALUE: 35
PIF_VALUE: 23
PIF_VALUE: 33
PIF_VALUE: 34
PIF_VALUE: 33
PIF_VALUE: 30
PIF_VALUE: 18
PIF_VALUE: 17
PIF_VALUE: 27
PIF_VALUE: 34
PIF_VALUE: 31
PIF_VALUE: 24
PIF_VALUE: 26
PIF_VALUE: 35
PIF_VALUE: 19
PIF_VALUE: 36
PIF_VALUE: 31
PIF_VALUE: 40
PIF_VALUE: 27
PIF_VALUE: 34
PIF_VALUE: 32
PIF_VALUE: 28
PIF_VALUE: 29
PIF_VALUE: 24
PIF_VALUE: 23
PIF_VALUE: 18
PIF_VALUE: 26
PIF_VALUE: 30
PIF_VALUE: 29
PIF_VALUE: 29
PIF_VALUE: 30
PIF_VALUE: 33
PIF_VALUE: 35
PIF_VALUE: 30
PIF_VALUE: 31
PIF_VALUE: 30
PIF_VALUE: 32
PIF_VALUE: 27
PIF_VALUE: 27
PIF_VALUE: 30
PIF_VALUE: 31
PIF_VALUE: 32
PIF_VALUE: 29
PIF_VALUE: 21
PIF_VALUE: 29
PIF_VALUE: 35
PIF_VALUE: 39
PIF_VALUE: 32
PIF_VALUE: 32

## 2020-01-01 ASSESSMENT — PAIN SCALES - GENERAL
PAINLEVEL_OUTOF10: 0
PAINLEVEL_OUTOF10: 1
PAINLEVEL_OUTOF10: 0
PAINLEVEL_OUTOF10: 5
PAINLEVEL_OUTOF10: 0
PAINLEVEL_OUTOF10: 5
PAINLEVEL_OUTOF10: 0
PAINLEVEL_OUTOF10: 0
PAINLEVEL_OUTOF10: 5
PAINLEVEL_OUTOF10: 0
PAINLEVEL_OUTOF10: 5
PAINLEVEL_OUTOF10: 0
PAINLEVEL_OUTOF10: 5
PAINLEVEL_OUTOF10: 0
PAINLEVEL_OUTOF10: 5
PAINLEVEL_OUTOF10: 0
PAINLEVEL_OUTOF10: 5
PAINLEVEL_OUTOF10: 0
PAINLEVEL_OUTOF10: 9
PAINLEVEL_OUTOF10: 0
PAINLEVEL_OUTOF10: 0
PAINLEVEL_OUTOF10: 6
PAINLEVEL_OUTOF10: 0
PAINLEVEL_OUTOF10: 2
PAINLEVEL_OUTOF10: 0
PAINLEVEL_OUTOF10: 6
PAINLEVEL_OUTOF10: 0
PAINLEVEL_OUTOF10: 9
PAINLEVEL_OUTOF10: 0

## 2020-01-01 ASSESSMENT — PATIENT HEALTH QUESTIONNAIRE - PHQ9
SUM OF ALL RESPONSES TO PHQ QUESTIONS 1-9: 0
SUM OF ALL RESPONSES TO PHQ QUESTIONS 1-9: 0
2. FEELING DOWN, DEPRESSED OR HOPELESS: 0
DEPRESSION UNABLE TO ASSESS: FUNCTIONAL CAPACITY MOTIVATION LIMITS ACCURACY
1. LITTLE INTEREST OR PLEASURE IN DOING THINGS: 0
SUM OF ALL RESPONSES TO PHQ9 QUESTIONS 1 & 2: 0
SUM OF ALL RESPONSES TO PHQ QUESTIONS 1-9: 0

## 2020-01-01 ASSESSMENT — ENCOUNTER SYMPTOMS
BACK PAIN: 0
EYE PAIN: 0
SINUS PAIN: 0
SHORTNESS OF BREATH: 0
WHEEZING: 0
CHEST TIGHTNESS: 0
EYE PAIN: 0
SINUS PRESSURE: 0
COUGH: 0
ABDOMINAL DISTENTION: 0
EYE REDNESS: 0
DIARRHEA: 0
WHEEZING: 0
VOMITING: 0
EYE DISCHARGE: 0
NAUSEA: 0
ABDOMINAL PAIN: 0
VOMITING: 0
DIARRHEA: 0
SORE THROAT: 0
SORE THROAT: 0
NAUSEA: 0
CHOKING: 0
COUGH: 1
SHORTNESS OF BREATH: 1

## 2020-01-01 ASSESSMENT — LIFESTYLE VARIABLES: HOW OFTEN DO YOU HAVE A DRINK CONTAINING ALCOHOL: 0

## 2020-01-02 LAB — DIABETIC RETINOPATHY: NORMAL

## 2020-01-13 RX ORDER — METFORMIN HYDROCHLORIDE 500 MG/1
TABLET, EXTENDED RELEASE ORAL
Qty: 360 TABLET | Refills: 1 | Status: SHIPPED | OUTPATIENT
Start: 2020-01-13 | End: 2020-01-20 | Stop reason: SDUPTHER

## 2020-01-13 RX ORDER — PRAVASTATIN SODIUM 40 MG
TABLET ORAL
Qty: 90 TABLET | Refills: 1 | Status: SHIPPED | OUTPATIENT
Start: 2020-01-13 | End: 2020-01-20 | Stop reason: SDUPTHER

## 2020-01-14 ENCOUNTER — OFFICE VISIT (OUTPATIENT)
Dept: PRIMARY CARE CLINIC | Age: 75
End: 2020-01-14
Payer: MEDICARE

## 2020-01-14 VITALS
DIASTOLIC BLOOD PRESSURE: 70 MMHG | SYSTOLIC BLOOD PRESSURE: 132 MMHG | RESPIRATION RATE: 16 BRPM | HEART RATE: 90 BPM | HEIGHT: 66 IN | WEIGHT: 136 LBS | OXYGEN SATURATION: 96 % | BODY MASS INDEX: 21.86 KG/M2

## 2020-01-14 LAB — HBA1C MFR BLD: 6.6 %

## 2020-01-14 PROCEDURE — G8482 FLU IMMUNIZE ORDER/ADMIN: HCPCS | Performed by: FAMILY MEDICINE

## 2020-01-14 PROCEDURE — 2022F DILAT RTA XM EVC RTNOPTHY: CPT | Performed by: FAMILY MEDICINE

## 2020-01-14 PROCEDURE — 4040F PNEUMOC VAC/ADMIN/RCVD: CPT | Performed by: FAMILY MEDICINE

## 2020-01-14 PROCEDURE — 1036F TOBACCO NON-USER: CPT | Performed by: FAMILY MEDICINE

## 2020-01-14 PROCEDURE — 1123F ACP DISCUSS/DSCN MKR DOCD: CPT | Performed by: FAMILY MEDICINE

## 2020-01-14 PROCEDURE — G8420 CALC BMI NORM PARAMETERS: HCPCS | Performed by: FAMILY MEDICINE

## 2020-01-14 PROCEDURE — G8400 PT W/DXA NO RESULTS DOC: HCPCS | Performed by: FAMILY MEDICINE

## 2020-01-14 PROCEDURE — 99214 OFFICE O/P EST MOD 30 MIN: CPT | Performed by: FAMILY MEDICINE

## 2020-01-14 PROCEDURE — 83036 HEMOGLOBIN GLYCOSYLATED A1C: CPT | Performed by: FAMILY MEDICINE

## 2020-01-14 PROCEDURE — 1090F PRES/ABSN URINE INCON ASSESS: CPT | Performed by: FAMILY MEDICINE

## 2020-01-14 PROCEDURE — G8427 DOCREV CUR MEDS BY ELIG CLIN: HCPCS | Performed by: FAMILY MEDICINE

## 2020-01-14 PROCEDURE — 3023F SPIROM DOC REV: CPT | Performed by: FAMILY MEDICINE

## 2020-01-14 PROCEDURE — 3046F HEMOGLOBIN A1C LEVEL >9.0%: CPT | Performed by: FAMILY MEDICINE

## 2020-01-14 PROCEDURE — G8926 SPIRO NO PERF OR DOC: HCPCS | Performed by: FAMILY MEDICINE

## 2020-01-14 PROCEDURE — 3017F COLORECTAL CA SCREEN DOC REV: CPT | Performed by: FAMILY MEDICINE

## 2020-01-14 ASSESSMENT — PATIENT HEALTH QUESTIONNAIRE - PHQ9
SUM OF ALL RESPONSES TO PHQ QUESTIONS 1-9: 0
1. LITTLE INTEREST OR PLEASURE IN DOING THINGS: 0
SUM OF ALL RESPONSES TO PHQ QUESTIONS 1-9: 0
SUM OF ALL RESPONSES TO PHQ9 QUESTIONS 1 & 2: 0
2. FEELING DOWN, DEPRESSED OR HOPELESS: 0

## 2020-01-14 NOTE — PROGRESS NOTES
2020     Juany Tierney Record    : 1945 Sex: female   Age: 75 y.o. Chief Complaint   Patient presents with    Diabetes    Hypertension    COPD       HPI: This 75y.o. -year-old female  presents today for evaluation and management of her  chronic medical problems. Current medication list reviewed. The patient is tolerating all medications well without adverse events or known side effects. The patient does understand the risk and benefits of the prescribed medications. The patient is up-to-date on all age-appropriate wellness issues. ROS:   Const: Denies changes in appetite, chills, fever, night sweats and weight loss. Eyes:  Denies discharge, a recent change in visual acuity, blurred vision and double vision. ENMT: Denies discharge of the ears, hearing loss, pain of the ears. Denies nasal or sinus symptoms other than stated above. Denies mouth or throat symptoms. CV:  Denies chest pain, dyspnea on exertion, orthopnea, palpitations and PND  Resp: Denies chest pain, cough, SOB and wheezing. GI: Denies abdominal pain, constipation, diarrhea, heartburn, indigestion, nausea and vomiting. : Denies dysuria, frequency, hematuria, nocturia and urgency. Musculo: Denies arthralgias and myalgia  Skin:  Denies lesions, pruritus and rash. Neuro: Denies dizziness, lightheadedness, numbness, tingling and weakness. Psych:  Denies anxiety and depression  Endocrine: Denies anxiety and depression. Hema/Lymph: Denies hematologic symptoms  Allergy/Immuno:  Denies allergic/immunologic symptoms.   Pertinent positives reviewed and noted      Current Outpatient Medications:     pravastatin (PRAVACHOL) 40 MG tablet, TAKE 1 TABLET EVERY EVENING., Disp: 90 tablet, Rfl: 1    metFORMIN (GLUCOPHAGE-XR) 500 MG extended release tablet, TAKE 2 TABLETS EVERY MORNING AND 2 TABLETS EVERY EVENING WITH MEALS, Disp: 360 tablet, Rfl: 1    Blood Glucose Monitoring Suppl (ACCU-CHEK RAYMOND PLUS) w/Device KIT, Use to check blood sugar per doctor's orders, Disp: 1 kit, Rfl: 0    predniSONE (DELTASONE) 20 MG tablet, Take orally 4 tabs on 11/9; 3 tabs on 11/10; 2 tabs on 11/11; 1 tab on 11/12, Disp: 10 tablet, Rfl: 0    budesonide (PULMICORT) 0.5 MG/2ML nebulizer suspension, Take 4 mLs by nebulization 2 times daily, Disp: 60 ampule, Rfl: 3    guaiFENesin 400 MG tablet, Take 400 mg by mouth 4 times daily as needed for Cough, Disp: , Rfl:     ipratropium-albuterol (DUONEB) 0.5-2.5 (3) MG/3ML SOLN nebulizer solution, Inhale 3 mLs into the lungs every 6 hours as needed for Shortness of Breath (Patient taking differently: Inhale 1 vial into the lungs 3 times daily ), Disp: 360 mL, Rfl: 3    naproxen sodium (ALEVE) 220 MG tablet, Take 220 mg by mouth 2 times daily (with meals), Disp: , Rfl:     ACCU-CHEK SOFTCLIX LANCETS MISC, , Disp: , Rfl:     Cholecalciferol (VITAMIN D) 2000 UNITS CAPS capsule, Take 2,000 Units by mouth every morning , Disp: , Rfl:     ascorbic acid (VITAMIN C) 500 MG tablet, Take 2,000 mg by mouth every morning , Disp: , Rfl:     vitamin E 400 UNIT capsule, Take 400 Units by mouth every morning , Disp: , Rfl:     Copper Gluconate 2 MG CAPS, Take 2 mg by mouth every morning , Disp: , Rfl:     Zinc 25 MG TABS, Take 25 mg by mouth every morning , Disp: , Rfl:     b complex vitamins capsule, Take 1 capsule by mouth every morning , Disp: , Rfl:     Omega-3 Fatty Acids (FISH OIL) 1200 MG CAPS, Take 2,400 mg by mouth every morning , Disp: , Rfl:     formoterol (PERFOROMIST) 20 MCG/2ML nebulizer solution, Take 2 mLs by nebulization 2 times daily, Disp: 120 mL, Rfl: 5    No Known Allergies    Past Medical History:   Diagnosis Date    [facility]) [facility] [address]     Diabetes type 2, controlled ([facility] [address]     Former smoker     Hyperlipidemia     Hypertension     Macular degeneration     OU    Multiple thyroid nodules 11/2017    Pulmonary nodule 05/2016     Social History     Socioeconomic History    Sclerosis Daughter     Heart Disease Sister         valvular        Vitals:    01/14/20 1116   BP: 132/70   Pulse: 90   Resp: 16   SpO2: 96%   Weight: 136 lb (61.7 kg)   Height: 5' 6\" (1.676 m)        Exam: Const: Appears healthy and well developed. No signs of acute distress present. O2 per nasal cannula noted. Eyes: PERRL  ENMT: Tympanic membranes are intact. Nasal mucosa intact without noted erythema Septum is in the midline. Posterior pharynx shows no exudate, irritation or redness. Neck:  Supple without adenopathy. Adequate range of motion   Resp: No rales, rhonchi, wheezes appreciated over the lungs bilaterally. CV: S1, S2 within normal limits. Regular rate and rhythm noted. Without murmur, gallop or rub. Extremities:  Pulses intact. Without noted edema. Abdomen: Positive bowel sounds. Palpation reveals softness, with no distension, organomegaly or tenderness. No abdominal masses palpable. Skin: Skin is warm and dry. Musculo: Unchanged upon examination. Neuro: Alert and oriented X3. Cranial nerves grossly intact. Psych: Mood is normal.  Affect is normal.   Vital signs reviewed. Controlled Substances Monitoring:     No flowsheet data found. Plan Per Assessment:  Juany was seen today for diabetes, hypertension and copd. Diagnoses and all orders for this visit:    Controlled type 2 diabetes mellitus without complication, unspecified whether long term insulin use (HCC)  -     POCT glycosylated hemoglobin (Hb A1C)    Hyperlipidemia, unspecified hyperlipidemia type    Pulmonary emphysema, unspecified emphysema type ([facility] [address]        Return in about 6 months (around 7/14/2020) for MEDICATION CHECK, FOLLOW UP CHRONIC MEDICAL PROBLEMS. Amanda Arevalo MD    Note was generated with the assistance of voice recognition software. Document was reviewed however may contain grammatical errors.

## 2020-01-20 RX ORDER — METFORMIN HYDROCHLORIDE 500 MG/1
TABLET, EXTENDED RELEASE ORAL
Qty: 360 TABLET | Refills: 3 | Status: ON HOLD
Start: 2020-01-20 | End: 2020-01-01 | Stop reason: HOSPADM

## 2020-01-20 RX ORDER — PRAVASTATIN SODIUM 40 MG
TABLET ORAL
Qty: 90 TABLET | Refills: 2 | Status: SHIPPED
Start: 2020-01-20 | End: 2020-01-01 | Stop reason: SDUPTHER

## 2020-01-20 NOTE — TELEPHONE ENCOUNTER
Patient needs pended medication refilled.         Electronically signed by Darrius Jiang LPN on 3/12/67 at 3:22 PM

## 2020-02-18 NOTE — TELEPHONE ENCOUNTER
Patient called she states she will be bringing in [employer] paperwork tomorrow for Dr. Martha Nguyễn to fill out.           Electronically signed by Domi Bay LPN on 1/29/28 at 10:24 AM

## 2020-02-24 NOTE — TELEPHONE ENCOUNTER
Last Appointment:  1/14/2020  Future Appointments   Date Time Provider Joaquim Rosarioi   6/9/2020  1:00 PM Juany Wilkerson, APRN - CNP [facility]   7/14/2020 11:20 AM Sharon Bocanegra MD [facility]

## 2020-07-14 NOTE — TELEPHONE ENCOUNTER
Last Appointment:  1/14/2020  Future Appointments   Date Time Provider Joaquim Rosarioi   7/29/2020  9:20 AM Brandan Mcconnell MD [facility]   12/9/2020  1:00 PM TITA Jo - CNP [facility]      Spoke with patient about missed visit. She reports she called yesterday to change visit to VV but no one called her. Visit was not changed to VV. Patient agreeable to reschedule for 07/29/2020 at 9:20 am VV.     Electronically signed by Marcie Hemphill LPN on 7/50/5109 at 1:28 PM

## 2020-07-28 NOTE — TELEPHONE ENCOUNTER
Patient's  reports insurance does not cover Perforomist unless DX of Asthma. Patient does have asthma and  Roz Corral is asking if there is another medication that is covered that patient could use.       Electronically signed by Eron Mitchell LPN on 7/65/9884 at 53:00 AM

## 2020-07-28 NOTE — TELEPHONE ENCOUNTER
Patient states that she has been on this medication since December of last year and has not ever had a problem.   I advised her to call her  for more details

## 2020-07-29 NOTE — PROGRESS NOTES
Kody Barber is a 67 y.o. female evaluated via telephone on 7/29/2020. Consent:  She and/or health care decision maker is aware that that she may receive a bill for this telephone service, depending on her insurance coverage, and has provided verbal consent to proceed: Yes      Documentation:  I communicated with the patient and/or health care decision maker about evaluation management of chronic medical problems. Details of this discussion including any medical advice provided: This 50-year-old female presents today for follow-up evaluation of her chronic medical problems. Current medication list reviewed. The patient is tolerating all medications well without adverse events or known side effects. The patient is not up-to-date on all age-appropriate wellness issues. Assessment #1 diabetes #3 hypertension plan #1 continue current medical therapy #2 schedule office visit follow-up in 3 months. I affirm this is a Patient Initiated Episode with a Patient who has not had a related appointment within my department in the past 7 days or scheduled within the next 24 hours. Patient identification was verified at the start of the visit: Yes    Total Time: minutes: 11-20 minutes.     Note: not billable if this call serves to triage the patient into an appointment for the relevant concern      Dorothy Lira

## 2020-09-12 NOTE — ED PROVIDER NOTES
[facility]  Department of Emergency Medicine     Written by: Jyothi East DO  Patient Name: Hanh Oliver  Attending Provider: No att. providers found  Admit Date: 2020  4:58 PM  MRN: [id number]                   : 1945        Chief Complaint   Patient presents with   Hien Waldok    - Chief complaint    Patient is a 50-year-old female past medical history of COPD, hypertension, hyperlipidemia and DM type II. Patient presents via EMS after suffering fall at home. Patient states that she was helping move boxes up steps when she fell down 4 flights of stairs. Patient states that her legs are weak and that she tripped over her feet. Patient states that she did hit her head but denies any loss of consciousness. Patient currently not on any blood thinners. In addition to mild headache patient also notes left shoulder and left upper arm discomfort. Patient states that pain is moderate 6 out of 10. She states is constant nature she also notes decreased range of motion. She states the pain is worsened with palpation and improved with rest.  Denies any nausea, vomiting, dizziness, lightheadedness, chest pain, cough or shortness of breath. The history is provided by the patient. No  was used. Review of Systems   Constitutional: Negative for chills and fever. HENT: Negative for ear pain, sinus pressure and sore throat. Eyes: Negative for pain, discharge and redness. Respiratory: Negative for cough, shortness of breath and wheezing. Cardiovascular: Negative for chest pain. Gastrointestinal: Negative for abdominal distention, diarrhea, nausea and vomiting. Genitourinary: Negative for dysuria and frequency. Musculoskeletal: Positive for arthralgias. Negative for back pain. Skin: Negative for rash and wound. Neurological: Positive for headaches. Negative for weakness. Hematological: Negative for adenopathy.    All other systems reviewed history. She has never used smokeless tobacco. She reports that she does not drink alcohol or use drugs. Family History: family history includes Coronary Art Dis in her father and mother; Diabetes in her mother; Heart Disease in her sister; High Blood Pressure in her father and mother; Tia Pile in her father and sister; Mult Sclerosis in her daughter; Other in an other family member. The patients home medications have been reviewed. Allergies: Patient has no known allergies. -------------------------------------------------- RESULTS -------------------------------------------------  Labs:  No results found for this visit on 09/12/20. Radiology:  CT Head WO Contrast   Final Result      No evidence of acute intracranial hemorrhage or edema. XR SHOULDER LEFT (MIN 2 VIEWS)   Final Result      No evidence of fracture or dislocation of the shoulder. XR HUMERUS LEFT (MIN 2 VIEWS)   Final Result   No fracture or dislocation.                      ------------------------- NURSING NOTES AND VITALS REVIEWED ---------------------------  Date / Time Roomed:  9/12/2020  4:58 PM  ED Bed Assignment:  26/26    The nursing notes within the ED encounter and vital signs as below have been reviewed. BP (!) 170/94 Comment: manual  Pulse 98 Comment: radial  Temp 98.2 °F (36.8 °C) (Oral)   Resp 18   Ht 5' 6\" (1.676 m)   Wt 135 lb (61.2 kg)   SpO2 97%   BMI 21.79 kg/m²   Oxygen Saturation Interpretation: Normal      ------------------------------------------ PROGRESS NOTES ------------------------------------------  6:43 PM EDT  I have spoken with the patient and discussed todays results, in addition to providing specific details for the plan of care and counseling regarding the diagnosis and prognosis. Their questions are answered at this time and they are agreeable with the plan.  I discussed at length with them reasons for immediate return here for re evaluation. They will followup with their primary care physician by calling their office on Monday.      --------------------------------- ADDITIONAL PROVIDER NOTES ---------------------------------  At this time the patient is without objective evidence of an acute process requiring hospitalization or inpatient management. They have remained hemodynamically stable throughout their entire ED visit and are stable for discharge with outpatient follow-up. The plan has been discussed in detail and they are aware of the specific conditions for emergent return, as well as the importance of follow-up. Discharge Medication List as of 9/12/2020  7:31 PM          Diagnosis:  1. Fall, initial encounter    2. Arm contusion, left, initial encounter    3. Essential hypertension        Disposition:  Patient's disposition: Discharge to home  Patient's condition is stable. Patient was seen and evaluated by myself and my attending No att. providers found. Assessment and Plan discussed with attending provider, please see attestation for final plan of care.      DO Porfirio Wolfe, DO  Resident  09/12/20 5925 Arthur Garza DO  Resident  09/12/20 5161

## 2020-09-12 NOTE — ED NOTES
Bed: 26  Expected date:   Expected time:   Means of arrival:   Comments:  EMS     Shani Crow RN  09/12/20 7171

## 2020-09-22 NOTE — PROGRESS NOTES
2020     Juany Peña    : 1945 Sex: female   Age: 67 y.o. Chief Complaint   Patient presents with    Pre-op Exam       HPI: This 67y.o. -year-old female  presents today for a preoperative physical examination and surgical clearance. Current medication list reviewed. The patient is tolerating all medications well without adverse events or known side effects. The patient does understand the risk and benefits of the prescribed medications. The patient is not up-to-date on all age-appropriate wellness issues. The patient states she sustained a recent fall at home injuring her left shoulder. The patient states she did go to the emergency room and x-rays were negative. The patient presents today with a oral cavity lesion. ROS:   Const: Denies changes in appetite, chills, fever, night sweats and weight loss. Eyes:  Denies discharge, a recent change in visual acuity, blurred vision and double vision. ENMT: Denies discharge of the ears, hearing loss, pain of the ears. Denies nasal or sinus symptoms other than stated above. Denies mouth or throat symptoms. CV:  Denies chest pain, dyspnea on exertion, orthopnea, palpitations and PND  Resp: Denies chest pain, cough, SOB and wheezing. GI: Denies abdominal pain, constipation, diarrhea, heartburn, indigestion, nausea and vomiting. : Denies dysuria, frequency, hematuria, nocturia and urgency. Musculo: Denies arthralgias and myalgia  Skin:  Denies lesions, pruritus and rash. Neuro: Denies dizziness, lightheadedness, numbness, tingling and weakness. Psych:  Denies anxiety and depression  Endocrine: Denies anxiety and depression. Hema/Lymph: Denies hematologic symptoms  Allergy/Immuno:  Denies allergic/immunologic symptoms.   Pertinent positives reviewed and noted      Current Outpatient Medications:     Blood Glucose Monitoring Suppl (ACCU-CHEK RAYMOND PLUS) w/Device KIT, USE AS DIRECTED, Disp: 1 kit, Rfl: 0    Accu-Chek Softclix Lancets MISC, USE AS DIRECTED EVERY DAY, Disp: 100 each, Rfl: 3    Umeclidinium Bromide (INCRUSE ELLIPTA) 62.5 MCG/INH AEPB, Inhale 1 puff into the lungs daily, Disp: 90 each, Rfl: 3    albuterol sulfate  (90 Base) MCG/ACT inhaler, Inhale 2 puffs into the lungs every 6 hours as needed for Wheezing, Disp: , Rfl:     metFORMIN (GLUCOPHAGE-XR) 500 MG extended release tablet, TAKE 2 TABLETS EVERY MORNING AND 2 TABLETS EVERY EVENING WITH MEALS, Disp: 360 tablet, Rfl: 3    pravastatin (PRAVACHOL) 40 MG tablet, TAKE 1 TABLET EVERY EVENING., Disp: 90 tablet, Rfl: 2    naproxen sodium (ALEVE) 220 MG tablet, Take 220 mg by mouth 2 times daily (with meals), Disp: , Rfl:     Cholecalciferol (VITAMIN D) 2000 UNITS CAPS capsule, Take 2,000 Units by mouth every morning , Disp: , Rfl:     ascorbic acid (VITAMIN C) 500 MG tablet, Take 2,000 mg by mouth every morning , Disp: , Rfl:     vitamin E 400 UNIT capsule, Take 400 Units by mouth every morning , Disp: , Rfl:     Copper Gluconate 2 MG CAPS, Take 2 mg by mouth every morning , Disp: , Rfl:     Zinc 25 MG TABS, Take 25 mg by mouth every morning , Disp: , Rfl:     b complex vitamins capsule, Take 1 capsule by mouth every morning , Disp: , Rfl:     Omega-3 Fatty Acids (FISH OIL) 1200 MG CAPS, Take 2,400 mg by mouth every morning , Disp: , Rfl:     naproxen (NAPROSYN) 500 MG tablet, Take 1 tablet by mouth 2 times daily for 7 days, Disp: 14 tablet, Rfl: 0    ACCU-CHEK RAYMOND PLUS strip, 1 each by Does not apply route daily, Disp: 200 each, Rfl: 5    formoterol (PERFOROMIST) 20 MCG/2ML nebulizer solution, Take 2 mLs by nebulization 2 times daily, Disp: 120 mL, Rfl: 5    predniSONE (DELTASONE) 20 MG tablet, Take orally 4 tabs on 11/9; 3 tabs on 11/10; 2 tabs on 11/11; 1 tab on 11/12 (Patient not taking: Reported on 9/22/2020), Disp: 10 tablet, Rfl: 0    budesonide (PULMICORT) 0.5 MG/2ML nebulizer suspension, Take 4 mLs by nebulization 2 times daily (Patient not taking: Affect is normal.   Vital signs reviewed. Controlled Substances Monitoring:     No flowsheet data found. Plan Per Assessment:  Juany was seen today for pre-op exam.    Diagnoses and all orders for this visit:    Preoperative general physical examination    Acute pain of left shoulder    Lesion of mouth  -     External Referral To Dentistry    Peripheral edema  -     Comprehensive Metabolic Panel; Future          Follow-up as scheduled. Repeat blood pressure. [address], MD    Note was generated with the assistance of voice recognition software. Document was reviewed however may contain grammatical errors.

## 2020-10-16 NOTE — TELEPHONE ENCOUNTER
Last Appointment:  9/22/2020  Future Appointments   Date Time Provider Joaquim Rosarioi   10/29/2020 10:00 AM Arslan Fuentes MD [facility]   12/9/2020  1:00 PM TITA Hargrove - CNP [facility]

## 2020-10-29 NOTE — PATIENT INSTRUCTIONS
Personalized Preventive Plan for Marissa Tong - 10/29/2020  Medicare offers a range of preventive health benefits. Some of the tests and screenings are paid in full while other may be subject to a deductible, co-insurance, and/or copay. Some of these benefits include a comprehensive review of your medical history including lifestyle, illnesses that may run in your family, and various assessments and screenings as appropriate. After reviewing your medical record and screening and assessments performed today your provider may have ordered immunizations, labs, imaging, and/or referrals for you. A list of these orders (if applicable) as well as your Preventive Care list are included within your After Visit Summary for your review. Other Preventive Recommendations:    · A preventive eye exam performed by an eye specialist is recommended every 1-2 years to screen for glaucoma; cataracts, macular degeneration, and other eye disorders. · A preventive dental visit is recommended every 6 months. · Try to get at least 150 minutes of exercise per week or 10,000 steps per day on a pedometer . · Order or download the FREE \"Exercise & Physical Activity: Your Everyday Guide\" from [employer]. Call 6-958.650.6323 or search [employer] online. · You need 5707-4566 mg of calcium and 5600-4526 IU of vitamin D per day. It is possible to meet your calcium requirement with diet alone, but a vitamin D supplement is usually necessary to meet this goal.  · When exposed to the sun, use a sunscreen that protects against both UVA and UVB radiation with an SPF of 30 or greater. Reapply every 2 to 3 hours or after sweating, drying off with a towel, or swimming. · Always wear a seat belt when traveling in a car. Always wear a helmet when riding a bicycle or motorcycle.

## 2020-10-29 NOTE — PROGRESS NOTES
Medicare Annual Wellness Visit  Name: Thierry Back Date: 10/29/2020   MRN: [id number] Sex: Female   Age: 67 y.o. Ethnicity: Non-/Non    : 1945 Race: Zandra Pablo is here for Medicare AWV    Screenings for behavioral, psychosocial and functional/safety risks, and cognitive dysfunction are all negative except as indicated below. These results, as well as other patient data from the [address] [facility] [address] form, are documented in Flowsheets linked to this Encounter. No Known Allergies      Prior to Visit Medications    Medication Sig Taking? Authorizing Provider   budesonide (PULMICORT) 0.5 MG/2ML nebulizer suspension Take 1 ampule by nebulization 2 times daily Yes Historical Provider, MD   pravastatin (PRAVACHOL) 40 MG tablet TAKE 1 TABLET EVERY EVENING.  Yes Lázaro Lange MD   Blood Glucose Monitoring Suppl (ACCU-CHEK RAYMOND PLUS) w/Device KIT USE AS DIRECTED Yes MIRIAN Garcia MD   Accu-Chek Softclix Lancets MISC USE AS DIRECTED EVERY DAY Yes MIRIAN Garcia MD   Umeclidinium Bromide (INCRUSE ELLIPTA) 62.5 MCG/INH AEPB Inhale 1 puff into the lungs daily Yes TITA Cardoza - CNP   albuterol sulfate  (90 Base) MCG/ACT inhaler Inhale 2 puffs into the lungs every 6 hours as needed for Wheezing Yes Historical Provider, MD   metFORMIN (GLUCOPHAGE-XR) 500 MG extended release tablet TAKE 2 TABLETS EVERY MORNING AND 2 TABLETS EVERY EVENING WITH MEALS Yes Lázaro Lange MD   naproxen sodium (ALEVE) 220 MG tablet Take 220 mg by mouth 2 times daily (with meals) Yes Historical Provider, MD   Cholecalciferol (VITAMIN D) 2000 UNITS CAPS capsule Take 2,000 Units by mouth every morning  Yes Historical Provider, MD   ascorbic acid (VITAMIN C) 500 MG tablet Take 2,000 mg by mouth every morning  Yes Historical Provider, MD   vitamin E 400 UNIT capsule Take 400 Units by mouth every morning  Yes Historical Provider, MD   Copper Gluconate 2 MG CAPS Take 2 mg by mouth every morning  Yes Historical Provider, MD   Zinc 25 MG TABS Take 25 mg by mouth every morning  Yes Historical Provider, MD   b complex vitamins capsule Take 1 capsule by mouth every morning  Yes Historical Provider, MD   Omega-3 Fatty Acids (FISH OIL) 1200 MG CAPS Take 2,400 mg by mouth every morning  Yes Historical Provider, MD   naproxen (NAPROSYN) 500 MG tablet Take 1 tablet by mouth 2 times daily for 7 days  Hallock Scale, DO   ACCU-CHEK RAYMOND PLUS strip 1 each by Does not apply route daily  Trinh Mark MD   formoterol (PERFOROMIST) 20 MCG/2ML nebulizer solution Take 2 mLs by nebulization 2 times daily  Rosalio Singh MD   guaiFENesin 400 MG tablet Take 400 mg by mouth 4 times daily as needed for Cough  Historical Provider, MD         Past Medical History:   Diagnosis Date    [facility] [address]     Diabetes type 2, controlled ([facility] [address]     Former smoker     Hyperlipidemia     Hypertension     Macular degeneration     OU    Multiple thyroid nodules 11/2017    Pulmonary nodule 05/2016       Past Surgical History:   Procedure Laterality Date    FOOT SURGERY  1976    LIPOSUCTION      TUBAL LIGATION  1970'a         Family History   Problem Relation Age of Onset    Diabetes Mother         NIDDM    High Blood Pressure Mother     Coronary Art Dis Mother     High Blood Pressure Father     Macular Degen Father     Coronary Art Dis Father         MI    Macular Degen Sister     Other Other         1/2 sis with HTN, 1/2 bro with DM    Mult Sclerosis Daughter     Heart Disease Sister         valvular       CareTeam (Including outside providers/suppliers regularly involved in providing care):   Patient Care Team:  Trinh Mark MD as PCP - General (Family Medicine)  Trinh Mark MD as PCP - [facility] Sarah Beyer EmpSout[facility] Servicesled Provider  DO Augusta Umaña MD as Consulting Physician (Pulmonology)    Wt Readings from Last 3 Encounters:   10/29/20 140 lb (63.5 kg)   09/22/20 141 lb (64 kg)   09/12/20 135 lb (61.2 kg)     Vitals:    10/29/20 1010 10/29/20 1017   BP: (!) 144/80 (!) 144/80   Pulse: 59    Resp: 16    Temp: 96.9 °F (36.1 °C)    TempSrc: Temporal    SpO2: 94%    Weight: 140 lb (63.5 kg)    Height: 5' 6\" (1.676 m)      Body mass index is 22.6 kg/m². Based upon direct observation of the patient, evaluation of cognition reveals recent and remote memory intact. Patient's complete Health Risk Assessment and screening values have been reviewed and are found in Flowsheets. The following problems were reviewed today and where indicated follow up appointments were made and/or referrals ordered. Positive Risk Factor Screenings with Interventions:     Fall Risk:  Timed Up and Go Test > 12 seconds? (Complete if either Fall Risk answers are Yes): no  2 or more falls in past year?: (!) yes  Fall with injury in past year?: (!) yes  Fall Risk Interventions:    · Fall prevention risk reviewed. Depression:  Depression Unable to Assess: Functional capacity motivation limits accuracy  PHQ-2 Score: 0  PHQ-9 Total Score: 0    Severity:1-4 = minimal depression, 5-9 = mild depression, 10-14 = moderate depression, 15-19 = moderately severe depression, 20-27 = severe depression      General Health and ACP:  General  In general, how would you say your health is?: Good  In the past 7 days, have you experienced any of the following? New or Increased Pain, New or Increased Fatigue, Loneliness, Social Isolation, Stress or Anger?: (!) Anger  Do you get the social and emotional support that you need?: Yes  Do you have a Living Will?: Yes  Advance Directives     Power of  Living Will ACP-Advance Directive ACP-Power of     Not on File [place] on 11/18/17 Filed [address] [facility] Risk Interventions:  · General health risk intervention recommendations made. ADL:  ADLs  In the past 7 days, did you need help from others to perform any of the following everyday activities? Eating, dressing, grooming, bathing, toileting, or walking/balance?: None  In the past 7 days, did you need help from others to take care of any of the following? Laundry, housekeeping, banking/finances, shopping, telephone use, food preparation, transportation, or taking medications?: [employer] [place], Shopping  ADL Interventions:  · Continue assistance with IADLs. Personalized Preventive Plan   Current Health Maintenance Status  Immunization History   Administered Date(s) Administered    Influenza A ([id number]) Vaccine PF IM 12/15/2009    Influenza Vaccine, unspecified formulation 09/12/2013, 10/09/2014, 09/22/2015, 09/16/2016    Influenza Virus Vaccine 09/12/2013, 10/09/2014, 09/22/2015, 09/16/2016    Influenza, High Dose (Fluzone 65 yrs and older) 09/20/2017, 09/14/2018    Influenza, MDCK Quadv, with preserv IM (Flucelvax 4 yrs and older) 04/16/2019    Influenza, Triv, inactivated, subunit, adjuvanted, IM (Fluad 65 yrs and older) 08/24/2019    Pneumococcal Conjugate 13-valent (Flordia Reel) 09/16/2016, 10/30/2017    Pneumococcal Polysaccharide ([id number]) 09/28/2011, 09/20/2017, 01/23/2019    Pneumococcal Vaccine 09/20/2017    Tdap (Boostrix, Adacel) 01/17/2017, 10/31/2018    Zoster Recombinant (Shingrix) 04/16/2019, 09/05/2019        Health Maintenance   Topic Date Due    Lipid screen  01/24/2019    Annual Wellness Visit (AWV)  06/11/2019    Low dose CT lung screening  11/06/2020    DTaP/Tdap/Td vaccine (3 - Td) 10/31/2028    DEXA (modify frequency per FRAX score)  Completed    Flu vaccine  Completed    Shingles Vaccine  Completed    Pneumococcal 65+ years Vaccine  Completed    Hepatitis A vaccine  Aged Out    Hib vaccine  Aged Out    Meningococcal (ACWY) vaccine  Aged Out     Recommendations for [place] [employer] Due: see orders and patient instructions/AVS.  .   Recommended screening schedule for the next 5-10 years is provided to the patient in written form: see Patient Instructions/AVS.    Eduarda Giordano was seen today for medicare awv.     Diagnoses and all orders for this visit:    Routine general medical examination at a health care facility

## 2020-11-03 PROBLEM — J44.9 COPD (CHRONIC OBSTRUCTIVE PULMONARY DISEASE) (HCC): Status: RESOLVED | Noted: 2018-10-31 | Resolved: 2020-01-01

## 2020-11-13 NOTE — ED NOTES
Pt's neighbor is the one who brought her in. His name is June, [phone].      Geraldine Tavera RN  11/13/20 [id number]

## 2020-11-13 NOTE — ED PROVIDER NOTES
[facility] is a 78-year-old female who presents with 3 days of progressively worsening shortness of breath. History comes primarily from the patient. Past medical history includes COPD and diabetes. Patient lives alone at home, however she does have home health aide to come to help her, and recently 1 of [facility] meds was ill with upper respiratory symptoms. Over the last 3 days the patient is a progressively worsening shortness of breath. She states that she normally wears oxygen only when ambulating and occasionally at night, having no respiratory distress when she is at rest off of oxygen. However she is now extremely short of breath even at rest.  She is now requiring continuous oxygen and is still short of breath despite the supportive therapy. For this reason, she was transported to [facility] emergency department for further evaluation and treatment. On arrival, she was assessed with history, physical exam, imaging studies, laboratory studies, EKG, vital signs. Her vital signs are notable for a tachycardia 104 but were otherwise stable and she was afebrile. Review of Systems   Constitutional: Positive for fatigue. Negative for appetite change, chills and fever. HENT: Negative for sinus pain and sore throat. Eyes: Negative for pain and visual disturbance. Respiratory: Positive for cough and shortness of breath. Negative for choking, chest tightness and wheezing. Cardiovascular: Negative for chest pain and palpitations. Gastrointestinal: Negative for abdominal pain, diarrhea, nausea and vomiting. Genitourinary: Negative for hematuria. Musculoskeletal: Negative for neck pain and neck stiffness. Skin: Negative for rash. Neurological: Negative for tremors, syncope and weakness. Psychiatric/Behavioral: Negative for confusion. Physical Exam  Vitals signs and nursing note reviewed. Constitutional:       Appearance: She is well-developed.  She is ill-appearing. HENT:      Head: Normocephalic and atraumatic. Eyes:      Pupils: Pupils are equal, round, and reactive to light. Neck:      Musculoskeletal: Normal range of motion and neck supple. Cardiovascular:      Rate and Rhythm: Normal rate and regular rhythm. Pulmonary:      Effort: Tachypnea and respiratory distress present. Breath sounds: Examination of the right-lower field reveals decreased breath sounds. Examination of the left-lower field reveals decreased breath sounds. Decreased breath sounds present. No wheezing or rales. Abdominal:      General: Bowel sounds are normal.      Palpations: Abdomen is soft. Tenderness: There is no abdominal tenderness. There is no guarding or rebound. Skin:     General: Skin is warm and dry. Capillary Refill: Capillary refill takes less than 2 seconds. Neurological:      General: No focal deficit present. Mental Status: She is alert and oriented to person, place, and time. Cranial Nerves: No cranial nerve deficit. Coordination: Coordination normal.          Procedures     Intubation Procedure Note    Indication: Respiratory failure, hypoxia, hypercarbia and airway protection    Consent: Unable to be obtained due to the emergent nature of this procedure. Medications Used: etomidate intravenously and succinycholine intravenously    Procedure: The patient was placed in the appropriate position. Cricoid pressure was not required. Intubation was performed via video laryngoscopy a 7.5 cuffed endotracheal tube. The cuff was then inflated and the tube was secured appropriately at a distance of 23 cm to the dental ridge. Initial confirmation of placement included bilateral breath sounds, an end tidal CO2 detector, absence of sounds over the stomach, tube fogging, adequate chest rise and adequate pulse oximetry reading. A chest x-ray to verify correct placement of the tube has been ordered but is still pending.     The patient tolerated the procedure well. Complications: None      Central Line Placement Procedure Note    Indication: vascular access, hypovolemia and centrally administered medications    Consent: Unable to be obtained due to the emergent nature of this procedure. Procedure: The patient was positioned appropriately and the skin over the right internal jugular vein was prepped with Chloraprep. Local anesthesia was obtained by infiltration using 1% Lidocaine without epinephrine. A large bore needle was used to identify the vein. A guide wire was then inserted into the vein through the needle. A triple lumen catheter was then inserted into the vessel over the guide wire using the Seldinger technique. All ports showed good, free flowing blood return and were flushed with saline solution. The catheter was then securely fastened to the skin with suture at 16 cm. Two sutures were placed into the kit included tube clamp and proximal eyelets. An antibiotic disk was placed and the site was then covered with a sterile dressing. A post procedure X-ray was ordered and is still pending at this time. The patient tolerated the procedure well. Complications: None              MDM  Number of Diagnoses or Management Options  Diagnosis management comments: The patient's emergency department work-up including history, physical exam, vital signs, laboratory studies, imaging studies and reevaluation after initial treatment are concerning for significant pathology requiring further management and care in the inpatient setting. Specifically, Anibal Bernheim has worsening respiratory failure with hypoxia and is concerning for acute Covid infection in the setting of severe COPD. In addition, the patient underwent an acute decompensation in the emergency department that did require intubation. This information was relayed to the patient who understood this plan of care and was amenable to the plan.   Patient was discussed with the admitting service (Dr. Javy Jeffries) who concurred with the decision for admission, and have agreed to admit the patient to the ICU     ED Course as of Nov 13 1956 Fri Nov 13, 2020 1933 After patient had been admitted, and while she was waiting for a bed I was notified by nursing staff that the patient appeared altered. Upon arriving to the patient's room, she was noted to be diaphoretic, tachypneic, and slumped over in her bed. She was taking gasping, agonal breaths. She was not responsive to verbal or painful stimuli. There was concern that she was not able to protect her airway and she was therefore intubated. She was promptly taken to CT scan for noncontrast of the head    [RK]      ED Course User Index  [RK] Bautista Reyna, DO       --------------------------------------------- PAST HISTORY ---------------------------------------------  Past Medical History:  has a past medical history of COPD (chronic obstructive pulmonary disease) ([facility] [address] Diabetes type 2, controlled ([facility] [address] Former smoker, Hyperlipidemia, Hypertension, Macular degeneration, Multiple thyroid nodules, and Pulmonary nodule. Past Surgical History:  has a past surgical history that includes Tubal ligation (1970'a); Foot surgery (1976); and Liposuction. Social History:  reports that she quit smoking about 7 years ago. Her smoking use included cigarettes. She started smoking about 47 years ago. She has a 120.00 pack-year smoking history. She has never used smokeless tobacco. She reports that she does not drink alcohol or use drugs. Family History: family history includes Coronary Art Dis in her father and mother; Diabetes in her mother; Heart Disease in her sister; High Blood Pressure in her father and mother; Zoey Cathy in her father and sister; Mult Sclerosis in her daughter; Other in an other family member. The patients home medications have been reviewed.     Allergies: Patient has no known clinical   presentation.)      2. Lung hyperinflation and coarse interstitial markings suggestive of   underlying COPD. 3.  Atherosclerotic disease. XR CHEST PORTABLE    (Results Pending)     EKG #1:  Interpreted by emergency department physician unless otherwise noted. Time:  1535    Rate: 97  Rhythm: Sinus. Interpretation: prolonged QT.        ------------------------- NURSING NOTES AND VITALS REVIEWED ---------------------------  Date / Time Roomed:  11/13/2020  1:46 PM  ED Bed Assignment:  05/05    The nursing notes within the ED encounter and vital signs as below have been reviewed. Patient Vitals for the past 24 hrs:   BP Temp Pulse Resp SpO2 Height Weight   11/13/20 1942 (!) 232/86 -- 122 18 96 % -- --   11/13/20 1925 -- -- 117 -- 97 % -- --   11/13/20 1909 -- -- 105 -- 100 % -- --   11/13/20 1905 -- -- 132 -- (!) 88 % -- --   11/13/20 1649 (!) 161/91 -- 93 20 97 % -- --   11/13/20 1526 (!) 163/81 -- 95 20 96 % -- --   11/13/20 1320 (!) 145/72 -- -- -- -- -- --   11/13/20 1318 -- 97.6 °F (36.4 °C) 104 20 95 % 5' 6\" (1.676 m) 135 lb (61.2 kg)       Oxygen Saturation Interpretation: Normal    ------------------------------------------ PROGRESS NOTES ------------------------------------------  Re-evaluation(s):  Time: 7:54 PM EST  Patients symptoms are worsening  Repeat physical examination is significantly worsened    Counseling:  I have spoken with the patient and discussed todays results, in addition to providing specific details for the plan of care and counseling regarding the diagnosis and prognosis. Their questions are answered at this time and they are agreeable with the plan of admission.    --------------------------------- ADDITIONAL PROVIDER NOTES ---------------------------------  Consultations:  Time: 7:55 PM EST. Spoke with Dr. Bassem Gonzales. Discussed case. They will admit the patient to the ICU.   This patient's ED course included: a personal history and physicial examination, re-evaluation prior to disposition, multiple bedside re-evaluations, IV medications, cardiac monitoring, continuous pulse oximetry and complex medical decision making and emergency management    This patient has remained hemodynamically stable during their ED course. Diagnosis:  1. Acute respiratory failure with hypoxia and hypercapnia (HCC)    2. COVID-19 virus infection        Disposition:  Patient's disposition: Admit to CCU/ICU  Patient's condition is serious. ED Course as of Nov 13 1956 Fri Nov 13, 2020 1933 After patient had been admitted, and while she was waiting for a bed I was notified by nursing staff that the patient appeared altered. Upon arriving to the patient's room, she was noted to be diaphoretic, tachypneic, and slumped over in her bed. She was taking gasping, agonal breaths. She was not responsive to verbal or painful stimuli. There was concern that she was not able to protect her airway and she was therefore intubated. She was promptly taken to CT scan for noncontrast of the head    [RK]      ED Course User Index  [RK] Leonie Rendon 43., DO        Please note that the withdrawal or failure to initiate urgent interventions for this patient would likely result in a life threatening deterioration or permanent disability. Accordingly this patient received 60 minutes of critical care time, excluding separately billable procedures.          Leonie Rendon 43., DO  Resident  11/13/20 0679

## 2020-11-13 NOTE — H&P
[facility] History and Physical      CHIEF COMPLAINT: Shortness of breath    History of Present Illness: This patient is a 50-year-old female with a past medical history significant for severe emphysema, type 2 diabetes that is well controlled, hypertension, hyperlipidemia, and macular degeneration who presented to the emergency department with a 1 week history of shortness of breath. The patient notes that about 1 week ago she was cared for by a home health aide that happened to have an upper respiratory illness. The home health aide told the patient that she had allergies. However, the patient started to note worsening shortness of breath since exposure to this aid. In the last 3 days the patient had to start using her oxygen 24 hours. Previously she was using 3 L nasal cannula only well up and moving. Her oxygen requirement increased to 5 L around-the-clock and the patient came to the emergency department for further evaluation. During our conversation, the patient started to have desaturations into the low 80s. I did place her on nonrebreather mask. Her O2 sat improved to approximately 94%. I discussed with the nursing staff, and they will get high flow nasal cannula for her. Patient was retracting during our discussion, and placed herself in a tripod position to get more airflow. The patient also notes that for the last 3 days she has had watery diarrhea, and one episode of nausea with vomiting yesterday. She has only had a mild dry cough. She denies fever, but admits to chills and diaphoresis. She denies chest pain. The patient follows as an outpatient with Dr. Margy Meredith.       Informant(s) for H&P: Patient    REVIEW OF SYSTEMS:  A comprehensive review of systems was negative except for: what is in the HPI      PMH:  Past Medical History:   Diagnosis Date    COPD (chronic obstructive pulmonary disease) ([facility] [address]     Diabetes type 2, controlled ([facility] [address]     Former smoker     morning     Historical Provider, MD   ascorbic acid (VITAMIN C) 500 MG tablet Take 2,000 mg by mouth every morning     Dwayne Provider, MD   vitamin E 400 UNIT capsule Take 400 Units by mouth every morning     Dwayne Provider, MD   Copper Gluconate 2 MG CAPS Take 2 mg by mouth every morning     Dwayne Provider, MD   Zinc 25 MG TABS Take 25 mg by mouth every morning     Historical Provider, MD   b complex vitamins capsule Take 1 capsule by mouth every morning     Dwayne Provider, MD   Omega-3 Fatty Acids (FISH OIL) 1200 MG CAPS Take 2,400 mg by mouth every morning     Historical Provider, MD       Allergies:    Patient has no known allergies. Social History:    reports that she quit smoking about 7 years ago. Her smoking use included cigarettes. She started smoking about 47 years ago. She has a 120.00 pack-year smoking history. She has never used smokeless tobacco. She reports that she does not drink alcohol or use drugs. Family History:   family history includes Coronary Art Dis in her father and mother; Diabetes in her mother; Heart Disease in her sister; High Blood Pressure in her father and mother; Timmothy Bless in her father and sister; Mult Sclerosis in her daughter; Other in an other family member.        PHYSICAL EXAM:  Vitals:  BP (!) 161/91   Pulse 93   Temp 97.6 °F (36.4 °C)   Resp 20   Ht 5' 6\" (1.676 m)   Wt 135 lb (61.2 kg)   SpO2 97%   BMI 21.79 kg/m²     General Appearance: alert and oriented to person, place and time and in acute respiratory distress with retractions and tripodding  Skin: warm and dry  Head: normocephalic and atraumatic  Eyes: pupils equal, round, and reactive to light, extraocular eye movements intact, conjunctivae normal  Neck: neck supple and non tender without mass   Pulmonary/Chest:  few end expiratory wheezes were heard, no rales or rhonchi, normal air movement, reduced breath sounds bilaterally  Cardiovascular: normal rate, normal S1 and S2 and no carotid bruits  Abdomen: soft, non-tender, non-distended, normal bowel sounds, no masses or organomegaly  Extremities: no cyanosis, no clubbing and no edema  Neurologic: no cranial nerve deficit and speech normal        LABS:  Recent Labs     11/13/20  1515      K 5.7*      CO2 25   BUN 11   CREATININE 0.4*   GLUCOSE 165*   CALCIUM 8.9       Recent Labs     11/13/20  1515   WBC 4.8   RBC 4.23   HGB 12.0   HCT 38.3   MCV 90.5   MCH 28.4   MCHC 31.3*   RDW 15.5*      MPV 10.5       No results for input(s): POCGLU in the last 72 hours. Radiology:   XR CHEST PORTABLE   Final Result   1. When compared to prior chest series there has been interval worsening in   the retrocardiac and bibasilar interstitial opacities. (Broad differential which includes worsening interstitial lung disease,   superimposed infection, and edema. Please correlate with clinical   presentation.)      2. Lung hyperinflation and coarse interstitial markings suggestive of   underlying COPD. 3.  Atherosclerotic disease. CTA PULMONARY W CONTRAST    (Results Pending)       EKG: not available for interpretation at this time. ASSESSMENT:      Active Problems:    Acute respiratory failure with hypoxia (HCC)  Resolved Problems:    * No resolved hospital problems. *      PLAN:    1. Acute respiratory failure with hypoxia  2. Severe emphysema  3. Type 2 diabetes, controlled  4. Essential hypertension  5. Mixed hyperlipidemia  6. Hyperkalemia      The patient will be admitted to the Covid floor, because the CAT scan is highly suspicious for COVID-19. Unfortunately, respiratory panel has not resulted yet. We will treat the patient for COVID-19 giving IV Decadron, inhalers, Lovenox 30 mg twice daily per COVID-19 protocol. We will continue to monitor her labs. I have ordered an ABG stat, procalcitonin, fibrinogen, D-dimer, PTT, PT, AT 3, ferritin, LD, as well as daily labs.   She will be given oxygen high flow or nonrebreather. Wean as tolerated. Please note this patient will require close monitoring as her work of breathing is quite high. Will hold metformin and monitor creatinine closely. Sliding scale insulin-low dose and hypoglycemia protocol ordered. Code Status: Full code  DVT prophylaxis: Lovenox      NOTE: This report was transcribed using voice recognition software. Every effort was made to ensure accuracy; however, inadvertent computerized transcription errors may be present.   Electronically signed by Jeff Samson MD on 11/13/2020 at 5:45 PM

## 2020-11-14 NOTE — PROGRESS NOTES
11/14/2020  10:17 AM      Comprehensive Nutrition Assessment    Type and Reason for Visit:  Initial(ICU/vent)    Nutrition Recommendations/Plan: If pt remains intubated and EN needed for nutrition support, recommend:    TF RECOMMENDATION: Semi-Elemental TF (Vital AF 1.2) to goal rate 40 ml/hr  This will provide: 960 ml/d, 1152 juanpablo (1442 juanpablo total w/propofol), 72 g pro, 779 ml free water    Nutrition Assessment:  Pt w/hx severe COPD admit w/Covid+. Now intubated/sedated in ICU. Hx. DM also noted. Will provide TF rec for use if pt remains intubated and EN support needed. Malnutrition Assessment:  Malnutrition Status: At risk for malnutrition (Comment)    Context:  Chronic Illness     Findings of the 6 clinical characteristics of malnutrition:  Energy Intake:  Mild decrease in energy intake (Comment)(Current NPO/intubated)  Weight Loss:  7 - Greater than 20% over 1 year     Body Fat Loss:  Unable to assess(Covid Isolation)     Muscle Mass Loss:  Unable to assess(Covid Isolation)    Fluid Accumulation:  Unable to assess     Strength:  Not Performed    Estimated Daily Nutrient Needs:  Energy (kcal):  ; Weight Used for Energy Requirements:  Current     Protein (g):  60-70 (1.3-1.5 g/kg);  Weight Used for Protein Requirements:  Current        Fluid (ml/day):  Per Critical Care; Method Used for Fluid Requirements:  1 ml/kcal      Nutrition Related Findings:  fatigue/weakness/cough/SOB PTA, now intubated/sedated, missing teeth, OGT in place per X-ray, hypo BS, I/O WNL, no edema      Wounds:  None       Current Nutrition Therapies:    No diet orders on file  Additional Calorie Sources:   propofol =290 juanpablo    Anthropometric Measures:  · Height: 5' 6\" (167.6 cm)  · Current Body Weight: 101 lb (45.8 kg)(11/14)   · Admission Body Weight:      · Usual Body Weight: 132 lb (59.9 kg)(per EMR x 1 yr)     · Ideal Body Weight: 130 lbs; % Ideal Body Weight 77.7 %   · BMI: 16.3  · BMI Categories: Underweight (BMI less than

## 2020-11-14 NOTE — ED NOTES
Another nurse went in to room at 1900 and saw pt slumped over. Called for nurse. Called Dr. Vazquez Grimm and Dr. Sy Kim for assistance. At 1903 etomidate 20mg was given and succinate 100mg given. Pt was intubated by Dr Vazquez Grimm with a 7.5 tube at 1904. 24 cm at teeth. Pulse 132. bp 223/113. Blood sugar was 232 at 1900. Propofol began at 1909 at a rate of 6mg/kg/hr. Pt taken to CT of head without contrast at 1910. Back in ed room #5 at this time.       Shira Pablo, RN  11/13/20 1924

## 2020-11-14 NOTE — ED PROVIDER NOTES
Attending;  Patient was in the process of being admitted when patient became unresponsive. Called into the room along with the emergency medicine resident. Patient had shallow respirations was not verbalizing not responding to verbal.  Patient was intubated using glide scope after etomidate and succinylcholine. Placed on dipper Van for sedation postintubation. Patient was intubated per EM resident using glide scope on first pass. There was good condensation in the tube. Capnography also confirmed placement of ET tube. Subsequently intensivist was called, Dr. Hopper Shock; patient is accepted to admission to the ICU. Hospitalist was notified of change of status. CT of head was negative for bleed.

## 2020-11-14 NOTE — PROGRESS NOTES
[facility] Progress Note    Admitting Date and Time: 11/13/2020  1:46 PM  Admit Dx: Acute respiratory failure with hypoxia (HCC) [J96.01]  Acute respiratory failure with hypoxia (HCC) [J96.01]  Acute respiratory failure with hypoxia (HCC) [J96.01]  Acute respiratory failure with hypoxia (HCC) [J96.01]    Subjective:  Patient is being followed for Acute respiratory failure with hypoxia ([address] [J96.01]  Acute respiratory failure with hypoxia ([address] [J96.01]  Acute respiratory failure with hypoxia ([address] [J96.01]  Acute respiratory failure with hypoxia ([address] [J96.01]   Pt is intubated and sedated  Per RN: patient was rather agitated and needed further propofol. ROS: denies fever, chills, cp, sob, n/v, HA unless stated above.       remdesivir IVPB  200 mg Intravenous Once    Followed by   Geovani Harris ON 11/15/2020] remdesivir IVPB  100 mg Intravenous Q24H    ipratropium  2 puff Inhalation 4x daily    sodium chloride flush  10 mL Intravenous 2 times per day    enoxaparin  30 mg Subcutaneous BID    ascorbic acid  2,000 mg Oral QAM    vitamin B and C  1 tablet Oral QAM    Vitamin D  2,000 Units Oral QAM    insulin lispro  0-6 Units Subcutaneous TID WC    insulin lispro  0-3 Units Subcutaneous Nightly    dexamethasone  6 mg Intravenous Q12H    midazolam  2 mg Intravenous Once     sodium chloride, 30 mL, PRN  albuterol sulfate HFA, 2 puff, Q6H PRN  sodium chloride flush, 10 mL, PRN  acetaminophen, 650 mg, Q6H PRN    Or  acetaminophen, 650 mg, Q6H PRN  polyethylene glycol, 17 g, Daily PRN  promethazine, 12.5 mg, Q6H PRN    Or  ondansetron, 4 mg, Q6H PRN  guaiFENesin, 400 mg, 4x Daily PRN  glucose, 15 g, PRN  dextrose, 12.5 g, PRN  glucagon (rDNA), 1 mg, PRN  dextrose, 100 mL/hr, PRN         Objective:    BP (!) 158/66   Pulse 83   Temp 98.6 °F (37 °C) (Bladder)   Resp 19   Ht 5' 6\" (1.676 m)   Wt 101 lb 13.6 oz (46.2 kg)   SpO2 100%   BMI 16.44 kg/m²     General Appearance: alert and oriented to person, place and time and in no acute distress  Skin: warm and dry  Head: normocephalic and atraumatic  Eyes: pupils equal, round, and reactive to light, extraocular eye movements intact, conjunctivae normal  Neck: neck supple and non tender without mass   Pulmonary/Chest: clear to auscultation bilaterally- no wheezes, rales or rhonchi, normal air movement, no respiratory distress  Cardiovascular: normal rate, normal S1 and S2 and no carotid bruits  Abdomen: soft, non-tender, non-distended, normal bowel sounds, no masses or organomegaly  Extremities: no cyanosis, no clubbing and no edema  Neurologic: no cranial nerve deficit and speech normal        Recent Labs     11/13/20  1515 11/14/20  0600    141   K 5.7* 3.8    109*   CO2 25 24   BUN 11 10   CREATININE 0.4* 0.4*   GLUCOSE 165* 158*   CALCIUM 8.9 8.1*       Recent Labs     11/13/20  1515 11/14/20  0600   WBC 4.8 6.6   RBC 4.23 3.89   HGB 12.0 10.9*   HCT 38.3 36.7   MCV 90.5 94.3   MCH 28.4 28.0   MCHC 31.3* 29.7*   RDW 15.5* 15.4*    173   MPV 10.5 10.7       Radiology:   EXAMINATION:    CTA OF THE CHEST 11/13/2020 6:00 pm         TECHNIQUE:    CTA of the chest was performed after the administration of intravenous    contrast.  Multiplanar reformatted images are provided for review.  MIP    images are provided for review.  Dose modulation, iterative reconstruction,    and/or weight based adjustment of the mA/kV was utilized to reduce the    radiation dose to as low as reasonably achievable.         COMPARISON:    11/06/2019         HISTORY:    ORDERING SYSTEM PROVIDED HISTORY: concern for covid    TECHNOLOGIST PROVIDED HISTORY:    Reason for exam:->concern for covid         FINDINGS:    Pulmonary Arteries: Pulmonary arteries are adequately opacified for    evaluation.  No evidence of intraluminal filling defect to suggest pulmonary    embolism.  Main pulmonary artery is normal in caliber.         Mediastinum: No evidence of mediastinal lymphadenopathy.  The heart is    enlarged.  There is no pericardial effusion.         Lungs/pleura: Advanced emphysematous changes are noted.  The centrilobular    emphysematous changes are most prominent within the upper lobes.         Bilateral lower lobe ground-glass infiltrates are noted.  Periphery within    the lung bases semi solid infiltrates are also noted.  There are small    infiltrate seen within the lingula and right middle lobe.      Upper Abdomen: Limited images of the upper abdomen are unremarkable.  There    are 2 stones seen within the gallbladder lumen.  There is no evidence of    acute cholecystitis.         Soft Tissues/Bones:  Age related degenerative changes of the visualized    osseous structures without focal destructive lesion.              Impression    1. There is no evidence of a pulmonary embolus. 2. Multifocal bilateral ground-glass and semi solid pulmonary infiltrates    within the right and left lower lobes, right middle lobe and lingula.  The    findings are highly suspicious for findings of COVID-19.    3. Advanced emphysematous changes. 4. Cardiomegaly. [place] is no pericardial effusion.             EXAMINATION:    CT OF THE HEAD WITHOUT CONTRAST  11/13/2020 6:25 pm         TECHNIQUE:    CT of the head was performed without the administration of intravenous    contrast. Dose modulation, iterative reconstruction, and/or weight based    adjustment of the mA/kV was utilized to reduce the radiation dose to as low    as reasonably achievable.         COMPARISON:    September 12         HISTORY:    ORDERING SYSTEM PROVIDED HISTORY: unresponsive    TECHNOLOGIST PROVIDED HISTORY:    Reason for exam:->unresponsive    Has a \"code stroke\" or \"stroke alert\" been called? ->Yes         FINDINGS:    BRAIN/VENTRICLES: There is no acute intracranial hemorrhage, mass effect or    midline shift.  No abnormal extra-axial fluid collection.  The gray-white    differentiation is maintained without evidence of an acute infarct. There is    prominence of the ventricles and sulci due to global parenchymal volume loss. There are nonspecific areas of hypoattenuation within the periventricular and    subcortical white matter, which likely represent chronic microvascular    ischemic change.         Chronic left basal ganglia lacunar infarction versus prominent perivascular    space, unchanged.         There is persistent contrast enhancement from prior intravenous contrast    administration.         ORBITS: The visualized portion of the orbits demonstrate no acute abnormality.         SINUSES: The visualized paranasal sinuses and mastoid air cells demonstrate    no acute abnormality.  Paranasal sinus mucosal thickening, likely chronic.         SOFT TISSUES/SKULL: No acute abnormality of the visualized skull or soft    tissues.              Impression    No acute intracranial abnormality.  Age-related loss of brain volume and    chronic periventricular ischemic changes.  Chronic left basal ganglia lacunar    infarctions versus prominent perivascular space, unchanged since the prior    examination.         Persistent contrast enhancement from recent intravenous contrast    administration. Assessment:    Active Problems:    Acute respiratory failure with hypoxia (HCC)  Resolved Problems:    * No resolved hospital problems. *      Plan:  1. Acute respiratory failure with hypoxia 2/2 Covid 19 pneumonia  -  Admitted to ICU after intubation  -  Decadron  -  ID consulted  -  Vent management per pulm/CC    2. Covid 19 pneumonia  -  ID consulted  -  Monitor labs  -  Vent  -  lovenox per COVID protocol  -   remdesivir    2. Severe emphysema  -  Follows with Dr. Margy Meredith as outpatient. 3.  Type 2 diabetes, controlled  -  ISS    4. Essential hypertension  -  BP 140s-170/65-85  -  Continue to monitor    5. Mixed hyperlipidemia  -  Previously on pravachol    6.   Hyperkalemia- ?spurious; normal on repeat  -  Continue to monitor      NOTE: This report was transcribed using voice recognition software. Every effort was made to ensure accuracy; however, inadvertent computerized transcription errors may be present.   Electronically signed by Merle Kemp MD on 11/14/2020 at 7:52 AM

## 2020-11-14 NOTE — PROGRESS NOTES
Patient admitted from ED room 05 to ICU room 205, with the following belongings help alert necklace, clothes, portable O2 with , glasses, cell phone, slippers, socks, dentures, placed on monitor, patient oriented to room and unit visiting hours. Patient guide at bedside, reviewed patient rights and responsibilities. MRSA nasal swab obtained. Bed alarm on. Call light within reach.

## 2020-11-14 NOTE — CONSULTS
Critical Care Admit/Consult Note         Patient - Celine Haynes   MRN -  [id number]   Bulmaro # - [de-identified]   - 1945      Date of Admission -  2020  1:46 PM  Date of evaluation -  2020  0205/0205-A   Hospital Day - 1          ADMIT/CONSULT DETAILS     Reason for Admit/Consult   Acute hypercapnic hypoxic respiratory failure secondary to Covid pneumonia    Consulting Delmy Magana MD  Primary Care Physician - MD MAAME Sanchez   The patient is a 67 y.o. female with significant past medical history of COPD, diabetes, hypertension, hyperlipidemia [facility] for shortness of breath. Patient is currently intubated and sedated so history obtained from chart review. Per emergency department, patient had history of 3 days of worsening shortness of breath. She is chronically on 3 L of oxygen at home when she ambulates but over the last few days has had increase her oxygen requirement to 5 L at all times. 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 She subsequently presented to the ED for evaluation. Patient was found to be hypoxic and and placed on nasal cannula oxygen. Patient was Positive for COVID-19. While in the emergency department patient was placed on a nonrebreather and subsequently decompensated. She became altered and was taking agonal respirations. She was then intubated for further airway protection. ABG demonstrated respiratory acidosis with a CO2 of greater than 113. Patient was admitted to the ICU for further evaluation care.          Past Medical History         Diagnosis Date    COPD (chronic obstructive pulmonary disease) ([address]     Diabetes type 2, controlled ([address]     Former smoker     Hyperlipidemia     Hypertension     Macular degeneration     OU    Multiple thyroid nodules 2017    Pulmonary nodule 2016        Past Surgical History           Procedure Laterality Date    FOOT SURGERY  1976    LIPOSUCTION      TUBAL LIGATION  1970'a       Current Medications   Current Medications    [START ON 11/15/2020] remdesivir IVPB  100 mg Intravenous Q24H    Arformoterol Tartrate  15 mcg Nebulization BID    budesonide  0.5 mg Nebulization BID    ipratropium-albuterol  1 ampule Inhalation Q4H    piperacillin-tazobactam  3.375 g Intravenous Q8H    pantoprazole  40 mg Intravenous Daily    ipratropium  2 puff Inhalation 4x daily    sodium chloride flush  10 mL Intravenous 2 times per day    enoxaparin  30 mg Subcutaneous BID    ascorbic acid  2,000 mg Oral QAM    vitamin B and C  1 tablet Oral QAM    Vitamin D  2,000 Units Oral QAM    insulin lispro  0-6 Units Subcutaneous TID WC    insulin lispro  0-3 Units Subcutaneous Nightly    dexamethasone  6 mg Intravenous Q12H    midazolam  2 mg Intravenous Once     sodium chloride, albuterol sulfate HFA, sodium chloride flush, acetaminophen **OR** acetaminophen, polyethylene glycol, promethazine **OR** ondansetron, guaiFENesin, glucose, dextrose, glucagon (rDNA), dextrose  IV Drips/Infusions   fentaNYL 5 mcg/ml in 0.9%  ml infusion 75 mcg/hr (11/14/20 1411)    sodium chloride      dextrose      propofol 30 mcg/kg/min (11/14/20 1411)     Home Medications  Medications Prior to Admission: budesonide (PULMICORT) 0.5 MG/2ML nebulizer suspension, Take 1 ampule by nebulization 2 times daily  pravastatin (PRAVACHOL) 40 MG tablet, TAKE 1 TABLET EVERY EVENING.   naproxen (NAPROSYN) 500 MG tablet, Take 1 tablet by mouth 2 times daily for 7 days  Blood Glucose Monitoring Suppl (ACCU-CHEK RAYMOND PLUS) w/Device KIT, USE AS DIRECTED  ACCU-CHEK RAYMOND PLUS strip, 1 each by Does not apply route daily  Accu-Chek Softclix Lancets MISC, USE AS DIRECTED EVERY DAY  Umeclidinium Bromide (INCRUSE ELLIPTA) 62.5 MCG/INH AEPB, Inhale 1 puff into the lungs daily  albuterol sulfate  (90 Base) MCG/ACT inhaler, Inhale 2 puffs into the lungs every 6 hours as needed for Wheezing  metFORMIN (GLUCOPHAGE-XR) 500 MG extended release tablet, TAKE 2 TABLETS EVERY MORNING AND 2 TABLETS EVERY EVENING WITH MEALS  formoterol (PERFOROMIST) 20 MCG/2ML nebulizer solution, Take 2 mLs by nebulization 2 times daily  guaiFENesin 400 MG tablet, Take 400 mg by mouth 4 times daily as needed for Cough  naproxen sodium (ALEVE) 220 MG tablet, Take 220 mg by mouth 2 times daily (with meals)  Cholecalciferol (VITAMIN D) 2000 UNITS CAPS capsule, Take 2,000 Units by mouth every morning   ascorbic acid (VITAMIN C) 500 MG tablet, Take 2,000 mg by mouth every morning   vitamin E 400 UNIT capsule, Take 400 Units by mouth every morning   Copper Gluconate 2 MG CAPS, Take 2 mg by mouth every morning   Zinc 25 MG TABS, Take 25 mg by mouth every morning   b complex vitamins capsule, Take 1 capsule by mouth every morning   Omega-3 Fatty Acids (FISH OIL) 1200 MG CAPS, Take 2,400 mg by mouth every morning     Diet/Nutrition   No diet orders on file    Allergies   Patient has no known allergies. Social History   Tobacco   reports that she quit smoking about 7 years ago. Her smoking use included cigarettes. She started smoking about 47 years ago. She has a 120.00 pack-year smoking history. She has never used smokeless tobacco.    Alcohol     reports no history of alcohol use.     Occupational history : Unknown    Family History         Problem Relation Age of Onset    Diabetes Mother         NIDDM    High Blood Pressure Mother     Coronary Art Dis Mother     High Blood Pressure Father     Macular Degen Father     Coronary Art Dis Father         MI    Macular Degen Sister     Other Other         1/2 sis with HTN, 1/2 bro with DM    Mult Sclerosis Daughter     Heart Disease Sister         valvular       Sleep History   Oxygen as needed as needed at night    ROS   REVIEW OF SYSTEMS:  Unable to obtain secondary to patient being intubated  Please see HPI above. All bolded are positive. All un-bolded are negative.   Constitutional Symptoms:  fever, chills, fatigue, generalized weakness, diaphoresis, increase in thirst, loss of appetite  Eyes: vision change   Ears, Nose, Mouth, Throat: hearing loss, nasal congestion, sores in the mouth  Cardiovascular: chest pain, chest heaviness, palpitations  Respiratory: shortness of breath, wheezing, coughing  Gastrointestinal: abdominal pain, nausea, vomiting, diarrhea, constipation, melena, hematochezia, hematemesis  Genitourinary: dysuria, hematuria, or increase in frequency  Musculoskeletal: lower extremity edema, myalgias, arthralgias, back pain  Integumentary: rashes, itching   Neurological: headache, lightheadedness, dizziness, confusion, syncope, numbness, tingling, focal weakness  Psychiatric: depression, suicidal ideation, or anxiety  Endocrine: unintentional weight change  Hematologic/Lymphatic: lymphadenopathy, easy bruising, easy bleeding   Allergic/Immunologic: recurrent infections      Lines and Devices   Right IVC triple-lumen  Bilateral peripherals    Drains/Tubes/Outputs   ET tube, Lloyd    Mechanical Ventilation Data   VENT SETTINGS (Comprehensive)  Vent Information  $Ventilation: $Subsequent Day  Equipment ID: [id number]  Vent Type: 840  Vent Mode: AC/VC  Vt Ordered: 450 mL  Rate Set: 18 bmp  Peak Flow: 60 L/min  Pressure Support: 0 cmH20  FiO2 : 60 %  SpO2: 94 %  SpO2/FiO2 ratio: 156.67  Sensitivity: 3  PEEP/CPAP: 8  I Time/ I Time %: 0 s  Humidification Source: HME  Additional Respiratory  Assessments  Pulse: 74  Resp: 18  SpO2: 94 %  $End Tidal CO2: 36  Position: Semi-Hunt's  Humidification Source: HME  Oral Care: Mouth swabbed, Mouth suctioned  Subglottic Suction Done?: Yes    ABG  Lab Results   Component Value Date    PH 7.281 11/14/2020    PCO2 49.9 11/14/2020    PO2 131.3 11/14/2020    HCO3 23.0 11/14/2020    O2SAT 98.6 11/14/2020     Lab Results   Component Value Date    MODE AC 2020         Vitals    height is 5' 6\" (1.676 m) and weight is 101 lb 13.6 oz (46.2 kg). Her bladder temperature is 100 °F (37.8 °C). Her blood pressure is 108/47 (abnormal) and her pulse is 74. Her respiration is 18 and oxygen saturation is 94%. Temperature Range: Temp: 100 °F (37.8 °C) Temp  Av.6 °F (37 °C)  Min: 97.2 °F (36.2 °C)  Max: 100 °F (37.8 °C)  BP Range:  Systolic (42RZG), CLIFFORD:901 , Min:78 , PDN:460     Diastolic (67DIG), CZR:61, Min:41, Max:91    Pulse Range: Pulse  Av.6  Min: 74  Max: 132  Respiration Range: Resp  Av.3  Min: 17  Max: 30  Current Pulse Ox[de-identified]  SpO2: 94 %  24HR Pulse Ox Range:  SpO2  Av.2 %  Min: 88 %  Max: 100 %  Oxygen Amount and Delivery: O2 Flow Rate (L/min): 4 L/min    I/O (24 Hours)    Patient Vitals for the past 8 hrs:   BP Temp Temp src Pulse Resp SpO2 Height   20 1430 (!) 108/47 -- -- 74 18 94 % --   20 1415 (!) 108/47 -- -- 76 17 94 % --   20 1400 (!) 102/45 -- -- 79 18 94 % --   20 1300 (!) 148/58 -- -- 82 25 90 % --   20 1200 136/63 100 °F (37.8 °C) Bladder 98 24 93 % --   20 1100 (!) 148/66 -- -- 84 17 96 % --   20 1000 (!) 131/59 -- -- 81 18 100 % --   20 0958 -- -- -- -- -- -- 5' 6\" (1.676 m)   20 0900 133/60 99.1 °F (37.3 °C) Bladder 76 18 100 % --   20 0800 133/66 -- -- 81 19 100 % --       Intake/Output Summary (Last 24 hours) at 2020 1508  Last data filed at 2020 1200  Gross per 24 hour   Intake 189 ml   Output 610 ml   Net -421 ml     I/O last 3 completed shifts:   In: 189 [I.V.:189]  Out: 56 [Urine:610]   Date 20 0000 - 20 235   Shift 5089-7126 2192-0560 2575-1467 24 Hour Total   INTAKE   I.V.(mL/kg) 189(4.1)   189(4.1)   Shift Total(mL/kg) 189(4.1)   189(4.1)   OUTPUT   Urine(mL/kg/hr) 450(1.2) 160  610   Shift Total(mL/kg) 450(9.7) 160(3.5)  610(13.2)   Weight (kg) 46.2 46.2 46.2 46.2     Patient Vitals for the past 96 hrs (Last 3 readings):   Weight 11/14/20 0100 101 lb 13.6 oz (46.2 kg)   11/13/20 1318 135 lb (61.2 kg)         Exam   PHYSICAL EXAM:  General: Intubated and sedated on the vent  Eyes: Pupils equal and reactive  Ears: no obvious scars, no lesions, no masses, hearing intact  Mouth: ET tube in place  Head: normocephalic, atraumatic  Neck: no JVD, no adenopathy, no thyromegaly, neck is supple, trachea is midline. Chest: no pain on palpation  Lungs: Clear to auscultation bilaterally but diminished  Heart: regular rate and regular rhythm, no murmur, normal S1, S2  Abdomen: soft, non-tender  Extremities: no lower extremity edema  Skin: normal color, normal texture, normal turgor, no rashes, no lesions  Neurologic: Limited secondary to patient being sedated    Data   Old records and images have been reviewed    Lab Results   CBC     Lab Results   Component Value Date    WBC 6.6 11/14/2020    RBC 3.89 11/14/2020    HGB 10.9 11/14/2020    HCT 36.7 11/14/2020     11/14/2020    MCV 94.3 11/14/2020    MCH 28.0 11/14/2020    MCHC 29.7 11/14/2020    RDW 15.4 11/14/2020    LYMPHOPCT 8.4 11/14/2020    MONOPCT 7.5 11/14/2020    BASOPCT 0.2 11/14/2020    MONOSABS 0.50 11/14/2020    LYMPHSABS 0.56 11/14/2020    EOSABS 0.01 11/14/2020    BASOSABS 0.01 11/14/2020       BMP   Lab Results   Component Value Date     11/14/2020    K 3.8 11/14/2020    K 5.7 11/13/2020     11/14/2020    CO2 24 11/14/2020    BUN 10 11/14/2020    CREATININE 0.4 11/14/2020    CREATININE 0.5 01/23/2019    GLUCOSE 158 11/14/2020    CALCIUM 8.1 11/14/2020       LFTS  Lab Results   Component Value Date    ALKPHOS 97 11/13/2020    ALT 29 11/13/2020    AST 67 11/13/2020    PROT 7.8 11/13/2020    BILITOT 0.4 11/13/2020    BILIDIR <0.2 11/01/2018    IBILI see below 11/01/2018    LABALBU 3.5 11/13/2020       INR  Recent Labs     11/14/20  0600   PROTIME 12.6*   INR 1.1       APTT  No results for input(s): APTT in the last 72 hours.     Lactic Acid  Lab Results   Component Value Date    LACTA 3.1 11/14/2017    LACTA 3.6 11/13/2017    LACTA 2.7 03/31/2016        BNP   No results for input(s): BNP in the last 72 hours. Cultures   No results for input(s): BC in the last 72 hours. No results for input(s): [place] in the last 72 hours. No results for input(s): LABURIN in the last 72 hours. Radiology   11/14/2020  XR ABDOMEN FOR NG/OG/NE TUBE PLACEMENT   Final Result   The enteric tube is in good position in the stomach. XR CHEST PORTABLE   Final Result   New right IJ line with the distal tip in the SVC. No pneumothorax. No other   significant interval change. XR CHEST PORTABLE   Final Result   Status post intubation with ET tube approximately 4.4 cm from the level of   the drew. Increasing airspace disease bilaterally predominantly in the lower lobes   consistent with multifocal pneumonia. CT HEAD WO CONTRAST   Final Result   No acute intracranial abnormality. Age-related loss of brain volume and   chronic periventricular ischemic changes. Chronic left basal ganglia lacunar   infarctions versus prominent perivascular space, unchanged since the prior   examination. Persistent contrast enhancement from recent intravenous contrast   administration. CTA PULMONARY W CONTRAST   Final Result   1. There is no evidence of a pulmonary embolus. 2. Multifocal bilateral ground-glass and semi solid pulmonary infiltrates   within the right and left lower lobes, right middle lobe and lingula. The   findings are highly suspicious for findings of COVID-19.   3. Advanced emphysematous changes. 4. Cardiomegaly. There is no pericardial effusion. XR CHEST PORTABLE   Final Result   1. When compared to prior chest series there has been interval worsening in   the retrocardiac and bibasilar interstitial opacities. (Broad differential which includes worsening interstitial lung disease,   superimposed infection, and edema.   Please correlate with clinical   presentation.)      2. Lung hyperinflation and coarse interstitial markings suggestive of   underlying COPD. 3.  Atherosclerotic disease. SYSTEMS ASSESSMENT    Neuro   AMS--most likely secondary to hypercapnia   -CT head negative    Intubated and sedated on the vent   -Propofol and fentanyl    Respiratory   Acute hypoxic and hypercapnic respiratory failure secondary to Covid pneumonia   -resp panel + SARS-COV2   -former smoker   -vit c, decadron (day 2), remdesivir (day 1)   -Consult pulmonology   -Strep, Legionella pending   -procal 2.54    Respiratory acidosis--improved   -Was placed on a nonrebreather and subsequently became hypercapnic and intubated   -Wean FiO2 as able   -Follow ABG    COPD   -O2 PRN and with ambulation @ home, usual 3L   -Brovana, Pulmicort, nebs    Cardiovascular   CTA   -No evidence of PE    No acute issues    Gastrointestinal   No acute issues    Renal   No acute issues     Infectious Disease   COVID PNA   -ID following   -Decadron, day 2. Remdesivir, day 1. Vitamin C, zinc    Hematology/Oncology   Lymphopenia   -2/2 covid pna    Endocrine   thyroid nodules  DM - SSI    Social/Spiritual/DNR/Other   Full    ______________________________________________________________________    ASSESSMENT/ PLAN   1. As above  2. Wean FiO2 as able  3. Brovana, Pulmicort, nebs  4. Pulmonology consult  5. GI prophylaxis-Protonix  6. DVT Prophylaxis- lovenox 30bid  7. Diet n.p.o.  8. Discuss case and plan with attending, Dr. Amanda Valverde, DO  Resident, PGY-2  11/14/2020  3:08 PM    I personally saw, examined and provided care for the patient. Radiographs, labs and medication list were reviewed by me independently. I spoke with bedside nursing, therapists and consultants. Critical care services and times documented are independent of procedures and multidisciplinary rounds with Residents.  Additionally comprehensive, multidisciplinary rounds were conducted with the MICU team. The case was discussed in detail and plans for care were established. Review of Residents documentation was conducted and revisions were made as appropriate. I agree with the above documented exam, problem list and plan of care.   Beatrice Fountain MD   CCT excluding procedures:40'

## 2020-11-14 NOTE — ED NOTES
Propofol drip stopped due to pt's decreased b/p.  Resident and attending notified     Sreedhar Hodges RN  11/13/20 2030

## 2020-11-14 NOTE — ED NOTES
Spoke to [employer] in [place], ext [phone], nurse to nurse report given. Pt prepared for transport.        Quintin Johnson RN  11/14/20 [id number]

## 2020-11-14 NOTE — CONSULTS
3.375 g, Intravenous, Q8H  NS - RUN AFTER ZOSYN INFUSES, , Intravenous, Q8H  pantoprazole (PROTONIX) injection 40 mg, 40 mg, Intravenous, Daily  ipratropium (ATROVENT HFA) 17 MCG/ACT inhaler 2 puff, 2 puff, Inhalation, 4x daily  albuterol sulfate  (90 Base) MCG/ACT inhaler 2 puff, 2 puff, Inhalation, Q6H PRN  sodium chloride flush 0.9 % injection 10 mL, 10 mL, Intravenous, 2 times per day  sodium chloride flush 0.9 % injection 10 mL, 10 mL, Intravenous, PRN  acetaminophen (TYLENOL) tablet 650 mg, 650 mg, Oral, Q6H PRN **OR** acetaminophen (TYLENOL) suppository 650 mg, 650 mg, Rectal, Q6H PRN  polyethylene glycol (GLYCOLAX) packet 17 g, 17 g, Oral, Daily PRN  promethazine (PHENERGAN) tablet 12.5 mg, 12.5 mg, Oral, Q6H PRN **OR** ondansetron (ZOFRAN) injection 4 mg, 4 mg, Intravenous, Q6H PRN  enoxaparin (LOVENOX) injection 30 mg, 30 mg, Subcutaneous, BID  ascorbic acid (VITAMIN C) tablet 2,000 mg, 2,000 mg, Oral, QAM  vitamin B and C (TOTAL B-C) 1 tablet, 1 tablet, Oral, QAM  Vitamin D (CHOLECALCIFEROL) tablet 2,000 Units, 2,000 Units, Oral, QAM  guaiFENesin tablet 400 mg, 400 mg, Oral, 4x Daily PRN  glucose (GLUTOSE) 40 % oral gel 15 g, 15 g, Oral, PRN  dextrose 50 % IV solution, 12.5 g, Intravenous, PRN  glucagon (rDNA) injection 1 mg, 1 mg, Intramuscular, PRN  dextrose 5 % solution, 100 mL/hr, Intravenous, PRN  insulin lispro (HUMALOG) injection vial 0-6 Units, 0-6 Units, Subcutaneous, TID WC  insulin lispro (HUMALOG) injection vial 0-3 Units, 0-3 Units, Subcutaneous, Nightly  propofol injection, 10 mcg/kg/min, Intravenous, Titrated  dexamethasone (PF) (DECADRON) injection 6 mg, 6 mg, Intravenous, Q12H  midazolam (VERSED) injection 2 mg, 2 mg, Intravenous, Once    Allergies:  Patient has no known allergies. Social History:    TOBACCO:   reports that she quit smoking about 7 years ago. Her smoking use included cigarettes. She started smoking about 47 years ago. She has a 120.00 pack-year smoking history.

## 2020-11-14 NOTE — PATIENT CARE CONFERENCE
Intensive Care Daily Quality Rounding Checklist      ICU Team Members: Dr. Delon Llanes, resident Dr. Mellissa Almaguer, RT, bedside RN, nursing leadership    ICU Day #: NUMBER: 2    Intubation Date: November 13,2020    Ventilator Day #: NUMBER: 2    Central Line Insertion Date: November 13,2020        Day #: NUMBER: 2     Arterial Line Insertion Date:  n/a      Day #:     DVT Prophylaxis: lovenox    GI Prophylaxis:    Lloyd Catheter Insertion Date: November 13,2020       Day #: 2      Continued need (if yes, reason documented and discussed with physician): yes, strict Is and Os    Skin Issues/ Wounds and ordered treatment discussed on rounds:     Goals/ Plans for the Day: wean vent, breathing treatments, consult Dr. Aracelis Silva, continue critical care management.

## 2020-11-14 NOTE — PLAN OF CARE
Problem: Inadequate oral food/beverage intake (NI-2.1)  Goal: Food and/or Nutrient Delivery  Pt will tolerate Nutrition Progression  Description: Individualized approach for food/nutrient provision.   Outcome: Ongoing

## 2020-11-15 NOTE — CONSULTS
[facility]     Consult Note                                 [address] [facility] [address], [place], [address]                   Phone [phone]     Fax [phone]        Date:   11/15/2020  Patient name:  Thomas Caraballo  Date of admission:  11/13/2020  1:46 PM  MRN:   [id number]  YOB: 1945    Reason for Consult: covid19    CC:   Chief Complaint   Patient presents with    Shortness of Breath     x 1 week, hx copd / home oxygen 87% on 3 L at [facility]        HISTORY OF PRESENT ILLNESS:                The patient is a 67 y.o.  female with significant past medical history of COPD, diabetes, hypertension, hyperlipidemia presented with  shortness of breath. Patient is currently intubated and sedated so history obtained from chart review.     Per emergency department, patient had history of 3 days of worsening shortness of breath. She is chronically on 3 L of oxygen at home when she ambulates but over the last few days has had increase her oxygen requirement to 5 L at all times. 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 She subsequently presented to the ED for evaluation.     Patient was found to be hypoxic and and placed on nasal cannula oxygen. Patient was Positive for COVID-19. While in the emergency department patient was placed on a nonrebreather and subsequently decompensated. She became altered and was taking agonal respirations. She was then intubated for further airway protection. ABG demonstrated respiratory acidosis with a CO2 of greater than 113. Patient was admitted to the ICU for further evaluation care.       Past Medical History:   has a past medical history of COPD (chronic obstructive pulmonary disease) ([facility] [address] Diabetes type 2, controlled ([facility] [address] Former smoker, Hyperlipidemia, Hypertension, Macular degeneration, Multiple thyroid nodules, and Pulmonary nodule. Past Surgical History:   has a past surgical history that includes Tubal ligation (1970'a); Foot surgery (1976); and Liposuction. Home Medications:    Prior to Admission medications    Medication Sig Start Date End Date Taking?  Authorizing Provider   budesonide (PULMICORT) 0.5 MG/2ML nebulizer suspension Take 1 ampule by nebulization 2 times daily    Historical Provider, MD   pravastatin (PRAVACHOL) 40 MG tablet TAKE 1 TABLET EVERY EVENING. 10/16/20   MIRIAN Conteh MD   naproxen (NAPROSYN) 500 MG tablet Take 1 tablet by mouth 2 times daily for 7 days 9/13/20 9/20/20  Cathlene Salt, DO   Blood Glucose Monitoring Suppl (ACCU-CHEK RAYMOND PLUS) w/Device KIT USE AS DIRECTED 4/27/20   MIRIAN Conteh MD   ACCU-CHEK RAYMOND PLUS strip 1 each by Does not apply route daily 4/24/20 7/23/20  MIRIAN Conteh MD   Accu-Chek Softclix Lancets MISC USE AS DIRECTED EVERY DAY 3/12/20   MIRIAN Conteh MD   Umeclidinium Bromide (INCRUSE ELLIPTA) 62.5 MCG/INH AEPB Inhale 1 puff into the lungs daily 2/25/20   TITA Santoyo - CNP   albuterol sulfate  (90 Base) MCG/ACT inhaler Inhale 2 puffs into the lungs every 6 hours as needed for Wheezing    Historical Provider, MD   metFORMIN (GLUCOPHAGE-XR) 500 MG extended release tablet TAKE 2 TABLETS EVERY MORNING AND 2 TABLETS EVERY EVENING WITH MEALS 1/20/20   MIRIAN Conteh MD   formoterol (PERFOROMIST) 20 MCG/2ML nebulizer solution Take 2 mLs by nebulization 2 times daily 11/11/19 6/9/20  Dillon Lord MD   guaiFENesin 400 MG tablet Take 400 mg by mouth 4 times daily as needed for Cough    Historical Provider, MD   naproxen sodium (ALEVE) 220 MG tablet Take 220 mg by mouth 2 times daily (with meals)    Historical Provider, MD   Cholecalciferol (VITAMIN D) 2000 UNITS CAPS capsule Take 2,000 Units by mouth every morning     Historical Provider, MD   ascorbic acid (VITAMIN C) 500 MG tablet Take 2,000 mg by mouth every morning     Historical Provider, MD   vitamin E 400 UNIT capsule Take 400 Units by mouth every morning     Historical Provider, MD   Copper Gluconate 2 MG CAPS Take 2 mg by mouth every morning     Historical Provider, MD   Zinc 25 MG TABS Take 25 mg by mouth every morning     Historical Provider, MD   b complex vitamins capsule Take 1 capsule by mouth every morning     Historical Provider, MD   Omega-3 Fatty Acids (FISH OIL) 1200 MG CAPS Take 2,400 mg by mouth every morning     Historical Provider, MD       Allergies:  Patient has no known allergies. Social History:   reports that she quit smoking about 7 years ago. Her smoking use included cigarettes. She started smoking about 47 years ago. She has a 120.00 pack-year smoking history. She has never used smokeless tobacco. She reports that she does not drink alcohol or use drugs. Family History: family history includes Coronary Art Dis in her father and mother; Diabetes in her mother; Heart Disease in her sister; High Blood Pressure in her father and mother; Virgene Puls in her father and sister; Mult Sclerosis in her daughter; Other in an other family member. REVIEW OF SYSTEMS:    Limited as pt is intubated        PHYSICAL EXAM:    BP (!) 108/52   Pulse 57   Temp 96.5 °F (35.8 °C) (Bladder)   Resp 17   Ht 5' 6\" (1.676 m)   Wt 101 lb 13.6 oz (46.2 kg)   SpO2 92%   BMI 16.44 kg/m²    VENT SETTINGS:   Vent Information  $Ventilation: $Subsequent Day  Equipment ID: [id number]  Vent Type: 840  Vent Mode: AC/VC  Vt Ordered: 450 mL  Rate Set: 18 bmp  Peak Flow: 60 L/min  Pressure Support: 0 cmH20  FiO2 : 50 %  SpO2: 92 %  SpO2/FiO2 ratio: 184  Sensitivity: 3  PEEP/CPAP: 8  I Time/ I Time %: 0 s  Humidification Source: HME    General appearance: intubated and sedated  Skin: Warm and dry  Eyes: Pink palpebral conjunctivae. PERRL  Ears: External ears normal.  Nose/Sinuses: Nares normal.   Oropharynx:ET tube in place  Neck: Neck supple.  No jugular venous distension, lymphadenopathy or thyromegaly Trachea midline  Lungs: Lungs clear to auscultation bilaterally. No rhonchi, crackles or wheezes  Heart: S1 S2  Regular rate and rhythm. No rub, murmur or gallop  Abdomen: Abdomen soft, non-tender. BS normal. No masses, organomegaly  Extremities: No edema, Peripheral pulses palpable  Musculoskeletal:  No joint effusion, tenderness, swelling or warmth      DATA:    Labs:     Last 3 CBC:  Recent Labs     11/13/20  1515 11/14/20  0600 11/15/20  0600   WBC 4.8 6.6 4.7   RBC 4.23 3.89 3.46*   HGB 12.0 10.9* 9.7*    173 162   MPV 10.5 10.7 10.7       Last 3 BMP  Recent Labs     11/13/20  1515 11/14/20  0600 11/15/20  0600    141 145   K 5.7* 3.8 4.3    109* 111*   CO2 25 24 21*   BUN 11 10 27*   CREATININE 0.4* 0.4* 0.7   GLUCOSE 165* 158* 152*   CALCIUM 8.9 8.1* 8.3*       LIVER PROFILE:  Recent Labs     11/13/20  1515 11/15/20  0600   AST 67* 48*   ALT 29 34*   LABALBU 3.5 2.6*       URINARY CATHETER OUTPUT (Lloyd):  Urethral Catheter Temperature probe 16 fr-Output (mL): 150 mL    DRAIN/TUBE OUTPUT:     Microbiology :  No results for input(s): BC in the last 72 hours. No results for input(s): Alice Patient in the last 72 hours. No results for input(s): LABURIN in the last 72 hours. No results for input(s): CULTRESP in the last 72 hours. No results for input(s): WNDABS in the last 72 hours. Radiology :  XR ABDOMEN FOR NG/OG/NE TUBE PLACEMENT   Final Result   The enteric tube is in good position in the stomach. XR CHEST PORTABLE   Final Result   New right IJ line with the distal tip in the SVC. No pneumothorax. No other   significant interval change. XR CHEST PORTABLE   Final Result   Status post intubation with ET tube approximately 4.4 cm from the level of   the drew. Increasing airspace disease bilaterally predominantly in the lower lobes   consistent with multifocal pneumonia.          CT HEAD WO CONTRAST   Final Result   No acute intracranial abnormality. Age-related loss of brain volume and   chronic periventricular ischemic changes. Chronic left basal ganglia lacunar   infarctions versus prominent perivascular space, unchanged since the prior   examination. Persistent contrast enhancement from recent intravenous contrast   administration. CTA PULMONARY W CONTRAST   Final Result   1. There is no evidence of a pulmonary embolus. 2. Multifocal bilateral ground-glass and semi solid pulmonary infiltrates   within the right and left lower lobes, right middle lobe and lingula. The   findings are highly suspicious for findings of COVID-19.   3. Advanced emphysematous changes. 4. Cardiomegaly. There is no pericardial effusion. XR CHEST PORTABLE   Final Result   1. When compared to prior chest series there has been interval worsening in   the retrocardiac and bibasilar interstitial opacities. (Broad differential which includes worsening interstitial lung disease,   superimposed infection, and edema. Please correlate with clinical   presentation.)      2. Lung hyperinflation and coarse interstitial markings suggestive of   underlying COPD. 3.  Atherosclerotic disease. Assessment and Plan:      · Acute resp failure  · COVID 19 pneumonia  · Secondary bacterial infection    Plan  - Jose Colunga. 3.75 q8hrly based on lung changes, high procalcitonin  Remdesivir, watch closely  Decadron 6mg q12  resp culture  On lovenox  Vit B/thiamine/c    Thank you for your consult, please call for any qs              Chris Rodriguez MD

## 2020-11-15 NOTE — PATIENT CARE CONFERENCE
ICU Team Members: Dr. Brianne Morgan, residents, charge nurse and bedside nurse    ICU Day #: NUMBER: 3     Intubation Date: November 13,2020     Ventilator Day #: NUMBER: 3     Central Line Insertion Date: November 13,2020                                                    Day #: NUMBER: 3      Arterial Line Insertion Date:  n/a                             Day #:      DVT Prophylaxis: lovenox    GI Prophylaxis:     Lloyd Catheter Insertion Date: November 13,2020                                        Day #: 3                             Continued need (if yes, reason documented and discussed with physician): yes, strict Is and Os     Skin Issues/ Wounds and ordered treatment discussed on rounds:      Goals/ Plans for the Day: wean vent, breathing treatments, continue critical care management, daily labs.  Start tube feeds

## 2020-11-15 NOTE — PROGRESS NOTES
Critical Care Team - Daily Progress Note         Date and time: 11/15/2020 5:24 PM  Patient's name:  Rosas Matson  Medical Record Number: [id number]  Patient's account/billing number: [de-identified]  Patient's YOB: 1945  Age: 67 y.o. Date of Admission: 11/13/2020  1:46 PM  Length of stay during current admission: 2      Primary Care Physician: Jolly Montano MD  ICU Attending Physician: Dr. Merlin Dahl    Code Status: Full Code    Reason for ICU admission: Acute hypercapnic hypoxic respiratory failure secondary to Covid pneumonia      SUBJECTIVE:     BRIEF HISTORY: The patient is a 67 y.o. female with significant past medical history of COPD, diabetes, hypertension, hyperlipidemia presented to the ED for shortness of breath. Patient is currently intubated and sedated so history obtained from chart review.     Per emergency department, patient had history of 3 days of worsening shortness of breath. She is chronically on 3 L of oxygen at home when she ambulates but over the last few days has had increase her oxygen requirement to 5 L at all times. 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 She subsequently presented to the ED for evaluation.     Patient was found to be hypoxic and and placed on nasal cannula oxygen. Patient was Positive for COVID-19. While in the emergency department patient was placed on a nonrebreather and subsequently decompensated. She became altered and was taking agonal respirations. She was then intubated for further airway protection. ABG demonstrated respiratory acidosis with a CO2 of greater than 113. Patient was admitted to the ICU for further evaluation care.     OVERNIGHT EVENTS:    11/15/20:  Some soft BP's overnight, responded to fluid bolus and switched from propofol gtt to versed gtt    CURRENT VENTILATION STATUS:     [x] Ventilator  [] BIPAP  [] Nasal Cannula [] Room Air      IF INTUBATED, ET TUBE MARKING AT LOWER LIP:       SECRETIONS : Amount:  [] Small [] Moderate  [] Large  [x] None  Color:     [] White [] Colored  [] Bloody    SEDATION:  RAAS Score:  [] Propofol gtt  [x] Versed gtt and fentanyl  [] Ativan gtt   [] No Sedation    PARALYZED:  [x] No    [] Yes      VASOPRESSORS:  [x] No    [] Yes    If yes -   [] Levophed       [] Dopamine     [] Vasopressin       [] Dobutamine  [] Phenylephrine         [] Epinephrine    CENTRAL LINES:     [] No   [x] Yes   (Date of Insertion:   )           If yes -     [x] Right IJ     [] Left IJ [] Right Femoral [] Left Femoral                   [] Right Subclavian [] Left Subclavian       RAM'S CATHETER:   [] No   [x] Yes  (Date of Insertion:   )     URINE OUTPUT:            [] Good   [x] Low              [] Anuric      OBJECTIVE:     VITAL SIGNS:  BP (!) 117/58   Pulse 58   Temp 96.8 °F (36 °C) (Bladder)   Resp 23   Ht 5' 6\" (1.676 m)   Wt 101 lb 13.6 oz (46.2 kg)   SpO2 91%   BMI 16.44 kg/m²   Tmax over 24 hours:  Temp (24hrs), Av.4 °F (36.3 °C), Min:96.5 °F (35.8 °C), Max:98.3 °F (36.8 °C)      Patient Vitals for the past 6 hrs:   BP Temp Temp src Pulse Resp SpO2   11/15/20 1657 -- -- -- 58 23 91 %   11/15/20 1600 (!) 117/58 96.8 °F (36 °C) Bladder 76 18 94 %   11/15/20 1500 (!) 115/50 -- -- 57 17 92 %   11/15/20 1400 (!) 105/46 -- -- 57 17 92 %   11/15/20 1328 -- -- -- 57 17 92 %   11/15/20 1300 (!) 110/45 -- -- 62 17 91 %   11/15/20 1200 (!) 111/45 96.8 °F (36 °C) Bladder 56 18 90 %         Intake/Output Summary (Last 24 hours) at 11/15/2020 1724  Last data filed at 11/15/2020 0609  Gross per 24 hour   Intake 1813 ml   Output 150 ml   Net 1663 ml     Wt Readings from Last 2 Encounters:   20 101 lb 13.6 oz (46.2 kg)   10/29/20 140 lb (63.5 kg)     Body mass index is 16.44 kg/m².       PHYSICAL EXAM:  General: Intubated and sedated on the vent  Eyes: Pupils equal and reactive  Ears: no obvious scars, no 840  Vent Mode: AC/VC  Vt Ordered: 450 mL  Rate Set: 18 bmp  Peak Flow: 60 L/min  Pressure Support: 0 cmH20  FiO2 : 50 %  SpO2: 91 %  SpO2/FiO2 ratio: 182  Sensitivity: 3  PEEP/CPAP: 8  I Time/ I Time %: 0 s  Humidification Source: HME  Additional Respiratory  Assessments  Pulse: 58  Resp: 23  SpO2: 91 %  $End Tidal CO2: 36  Position: Semi-Hunt's  Humidification Source: HME  Oral Care: Suction toothette, Mouth suctioned, Mouth moisturizer, Lip moisturizer applied, Mouth swabbed  Subglottic Suction Done?: Yes  Cuff Pressure (cm H2O): 29 cm H2O    ABGs:   Recent Labs     11/15/20  0615   PH 7.246*   PCO2 48.1*   PO2 71.2*   HCO3 20.4*   BE -6.7*   O2SAT 91.8*       Laboratory findings:    Complete Blood Count:   Recent Labs     11/13/20  1515 11/14/20  0600 11/15/20  0600   WBC 4.8 6.6 4.7   HGB 12.0 10.9* 9.7*   HCT 38.3 36.7 32.3*    173 162        Last 3 Blood Glucose:   Recent Labs     11/13/20  1515 11/14/20  0600 11/15/20  0600   GLUCOSE 165* 158* 152*        PT/INR:    Lab Results   Component Value Date    PROTIME 12.2 11/15/2020    INR 1.1 11/15/2020     PTT:    Lab Results   Component Value Date    APTT 26.7 11/15/2020       Comprehensive Metabolic Profile:   Recent Labs     11/13/20  1515 11/14/20  0600 11/15/20  0600    141 145   K 5.7* 3.8 4.3    109* 111*   CO2 25 24 21*   BUN 11 10 27*   CREATININE 0.4* 0.4* 0.7   GLUCOSE 165* 158* 152*   CALCIUM 8.9 8.1* 8.3*   PROT 7.8  --  6.0*   LABALBU 3.5  --  2.6*   BILITOT 0.4  --  0.2   ALKPHOS 97  --  118*   AST 67*  --  48*   ALT 29  --  34*      Magnesium:   Lab Results   Component Value Date    MG 2.1 11/15/2020     Phosphorus:   Lab Results   Component Value Date    PHOS 5.2 11/15/2020     Ionized Calcium: No results found for: CAION       Troponin:   Recent Labs     11/15/20  0000   TROPONINI <0.01       Microbiology:  Cultures during this admission:     Blood cultures:                 [x] None drawn      [] Negative             [] -procal 2.54   - Likely secondary bacterial pneumonia, ID following, pending respiratory cultures. Starting Zosyn     Respiratory acidosis--improved              -Was placed on a nonrebreather and subsequently became hypercapnic and intubated              -Wean FiO2 as able              -Follow ABG   - Consider trial of pressure support or vent changes tomorrow      COPD              -O2 PRN and with ambulation @ home, usual 3L              -Brovana, Pulmicort, nebs     Cardiovascular   CTA              -No evidence of PE     No acute issues      Gastrointestinal   No acute issues     Renal   No acute issues     Infectious Disease   COVID PNA              -ID following              -Decadron, 6mg q12. Remdesivir, day 1. Vitamin C, zinc   - 11/15: Likely secondary bacterial pneumonia, Zosyn 3.75 q8hrs based on lung changes, high procal   - Pending resp. cultures       Hematology/Oncology   Lymphopenia              -2/2 covid pna     Endocrine   thyroid nodules  DM - SSI     Social/Spiritual/DNR/Other   Full      Diet: DIET TUBE FEED CONTINUOUS/CYCLIC NPO; Semi-elemental; Orogastric; Continuous; 25; 40  CODE Status: Full Code    Dispo: maintain ICU level care    ASSESSMENT/ PLAN   1. As above  2. Wean FiO2 as able  3. ID recs appreciated, start Zosyn - pending resp cultures  4. GI prophylaxis-Protonix  5. DVT Prophylaxis- lovenox 30bid  6. Stop atrovent  7. Start tube feeds  8. Discuss case and plan with attending, Dr. Nicolette Littlejohn, DO  Resident, PGY-1  11/15/2020  5:24 PM    I personally saw, examined and provided care for the patient. Radiographs, labs and medication list were reviewed by me independently. I spoke with bedside nursing, therapists and consultants. Critical care services and times documented are independent of procedures and multidisciplinary rounds with Residents.  Additionally comprehensive, multidisciplinary rounds were conducted with the MICU team. The case was discussed in detail and plans for care were established. Review of Residents documentation was conducted and revisions were made as appropriate. I agree with the above documented exam, problem list and plan of care.   Abad Velázquez MD   CCT excluding procedures:40'

## 2020-11-15 NOTE — PROGRESS NOTES
(36.7 °C) (Bladder)   Resp 18   Ht 5' 6\" (1.676 m)   Wt 101 lb 13.6 oz (46.2 kg)   SpO2 94%   BMI 16.44 kg/m²     Due to the patient's COVID-19-positive status, to reduce exposure, contamination, and in an effort to conserve PPE, this patient was evaluated through a combination of review of the medical record, nursing assessments, other physicians' exams and assessments, as well as telemedicine interaction. Recent Labs     11/13/20  1515 11/14/20  0600 11/15/20  0600    141 145   K 5.7* 3.8 4.3    109* 111*   CO2 25 24 21*   BUN 11 10 27*   CREATININE 0.4* 0.4* 0.7   GLUCOSE 165* 158* 152*   CALCIUM 8.9 8.1* 8.3*       Recent Labs     11/13/20  1515 11/14/20  0600 11/15/20  0600   WBC 4.8 6.6 4.7   RBC 4.23 3.89 3.46*   HGB 12.0 10.9* 9.7*   HCT 38.3 36.7 32.3*   MCV 90.5 94.3 93.4   MCH 28.4 28.0 28.0   MCHC 31.3* 29.7* 30.0*   RDW 15.5* 15.4* 15.6*    173 162   MPV 10.5 10.7 10.7       Radiology:   EXAMINATION:    CTA OF THE CHEST 11/13/2020 6:00 pm         TECHNIQUE:    CTA of the chest was performed after the administration of intravenous    contrast.  Multiplanar reformatted images are provided for review.  MIP    images are provided for review. Dose modulation, iterative reconstruction,    and/or weight based adjustment of the mA/kV was utilized to reduce the    radiation dose to as low as reasonably achievable.         COMPARISON:    11/06/2019         HISTORY:    ORDERING SYSTEM PROVIDED HISTORY: concern for covid    TECHNOLOGIST PROVIDED HISTORY:    Reason for exam:->concern for covid         FINDINGS:    Pulmonary Arteries: Pulmonary arteries are adequately opacified for    evaluation.  No evidence of intraluminal filling defect to suggest pulmonary    embolism.  Main pulmonary artery is normal in caliber.         Mediastinum: No evidence of mediastinal lymphadenopathy.  The heart is    enlarged.  There is no pericardial effusion.         Lungs/pleura:  Advanced emphysematous There are nonspecific areas of hypoattenuation within the periventricular and    subcortical white matter, which likely represent chronic microvascular    ischemic change.         Chronic left basal ganglia lacunar infarction versus prominent perivascular    space, unchanged.         There is persistent contrast enhancement from prior intravenous contrast    administration.         ORBITS: The visualized portion of the orbits demonstrate no acute abnormality.         SINUSES: The visualized paranasal sinuses and mastoid air cells demonstrate    no acute abnormality.  Paranasal sinus mucosal thickening, likely chronic.         SOFT TISSUES/SKULL: No acute abnormality of the visualized skull or soft    tissues.              Impression    No acute intracranial abnormality.  Age-related loss of brain volume and    chronic periventricular ischemic changes.  Chronic left basal ganglia lacunar    infarctions versus prominent perivascular space, unchanged since the prior    examination.         Persistent contrast enhancement from recent intravenous contrast    administration. Assessment:    Active Problems:    Acute respiratory failure with hypoxia (HCC)  Resolved Problems:    * No resolved hospital problems. *      Plan:  1. Acute respiratory failure with hypoxia 2/2 Covid 19 pneumonia  -  Admitted to ICU after intubation  -  Decadron  -  ID consulted  -  Vent management per pulm/CC    2. Covid 19 pneumonia  -  ID consulted  -  Monitor labs  -  Vent  -  lovenox per COVID protocol  -   remdesivir    2. Severe emphysema  -  Follows with Dr. Lauren East as outpatient. 3.  Type 2 diabetes, controlled  -  ISS    4. Essential hypertension  -  BP 140s-170/65-85  -  Continue to monitor    5. Mixed hyperlipidemia  -  Previously on pravachol    6. Hyperkalemia- ?spurious; normal on repeat  -  Continue to monitor      NOTE: This report was transcribed using voice recognition software.  Every effort was made to ensure accuracy; however, inadvertent computerized transcription errors may be present.   Electronically signed by Denzel Felty, MD on 11/15/2020 at 8:16 AM

## 2020-11-15 NOTE — PLAN OF CARE
Problem: Restraint Use - Nonviolent/Non-Self-Destructive Behavior:  Goal: Absence of restraint indications  Description: Absence of restraint indications  Outcome: Met This Shift     Problem: Gas Exchange - Impaired  Goal: Promote optimal lung function  Outcome: Met This Shift     Problem: Breathing Pattern - Ineffective  Goal: Ability to achieve and maintain a regular respiratory rate  Outcome: Met This Shift     Problem:  Body Temperature -  Risk of, Imbalanced  Goal: Ability to maintain a body temperature within defined limits  Outcome: Met This Shift     Problem: Isolation Precautions - Risk of Spread of Infection  Goal: Prevent transmission of infection  Outcome: Met This Shift

## 2020-11-16 NOTE — PROGRESS NOTES
hours. No results for input(s): LABURIN in the last 72 hours. No results for input(s): CULTRESP in the last 72 hours. No results for input(s): WNDABS in the last 72 hours. Radiology :  XR ABDOMEN FOR NG/OG/NE TUBE PLACEMENT   Final Result   The enteric tube is in good position in the stomach. XR CHEST PORTABLE   Final Result   New right IJ line with the distal tip in the SVC. No pneumothorax. No other   significant interval change. XR CHEST PORTABLE   Final Result   Status post intubation with ET tube approximately 4.4 cm from the level of   the drew. Increasing airspace disease bilaterally predominantly in the lower lobes   consistent with multifocal pneumonia. CT HEAD WO CONTRAST   Final Result   No acute intracranial abnormality. Age-related loss of brain volume and   chronic periventricular ischemic changes. Chronic left basal ganglia lacunar   infarctions versus prominent perivascular space, unchanged since the prior   examination. Persistent contrast enhancement from recent intravenous contrast   administration. CTA PULMONARY W CONTRAST   Final Result   1. There is no evidence of a pulmonary embolus. 2. Multifocal bilateral ground-glass and semi solid pulmonary infiltrates   within the right and left lower lobes, right middle lobe and lingula. The   findings are highly suspicious for findings of COVID-19.   3. Advanced emphysematous changes. 4. Cardiomegaly. There is no pericardial effusion. XR CHEST PORTABLE   Final Result   1. When compared to prior chest series there has been interval worsening in   the retrocardiac and bibasilar interstitial opacities. (Broad differential which includes worsening interstitial lung disease,   superimposed infection, and edema. Please correlate with clinical   presentation.)      2. Lung hyperinflation and coarse interstitial markings suggestive of   underlying COPD. 3.  Atherosclerotic disease.

## 2020-11-16 NOTE — PROGRESS NOTES
[facility] Progress Note    Admitting Date and Time: 11/13/2020  1:46 PM  Admit Dx: Acute respiratory failure with hypoxia (HCC) [J96.01]  Acute respiratory failure with hypoxia (HCC) [J96.01]  Acute respiratory failure with hypoxia (HCC) [J96.01]  Acute respiratory failure with hypoxia (HCC) [J96.01]    Subjective:  Patient is being followed for Acute respiratory failure with hypoxia ([address] [J96.01]  Acute respiratory failure with hypoxia ([address] [J96.01]  Acute respiratory failure with hypoxia ([address] [J96.01]  Acute respiratory failure with hypoxia ([address] [J96.01]   Pt is intubated and sedated      ROS: denies fever, chills, cp, sob, n/v, HA unless stated above.       insulin lispro  0-6 Units Subcutaneous 4 times per day    ascorbic acid  1,000 mg Oral QAM    remdesivir IVPB  100 mg Intravenous Q24H    Arformoterol Tartrate  15 mcg Nebulization BID    budesonide  0.5 mg Nebulization BID    ipratropium-albuterol  1 ampule Inhalation Q4H    piperacillin-tazobactam  3.375 g Intravenous Q8H    pantoprazole  40 mg Intravenous Daily    sodium chloride flush  10 mL Intravenous 2 times per day    enoxaparin  30 mg Subcutaneous BID    vitamin B and C  1 tablet Oral QAM    Vitamin D  2,000 Units Oral QAM    dexamethasone  6 mg Intravenous Q12H    midazolam  2 mg Intravenous Once     sodium chloride, 30 mL, PRN  albuterol sulfate HFA, 2 puff, Q6H PRN  sodium chloride flush, 10 mL, PRN  acetaminophen, 650 mg, Q6H PRN    Or  acetaminophen, 650 mg, Q6H PRN  polyethylene glycol, 17 g, Daily PRN  promethazine, 12.5 mg, Q6H PRN    Or  ondansetron, 4 mg, Q6H PRN  guaiFENesin, 400 mg, 4x Daily PRN  glucose, 15 g, PRN  dextrose, 12.5 g, PRN  glucagon (rDNA), 1 mg, PRN  dextrose, 100 mL/hr, PRN         Objective:    /62   Pulse 71   Temp 97.2 °F (36.2 °C)   Resp 16   Ht 5' 6\" (1.676 m)   Wt 105 lb 6.1 oz (47.8 kg)   SpO2 92%   BMI 17.01 kg/m²     Due to the patient's COVID-19-positive status, to reduce exposure, contamination, and in an effort to conserve PPE, this patient was evaluated through a combination of review of the medical record, nursing assessments, other physicians' exams and assessments, as well as telemedicine interaction. Recent Labs     11/14/20  0600 11/15/20  0600 11/16/20  0615    145 144   K 3.8 4.3 3.6   * 111* 111*   CO2 24 21* 23   BUN 10 27* 43*   CREATININE 0.4* 0.7 0.8   GLUCOSE 158* 152* 208*   CALCIUM 8.1* 8.3* 8.5*       Recent Labs     11/14/20  0600 11/15/20  0600 11/16/20  0615   WBC 6.6 4.7 7.5   RBC 3.89 3.46* 3.31*   HGB 10.9* 9.7* 9.3*   HCT 36.7 32.3* 30.7*   MCV 94.3 93.4 92.7   MCH 28.0 28.0 28.1   MCHC 29.7* 30.0* 30.3*   RDW 15.4* 15.6* 15.7*    162 207   MPV 10.7 10.7 10.7       Radiology:   EXAMINATION:    CTA OF THE CHEST 11/13/2020 6:00 pm         TECHNIQUE:    CTA of the chest was performed after the administration of intravenous    contrast.  Multiplanar reformatted images are provided for review.  MIP    images are provided for review. Dose modulation, iterative reconstruction,    and/or weight based adjustment of the mA/kV was utilized to reduce the    radiation dose to as low as reasonably achievable.         COMPARISON:    11/06/2019         HISTORY:    ORDERING SYSTEM PROVIDED HISTORY: concern for covid    TECHNOLOGIST PROVIDED HISTORY:    Reason for exam:->concern for covid         FINDINGS:    Pulmonary Arteries: Pulmonary arteries are adequately opacified for    evaluation.  No evidence of intraluminal filling defect to suggest pulmonary    embolism.  Main pulmonary artery is normal in caliber.         Mediastinum: No evidence of mediastinal lymphadenopathy.  The heart is    enlarged.  There is no pericardial effusion.         Lungs/pleura:  Advanced emphysematous changes are noted.  The centrilobular    emphysematous changes are most prominent within the upper lobes.         Bilateral lower lobe ground-glass infiltrates are noted.  Periphery within    the lung bases semi solid infiltrates are also noted.  There are small    infiltrate seen within the lingula and right middle lobe.      Upper Abdomen: Limited images of the upper abdomen are unremarkable.  There    are 2 stones seen within the gallbladder lumen.  There is no evidence of    acute cholecystitis.         Soft Tissues/Bones:  Age related degenerative changes of the visualized    osseous structures without focal destructive lesion.              Impression    1. There is no evidence of a pulmonary embolus. 2. Multifocal bilateral ground-glass and semi solid pulmonary infiltrates    within the right and left lower lobes, right middle lobe and lingula.  The    findings are highly suspicious for findings of COVID-19.    3. Advanced emphysematous changes. 4. Cardiomegaly. Ashly Stinson is no pericardial effusion.             EXAMINATION:    CT OF THE HEAD WITHOUT CONTRAST  11/13/2020 6:25 pm         TECHNIQUE:    CT of the head was performed without the administration of intravenous    contrast. Dose modulation, iterative reconstruction, and/or weight based    adjustment of the mA/kV was utilized to reduce the radiation dose to as low    as reasonably achievable.         COMPARISON:    September 12         HISTORY:    ORDERING SYSTEM PROVIDED HISTORY: unresponsive    TECHNOLOGIST PROVIDED HISTORY:    Reason for exam:->unresponsive    Has a \"code stroke\" or \"stroke alert\" been called? ->Yes         FINDINGS:    BRAIN/VENTRICLES: There is no acute intracranial hemorrhage, mass effect or    midline shift. No abnormal extra-axial fluid collection.  The gray-white    differentiation is maintained without evidence of an acute infarct. There is    prominence of the ventricles and sulci due to global parenchymal volume loss.     There are nonspecific areas of hypoattenuation within the periventricular and    subcortical white matter, which likely represent chronic microvascular ischemic change.         Chronic left basal ganglia lacunar infarction versus prominent perivascular    space, unchanged.         There is persistent contrast enhancement from prior intravenous contrast    administration.         ORBITS: The visualized portion of the orbits demonstrate no acute abnormality.         SINUSES: The visualized paranasal sinuses and mastoid air cells demonstrate    no acute abnormality.  Paranasal sinus mucosal thickening, likely chronic.         SOFT TISSUES/SKULL: No acute abnormality of the visualized skull or soft    tissues.              Impression    No acute intracranial abnormality.  Age-related loss of brain volume and    chronic periventricular ischemic changes.  Chronic left basal ganglia lacunar    infarctions versus prominent perivascular space, unchanged since the prior    examination.         Persistent contrast enhancement from recent intravenous contrast    administration. Assessment:    Active Problems:    Acute respiratory failure with hypoxia (HCC)  Resolved Problems:    * No resolved hospital problems. *      Plan:  1. Acute respiratory failure with hypoxia 2/2 Covid 19 pneumonia  -  Admitted to ICU after intubation  -  Decadron  -  ID consulted  -  Vent management per pulm/CC    2. Covid 19 pneumonia  -  ID consulted  -  Monitor labs  -  Vent  -  lovenox per COVID protocol  -  remdesivir    2. Severe emphysema  -  Follows with Dr. Margy Meredith as outpatient. 3.  Type 2 diabetes, controlled  -  ISS    4. Essential hypertension  -  BP 140s-170/65-85  -  Continue to monitor    5. Mixed hyperlipidemia  -  Previously on pravachol    6. Hyperkalemia- resolved  -  Continue to monitor      NOTE: This report was transcribed using voice recognition software. Every effort was made to ensure accuracy; however, inadvertent computerized transcription errors may be present.   Electronically signed by Jamie Sacks, MD on 11/16/2020 at 9:25 AM

## 2020-11-16 NOTE — PLAN OF CARE
Problem: Falls - Risk of:  Goal: Will remain free from falls  Description: Will remain free from falls  Outcome: Met This Shift     Problem: Restraint Use - Nonviolent/Non-Self-Destructive Behavior:  Goal: Absence of restraint indications  Description: Absence of restraint indications  Outcome: Met This Shift     Problem: Airway Clearance - Ineffective  Goal: Achieve or maintain patent airway  Outcome: Met This Shift     Problem: Gas Exchange - Impaired  Goal: Promote optimal lung function  Outcome: Met This Shift     Problem: Nutrition Deficits  Goal: Optimize nutrtional status  Outcome: Met This Shift

## 2020-11-16 NOTE — CARE COORDINATION
COVID positive. Pt currently on vent support, fio2 50%, PEEP-8. IV Remdesivir day 3, continue decadron iv. Fentanyl and versed gtts. Attempted to reach out to daughter, Deisy Aleman for discharge planning. VM left. Pt has hx of home with [facility] and DME through [facility].  Will continue to follow-O

## 2020-11-16 NOTE — PATIENT CARE CONFERENCE
Intensive Care Daily Quality Rounding Checklist      ICU Team Members: Dr. Jaleel Benites, Loi Stanton & Mica (residents), clinical pharmacist, charge nurse, bedside nurse, respiratory therapist     ICU Day #: NUMBER: 4     Intubation Date: November 13,2020     Ventilator Day #: NUMBER: 4     Central Line Insertion Date: November 13,2020                                                    Day #: NUMBER: 4      Arterial Line Insertion Date:  n/a                             Day #: n/a     DVT Prophylaxis: Lovenox    GI Prophylaxis: tube feeds     Lloyd Catheter Insertion Date: November 13,2020                                        Day #: 4                             Continued need (if yes, reason documented and discussed with physician): yes, strict Is and Os     Skin Issues/ Wounds and ordered treatment discussed on rounds: no issues     Goals/ Plans for the Day: Daily labs, wean vent, breathing treatments, continue critical care management, stop Versed-try weaning trials

## 2020-11-16 NOTE — PROGRESS NOTES
Critical Care Team - Daily Progress Note         Date and time: 11/16/2020 1:35 PM  Patient's name:  Sofia Xavier  Medical Record Number: [id number]  Patient's account/billing number: [de-identified]  Patient's YOB: 1945  Age: 67 y.o. Date of Admission: 11/13/2020  1:46 PM  Length of stay during current admission: 3      Primary Care Physician: Julio Ferreira MD  ICU Attending Physician: Dr. Trujillo Conception    Code Status: Full Code    Reason for ICU admission: Acute hypercapnic hypoxic respiratory failure secondary to Covid pneumonia      SUBJECTIVE:     BRIEF HISTORY: The patient is a 67 y.o. female with significant past medical history of COPD, diabetes, hypertension, hyperlipidemia presented to the ED for shortness of breath. Patient is currently intubated and sedated so history obtained from chart review.     Per emergency department, patient had history of 3 days of worsening shortness of breath. She is chronically on 3 L of oxygen at home when she ambulates but over the last few days has had increase her oxygen requirement to 5 L at all times. 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 She subsequently presented to the ED for evaluation.     Patient was found to be hypoxic and and placed on nasal cannula oxygen. Patient was Positive for COVID-19. While in the emergency department patient was placed on a nonrebreather and subsequently decompensated. She became altered and was taking agonal respirations. She was then intubated for further airway protection. ABG demonstrated respiratory acidosis with a CO2 of greater than 113. Patient was admitted to the ICU for further evaluation care. OVERNIGHT EVENTS:    11/15/20:  Some soft BP's overnight, responded to fluid bolus and switched from propofol gtt to versed gtt  11/16/20: Luwana Large, tolerating vent.  Attempt to wean sedation and pressure support    CURRENT kg/m².      PHYSICAL EXAM:  General: Intubated and sedated on the vent  Eyes: Pupils pinpoint but reactive  Ears: no obvious scars, no lesions, no masses, hearing intact  Mouth: ET tube in place  Head: normocephalic, atraumatic  Neck: no JVD, no adenopathy, no thyromegaly, neck is supple, trachea is midline.   Chest: no pain on palpation  Lungs: Clear to auscultation bilaterally but diminished  Heart: regular rate and regular rhythm, no murmur, normal S1, S2  Abdomen: soft, non-tender  Extremities: no lower extremity edema  Skin: normal color, normal texture, normal turgor, no rashes, no lesions  Neurologic: Limited secondary to patient being sedated  MEDICATIONS:    Scheduled Meds:   zinc sulfate  50 mg Oral Daily    [START ON 11/17/2020] enoxaparin  30 mg Subcutaneous Daily    insulin lispro  0-12 Units Subcutaneous 4 times per day    ascorbic acid  1,000 mg Oral QAM    remdesivir IVPB  100 mg Intravenous Q24H    Arformoterol Tartrate  15 mcg Nebulization BID    budesonide  0.5 mg Nebulization BID    ipratropium-albuterol  1 ampule Inhalation Q4H    piperacillin-tazobactam  3.375 g Intravenous Q8H    pantoprazole  40 mg Intravenous Daily    sodium chloride flush  10 mL Intravenous 2 times per day    vitamin B and C  1 tablet Oral QAM    Vitamin D  2,000 Units Oral QAM    dexamethasone  6 mg Intravenous Q12H    midazolam  2 mg Intravenous Once     Continuous Infusions:   fentaNYL 5 mcg/ml in 0.9%  ml infusion 125 mcg/hr (11/16/20 0612)    sodium chloride Stopped (11/16/20 1321)    midazolam Stopped (11/16/20 1321)    dextrose       PRN Meds:   sodium chloride, 30 mL, PRN  albuterol sulfate HFA, 2 puff, Q6H PRN  sodium chloride flush, 10 mL, PRN  acetaminophen, 650 mg, Q6H PRN    Or  acetaminophen, 650 mg, Q6H PRN  polyethylene glycol, 17 g, Daily PRN  promethazine, 12.5 mg, Q6H PRN    Or  ondansetron, 4 mg, Q6H PRN  guaiFENesin, 400 mg, 4x Daily PRN  glucose, 15 g, PRN  dextrose, 12.5 g, PRN  glucagon (rDNA), 1 mg, PRN  dextrose, 100 mL/hr, PRN        VENT SETTINGS (Comprehensive) (if applicable):  Vent Information  $Ventilation: $Subsequent Day  Equipment ID: [id number]  Vent Type: [id number]  Vent Mode: AC/VC  Vt Ordered: 450 mL  Rate Set: 18 bmp  Peak Flow: 55 L/min  Pressure Support: 0 cmH20  FiO2 : 50 %  SpO2: 94 %  SpO2/FiO2 ratio: 188  Sensitivity: 3  PEEP/CPAP: 8  I Time/ I Time %: 0 s  Humidification Source: HME  Additional Respiratory  Assessments  Pulse: 75  Resp: 17  SpO2: 94 %  $End Tidal CO2: 36  Position: Semi-Hunt's  Humidification Source: HME  Oral Care: Mouth moisturizer, Mouth suctioned  Subglottic Suction Done?: Yes  Cuff Pressure (cm H2O): 298 cm H2O    ABGs:   Recent Labs     11/16/20  0635   PH 7.309*   PCO2 47.7*   PO2 74.6*   HCO3 23.4   BE -3.0   O2SAT 93.2       Laboratory findings:    Complete Blood Count:   Recent Labs     11/14/20  0600 11/15/20  0600 11/16/20  0615   WBC 6.6 4.7 7.5   HGB 10.9* 9.7* 9.3*   HCT 36.7 32.3* 30.7*    162 207        Last 3 Blood Glucose:   Recent Labs     11/14/20  0600 11/15/20  0600 11/16/20  0615   GLUCOSE 158* 152* 208*        PT/INR:    Lab Results   Component Value Date    PROTIME 13.0 11/16/2020    INR 1.1 11/16/2020     PTT:    Lab Results   Component Value Date    APTT 26.5 11/16/2020       Comprehensive Metabolic Profile:   Recent Labs     11/14/20  0600 11/15/20  0600 11/16/20  0615    145 144   K 3.8 4.3 3.6   * 111* 111*   CO2 24 21* 23   BUN 10 27* 43*   CREATININE 0.4* 0.7 0.8   GLUCOSE 158* 152* 208*   CALCIUM 8.1* 8.3* 8.5*   PROT  --  6.0* 5.8*   LABALBU  --  2.6* 2.6*   BILITOT  --  0.2 0.2   ALKPHOS  --  118* 91   AST  --  48* 28   ALT  --  34* 27      Magnesium:   Lab Results   Component Value Date    MG 2.1 11/16/2020     Phosphorus:   Lab Results   Component Value Date    PHOS 3.7 11/16/2020     Ionized Calcium: No results found for: DOLORES       Troponin:   Recent Labs     11/15/20  0000   TROPONINI <0.01 Microbiology:  Cultures during this admission:     Blood cultures:                 [x] None drawn      [] Negative             []  Positive (Details:  )  Urine Culture:                   [x] None drawn      [] Negative             []  Positive (Details:  )  Sputum Culture:               [] None drawn       [] Negative             []  Positive (Details: pending )   Endotracheal aspirate:     [] None drawn       [] Negative             []  Positive (Details: pending )     Other pertinent Labs:     Radiology/Imaging:     Chest x-ray:  11/16/20:      ASSESSMENT:       - Acute respiratory failure with hypoxia, hypercarbia  - COVID-19 pneumonia  - Superimposed bacterial pneumonia  - COPD  -Type 2 diabetes    Additional assessment:        PLAN:     WEAN PER PROTOCOL:  [x] No   [] Yes  [] N/A    DISCONTINUE ANY LABS:   [x] No   [] Yes    ICU PROPHYLAXIS:  Stress ulcer:  [x] PPI Agent  [] B3Pjlds [] Sucralfate  [] Other:  VTE:   [x] Enoxaparin  [] Unfract. Heparin Subcut  [] EPC Cuffs    NUTRITION:  [] NPO [] Tube Feeding (Specify: ) [] TPN  [x] PO (Diet: DIET TUBE FEED CONTINUOUS/CYCLIC NPO; Semi-elemental; Orogastric; Continuous; 25; 40)    HOME MEDICATIONS RECONCILED: [] No  [x] Yes    INSULIN DRIP:   [x] No   [] Yes    CONSULTATION NEEDED:  [] No   [x] Yes    FAMILY UPDATED:    [x] No   [] Yes    TRANSFER OUT OF ICU:   [x] No   [] Yes       SYSTEMS ASSESSMENT     Neuro   AMS--most likely secondary to hypercapnia              -CT head negative     Intubated and sedated on the vent              -Start to wean sedation, stop Versed     Respiratory   Acute hypoxic and hypercapnic respiratory failure secondary to Covid pneumonia              -resp panel + SARS-COV2              -former smoker              -vit c, decadron (day 4), remdesivir (day 3)              -Pulmonology following              -procal 2.54    -abg 7.309/47.7/74./23.4. PF ratio 149.  Vent 450/18/8/50   -Wean sedation and attempt pressure the MICU team. The case was discussed in detail and plans for care were established. Review of Residents documentation was conducted and revisions were made as appropriate. I agree with the above documented exam, problem list and plan of care with the following additions:    Wean sedatives today and proceed with pressure support trials  Zosyn and Remdesivir per ID  Continue decadron q12h  Vitamin C/D/zinc  Bronchodilators    36 minutes of CCT spent with the patient, reviewing the chart including imaging studies, and discussing the case with other health care professionals. This time excludes procedures.      Mahendra Mota MD

## 2020-11-17 NOTE — PROGRESS NOTES
DAILY VENTILATOR WEANING ASSESSMENT PERFORMED    P/FIO2 Ratio =  191        (<100= do not Wean)                  Cs = 40           (<32= Instability)  Plat. Pressure = 24  MV =9.69  RSBI =    Instabilities:       Cardiovascular =       CNS =       Respiratory =       Metabolic =    Parameters    no    Wean per protocol  yes    Ask Physician for a weaning plan yes    Additional Comments: ask during rounds    Performed by Kun Alcantara RRT      Reference Table:    Cardiovascular     CNS      1. Mean BP less than or equal to 75   1. Neuromuscular blockade  2. Heart Rate greater than 130   2. RASS of -3, -4, -5  3. Myocardial Ischemia    3. RASS of +3, +4  4. Mechanical Assist Device    4. ICP greater than 15 or             Intracranial Hypertension         Respiratory      Metabolic  1. PEEP equal to or greater than 10cm/H20  1. Temp. (8hrs) less than 95 or > 103  2. Respiratory Rate greater than 35   2. WBC < 5000 or > 13977  3. Minute Volume greater than 15L  4. pH less than 7.30  5.  Deteriorating chest X-ray

## 2020-11-17 NOTE — PROGRESS NOTES
Spoke with patients daughter, updated her on her mothers hospital course, diagnosis and prognosis at this time. Extensive conversation had about what brought patient into the hospital and findings we have found. Discussed that patient was brought to the hospital by her neighbor son. The neighbor has been calling and asking for updates we cannot give him updates at this time because he is not on her chart. Spoke to the daughter about this. She states her mother and her mothers neighbor, Anson Becerra, are very good friends and he can be updated about her medical care. Anson Becerra can be updated about "[employer]" care.

## 2020-11-17 NOTE — PROGRESS NOTES
Intake/Output Summary (Last 24 hours) at 11/17/2020 1330  Last data filed at 11/17/2020 1200  Gross per 24 hour   Intake 2020.13 ml   Output 710 ml   Net 1310.13 ml     Wt Readings from Last 2 Encounters:   11/17/20 153 lb 14.1 oz (69.8 kg)   10/29/20 140 lb (63.5 kg)     Body mass index is 24.84 kg/m². PHYSICAL EXAM:  General: Intubated  Eyes:  Pupils equal and reactive  Ears: no obvious scars, no lesions, no masses, hearing intact  Mouth: ET tube in place  Head: normocephalic, atraumatic  Neck: no JVD, no adenopathy, no thyromegaly, neck is supple, trachea is midline.   Chest: no pain on palpation  Lungs: Clear to auscultation bilaterally , no wheezes, rhonchi  Heart: regular rate and regular rhythm, no murmur, normal S1, S2  Abdomen: soft, non-tender  Extremities: no lower extremity edema  Skin: normal color, normal texture, normal turgor, no rashes, no lesions  Neurologic:  Following commands, will give thumbs up, shakes her head yes and no to questions  MEDICATIONS:    Scheduled Meds:   dexamethasone  6 mg Intravenous Q12H    [START ON 11/18/2020] enoxaparin  40 mg Subcutaneous Daily    midazolam  2 mg Intravenous Q4H    zinc sulfate  50 mg Oral Daily    insulin lispro  0-12 Units Subcutaneous 4 times per day    ascorbic acid  1,000 mg Oral QAM    remdesivir IVPB  100 mg Intravenous Q24H    Arformoterol Tartrate  15 mcg Nebulization BID    budesonide  0.5 mg Nebulization BID    ipratropium-albuterol  1 ampule Inhalation Q4H    piperacillin-tazobactam  3.375 g Intravenous Q8H    pantoprazole  40 mg Intravenous Daily    sodium chloride flush  10 mL Intravenous 2 times per day    vitamin B and C  1 tablet Oral QAM    Vitamin D  2,000 Units Oral QAM     Continuous Infusions:   sodium chloride 12.5 mL/hr at 11/17/20 1109    fentaNYL 5 mcg/ml in 0.9%  ml infusion 150 mcg/hr (11/17/20 1304)    dextrose       PRN Meds:   sodium chloride, 30 mL, PRN  albuterol sulfate HFA, 2 puff, Q6H PRN  sodium chloride flush, 10 mL, PRN  acetaminophen, 650 mg, Q6H PRN    Or  acetaminophen, 650 mg, Q6H PRN  polyethylene glycol, 17 g, Daily PRN  promethazine, 12.5 mg, Q6H PRN    Or  ondansetron, 4 mg, Q6H PRN  guaiFENesin, 400 mg, 4x Daily PRN  glucose, 15 g, PRN  dextrose, 12.5 g, PRN  glucagon (rDNA), 1 mg, PRN  dextrose, 100 mL/hr, PRN        VENT SETTINGS (Comprehensive) (if applicable):  Vent Information  $Ventilation: $Subsequent Day  Skin Assessment: Clean, dry, & intact  Equipment ID: [id number]  Vent Type: [phone]  Vent Mode: AC/VC  Vt Ordered: 450 mL  Rate Set: 18 bmp  Peak Flow: 55 L/min  Pressure Support: 0 cmH20  FiO2 : 50 %  SpO2: 91 %  SpO2/FiO2 ratio: 182  Sensitivity: 3  PEEP/CPAP: 8  I Time/ I Time %: 0 s  Humidification Source: HME  Additional Respiratory  Assessments  Pulse: 83  Resp: 18  SpO2: 91 %  $End Tidal CO2: 36  Position: Semi-Hunt's  Humidification Source: HME  Oral Care: Mouth moisturizer, Mouth suctioned  Subglottic Suction Done?: Yes  Cuff Pressure (cm H2O): 298 cm H2O    ABGs:   Recent Labs     11/17/20  0549   PH 7.365   PCO2 41.4   PO2 95.4   HCO3 23.1   BE -2.1   O2SAT 97.3       Laboratory findings:    Complete Blood Count:   Recent Labs     11/15/20  0600 11/16/20  0615 11/17/20  0549   WBC 4.7 7.5 7.2   HGB 9.7* 9.3* 8.7*   HCT 32.3* 30.7* 28.5*    207 209        Last 3 Blood Glucose:   Recent Labs     11/15/20  0600 11/16/20  0615 11/17/20  0549   GLUCOSE 152* 208* 340*        PT/INR:    Lab Results   Component Value Date    PROTIME 11.9 11/17/2020    INR 1.0 11/17/2020     PTT:    Lab Results   Component Value Date    APTT 24.9 11/17/2020       Comprehensive Metabolic Profile:   Recent Labs     11/15/20  0600 11/16/20 0615 11/17/20  0549    144 140   K 4.3 3.6 3.6   * 111* 108*   CO2 21* 23 26   BUN 27* 43* 39*   CREATININE 0.7 0.8 0.7   GLUCOSE 152* 208* 340*   CALCIUM 8.3* 8.5* 8.3*   PROT 6.0* 5.8* 5.4*   LABALBU 2.6* 2.6* 2.8*   BILITOT 0.2 0.2 0.3   ALKPHOS 118* 91 86   AST 48* 28 21   ALT 34* 27 23      Magnesium:   Lab Results   Component Value Date    MG 2.2 11/17/2020     Phosphorus:   Lab Results   Component Value Date    PHOS 2.7 11/17/2020     Ionized Calcium: No results found for: CAION       Troponin:   Recent Labs     11/15/20  0000   TROPONINI <0.01       Microbiology:  Cultures during this admission:     Blood cultures:                 [x] None drawn      [] Negative             []  Positive (Details:  )  Urine Culture:                   [x] None drawn      [] Negative             []  Positive (Details:  )  Sputum Culture:               [] None drawn       [] Negative             []  Positive (Details: pending )   Endotracheal aspirate:     [] None drawn       [] Negative             []  Positive (Details: pending )     Other pertinent Labs:     Radiology/Imaging:     Chest x-ray:  11/17/20:      ASSESSMENT:       - Acute respiratory failure with hypoxia, hypercarbia  - COVID-19 pneumonia  - Superimposed bacterial pneumonia  - COPD  -Type 2 diabetes    Additional assessment:        PLAN:     WEAN PER PROTOCOL:  [x] No   [] Yes  [] N/A    DISCONTINUE ANY LABS:   [x] No   [] Yes    ICU PROPHYLAXIS:  Stress ulcer:  [x] PPI Agent  [] [id number] [] Sucralfate  [] Other:  VTE:   [x] Enoxaparin  [] Unfract. Heparin Subcut  [] EPC Cuffs    NUTRITION:  [] NPO [] Tube Feeding (Specify: ) [] TPN  [x] PO (Diet: DIET TUBE FEED CONTINUOUS/CYCLIC NPO; Semi-elemental; Orogastric; Continuous; 25; 40)    HOME MEDICATIONS RECONCILED: [] No  [x] Yes    INSULIN DRIP:   [x] No   [] Yes    CONSULTATION NEEDED:  [] No   [x] Yes    FAMILY UPDATED:    [x] No   [] Yes    TRANSFER OUT OF ICU:   [x] No   [] Yes       SYSTEMS ASSESSMENT     Neuro   AMS--most likely secondary to hypercapnia              -CT head negative     Intubated and sedated on the vent              -Off of sedation.   Versed every 4 hours as needed for agitation     Respiratory   Acute hypoxic and hypercapnic respiratory failure secondary to Covid pneumonia              -resp panel + SARS-COV2              -former smoker              -vit c, decadron (day 4), remdesivir (day 3)              -Pulmonology following              -procal 2.54    -abg 7.365/41.4/95.4/23. 1. PF ratio 191. Vent 450/18/8/50   -Spontaneous breathing trial--patient became tachycardic and hypertensive. Trial stopped. Will attempt again tomorrow  Superimposed bacterial PNA   -elevated procal-2.54   -resp culture   -Zosyn, day 3    Respiratory acidosis--resolved              -Was placed on a nonrebreather and subsequently became hypercapnic and intubated              -trial of pressure support or vent changes tomorrow      COPD              -O2 PRN and with ambulation @ home, usual 3L              -Brovana, Pulmicort, nebs     Cardiovascular   CTA              -No evidence of PE     No acute issues      Gastrointestinal   Tube feeds   PPI     Renal   No acute issues     Infectious Disease   COVID PNA              -ID following              -Decadron, 6mg q12. Remdesivir, day 4, decadron day 5. Vitamin C, zinc    Superimposed bacterial PNA   -elevated procal-2.54   -resp culture   -Zosyn, day 3     Hematology/Oncology   Lymphopenia              -2/2 covid pna    Anemia   -Likely iatrogenic, no signs of bleeding. Continue to monitor     Endocrine   thyroid nodules  DM -sugars uncontrolled, second secondary to Decadron. Add 10 Lantus daily. Signs scale  Patient had a CTA with no PE, decreased to DVT prophylactic dose of Lovenox     Social/Spiritual/DNR/Other   Full      Diet: DIET TUBE FEED CONTINUOUS/CYCLIC NPO; Semi-elemental; Orogastric; Continuous; 25; 40  CODE Status: Full Code    Dispo: maintain ICU level care    ASSESSMENT/ PLAN   1. As above  2. Spontaneous breathing trial--became tachycardic and hypertensive, will try again tomorrow  3. Versed q4 hours PRN  4. Add Lantus 10u daily  5. GI prophylaxis-Protonix  6.  DVT Prophylaxis-Lovenox  7. Discuss case and plan with attending, Dr. Celine Jo DO  Resident, PGY-2  11/17/2020  1:30 PM    I personally saw, examined and provided care for the patient. Radiographs, labs and medication list were reviewed by me independently. I spoke with bedside nursing, therapists and consultants. Critical care services and times documented are independent of procedures and multidisciplinary rounds with Residents. Additionally comprehensive, multidisciplinary rounds were conducted with the MICU team. The case was discussed in detail and plans for care were established. Review of Residents documentation was conducted and revisions were made as appropriate. I agree with the above documented exam, problem list and plan of care with the following additions:    Failed SBT this AM d/t tachycardia and hypertension  Repeat SBT tomorrow with the assistance of precedex  CXR has been improving  Remains on decadron  Remdesivir/Zosyn per ID  Bronchodilators  Add long acting insulin for hyperglycemia     36 minutes of CCT spent with the patient, reviewing the chart including imaging studies, and discussing the case with other health care professionals. This time excludes procedures.      Topher Rosales MD

## 2020-11-17 NOTE — PLAN OF CARE
Problem: Inadequate oral food/beverage intake (NI-2.1)  Goal: Food and/or Nutrient Delivery  Pt tolerate EN at goal rate  Description: Individualized approach for food/nutrient provision.   Outcome: Met This Shift

## 2020-11-17 NOTE — PROGRESS NOTES
11/17/2020  11:56 AM      Comprehensive Nutrition Assessment    Type and Reason for Visit:  Reassess    Nutrition Recommendations/Plan: Continue current EN while TF needed for nutrition support    Nutrition Assessment:  Pt remains intubated/sedated but wean trial for extubation planned. On TF via OGT. Will monitor status changes/weaning progress. No recommendations at this time. Malnutrition Assessment:  Malnutrition Status: At risk for malnutrition (Comment)    Context:  Chronic Illness     Findings of the 6 clinical characteristics of malnutrition:  Energy Intake:  Mild decrease in energy intake (Comment)(Current NPO/intubated)  Weight Loss:  7 - Greater than 20% over 1 year     Body Fat Loss:  Unable to assess(Covid Isolation)     Muscle Mass Loss:  Unable to assess(Covid Isolation)    Fluid Accumulation:  Unable to assess     Strength:  Not Performed    Estimated Daily Nutrient Needs:  Energy (kcal):  ; Weight Used for Energy Requirements:  Admission     Protein (g):  60-70 (1.3-1.5 g/kg);  Weight Used for Protein Requirements:  Admission        Fluid (ml/day):  Per Critical care; Method Used for Fluid Requirements:  1 ml/kcal      Nutrition Related Findings:  intubated, +I/O 3L, no edema, OGT to TF, hypo BS, missing teeth      Wounds:  None       Current Nutrition Therapies:    Current Tube Feeding (TF) Orders:  · Feeding Route: Orogastric  · Formula: Semi-Elemental  · Schedule: Continuous  · Additives/Modulars:    · Water Flushes: 30 ml/hr =720 ml/d  · Current TF & Flush Orders Provides: at goal  · Goal TF & Flush Orders Provides: 1152 [address], 72 g pro, 1499 ml total free water      Anthropometric Measures:  · Height: 5' 6\" (167.6 cm)  · Current Body Weight: 153 lb (69.4 kg)(11/17 bedwt - question accuracy, as wt 11/16 per bed 105# and 101 on initial Eval)   · Admission Body Weight:      · Usual Body Weight: 132 lb (59.9 kg)(per EMR x 1 yr)     · Ideal Body Weight: 130 lbs; % Ideal Body Weight 117.7 %   · BMI: 24.7  · BMI Categories: Underweight (BMI less than 22) age over 72       Nutrition Diagnosis:   · Inadequate oral intake related to impaired respiratory function(current vent and hx. severe COPD) as evidenced by NPO or clear liquid status due to medical condition, intubation, weight loss greater than or equal to 20% in 1 year, BMI      Nutrition Interventions:   Food and/or Nutrient Delivery:  Continue Current Tube Feeding  Nutrition Education/Counseling:  No recommendation at this time   Coordination of Nutrition Care:  Continue to monitor while inpatient    Goals:  Pt monserrat EN at goal rate       Nutrition Monitoring and Evaluation:   Food/Nutrient Intake Outcomes:  Enteral Nutrition Intake/Tolerance  Physical Signs/Symptoms Outcomes:  Biochemical Data, GI Status, Fluid Status or Edema, Hemodynamic Status, Weight, Skin, Nutrition Focused Physical Findings     Discharge Planning:     Too soon to determine     Electronically signed by Sindhu Urias RD, CNSC, LD on 11/17/20 at 11:56 AM EST    Contact: [phone]

## 2020-11-17 NOTE — CARE COORDINATION
COVID positive. Updated discharge plan of care. Cont vent support, FIO2 50%, PEEP-8. Day 3 Remdesivir. Cont zosyn and iv decadron. Just spoke with daughter, Radhika Baldwin, who was unaware that the patient was currently in hospital. Updated dinora on patient condition. We discussed home situation. Pt lives alone in ranch home, laundry on 1st floor. Has home o2 through [id number]. Daughter is receptive to any help with home needs. Backdoor referral given to [employer]  WILL CONTINUE TO Dorcas Siddiqi

## 2020-11-17 NOTE — PROGRESS NOTES
[facility] Progress Note    Admitting Date and Time: 11/13/2020  1:46 PM  Admit Dx: Acute respiratory failure with hypoxia (HCC) [J96.01]  Acute respiratory failure with hypoxia (HCC) [J96.01]  Acute respiratory failure with hypoxia (HCC) [J96.01]  Acute respiratory failure with hypoxia (HCC) [J96.01]    Subjective:  Patient is being followed for Acute respiratory failure with hypoxia ([address] [J96.01]  Acute respiratory failure with hypoxia ([address] [J96.01]  Acute respiratory failure with hypoxia ([address] [J96.01]  Acute respiratory failure with hypoxia ([address] [J96.01]   Pt is intubated and sedated. Hopeful for less sedation and weaning. ROS: denies fever, chills, cp, sob, n/v, HA unless stated above.       zinc sulfate  50 mg Oral Daily    enoxaparin  30 mg Subcutaneous Daily    insulin lispro  0-12 Units Subcutaneous 4 times per day    ascorbic acid  1,000 mg Oral QAM    remdesivir IVPB  100 mg Intravenous Q24H    Arformoterol Tartrate  15 mcg Nebulization BID    budesonide  0.5 mg Nebulization BID    ipratropium-albuterol  1 ampule Inhalation Q4H    piperacillin-tazobactam  3.375 g Intravenous Q8H    pantoprazole  40 mg Intravenous Daily    sodium chloride flush  10 mL Intravenous 2 times per day    vitamin B and C  1 tablet Oral QAM    Vitamin D  2,000 Units Oral QAM    dexamethasone  6 mg Intravenous Q12H    midazolam  2 mg Intravenous Once     sodium chloride, 30 mL, PRN  albuterol sulfate HFA, 2 puff, Q6H PRN  sodium chloride flush, 10 mL, PRN  acetaminophen, 650 mg, Q6H PRN    Or  acetaminophen, 650 mg, Q6H PRN  polyethylene glycol, 17 g, Daily PRN  promethazine, 12.5 mg, Q6H PRN    Or  ondansetron, 4 mg, Q6H PRN  guaiFENesin, 400 mg, 4x Daily PRN  glucose, 15 g, PRN  dextrose, 12.5 g, PRN  glucagon (rDNA), 1 mg, PRN  dextrose, 100 mL/hr, PRN         Objective:    BP (!) 122/55   Pulse 80   Temp 98.2 °F (36.8 °C) (Bladder)   Resp 17   Ht 5' 6\" (1.676 m)   Wt 153 lb 14.1 oz (69.8 kg)   SpO2 93%   BMI 24.84 kg/m²     Due to the patient's COVID-19-positive status, to reduce exposure, contamination, and in an effort to conserve PPE, this patient was evaluated through a combination of review of the medical record, nursing assessments, other physicians' exams and assessments, as well as telemedicine interaction. Recent Labs     11/15/20  0600 11/16/20  0615 11/17/20  0549    144 140   K 4.3 3.6 3.6   * 111* 108*   CO2 21* 23 26   BUN 27* 43* 39*   CREATININE 0.7 0.8 0.7   GLUCOSE 152* 208* 340*   CALCIUM 8.3* 8.5* 8.3*       Recent Labs     11/15/20  0600 11/16/20  0615 11/17/20  0549   WBC 4.7 7.5 7.2   RBC 3.46* 3.31* 3.07*   HGB 9.7* 9.3* 8.7*   HCT 32.3* 30.7* 28.5*   MCV 93.4 92.7 92.8   MCH 28.0 28.1 28.3   MCHC 30.0* 30.3* 30.5*   RDW 15.6* 15.7* 15.8*    207 209   MPV 10.7 10.7 10.9       Radiology:   EXAMINATION:    CTA OF THE CHEST 11/13/2020 6:00 pm         TECHNIQUE:    CTA of the chest was performed after the administration of intravenous    contrast.  Multiplanar reformatted images are provided for review.  MIP    images are provided for review. Dose modulation, iterative reconstruction,    and/or weight based adjustment of the mA/kV was utilized to reduce the    radiation dose to as low as reasonably achievable.         COMPARISON:    11/06/2019         HISTORY:    ORDERING SYSTEM PROVIDED HISTORY: concern for covid    TECHNOLOGIST PROVIDED HISTORY:    Reason for exam:->concern for covid         FINDINGS:    Pulmonary Arteries: Pulmonary arteries are adequately opacified for    evaluation.  No evidence of intraluminal filling defect to suggest pulmonary    embolism.  Main pulmonary artery is normal in caliber.         Mediastinum: No evidence of mediastinal lymphadenopathy.  The heart is    enlarged.  There is no pericardial effusion.         Lungs/pleura:  Advanced emphysematous changes are noted.  The centrilobular    emphysematous changes are most prominent within the upper lobes.         Bilateral lower lobe ground-glass infiltrates are noted.  Periphery within    the lung bases semi solid infiltrates are also noted.  There are small    infiltrate seen within the lingula and right middle lobe.      Upper Abdomen: Limited images of the upper abdomen are unremarkable.  There    are 2 stones seen within the gallbladder lumen.  There is no evidence of    acute cholecystitis.         Soft Tissues/Bones:  Age related degenerative changes of the visualized    osseous structures without focal destructive lesion.              Impression    1. There is no evidence of a pulmonary embolus. 2. Multifocal bilateral ground-glass and semi solid pulmonary infiltrates    within the right and left lower lobes, right middle lobe and lingula.  The    findings are highly suspicious for findings of COVID-19.    3. Advanced emphysematous changes. 4. Cardiomegaly. Cirilo Ozzy is no pericardial effusion.             EXAMINATION:    CT OF THE HEAD WITHOUT CONTRAST  11/13/2020 6:25 pm         TECHNIQUE:    CT of the head was performed without the administration of intravenous    contrast. Dose modulation, iterative reconstruction, and/or weight based    adjustment of the mA/kV was utilized to reduce the radiation dose to as low    as reasonably achievable.         COMPARISON:    September 12         HISTORY:    ORDERING SYSTEM PROVIDED HISTORY: unresponsive    TECHNOLOGIST PROVIDED HISTORY:    Reason for exam:->unresponsive    Has a \"code stroke\" or \"stroke alert\" been called? ->Yes         FINDINGS:    BRAIN/VENTRICLES: There is no acute intracranial hemorrhage, mass effect or    midline shift. No abnormal extra-axial fluid collection.  The gray-white    differentiation is maintained without evidence of an acute infarct. There is    prominence of the ventricles and sulci due to global parenchymal volume loss.     There are nonspecific areas of hypoattenuation within the periventricular and    subcortical white matter, which likely represent chronic microvascular    ischemic change.         Chronic left basal ganglia lacunar infarction versus prominent perivascular    space, unchanged.         There is persistent contrast enhancement from prior intravenous contrast    administration.         ORBITS: The visualized portion of the orbits demonstrate no acute abnormality.         SINUSES: The visualized paranasal sinuses and mastoid air cells demonstrate    no acute abnormality.  Paranasal sinus mucosal thickening, likely chronic.         SOFT TISSUES/SKULL: No acute abnormality of the visualized skull or soft    tissues.              Impression    No acute intracranial abnormality.  Age-related loss of brain volume and    chronic periventricular ischemic changes.  Chronic left basal ganglia lacunar    infarctions versus prominent perivascular space, unchanged since the prior    examination.         Persistent contrast enhancement from recent intravenous contrast    administration. Assessment:    Active Problems:    Acute respiratory failure with hypoxia (HCC)  Resolved Problems:    * No resolved hospital problems. *      Plan:  1. Acute respiratory failure with hypoxia 2/2 Covid 19 pneumonia  -  Admitted to ICU after intubation  -  Decadron  -  ID consulted  -  Vent management per pulm/CC    2. Covid 19 pneumonia  -  ID consulted  -  Monitor labs  -  Vent  -  lovenox per COVID protocol  -  remdesivir day 4  -  Decadron day 5    2. Severe emphysema  -  Follows with Dr. Luke Ashby as outpatient. -  Tobacco use history  -  Brovana, pulmicort nebs  -  Generally on 3L O2 at home    3. Type 2 diabetes, A1C 6.6 in January 2020  -  Hyperglycemia:  -300 range  -  Medium dose ISS    4. Essential hypertension  -  BP 140s-170/65-85  -  Continue to monitor    5. Mixed hyperlipidemia  -  Previously on pravachol    6.   Hyperkalemia- resolved  -  Continue to monitor      NOTE: This report was transcribed using voice recognition software. Every effort was made to ensure accuracy; however, inadvertent computerized transcription errors may be present.   Electronically signed by Cheryl Finch MD on 11/17/2020 at 7:30 AM
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
## 2020-11-17 NOTE — PLAN OF CARE
Problem: Falls - Risk of:  Goal: Will remain free from falls  Description: Will remain free from falls  11/17/2020 0904 by Zenaida Larkin RN  Outcome: Met This Shift  11/17/2020 0134 by Yariel Caruso RN  Outcome: Met This Shift  Goal: Absence of physical injury  Description: Absence of physical injury  11/17/2020 0904 by Zenaida Larkin RN  Outcome: Met This Shift  11/17/2020 0134 by Yariel Caruso RN  Outcome: Met This Shift     Problem: Restraint Use - Nonviolent/Non-Self-Destructive Behavior:  Goal: Absence of restraint indications  Description: Absence of restraint indications  11/17/2020 0904 by Zenaida Larkin RN  Outcome: Met This Shift  11/17/2020 0134 by Yariel Caruso RN  Outcome: Not Met This Shift  Goal: Absence of restraint-related injury  Description: Absence of restraint-related injury  11/17/2020 0904 by Zenaida Larkin RN  Outcome: Met This Shift  11/17/2020 0134 by Yariel Caruso RN  Outcome: Met This Shift     Problem: Airway Clearance - Ineffective  Goal: Achieve or maintain patent airway  11/17/2020 0904 by Zenaida Larkin RN  Outcome: Met This Shift  11/17/2020 0134 by Yariel Caruso RN  Outcome: Met This Shift     Problem: Gas Exchange - Impaired  Goal: Absence of hypoxia  11/17/2020 0904 by Zenaida Larkin RN  Outcome: Met This Shift  11/17/2020 0134 by Yariel Caruso RN  Outcome: Met This Shift  Goal: Promote optimal lung function  11/17/2020 0904 by Zenaida Larkin RN  Outcome: Met This Shift  11/17/2020 0134 by Yariel Caruso RN  Outcome: Met This Shift     Problem: Breathing Pattern - Ineffective  Goal: Ability to achieve and maintain a regular respiratory rate  11/17/2020 0904 by Zenaida Larkin RN  Outcome: Met This Shift  11/17/2020 0134 by Yariel Caruso RN  Outcome: Met This Shift     Problem:  Body Temperature -  Risk of, Imbalanced  Goal: Ability to maintain a body temperature within defined limits  11/17/2020 0904 by Zenaida Larkin RN  Outcome: Met This Shift  11/17/2020 0134 by Melissa Saunders RN  Outcome: Met This Shift  Goal: Will regain or maintain usual level of consciousness  11/17/2020 0904 by Amanda Ernst RN  Outcome: Met This Shift  11/17/2020 0134 by Melissa Saunders RN  Outcome: Met This Shift  Goal: Complications related to the disease process, condition or treatment will be avoided or minimized  11/17/2020 0904 by Amanda Ernst RN  Outcome: Met This Shift  11/17/2020 0134 by Melissa Saunders RN  Outcome: Met This Shift     Problem: Isolation Precautions - Risk of Spread of Infection  Goal: Prevent transmission of infection  11/17/2020 0904 by Amanda Ernst RN  Outcome: Met This Shift  11/17/2020 0134 by Melissa Saunders RN  Outcome: Met This Shift     Problem: Nutrition Deficits  Goal: Optimize nutrtional status  Outcome: Met This Shift     Problem: Risk for Fluid Volume Deficit  Goal: Maintain normal heart rhythm  Outcome: Met This Shift  Goal: Maintain absence of muscle cramping  Outcome: Met This Shift  Goal: Maintain normal serum potassium, sodium, calcium, phosphorus, and pH  Outcome: Met This Shift     Problem: Loneliness or Risk for Loneliness  Goal: Demonstrate positive use of time alone when socialization is not possible  Outcome: Met This Shift     Problem: Fatigue  Goal: Verbalize increase energy and improved vitality  Outcome: Met This Shift     Problem: Patient Education: Go to Patient Education Activity  Goal: Patient/Family Education  Outcome: Met This Shift     Problem: Skin Integrity:  Goal: Will show no infection signs and symptoms  Description: Will show no infection signs and symptoms  11/17/2020 0904 by Amanda Ernst RN  Outcome: Met This Shift  11/17/2020 0134 by Melissa Saunders RN  Outcome: Met This Shift  Goal: Absence of new skin breakdown  Description: Absence of new skin breakdown  11/17/2020 0904 by Amanda Ernst RN  Outcome: Met This Shift  11/17/2020 0134 by Melissa Saunders RN  Outcome: Met This Shift

## 2020-11-17 NOTE — PATIENT CARE CONFERENCE
..  Intensive Care Daily Quality Rounding Checklist      ICU Team Members: n/a    ICU Day #: NUMBER: 5    Intubation Date: November 13    Ventilator Day #: NUMBER: 5    Central Line Insertion Date: November 13        Day #: NUMBER: 5     Arterial Line Insertion Date:  n/a      Day #: n/a    DVT Prophylaxis: Lovenox    GI Prophylaxis: Tube feeding    Lloyd Catheter Insertion Date: November 13       Day #: 5      Continued need (if yes, reason documented and discussed with physician): yes, strict I & O     Skin Issues/ Wounds and ordered treatment discussed on rounds: none    Goals/ Plans for the Day: daily labs & abgs, weaning trial & attempt to extubate pt, start lantus today for coverage,

## 2020-11-17 NOTE — PROGRESS NOTES
ID Progress Note                [address] [facility] [address], [place], [address] [facility]            Phone [phone]     Fax [phone]      Chief complaint   unchanged    Subjective:     On 50%Fio2 and 8 of PEEP  Sedation has been decreased in order to wean her            Objective:    Vitals:    11/17/20 1630   BP: (!) 173/111   Pulse: 118   Resp: 14   Temp:    SpO2: 95%     VENT SETTINGS:   Vent Information  $Ventilation: $Subsequent Day  Skin Assessment: Clean, dry, & intact  Equipment ID: 20  Vent Type: 840  Vent Mode: AC/VC  Vt Ordered: 450 mL  Rate Set: 18 bmp  Peak Flow: 55 L/min  Pressure Support: 0 cmH20  FiO2 : (S) 40 %  SpO2: 95 %  SpO2/FiO2 ratio: 188  Sensitivity: 3  PEEP/CPAP: 8  I Time/ I Time %: 0 s  Humidification Source: [facility]  General Appearance:   Intubated/sedated   HEENT:    Normocephalic,PERRA   Lungs:     Clear to auscultation bilaterally, no wheeze , crackles   Heart:    Regular rate and rhythm, no murmur   Abdomen:     Soft, non-tender, not distended  bowel sounds present,   Extremities:   No edema,no open wound,no erythema, non  tender   Skin:   no rashes or lesions   Rt IJ- 11/13/2020    Labs:  Recent Labs     11/15/20  0600 11/16/20  0615 11/17/20  0549   WBC 4.7 7.5 7.2   RBC 3.46* 3.31* 3.07*   HGB 9.7* 9.3* 8.7*   HCT 32.3* 30.7* 28.5*   MCV 93.4 92.7 92.8   MCH 28.0 28.1 28.3   MCHC 30.0* 30.3* 30.5*   RDW 15.6* 15.7* 15.8*    207 209   MPV 10.7 10.7 10.9     CMP:    Lab Results   Component Value Date     11/17/2020    K 3.6 11/17/2020    K 5.7 11/13/2020     11/17/2020    CO2 26 11/17/2020    BUN 39 11/17/2020    CREATININE 0.7 11/17/2020    CREATININE 0.5 01/23/2019    GFRAA >60 11/17/2020    LABGLOM >60 11/17/2020    GLUCOSE 340 11/17/2020    PROT 5.4 11/17/2020    LABALBU 2.8 11/17/2020    CALCIUM 8.3 11/17/2020    BILITOT 0.3 11/17/2020    ALKPHOS 86 11/17/2020    AST 21 11/17/2020    ALT 23 11/17/2020          Microbiology :  No results for input(s): BC in the last 72 hours. No results for input(s): Arturo Lav in the last 72 hours. No results for input(s): LABURIN in the last 72 hours. No results for input(s): CULTRESP in the last 72 hours. No results for input(s): WNDABS in the last 72 hours. Radiology :  XR CHEST PORTABLE   Final Result   1. No significant interval change in the multifocal bilateral lower lobe   infiltrates. 2. Stable position of the support lines and tubes. There is an NG tube   within the stomach. XR ABDOMEN FOR NG/OG/NE TUBE PLACEMENT   Final Result   The enteric tube is in good position in the stomach. XR CHEST PORTABLE   Final Result   New right IJ line with the distal tip in the SVC. No pneumothorax. No other   significant interval change. XR CHEST PORTABLE   Final Result   Status post intubation with ET tube approximately 4.4 cm from the level of   the drew. Increasing airspace disease bilaterally predominantly in the lower lobes   consistent with multifocal pneumonia. CT HEAD WO CONTRAST   Final Result   No acute intracranial abnormality. Age-related loss of brain volume and   chronic periventricular ischemic changes. Chronic left basal ganglia lacunar   infarctions versus prominent perivascular space, unchanged since the prior   examination. Persistent contrast enhancement from recent intravenous contrast   administration. CTA PULMONARY W CONTRAST   Final Result   1. There is no evidence of a pulmonary embolus. 2. Multifocal bilateral ground-glass and semi solid pulmonary infiltrates   within the right and left lower lobes, right middle lobe and lingula. The   findings are highly suspicious for findings of COVID-19.   3. Advanced emphysematous changes. 4. Cardiomegaly. There is no pericardial effusion. XR CHEST PORTABLE   Final Result   1.   When compared to prior chest series there has been interval worsening in   the retrocardiac and bibasilar interstitial opacities. (Broad differential which includes worsening interstitial lung disease,   superimposed infection, and edema. Please correlate with clinical   presentation.)      2. Lung hyperinflation and coarse interstitial markings suggestive of   underlying COPD. 3.  Atherosclerotic disease. URINARY CATHETER OUTPUT (Lloyd):  Urethral Catheter Temperature probe 16 fr-Output (mL): 100 mL    Assessment and Plan:      · Acute resp failure  · COVID 19 pneumonia  · Secondary bacterial infection     Plan  - Yamile Ferrer. 3.75 q8hrly DAY 4 based on lung changes, high procalcitonin  Remdesivir, watch closely, DAY 4  Decadron 6mg q12, changed to 6 mg daily  resp culture  On lovenox  Vit B/thiamine/c  resp culture results pending not back yet    Electronically signed by Uriel Lay MD on 11/17/2020 at 5:03 PM

## 2020-11-18 NOTE — PROGRESS NOTES
ID Progress Note                [address] [facility] [address], [place], [address] [facility]            Phone [phone]     Fax [phone]      Chief complaint   unchanged    Subjective:     On 50%Fio2 and 8 of PEEP  Sedation has been decreased in order to wean her  Doing good on pressure support          Objective:    Vitals:    11/18/20 1200   BP: (!) 88/49   Pulse: 103   Resp: 15   Temp: 97.6 °F (36.4 °C)   SpO2: 93%     VENT SETTINGS:   Vent Information  $Ventilation: $Subsequent Day  Skin Assessment: Clean, dry, & intact  Equipment ID: 20  Vent Type: (P) 840  Vent Mode: (P) PS  Vt Ordered: 450 mL  Rate Set: 0 bmp  Peak Flow: 55 L/min  Pressure Support: 10 cmH20  FiO2 : 50 %  SpO2: 93 %  SpO2/FiO2 ratio: 186  Sensitivity: 3  PEEP/CPAP: 8  I Time/ I Time %: 0 s  Humidification Source: E  General Appearance:   Intubated/awake   HEENT:    Normocephalic,PERRA   Lungs:     Clear to auscultation bilaterally, no wheeze , crackles   Heart:    Regular rate and rhythm, no murmur   Abdomen:     Soft, non-tender, not distended  bowel sounds present,   Extremities:   No edema,no open wound,no erythema, non  tender   Skin:   no rashes or lesions   Rt IJ- 11/13/2020    Labs:  Recent Labs     11/16/20  0615 11/17/20  0549 11/18/20  0522   WBC 7.5 7.2 6.2   RBC 3.31* 3.07* 3.06*   HGB 9.3* 8.7* 8.5*   HCT 30.7* 28.5* 28.8*   MCV 92.7 92.8 94.1   MCH 28.1 28.3 27.8   MCHC 30.3* 30.5* 29.5*   RDW 15.7* 15.8* 15.8*    209 192   MPV 10.7 10.9 10.9     CMP:    Lab Results   Component Value Date     11/18/2020    K 3.5 11/18/2020    K 5.7 11/13/2020     11/18/2020    CO2 26 11/18/2020    BUN 31 11/18/2020    CREATININE 0.5 11/18/2020    CREATININE 0.5 01/23/2019    GFRAA >60 11/18/2020    LABGLOM >60 11/18/2020    GLUCOSE 244 11/18/2020    PROT 5.1 11/18/2020    LABALBU 2.6 11/18/2020    CALCIUM 8.1 11/18/2020    BILITOT 0.3 11/18/2020    ALKPHOS 74 11/18/2020    AST 21 11/18/2020    ALT 24 11/18/2020 Microbiology :  No results for input(s): BC in the last 72 hours. No results for input(s): Le Marguerite in the last 72 hours. No results for input(s): LABURIN in the last 72 hours. No results for input(s): CULTRESP in the last 72 hours. No results for input(s): WNDABS in the last 72 hours. Radiology :  XR CHEST PORTABLE   Final Result   1. No significant interval change in the multifocal bilateral lower lobe   infiltrates. 2. Stable position of the support lines and tubes. There is an NG tube   within the stomach. XR ABDOMEN FOR NG/OG/NE TUBE PLACEMENT   Final Result   The enteric tube is in good position in the stomach. XR CHEST PORTABLE   Final Result   New right IJ line with the distal tip in the SVC. No pneumothorax. No other   significant interval change. XR CHEST PORTABLE   Final Result   Status post intubation with ET tube approximately 4.4 cm from the level of   the drew. Increasing airspace disease bilaterally predominantly in the lower lobes   consistent with multifocal pneumonia. CT HEAD WO CONTRAST   Final Result   No acute intracranial abnormality. Age-related loss of brain volume and   chronic periventricular ischemic changes. Chronic left basal ganglia lacunar   infarctions versus prominent perivascular space, unchanged since the prior   examination. Persistent contrast enhancement from recent intravenous contrast   administration. CTA PULMONARY W CONTRAST   Final Result   1. There is no evidence of a pulmonary embolus. 2. Multifocal bilateral ground-glass and semi solid pulmonary infiltrates   within the right and left lower lobes, right middle lobe and lingula. The   findings are highly suspicious for findings of COVID-19.   3. Advanced emphysematous changes. 4. Cardiomegaly. There is no pericardial effusion. XR CHEST PORTABLE   Final Result   1.   When compared to prior chest series there has been interval worsening in the retrocardiac and bibasilar interstitial opacities. (Broad differential which includes worsening interstitial lung disease,   superimposed infection, and edema. Please correlate with clinical   presentation.)      2. Lung hyperinflation and coarse interstitial markings suggestive of   underlying COPD. 3.  Atherosclerotic disease. XR CHEST PORTABLE    (Results Pending)         URINARY CATHETER OUTPUT (Lloyd):  Urethral Catheter Temperature probe 16 fr-Output (mL): 100 mL    Assessment and Plan:      · Acute resp failure - intubated  · COVID 19 pneumonia  · Secondary bacterial infection     Plan  - Valentin Calderon. 3.75 q8hrly DAY 5 based on lung changes, high procalcitonin  Remdesivir, watch closely, DAY 4  Decadron 6mgdaily  On lovenox  Vit B/thiamine/c  resp culture results pending not back yet- ?cancelled    Electronically signed by Berny Stack MD on 11/18/2020 at 12:54 PM

## 2020-11-18 NOTE — CARE COORDINATION
Updated discharge plan of care. COVID positive. Continue vent support, FIO2-50%, PEEP-8. Remdesivir day 5. Continue gtt's. [facility] following-NOT READY for d/c.  Will continue to follow-SAMUELO

## 2020-11-18 NOTE — PROGRESS NOTES
[facility] Progress Note    Admitting Date and Time: 11/13/2020  1:46 PM  Admit Dx: Acute respiratory failure with hypoxia (HCC) [J96.01]  Acute respiratory failure with hypoxia (HCC) [J96.01]  Acute respiratory failure with hypoxia (HCC) [J96.01]  Acute respiratory failure with hypoxia (HCC) [J96.01]    Subjective:  Patient is being followed for Acute respiratory failure with hypoxia ([address] [J96.01]  Acute respiratory failure with hypoxia ([address] [J96.01]  Acute respiratory failure with hypoxia ([address] [J96.01]  Acute respiratory failure with hypoxia ([address] [J96.01]   Pt is intubated and sedated. Attempting weaning trial from vent. ROS: denies fever, chills, cp, sob, n/v, HA unless stated above.       potassium chloride  40 mEq Intravenous Once    enoxaparin  40 mg Subcutaneous Daily    insulin glargine  10 Units Subcutaneous Nightly    dexamethasone  6 mg Intravenous Daily    zinc sulfate  50 mg Oral Daily    insulin lispro  0-12 Units Subcutaneous 4 times per day    ascorbic acid  1,000 mg Oral QAM    remdesivir IVPB  100 mg Intravenous Q24H    Arformoterol Tartrate  15 mcg Nebulization BID    budesonide  0.5 mg Nebulization BID    ipratropium-albuterol  1 ampule Inhalation Q4H    piperacillin-tazobactam  3.375 g Intravenous Q8H    pantoprazole  40 mg Intravenous Daily    sodium chloride flush  10 mL Intravenous 2 times per day    vitamin B and C  1 tablet Oral QAM    Vitamin D  2,000 Units Oral QAM     sodium chloride, 30 mL, PRN  albuterol sulfate HFA, 2 puff, Q6H PRN  sodium chloride flush, 10 mL, PRN  acetaminophen, 650 mg, Q6H PRN    Or  acetaminophen, 650 mg, Q6H PRN  polyethylene glycol, 17 g, Daily PRN  promethazine, 12.5 mg, Q6H PRN    Or  ondansetron, 4 mg, Q6H PRN  guaiFENesin, 400 mg, 4x Daily PRN  glucose, 15 g, PRN  dextrose, 12.5 g, PRN  glucagon (rDNA), 1 mg, PRN  dextrose, 100 mL/hr, PRN         Objective:    BP (!) 98/51   Pulse 59   Temp 97.5 °F (36.4 °C) (Bladder)   Resp 18   Ht 5' 6\" (1.676 m)   Wt 153 lb 14.1 oz (69.8 kg)   SpO2 96%   BMI 24.84 kg/m²     Due to the patient's COVID-19-positive status, to reduce exposure, contamination, and in an effort to conserve PPE, this patient was evaluated through a combination of review of the medical record, nursing assessments, other physicians' exams and assessments, as well as telemedicine interaction. Recent Labs     11/16/20 0615 11/17/20 0549 11/18/20  0522    140 143   K 3.6 3.6 3.5   * 108* 108*   CO2 23 26 26   BUN 43* 39* 31*   CREATININE 0.8 0.7 0.5   GLUCOSE 208* 340* 244*   CALCIUM 8.5* 8.3* 8.1*       Recent Labs     11/16/20 0615 11/17/20 0549 11/18/20 0522   WBC 7.5 7.2 6.2   RBC 3.31* 3.07* 3.06*   HGB 9.3* 8.7* 8.5*   HCT 30.7* 28.5* 28.8*   MCV 92.7 92.8 94.1   MCH 28.1 28.3 27.8   MCHC 30.3* 30.5* 29.5*   RDW 15.7* 15.8* 15.8*    209 192   MPV 10.7 10.9 10.9       Radiology:   EXAMINATION:    CTA OF THE CHEST 11/13/2020 6:00 pm         TECHNIQUE:    CTA of the chest was performed after the administration of intravenous    contrast.  Multiplanar reformatted images are provided for review.  MIP    images are provided for review. Dose modulation, iterative reconstruction,    and/or weight based adjustment of the mA/kV was utilized to reduce the    radiation dose to as low as reasonably achievable.         COMPARISON:    11/06/2019         HISTORY:    ORDERING SYSTEM PROVIDED HISTORY: concern for covid    TECHNOLOGIST PROVIDED HISTORY:    Reason for exam:->concern for covid         FINDINGS:    Pulmonary Arteries: Pulmonary arteries are adequately opacified for    evaluation.  No evidence of intraluminal filling defect to suggest pulmonary    embolism.  Main pulmonary artery is normal in caliber.         Mediastinum: No evidence of mediastinal lymphadenopathy.  The heart is    enlarged.  There is no pericardial effusion.         Lungs/pleura:  Advanced emphysematous changes are noted. Ayden Sol centrilobular    emphysematous changes are most prominent within the upper lobes.         Bilateral lower lobe ground-glass infiltrates are noted.  Periphery within    the lung bases semi solid infiltrates are also noted.  There are small    infiltrate seen within the lingula and right middle lobe.      Upper Abdomen: Limited images of the upper abdomen are unremarkable.  There    are 2 stones seen within the gallbladder lumen.  There is no evidence of    acute cholecystitis.         Soft Tissues/Bones:  Age related degenerative changes of the visualized    osseous structures without focal destructive lesion.              Impression    1. There is no evidence of a pulmonary embolus. 2. Multifocal bilateral ground-glass and semi solid pulmonary infiltrates    within the right and left lower lobes, right middle lobe and lingula.  The    findings are highly suspicious for findings of COVID-19.    3. Advanced emphysematous changes. 4. Cardiomegaly. Laura Lim is no pericardial effusion.             EXAMINATION:    CT OF THE HEAD WITHOUT CONTRAST  11/13/2020 6:25 pm         TECHNIQUE:    CT of the head was performed without the administration of intravenous    contrast. Dose modulation, iterative reconstruction, and/or weight based    adjustment of the mA/kV was utilized to reduce the radiation dose to as low    as reasonably achievable.         COMPARISON:    September 12         HISTORY:    ORDERING SYSTEM PROVIDED HISTORY: unresponsive    TECHNOLOGIST PROVIDED HISTORY:    Reason for exam:->unresponsive    Has a \"code stroke\" or \"stroke alert\" been called? ->Yes         FINDINGS:    BRAIN/VENTRICLES: There is no acute intracranial hemorrhage, mass effect or    midline shift. No abnormal extra-axial fluid collection.  The gray-white    differentiation is maintained without evidence of an acute infarct. There is    prominence of the ventricles and sulci due to global parenchymal volume loss.     There are nonspecific areas of hypoattenuation within the periventricular and    subcortical white matter, which likely represent chronic microvascular    ischemic change.         Chronic left basal ganglia lacunar infarction versus prominent perivascular    space, unchanged.         There is persistent contrast enhancement from prior intravenous contrast    administration.         ORBITS: The visualized portion of the orbits demonstrate no acute abnormality.         SINUSES: The visualized paranasal sinuses and mastoid air cells demonstrate    no acute abnormality.  Paranasal sinus mucosal thickening, likely chronic.         SOFT TISSUES/SKULL: No acute abnormality of the visualized skull or soft    tissues.              Impression    No acute intracranial abnormality.  Age-related loss of brain volume and    chronic periventricular ischemic changes.  Chronic left basal ganglia lacunar    infarctions versus prominent perivascular space, unchanged since the prior    examination.         Persistent contrast enhancement from recent intravenous contrast    administration. Assessment:    Active Problems:    Acute respiratory failure with hypoxia (HCC)  Resolved Problems:    * No resolved hospital problems. *      Plan:  1. Acute respiratory failure with hypoxia 2/2 Covid 19 pneumonia  -  Admitted to ICU after intubation  -  Decadron  -  ID consulted  -  Vent management per pulm/CC    2. Covid 19 pneumonia  -  ID consulted  -  Monitor labs  -  Vent  -  lovenox per COVID protocol  -  remdesivir day 5  -  Decadron day 6    2. Severe emphysema  -  Follows with Dr. Maureen Ann as outpatient. -  Tobacco use history  -  Brovana, pulmicort nebs  -  Generally on 3L O2 at home    3. Type 2 diabetes, A1C 6.6 in January 2020  -  Hyperglycemia:  -300 range  -  Medium dose ISS    4. Essential hypertension  -  BP 140s-170/65-85  -  Continue to monitor    5. Mixed hyperlipidemia  -  Previously on pravachol    6.   Hyperkalemia- resolved  -  Continue to monitor      NOTE: This report was transcribed using voice recognition software. Every effort was made to ensure accuracy; however, inadvertent computerized transcription errors may be present.   Electronically signed by Goldie Mcclain MD on 11/18/2020 at 9:03 AM

## 2020-11-18 NOTE — PROGRESS NOTES
Critical Care Team - Daily Progress Note         Date and time: 11/18/2020 5:27 PM  Patient's name:  Fidelina Botello  Medical Record Number: [id number]  Patient's account/billing number: [de-identified]  Patient's YOB: 1945  Age: 67 y.o. Date of Admission: 11/13/2020  1:46 PM  Length of stay during current admission: 5      Primary Care Physician: Ann Mancuso MD  ICU Attending Physician: Dr. Isadora Lambert    Code Status: Full Code    Reason for ICU admission: Acute hypercapnic hypoxic respiratory failure secondary to Covid pneumonia      SUBJECTIVE:     BRIEF HISTORY: The patient is a 67 y.o. female with significant past medical history of COPD, diabetes, hypertension, hyperlipidemia presented to the ED for shortness of breath. Patient is currently intubated and sedated so history obtained from chart review.     Per emergency department, patient had history of 3 days of worsening shortness of breath. She is chronically on 3 L of oxygen at home when she ambulates but over the last few days has had increase her oxygen requirement to 5 L at all times. 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 She subsequently presented to the ED for evaluation.     Patient was found to be hypoxic and and placed on nasal cannula oxygen. Patient was Positive for COVID-19. While in the emergency department patient was placed on a nonrebreather and subsequently decompensated. She became altered and was taking agonal respirations. She was then intubated for further airway protection. ABG demonstrated respiratory acidosis with a CO2 of greater than 113. Patient was admitted to the ICU for further evaluation care. OVERNIGHT EVENTS:    11/15/20:  Some soft BP's overnight, responded to fluid bolus and switched from propofol gtt to versed gtt  11/16/20: Gearld Ramal, tolerating vent.  Attempt to wean sedation and pressure support  11/17/20: Tolerating pressure support, off or Versed  20: Attempt pressure support trials.   Patient becoming tachycardic and hypertensive    CURRENT VENTILATION STATUS:     [x] Ventilator  [] BIPAP  [] Nasal Cannula [] Room Air      IF INTUBATED, ET TUBE MARKING AT LOWER LIP:       SECRETIONS : Amount:  [] Small [] Moderate  [] Large  [x] None  Color:     [] White [] Colored  [] Bloody    SEDATION:  RAAS Score:  [] Propofol gtt  [] Versed gtt and fentanyl  [] Ativan gtt   [x] No Sedation    PARALYZED:  [x] No    [] Yes      VASOPRESSORS:  [x] No    [] Yes    If yes -   [] Levophed       [] Dopamine     [] Vasopressin       [] Dobutamine  [] Phenylephrine         [] Epinephrine    CENTRAL LINES:     [] No   [x] Yes   (Date of Insertion:   )           If yes -     [x] Right IJ     [] Left IJ [] Right Femoral [] Left Femoral                   [] Right Subclavian [] Left Subclavian       RAM'S CATHETER:   [] No   [x] Yes  (Date of Insertion:   )     URINE OUTPUT:            [] Good   [x] Low              [] Anuric      OBJECTIVE:     VITAL SIGNS:  BP (!) 169/86   Pulse 68   Temp 98.6 °F (37 °C) (Bladder)   Resp 11   Ht 5' 6\" (1.676 m)   Wt 153 lb 14.1 oz (69.8 kg)   SpO2 98%   BMI 24.84 kg/m²   Tmax over 24 hours:  Temp (24hrs), Av.7 °F (36.5 °C), Min:97.3 °F (36.3 °C), Max:98.6 °F (37 °C)      Patient Vitals for the past 6 hrs:   BP Temp Temp src Pulse Resp SpO2   20 1600 (!) 169/86 98.6 °F (37 °C) Bladder 68 11 98 %   20 1500 (!) 158/78 -- -- 78 10 --   20 1457 (!) 158/78 -- -- 73 11 96 %   20 1445 (!) 158/78 -- -- 117 15 96 %   20 1400 (!) 187/101 -- -- 95 13 92 %   20 1300 (!) 185/85 -- -- 109 14 91 %   20 1200 (!) 88 97.6 °F (36.4 °C) Bladder 103 15 93 %   20 1130 (!) 92 -- -- 54 17 94 %         Intake/Output Summary (Last 24 hours) at 2020 1727  Last data filed at 2020 1600  Gross per 24 hour   Intake 3230.03 ml   Output 1350 ml PRN  acetaminophen, 650 mg, Q6H PRN    Or  acetaminophen, 650 mg, Q6H PRN  polyethylene glycol, 17 g, Daily PRN  promethazine, 12.5 mg, Q6H PRN    Or  ondansetron, 4 mg, Q6H PRN  guaiFENesin, 400 mg, 4x Daily PRN  glucose, 15 g, PRN  dextrose, 12.5 g, PRN  glucagon (rDNA), 1 mg, PRN  dextrose, 100 mL/hr, PRN        VENT SETTINGS (Comprehensive) (if applicable):  Vent Information  $Ventilation: $Subsequent Day  Skin Assessment: Clean, dry, & intact  Equipment ID: [id number]  Vent Type: [phone]  Vent Mode: (S) PS  Vt Ordered: 450 mL  Rate Set: 0 bmp  Peak Flow: 55 L/min  Pressure Support: (S) 8 cmH20  FiO2 : 60 %  SpO2: 98 %  SpO2/FiO2 ratio: 163.33  Sensitivity: 3  PEEP/CPAP: 8  I Time/ I Time %: 0 s  Humidification Source: HME  Additional Respiratory  Assessments  Pulse: 68  Resp: 11  SpO2: 98 %  $End Tidal CO2: 36  Position: Semi-Hunt's  Humidification Source: HME  Oral Care: Mouth swabbed, Mouth moisturizer, Mouth suctioned, Suction toothette  Subglottic Suction Done?: Yes  Cuff Pressure (cm H2O): 298 cm H2O    ABGs:   Recent Labs     11/18/20 0542   PH 7.338*   PCO2 47.1*   PO2 95.3   HCO3 24.7   BE -1.2   O2SAT 97.0       Laboratory findings:    Complete Blood Count:   Recent Labs     11/16/20  0615 11/17/20 0549 11/18/20 0522   WBC 7.5 7.2 6.2   HGB 9.3* 8.7* 8.5*   HCT 30.7* 28.5* 28.8*    209 192        Last 3 Blood Glucose:   Recent Labs     11/16/20  0615 11/17/20  0549 11/18/20  0522   GLUCOSE 208* 340* 244*        PT/INR:    Lab Results   Component Value Date    PROTIME 11.8 11/18/2020    INR 1.0 11/18/2020     PTT:    Lab Results   Component Value Date    APTT 24.6 11/18/2020       Comprehensive Metabolic Profile:   Recent Labs     11/16/20  0615 11/17/20  0549 11/18/20  0522    140 143   K 3.6 3.6 3.5   * 108* 108*   CO2 23 26 26   BUN 43* 39* 31*   CREATININE 0.8 0.7 0.5   GLUCOSE 208* 340* 244*   CALCIUM 8.5* 8.3* 8.1*   PROT 5.8* 5.4* 5.1*   LABALBU 2.6* 2.8* 2.6*   BILITOT 0.2 0.3 0.3 ALKPHOS 91 86 74   AST 28 21 21   ALT 27 23 24      Magnesium:   Lab Results   Component Value Date    MG 2.0 11/18/2020     Phosphorus:   Lab Results   Component Value Date    PHOS 2.5 11/18/2020     Ionized Calcium: No results found for: CAION       Troponin:   No results for input(s): TROPONINI in the last 72 hours. Microbiology:  Cultures during this admission:     Blood cultures:                 [x] None drawn      [] Negative             []  Positive (Details:  )  Urine Culture:                   [x] None drawn      [] Negative             []  Positive (Details:  )  Sputum Culture:               [] None drawn       [] Negative             []  Positive (Details: pending )   Endotracheal aspirate:     [] None drawn       [] Negative             []  Positive (Details: pending )     Other pertinent Labs:     Radiology/Imaging:     Chest x-ray:  11/17/20:      ASSESSMENT:       - Acute respiratory failure with hypoxia, hypercarbia  - COVID-19 pneumonia  - Superimposed bacterial pneumonia  - COPD  -Type 2 diabetes    Additional assessment:        PLAN:     WEAN PER PROTOCOL:  [] No   [x] Yes  [] N/A    DISCONTINUE ANY LABS:   [x] No   [] Yes    ICU PROPHYLAXIS:  Stress ulcer:  [x] PPI Agent  [] [id number] [] Sucralfate  [] Other:  VTE:   [x] Enoxaparin  [] Unfract. Heparin Subcut  [] EPC Cuffs    NUTRITION:  [] NPO [] Tube Feeding (Specify: ) [] TPN  [x] PO (Diet: DIET TUBE FEED CONTINUOUS/CYCLIC NPO; Semi-elemental; Orogastric; Continuous; 25; 40)    HOME MEDICATIONS RECONCILED: [] No  [x] Yes    INSULIN DRIP:   [x] No   [] Yes    CONSULTATION NEEDED:  [] No   [x] Yes    FAMILY UPDATED:    [x] No   [] Yes    TRANSFER OUT OF ICU:   [x] No   [] Yes       SYSTEMS ASSESSMENT     Neuro   AMS--most likely secondary to hypercapnia              -CT head negative     Intubated and sedated on the vent              -Off of sedation.   Precedex     Respiratory   Acute hypoxic and hypercapnic respiratory failure secondary to personally saw, examined and provided care for the patient. Radiographs, labs and medication list were reviewed by me independently. I spoke with bedside nursing, therapists and consultants. Critical care services and times documented are independent of procedures and multidisciplinary rounds with Residents. Additionally comprehensive, multidisciplinary rounds were conducted with the MICU team. The case was discussed in detail and plans for care were established. Review of Residents documentation was conducted and revisions were made as appropriate. I agree with the above documented exam, problem list and plan of care with the following additions:    Failed SBT today, repeat tomorrow  precedex and prn fentanyl  Zosyn, remdesivir, decadron    33 minutes of CCT spent with the patient, reviewing the chart including imaging studies, and discussing the case with other health care professionals. This time excludes procedures.      Costa Ferrera MD

## 2020-11-18 NOTE — PLAN OF CARE
Problem: Falls - Risk of:  Goal: Will remain free from falls  Description: Will remain free from falls  11/18/2020 0752 by Betito Dawkins RN  Outcome: Met This Shift  11/18/2020 0057 by Joseph Cain RN  Outcome: Met This Shift  Goal: Absence of physical injury  Description: Absence of physical injury  11/18/2020 0752 by Betito Dawkins RN  Outcome: Met This Shift  11/18/2020 0057 by Joseph Cain RN  Outcome: Met This Shift     Problem: Restraint Use - Nonviolent/Non-Self-Destructive Behavior:  Goal: Absence of restraint indications  Description: Absence of restraint indications  11/18/2020 0752 by Betito Dawkins RN  Outcome: Met This Shift  11/18/2020 0057 by Joseph Cain RN  Outcome: Not Met This Shift  Goal: Absence of restraint-related injury  Description: Absence of restraint-related injury  11/18/2020 0752 by Betito Dawkins RN  Outcome: Met This Shift  11/18/2020 0057 by Joseph Cain RN  Outcome: Met This Shift     Problem: Airway Clearance - Ineffective  Goal: Achieve or maintain patent airway  11/18/2020 0752 by Betito Dawkins RN  Outcome: Met This Shift  11/18/2020 0057 by Joseph Cain RN  Outcome: Met This Shift     Problem: Gas Exchange - Impaired  Goal: Absence of hypoxia  11/18/2020 0752 by Betito Dawkins RN  Outcome: Met This Shift  11/18/2020 0057 by Joseph Cain RN  Outcome: Met This Shift  Goal: Promote optimal lung function  11/18/2020 0752 by Betito Dawkins RN  Outcome: Met This Shift  11/18/2020 0057 by Joseph Cian RN  Outcome: Met This Shift     Problem: Breathing Pattern - Ineffective  Goal: Ability to achieve and maintain a regular respiratory rate  Outcome: Met This Shift     Problem:  Body Temperature -  Risk of, Imbalanced  Goal: Ability to maintain a body temperature within defined limits  11/18/2020 0752 by Betito Dawkins RN  Outcome: Met This Shift  11/18/2020 0057 by Joseph Cain RN  Outcome: Met This Shift  Goal: Will regain or maintain usual level of consciousness  11/18/2020 0752 by Keyana Bernal RN  Outcome: Met This Shift  11/18/2020 0057 by Ari Lazar RN  Outcome: Not Met This Shift  Goal: Complications related to the disease process, condition or treatment will be avoided or minimized  11/18/2020 0752 by Keyana Bernal RN  Outcome: Met This Shift  11/18/2020 0057 by Ari Lazar RN  Outcome: Ongoing     Problem: Isolation Precautions - Risk of Spread of Infection  Goal: Prevent transmission of infection  11/18/2020 0752 by Keyana Bernal RN  Outcome: Met This Shift  11/18/2020 0057 by Ari Lazar RN  Outcome: Ongoing     Problem: Nutrition Deficits  Goal: Optimize nutrtional status  11/18/2020 0752 by Keyana Bernal RN  Outcome: Met This Shift  11/18/2020 0057 by Ari Lazar RN  Outcome: Met This Shift     Problem: Risk for Fluid Volume Deficit  Goal: Maintain normal heart rhythm  11/18/2020 0752 by Keyana Bernal RN  Outcome: Met This Shift  11/18/2020 0057 by Ari Lazar RN  Outcome: Met This Shift  Goal: Maintain absence of muscle cramping  11/18/2020 0752 by Keyana Bernal RN  Outcome: Met This Shift  11/18/2020 0057 by Ari Lazar RN  Outcome: Met This Shift  Goal: Maintain normal serum potassium, sodium, calcium, phosphorus, and pH  11/18/2020 0752 by Keyana Bernal RN  Outcome: Met This Shift  11/18/2020 0057 by Ari Lazar RN  Outcome: Met This Shift     Problem: Loneliness or Risk for Loneliness  Goal: Demonstrate positive use of time alone when socialization is not possible  11/18/2020 0752 by Keyana Bernal RN  Outcome: Met This Shift  11/18/2020 0057 by Ari Lazar RN  Outcome: Not Met This Shift     Problem: Fatigue  Goal: Verbalize increase energy and improved vitality  11/18/2020 0752 by Keyana Bernal RN  Outcome: Met This Shift  11/18/2020 0057 by Ari Lazar RN  Outcome: Not Met This Shift     Problem: Patient Education: Go to Patient Education Activity  Goal: Patient/Family Education  11/18/2020 0752 by Lilliam Boland RN  Outcome: Met This Shift  11/18/2020 0057 by Ashley Lao RN  Outcome: Met This Shift     Problem: Skin Integrity:  Goal: Will show no infection signs and symptoms  Description: Will show no infection signs and symptoms  11/18/2020 0752 by Lilliam Boland RN  Outcome: Met This Shift  11/18/2020 0057 by Ashley Lao RN  Outcome: Met This Shift  Goal: Absence of new skin breakdown  Description: Absence of new skin breakdown  11/18/2020 0752 by Lilliam Boland RN  Outcome: Met This Shift  11/18/2020 0057 by Ashley Lao RN  Outcome: Met This Shift

## 2020-11-18 NOTE — PLAN OF CARE
Problem: Falls - Risk of:  Goal: Will remain free from falls  Description: Will remain free from falls  Outcome: Met This Shift  Goal: Absence of physical injury  Description: Absence of physical injury  Outcome: Met This Shift     Problem: Restraint Use - Nonviolent/Non-Self-Destructive Behavior:  Goal: Absence of restraint-related injury  Description: Absence of restraint-related injury  Outcome: Met This Shift     Problem: Airway Clearance - Ineffective  Goal: Achieve or maintain patent airway  Outcome: Met This Shift     Problem: Gas Exchange - Impaired  Goal: Absence of hypoxia  Outcome: Met This Shift  Goal: Promote optimal lung function  Outcome: Met This Shift     Problem: Body Temperature -  Risk of, Imbalanced  Goal: Ability to maintain a body temperature within defined limits  Outcome: Met This Shift     Problem: Nutrition Deficits  Goal: Optimize nutrtional status  Outcome: Met This Shift     Problem: Risk for Fluid Volume Deficit  Goal: Maintain normal heart rhythm  Outcome: Met This Shift  Goal: Maintain absence of muscle cramping  Outcome: Met This Shift  Goal: Maintain normal serum potassium, sodium, calcium, phosphorus, and pH  Outcome: Met This Shift     Problem: Patient Education: Go to Patient Education Activity  Goal: Patient/Family Education  Outcome: Met This Shift     Problem: Skin Integrity:  Goal: Will show no infection signs and symptoms  Description: Will show no infection signs and symptoms  Outcome: Met This Shift  Goal: Absence of new skin breakdown  Description: Absence of new skin breakdown  Outcome: Met This Shift     Problem: Inadequate oral food/beverage intake (NI-2.1)  Goal: Food and/or Nutrient Delivery  Description: Individualized approach for food/nutrient provision.   11/17/2020 1155 by Carla Gonzalez RD, CNSC, LD  Outcome: Met This Shift

## 2020-11-19 NOTE — PROGRESS NOTES
[facility] Hospitalist   Progress Note    Admitting Date and Time: 11/13/2020  1:46 PM  Admit Dx: Acute respiratory failure with hypoxia (HCC) [J96.01]  Acute respiratory failure with hypoxia (HCC) [J96.01]  Acute respiratory failure with hypoxia (HCC) [J96.01]  Acute respiratory failure with hypoxia ([facility]) [J96.01]     Seen for follow-up on multiple problems as listed below    Subjective:   Intubated on vent , reviewed care     ROS: Not possible sec to above     magnesium sulfate  2 g Intravenous Once    [START ON 11/20/2020] insulin glargine  15 Units Subcutaneous Daily    furosemide  20 mg Intravenous BID    insulin lispro  0-18 Units Subcutaneous Q6H    enoxaparin  40 mg Subcutaneous Daily    insulin glargine  10 Units Subcutaneous Nightly    dexamethasone  6 mg Intravenous Daily    zinc sulfate  50 mg Oral Daily    ascorbic acid  1,000 mg Oral QAM    Arformoterol Tartrate  15 mcg Nebulization BID    budesonide  0.5 mg Nebulization BID    ipratropium-albuterol  1 ampule Inhalation Q4H    piperacillin-tazobactam  3.375 g Intravenous Q8H    pantoprazole  40 mg Intravenous Daily    sodium chloride flush  10 mL Intravenous 2 times per day    vitamin B and C  1 tablet Oral QAM    Vitamin D  2,000 Units Oral QAM     albuterol sulfate HFA, 2 puff, Q6H PRN  sodium chloride flush, 10 mL, PRN  acetaminophen, 650 mg, Q6H PRN    Or  acetaminophen, 650 mg, Q6H PRN  polyethylene glycol, 17 g, Daily PRN  promethazine, 12.5 mg, Q6H PRN    Or  ondansetron, 4 mg, Q6H PRN  guaiFENesin, 400 mg, 4x Daily PRN  glucose, 15 g, PRN  dextrose, 12.5 g, PRN  glucagon (rDNA), 1 mg, PRN  dextrose, 100 mL/hr, PRN         Objective:    BP (!) 195/93   Pulse 108   Temp 98.1 °F (36.7 °C) (Bladder)   Resp 26   Ht 5' 6\" (1.676 m)   Wt 160 lb 11.5 oz (72.9 kg)   SpO2 94%   BMI 25.94 kg/m²      Due to patient's COVID-19 positive status, to reduce exposure, contamination and in  an effort to conserve PPE this patient was evaluated through a combination of review of the medical record,, nursing assessment and other physicians exams and assessments as well as telemedicine interaction. General Appearance: Intubated on vent  Skin: warm and dry  Head: normocephalic and atraumatic  Neck: supple and non-tender  Pulmon carmela/Chest: clear to auscultation bilaterally, few coarse BS  Cardiovascular: normal rate, regular rhythm, normal S1 and S2  Abdomen: soft, non-tender, non-distended, normal bowel sounds  Extremities: no cyanosis, clubbing   Neurologic: cannot assess      Recent Labs     11/17/20  0549 11/18/20  0522 11/19/20  0600    143 137   K 3.6 3.5 3.8   * 108* 100   CO2 26 26 29   BUN 39* 31* 26*   CREATININE 0.7 0.5 0.4*   GLUCOSE 340* 244* 291*   CALCIUM 8.3* 8.1* 8.4*       Recent Labs     11/17/20  0549 11/18/20  0522 11/19/20  0600   WBC 7.2 6.2 11.0   RBC 3.07* 3.06* 3.57   HGB 8.7* 8.5* 10.2*   HCT 28.5* 28.8* 32.3*   MCV 92.8 94.1 90.5   MCH 28.3 27.8 28.6   MCHC 30.5* 29.5* 31.6*   RDW 15.8* 15.8* 15.6*    192 280   MPV 10.9 10.9 10.9       Labs and images reviewed    Radiology:   XR CHEST PORTABLE   Final Result   Endotracheal tube in good position. Persistent a bi basilar infiltrates. XR ABDOMEN FOR NG/OG/NE TUBE PLACEMENT   Final Result   Distal tip of a gastric tube is present in the left upper quadrant of the   abdomen, in the expected region of the proximal stomach. Side port projected   below the diaphragmatic level. Stable small left pleural effusion with adjacent atelectasis and/or pneumonia   in the left lung base   Stable right lower lung infiltrate may be interstitial edema and/or   interstitial pneumonitis   Slight cardiomegaly      XR CHEST PORTABLE   Preliminary Result   No interval change in the multifocal bilateral pulmonary infiltrates left   greater than right. Suspected small left pleural effusion. XR CHEST PORTABLE   Final Result   1.  No significant Assessment:    Active Problems:    Acute respiratory failure with hypoxia (HCC)  Resolved Problems:    * No resolved hospital problems. *      Plan:  Acute respiratory failure with hypoxia secondary to COVID-19 pneumonia-currently intubated on vent, critical care managing. COVID-19 pneumonia-ID and critical care following. On vit c, Decadron and remdesivir, on Lovenox as per Covid protocol on vent support . Procal 2.54 - on Zosyn . Continue to monitor in ICU. Severe emphysema-follows with Dr. Ismael Paredes as outpatient, continue bronchodilators, Brovana and Pulmicort. At home on 3 L nasal cannula.     DM 2-on Lantus +  ISS    Hypertension-monitor    Hyperlipidemia-Previously on Pravachol    On lovenox for dvt prophy  Full code         Electronically signed by Morena Mancera MD on 11/19/2020 at 1:38 PM

## 2020-11-19 NOTE — PROGRESS NOTES
Event Note    · Patient is being followed peripherally while in the ICU. Will follow daily upon transfer. Carlita Upton. Omer Liu M.D., F.C.C.P.     [facility], [address] [place] [address], [address] [place] [address]

## 2020-11-19 NOTE — PROGRESS NOTES
.  Intensive Care Daily Quality Rounding Checklist      ICU Team Members: Dr. Zoey Gibbons, clinical pharmacist, charge nurse, bedside nurse, respiratory therapist    ICU Day #: NUMBER: 7    Intubation Date: November 13    Ventilator Day #: NUMBER: 7    Central Line Insertion Date: November 13        Day #: NUMBER: 7     Arterial Line Insertion Date:  n/a      Day #: n/a    DVT Prophylaxis: Lovenox    GI Prophylaxis: tube feed    Lloyd Catheter Insertion Date: November 13       Day #: 7      Continued need (if yes, reason documented and discussed with physician): yes, strict I & O    Skin Issues/ Wounds and ordered treatment discussed on rounds: none, sos precautions    Goals/ Plans for the Day: Daily labs & abg's, attempt to wean, prn med for elevated B/P, anxiety, soft wrist restraints to prevent pulling tubes

## 2020-11-19 NOTE — PROGRESS NOTES
Critical Care Team - Daily Progress Note         Date and time: 11/19/2020 2:01 PM  Patient's name:  Pepe Barreto  Medical Record Number: [id number]  Patient's account/billing number: [de-identified]  Patient's YOB: 1945  Age: 67 y.o. Date of Admission: 11/13/2020  1:46 PM  Length of stay during current admission: 6      Primary Care Physician: Arslan Fuentes MD  ICU Attending Physician: Dr. Inez Green    Code Status: Full Code    Reason for ICU admission: Acute hypercapnic hypoxic respiratory failure secondary to Covid pneumonia      SUBJECTIVE:     BRIEF HISTORY: The patient is a 67 y.o. female with significant past medical history of COPD, diabetes, hypertension, hyperlipidemia presented to the ED for shortness of breath. Patient is currently intubated and sedated so history obtained from chart review.     Per emergency department, patient had history of 3 days of worsening shortness of breath. She is chronically on 3 L of oxygen at home when she ambulates but over the last few days has had increase her oxygen requirement to 5 L at all times. 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 She subsequently presented to the ED for evaluation.     Patient was found to be hypoxic and and placed on nasal cannula oxygen. Patient was Positive for COVID-19. While in the emergency department patient was placed on a nonrebreather and subsequently decompensated. She became altered and was taking agonal respirations. She was then intubated for further airway protection. ABG demonstrated respiratory acidosis with a CO2 of greater than 113. Patient was admitted to the ICU for further evaluation care. OVERNIGHT EVENTS:    11/15/20:  Some soft BP's overnight, responded to fluid bolus and switched from propofol gtt to versed gtt  11/16/20: Ernesto Gamma, tolerating vent.  Attempt to wean sedation and pressure support  11/17/20: lesions, no masses, hearing intact  Mouth: ET tube in place  Head: normocephalic, atraumatic  Neck: no JVD, no adenopathy, no thyromegaly, neck is supple, trachea is midline.   Chest: no pain on palpation  Lungs: Clear to auscultation bilaterally , no wheezes, rhonchi  Heart: regular rate and regular rhythm, no murmur, normal S1, S2  Abdomen: soft, non-tender  Extremities: no lower extremity edema  Skin: normal color, normal texture, normal turgor, no rashes, no lesions  Neurologic:  Following commands, will give thumbs up, shakes her head yes and no to questions  MEDICATIONS:    Scheduled Meds:   magnesium sulfate  2 g Intravenous Once    [START ON 11/20/2020] insulin glargine  15 Units Subcutaneous Daily    furosemide  20 mg Intravenous BID    insulin lispro  0-18 Units Subcutaneous Q6H    enoxaparin  40 mg Subcutaneous Daily    insulin glargine  10 Units Subcutaneous Nightly    dexamethasone  6 mg Intravenous Daily    zinc sulfate  50 mg Oral Daily    ascorbic acid  1,000 mg Oral QAM    Arformoterol Tartrate  15 mcg Nebulization BID    budesonide  0.5 mg Nebulization BID    ipratropium-albuterol  1 ampule Inhalation Q4H    piperacillin-tazobactam  3.375 g Intravenous Q8H    pantoprazole  40 mg Intravenous Daily    sodium chloride flush  10 mL Intravenous 2 times per day    vitamin B and C  1 tablet Oral QAM    Vitamin D  2,000 Units Oral QAM     Continuous Infusions:   sodium chloride 12.5 mL/hr at 11/19/20 1317    dexmedetomidine (PRECEDEX) IV infusion 0.7 mcg/kg/hr (11/19/20 1005)    fentaNYL 5 mcg/ml in 0.9%  ml infusion 100 mcg/hr (11/19/20 1353)    dextrose       PRN Meds:   albuterol sulfate HFA, 2 puff, Q6H PRN  sodium chloride flush, 10 mL, PRN  acetaminophen, 650 mg, Q6H PRN    Or  acetaminophen, 650 mg, Q6H PRN  polyethylene glycol, 17 g, Daily PRN  promethazine, 12.5 mg, Q6H PRN    Or  ondansetron, 4 mg, Q6H PRN  guaiFENesin, 400 mg, 4x Daily PRN  glucose, 15 g, PRN  dextrose, 12.5 g, PRN  glucagon (rDNA), 1 mg, PRN  dextrose, 100 mL/hr, PRN        VENT SETTINGS (Comprehensive) (if applicable):  Vent Information  $Ventilation: Off Vent  Skin Assessment: Clean, dry, & intact  Equipment ID: [id number]  Vent Type: 840  Vent Mode: AC/VC  Vt Ordered: 450 mL  Rate Set: 18 bmp  Peak Flow: 55 L/min  Pressure Support: (S) 5 cmH20  FiO2 : 55 %  SpO2: 94 %  SpO2/FiO2 ratio: 156.67  Sensitivity: 3  PEEP/CPAP: (S) 5  I Time/ I Time %: 0.9 s  Humidification Source: HME  Mask Type: Full face mask  Mask Size: Medium  Additional Respiratory  Assessments  Pulse: 108  Resp: 26  SpO2: 94 %  $End Tidal CO2: 36  Position: Semi-Hunt's  Humidification Source: HME  Oral Care: Mouth suctioned, Mouth moisturizer, Mouth swabbed  Subglottic Suction Done?: Yes  Cuff Pressure (cm H2O): 298 cm H2O    ABGs:   Recent Labs     11/19/20  0618   PH 7.428   PCO2 41.6   PO2 54.7*   HCO3 26.9*   BE 2.3   O2SAT 89.4*       Laboratory findings:    Complete Blood Count:   Recent Labs     11/17/20  0549 11/18/20  0522 11/19/20  0600   WBC 7.2 6.2 11.0   HGB 8.7* 8.5* 10.2*   HCT 28.5* 28.8* 32.3*    192 280        Last 3 Blood Glucose:   Recent Labs     11/17/20  0549 11/18/20  0522 11/19/20  0600   GLUCOSE 340* 244* 291*        PT/INR:    Lab Results   Component Value Date    PROTIME 12.8 11/19/2020    INR 1.1 11/19/2020     PTT:    Lab Results   Component Value Date    APTT 26.3 11/19/2020       Comprehensive Metabolic Profile:   Recent Labs     11/17/20  0549 11/18/20  0522 11/19/20  0600    143 137   K 3.6 3.5 3.8   * 108* 100   CO2 26 26 29   BUN 39* 31* 26*   CREATININE 0.7 0.5 0.4*   GLUCOSE 340* 244* 291*   CALCIUM 8.3* 8.1* 8.4*   PROT 5.4* 5.1* 6.0*   LABALBU 2.8* 2.6* 2.7*   BILITOT 0.3 0.3 0.5   ALKPHOS 86 74 107*   AST 21 21 45*   ALT 23 24 42*      Magnesium:   Lab Results   Component Value Date    MG 1.7 11/19/2020     Phosphorus:   Lab Results   Component Value Date    PHOS 2.6 11/19/2020 Ionized Calcium: No results found for: CAION       Troponin:   No results for input(s): TROPONINI in the last 72 hours. Microbiology:  Cultures during this admission:     Blood cultures:                 [x] None drawn      [] Negative             []  Positive (Details:  )  Urine Culture:                   [x] None drawn      [] Negative             []  Positive (Details:  )  Sputum Culture:               [] None drawn       [] Negative             []  Positive (Details: pending )   Endotracheal aspirate:     [] None drawn       [] Negative             []  Positive (Details: pending )     Other pertinent Labs:     Radiology/Imaging:     Chest x-ray:  11/17/20:      ASSESSMENT:       - Acute respiratory failure with hypoxia, hypercarbia  - COVID-19 pneumonia  - Superimposed bacterial pneumonia  - COPD  -Type 2 diabetes    Additional assessment:        PLAN:     WEAN PER PROTOCOL:  [] No   [x] Yes  [] N/A    DISCONTINUE ANY LABS:   [x] No   [] Yes    ICU PROPHYLAXIS:  Stress ulcer:  [x] PPI Agent  [] O1Muhni [] Sucralfate  [] Other:  VTE:   [x] Enoxaparin  [] Unfract. Heparin Subcut  [] EPC Cuffs    NUTRITION:  [] NPO [] Tube Feeding (Specify: ) [] TPN  [x] PO (Diet: DIET TUBE FEED CONTINUOUS/CYCLIC NPO; Semi-elemental; Orogastric; Continuous; 25; 40)    HOME MEDICATIONS RECONCILED: [] No  [x] Yes    INSULIN DRIP:   [x] No   [] Yes    CONSULTATION NEEDED:  [] No   [x] Yes    FAMILY UPDATED:    [x] No   [] Yes    TRANSFER OUT OF ICU:   [x] No   [] Yes       SYSTEMS ASSESSMENT     Neuro   AMS--most likely secondary to hypercapnia              -CT head negative     Intubated and sedated on the vent              -Off of sedation.   Precedex     Respiratory   Acute hypoxic and hypercapnic respiratory failure secondary to Covid pneumonia              -resp panel + SARS-COV2              -former smoker              -vit c, decadron (day 4), remdesivir (day 3)              -Pulmonology following              -procal 2.54   -Patient was extubated today. After approximately 20 minutes extubation she became tachycardic, tachypneic, hypotensive and not tolerating secretions. She was subsequently reintubated for airway protection. Superimposed bacterial PNA   -elevated procal-2.54   -resp culture   -Zosyn, day 6    Respiratory acidosis--resolved              -Was placed on a nonrebreather and subsequently became hypercapnic and intubated              -trial of pressure support or vent changes tomorrow      COPD              -O2 PRN and with ambulation @ home, usual 3L              -Brovana, Pulmicort, nebs     Cardiovascular   CTA              -No evidence of PE     No acute issues      Gastrointestinal   Tube feeds   PPI     Renal   Up 5 L   -Start Lasix    Electrolytes-replace and monitor    Infectious Disease   COVID PNA              -ID following              -Decadron, 6mg q12. Remdesivir- finished 11/19., decadron day 7. Vitamin C, zinc    Superimposed bacterial PNA   -elevated procal-2.54   -resp culture   -Zosyn, day 6     Hematology/Oncology   Lymphopenia              -2/2 covid pna    Anemia   -Likely iatrogenic, no signs of bleeding. Continue to monitor       Endocrine   thyroid nodules  DM -sugars uncontrolled, second secondary to Decadron. Increased lantus to 15 in the morning and 10 at night.       Social/Spiritual/DNR/Other   Full      Diet: DIET TUBE FEED CONTINUOUS/CYCLIC NPO; Semi-elemental; Orogastric; Continuous; 25; 40  CODE Status: Full Code    Dispo: maintain ICU level care    ASSESSMENT/ PLAN   1. As above  2. Patient was extubated and subsequently re intubated  3. Full ventilatory support  4. Diuresis  5. GI prophylaxis-Protonix  6. DVT Prophylaxis-Lovenox  7. Discuss case and plan with attending, Dr. Selvin Jimenez, DO  Resident, PGY-2  11/19/2020  2:01 PM    I personally saw, examined and provided care for the patient.  Radiographs, labs and medication list were reviewed by me

## 2020-11-19 NOTE — PROGRESS NOTES
Patient extubated to BIPAP 12/6 55%. Patient spo2 91%. Patient tolerating extubation well. No stridor present.

## 2020-11-19 NOTE — PROGRESS NOTES
Patient re-intubated due to declining saturations and respiratory distress on BIPAP.     7.5/25 at the lip.

## 2020-11-19 NOTE — PROCEDURES
Intubation Procedure Note    Indication: Respiratory failure    Consent: Unable to be obtained due to the emergent nature of this procedure. Medications Used: etomidate intravenously, fentanyl intravenously and succinycholine intravenously    Procedure: The patient was placed in the appropriate position. Cricoid pressure was not required. Intubation was performed via glidescope with a 7.5 cuffed endotracheal tube. The cuff was then inflated and the tube was secured appropriately at a distance of 23 cm to the dental ridge. Initial confirmation of placement included bilateral breath sounds, an end tidal CO2 detector, absence of sounds over the stomach, tube fogging, adequate chest rise and adequate pulse oximetry reading. A chest x-ray to verify correct placement of the tube has been ordered but is still pending. The patient tolerated the procedure well.      Complications: None    Electronically signed by Latanya Bello MD on 11/19/2020 at 4:55 PM

## 2020-11-19 NOTE — CARE COORDINATION
Updated plan of care. COVID positive. Remdesivir completed. Attempted to extubate-on bipap, not tolerated, pt re-intubated.   [facility] following-mjo

## 2020-11-20 NOTE — CARE COORDINATION
Updated discharge plan of care COVID positive. Cont vent support, fio2-50%, PEEP-5. Levophed restarted for low bp, cont gtt's. remdesivir completed. According to [employer] from [facility], cost for LTAC approx 7000 dollars out of pocket. Will need to reach out to family to discuss options. Attempted to call daughter, BUTCH left.  Will follow-O

## 2020-11-20 NOTE — PROGRESS NOTES
[facility] Hospital[facility]   Progress Note    Admitting Date and Time: 11/13/2020  1:46 PM  Admit Dx: Acute respiratory failure with hypoxia (HCC) [J96.01]  Acute respiratory failure with hypoxia (HCC) [J96.01]  Acute respiratory failure with hypoxia (HCC) [J96.01]  Acute respiratory failure with hypoxia ([address] [J96.01]     Seen for follow-up on multiple problems as listed below    Subjective:    Intubated on vent , continues to be on levophed, being considered for LTACH     ROS: Not possible sec to above     potassium phosphate IVPB  30 mmol Intravenous Once    insulin glargine  20 Units Subcutaneous Nightly    lidocaine PF  5 mL Intradermal Once    heparin flush  3 mL Intravenous 2 times per day    insulin glargine  15 Units Subcutaneous Daily    furosemide  20 mg Intravenous BID    insulin lispro  0-18 Units Subcutaneous Q6H    enoxaparin  40 mg Subcutaneous Daily    dexamethasone  6 mg Intravenous Daily    zinc sulfate  50 mg Oral Daily    ascorbic acid  1,000 mg Oral QAM    Arformoterol Tartrate  15 mcg Nebulization BID    budesonide  0.5 mg Nebulization BID    ipratropium-albuterol  1 ampule Inhalation Q4H    piperacillin-tazobactam  3.375 g Intravenous Q8H    pantoprazole  40 mg Intravenous Daily    sodium chloride flush  10 mL Intravenous 2 times per day    vitamin B and C  1 tablet Oral QAM    Vitamin D  2,000 Units Oral QAM     sodium chloride flush, 10 mL, PRN  heparin flush, 3 mL, PRN  albuterol sulfate HFA, 2 puff, Q6H PRN  sodium chloride flush, 10 mL, PRN  acetaminophen, 650 mg, Q6H PRN    Or  acetaminophen, 650 mg, Q6H PRN  polyethylene glycol, 17 g, Daily PRN  promethazine, 12.5 mg, Q6H PRN    Or  ondansetron, 4 mg, Q6H PRN  guaiFENesin, 400 mg, 4x Daily PRN  glucose, 15 g, PRN  dextrose, 12.5 g, PRN  glucagon (rDNA), 1 mg, PRN  dextrose, 100 mL/hr, PRN         Objective:    BP (!) 193/79   Pulse 132   Temp 98.4 °F (36.9 °C) (Bladder)   Resp 19   Ht 5' 6\" (1.676 m) Wt 160 lb 11.5 oz (72.9 kg)   SpO2 90%   BMI 25.94 kg/m²      Due to patient's COVID-19 positive status, to reduce exposure, contamination and in  an effort to conserve PPE this patient was evaluated through a combination of review of the medical record,, nursing assessment and other physicians exams and assessments as well as telemedicine interaction. General Appearance: Intubated on vent  Skin: warm and dry  Head: normocephalic and atraumatic  Neck: supple and non-tender  Pulmon carmela/Chest: clear to auscultation bilaterally, few coarse BS  Cardiovascular: normal rate, regular rhythm, normal S1 and S2  Abdomen: soft, non-tender, non-distended, normal bowel sounds  Extremities: no cyanosis, clubbing   Neurologic: cannot assess      Recent Labs     11/18/20 0522 11/19/20  0600 11/20/20  0430    137 139   K 3.5 3.8 3.9   * 100 100   CO2 26 29 32*   BUN 31* 26* 26*   CREATININE 0.5 0.4* 0.5   GLUCOSE 244* 291* 206*   CALCIUM 8.1* 8.4* 8.2*       Recent Labs     11/18/20 0522 11/19/20  0600 11/20/20  0430   WBC 6.2 11.0 12.6*   RBC 3.06* 3.57 3.49*   HGB 8.5* 10.2* 9.6*   HCT 28.8* 32.3* 31.9*   MCV 94.1 90.5 91.4   MCH 27.8 28.6 27.5   MCHC 29.5* 31.6* 30.1*   RDW 15.8* 15.6* 16.0*    280 339   MPV 10.9 10.9 10.9       Labs and images reviewed    Radiology:   XR CHEST PORTABLE   Final Result   Endotracheal tube in good position. Persistent a bi basilar infiltrates. XR ABDOMEN FOR NG/OG/NE TUBE PLACEMENT   Final Result   Distal tip of a gastric tube is present in the left upper quadrant of the   abdomen, in the expected region of the proximal stomach. Side port projected   below the diaphragmatic level.    Stable small left pleural effusion with adjacent atelectasis and/or pneumonia   in the left lung base   Stable right lower lung infiltrate may be interstitial edema and/or   interstitial pneumonitis   Slight cardiomegaly      XR CHEST PORTABLE   Final Result   No interval change in the multifocal bilateral pulmonary infiltrates left   greater than right. Suspected small left pleural effusion. XR CHEST PORTABLE   Final Result   1. No significant interval change in the multifocal bilateral lower lobe   infiltrates. 2. Stable position of the support lines and tubes. There is an NG tube   within the stomach. XR ABDOMEN FOR NG/OG/NE TUBE PLACEMENT   Final Result   The enteric tube is in good position in the stomach. XR CHEST PORTABLE   Final Result   New right IJ line with the distal tip in the SVC. No pneumothorax. No other   significant interval change. XR CHEST PORTABLE   Final Result   Status post intubation with ET tube approximately 4.4 cm from the level of   the drew. Increasing airspace disease bilaterally predominantly in the lower lobes   consistent with multifocal pneumonia. CT HEAD WO CONTRAST   Final Result   No acute intracranial abnormality. Age-related loss of brain volume and   chronic periventricular ischemic changes. Chronic left basal ganglia lacunar   infarctions versus prominent perivascular space, unchanged since the prior   examination. Persistent contrast enhancement from recent intravenous contrast   administration. CTA PULMONARY W CONTRAST   Final Result   1. There is no evidence of a pulmonary embolus. 2. Multifocal bilateral ground-glass and semi solid pulmonary infiltrates   within the right and left lower lobes, right middle lobe and lingula. The   findings are highly suspicious for findings of COVID-19.   3. Advanced emphysematous changes. 4. Cardiomegaly. There is no pericardial effusion. XR CHEST PORTABLE   Final Result   1. When compared to prior chest series there has been interval worsening in   the retrocardiac and bibasilar interstitial opacities. (Broad differential which includes worsening interstitial lung disease,   superimposed infection, and edema.   Please correlate with clinical   presentation.)      2. Lung hyperinflation and coarse interstitial markings suggestive of   underlying COPD. 3.  Atherosclerotic disease. XR CHEST PORTABLE    (Results Pending)       Assessment:    Active Problems:    Acute respiratory failure with hypoxia (HCC)  Resolved Problems:    * No resolved hospital problems. *      Plan:  Acute respiratory failure with hypoxia secondary to COVID-19 pneumonia-currently intubated on vent, critical care managing. COVID-19 pneumonia-ID and critical care following. On vit c, Decadron and remdesivir, on Lovenox as per Covid protocol on vent support . Procal 2.54 - on Zosyn . Continue to monitor in ICU. ID following. 12/20 - has completed remdesivir course. Severe emphysema-follows with Dr. Lily Montano as outpatient, continue bronchodilators, Brovana and Pulmicort. At home on 3 L nasal cannula.     DM 2-on Lantus +  ISS    Hypertension-monitor    Hyperlipidemia-Previously on Pravachol    On lovenox for dvt prophy  Full code         Electronically signed by Siva Flynn MD on 11/20/2020 at 1:37 PM

## 2020-11-20 NOTE — PROGRESS NOTES
ID Progress Note                [address] [facility] [address], [place], [address] [facility]            Phone [phone]     Fax [phone]      Chief complaint   unchanged    Subjective:     On 60% FiO2 and 8 of PEEP   sedation has been decreased in order to wean her  Doing good on pressure support    Still on pressors today, ? hypotension from sedation      Objective:    Vitals:    11/20/20 1220   BP: (!) 99/47   Pulse: 103   Resp: 16   Temp:    SpO2: 93%     VENT SETTINGS:   Vent Information  $Ventilation: $Subsequent Day  Skin Assessment: Clean, dry, & intact  Equipment ID: [id number]  Vent Type: 840  Vent Mode: AC/VC  Vt Ordered: 450 mL  Rate Set: 18 bmp  Peak Flow: 55 L/min  Pressure Support: 0 cmH20  FiO2 : 60 %  SpO2: 93 %  SpO2/FiO2 ratio: 155  Sensitivity: 3  PEEP/CPAP: 8  I Time/ I Time %: 0 s  Humidification Source: HME  Mask Type: Full face mask  Mask Size: Medium  General Appearance:   Intubated   HEENT:    Normocephalic,PERRA   Lungs:     Clear to auscultation bilaterally, no wheeze , crackles   Heart:    Regular rate and rhythm, no murmur   Abdomen:     Soft, non-tender, not distended  bowel sounds present,   Extremities:   No edema,no open wound,no erythema, non  tender   Skin:   no rashes or lesions   Rt IJ- 11/13/2020    Labs:  Recent Labs     11/18/20  0522 11/19/20  0600 11/20/20  0430   WBC 6.2 11.0 12.6*   RBC 3.06* 3.57 3.49*   HGB 8.5* 10.2* 9.6*   HCT 28.8* 32.3* 31.9*   MCV 94.1 90.5 91.4   MCH 27.8 28.6 27.5   MCHC 29.5* 31.6* 30.1*   RDW 15.8* 15.6* 16.0*    280 339   MPV 10.9 10.9 10.9     CMP:    Lab Results   Component Value Date     11/20/2020    K 3.9 11/20/2020    K 5.7 11/13/2020     11/20/2020    CO2 32 11/20/2020    BUN 26 11/20/2020    CREATININE 0.5 11/20/2020    CREATININE 0.5 01/23/2019    GFRAA >60 11/20/2020    LABGLOM >60 11/20/2020    GLUCOSE 206 11/20/2020    PROT 5.7 11/20/2020    LABALBU 2.5 11/20/2020    CALCIUM 8.2 11/20/2020    BILITOT 0.5 11/20/2020    ALKPHOS 85 11/20/2020    AST 25 11/20/2020    ALT 33 11/20/2020          Microbiology :  No results for input(s): BC in the last 72 hours. No results for input(s): Gwendolyn Cava in the last 72 hours. No results for input(s): LABURIN in the last 72 hours. No results for input(s): CULTRESP in the last 72 hours. No results for input(s): WNDABS in the last 72 hours. Radiology :  XR CHEST PORTABLE   Final Result   Endotracheal tube in good position. Persistent a bi basilar infiltrates. XR ABDOMEN FOR NG/OG/NE TUBE PLACEMENT   Final Result   Distal tip of a gastric tube is present in the left upper quadrant of the   abdomen, in the expected region of the proximal stomach. Side port projected   below the diaphragmatic level. Stable small left pleural effusion with adjacent atelectasis and/or pneumonia   in the left lung base   Stable right lower lung infiltrate may be interstitial edema and/or   interstitial pneumonitis   Slight cardiomegaly      XR CHEST PORTABLE   Final Result   No interval change in the multifocal bilateral pulmonary infiltrates left   greater than right. Suspected small left pleural effusion. XR CHEST PORTABLE   Final Result   1. No significant interval change in the multifocal bilateral lower lobe   infiltrates. 2. Stable position of the support lines and tubes. There is an NG tube   within the stomach. XR ABDOMEN FOR NG/OG/NE TUBE PLACEMENT   Final Result   The enteric tube is in good position in the stomach. XR CHEST PORTABLE   Final Result   New right IJ line with the distal tip in the SVC. No pneumothorax. No other   significant interval change. XR CHEST PORTABLE   Final Result   Status post intubation with ET tube approximately 4.4 cm from the level of   the drew. Increasing airspace disease bilaterally predominantly in the lower lobes   consistent with multifocal pneumonia.          CT HEAD WO CONTRAST   Final Result   No acute intracranial abnormality. Age-related loss of brain volume and   chronic periventricular ischemic changes. Chronic left basal ganglia lacunar   infarctions versus prominent perivascular space, unchanged since the prior   examination. Persistent contrast enhancement from recent intravenous contrast   administration. CTA PULMONARY W CONTRAST   Final Result   1. There is no evidence of a pulmonary embolus. 2. Multifocal bilateral ground-glass and semi solid pulmonary infiltrates   within the right and left lower lobes, right middle lobe and lingula. The   findings are highly suspicious for findings of COVID-19.   3. Advanced emphysematous changes. 4. Cardiomegaly. There is no pericardial effusion. XR CHEST PORTABLE   Final Result   1. When compared to prior chest series there has been interval worsening in   the retrocardiac and bibasilar interstitial opacities. (Broad differential which includes worsening interstitial lung disease,   superimposed infection, and edema. Please correlate with clinical   presentation.)      2. Lung hyperinflation and coarse interstitial markings suggestive of   underlying COPD. 3.  Atherosclerotic disease. XR CHEST PORTABLE    (Results Pending)         URINARY CATHETER OUTPUT (Lloyd):  Urethral Catheter Temperature probe 16 fr-Output (mL): 75 mL    Assessment and Plan:      · Acute resp failure - intubated  · COVID 19 pneumonia  · Secondary bacterial infection     Plan  - Nhung Leblanc. 3.75 q8hrly DAY 7 based on lung changes, high procalcitonin, continue for now  -Completed 5-day course of remdesivir on 11/18/2020   Decadron 6mgdaily  On lovenox  Vit B/thiamine/c  resp culture results pending not back yet- ?cancelled, sent again today  Chest x-ray looks unchanged    Electronically signed by Nabeel Barcenas MD on 11/20/2020 at 1:27 PM

## 2020-11-20 NOTE — PROGRESS NOTES
ID Progress Note                [address] [facility] [address], [place], [address] [facility]            Phone [phone]     Fax [phone]      Chief complaint   unchanged    Subjective:     On 50%Fio2 and 8 of PEEP  Sedation has been decreased in order to wean her  Doing good on pressure support    Looks more hypotensive today no fever      Objective:    Vitals:    11/19/20 1800   BP: (!) 73/37   Pulse: 66   Resp: 17   Temp:    SpO2: 91%     VENT SETTINGS:   Vent Information  $Ventilation: Off Vent  Skin Assessment: Clean, dry, & intact  Equipment ID: [id number]  Vent Type: 840  Vent Mode: AC/VC  Vt Ordered: 450 mL  Rate Set: 18 bmp  Peak Flow: 55 L/min  Pressure Support: 0 cmH20  FiO2 : 75 %  SpO2: 91 %  SpO2/FiO2 ratio: 121.33  Sensitivity: 3  PEEP/CPAP: 5  I Time/ I Time %: 0 s  Humidification Source: HME  Mask Type: Full face mask  Mask Size: Medium  General Appearance:   Intubated/awake   HEENT:    Normocephalic,PERRA   Lungs:     Clear to auscultation bilaterally, no wheeze , crackles   Heart:    Regular rate and rhythm, no murmur   Abdomen:     Soft, non-tender, not distended  bowel sounds present,   Extremities:   No edema,no open wound,no erythema, non  tender   Skin:   no rashes or lesions   Rt IJ- 11/13/2020    Labs:  Recent Labs     11/17/20  0549 11/18/20  0522 11/19/20  0600   WBC 7.2 6.2 11.0   RBC 3.07* 3.06* 3.57   HGB 8.7* 8.5* 10.2*   HCT 28.5* 28.8* 32.3*   MCV 92.8 94.1 90.5   MCH 28.3 27.8 28.6   MCHC 30.5* 29.5* 31.6*   RDW 15.8* 15.8* 15.6*    192 280   MPV 10.9 10.9 10.9     CMP:    Lab Results   Component Value Date     11/19/2020    K 3.8 11/19/2020    K 5.7 11/13/2020     11/19/2020    CO2 29 11/19/2020    BUN 26 11/19/2020    CREATININE 0.4 11/19/2020    CREATININE 0.5 01/23/2019    GFRAA >60 11/19/2020    LABGLOM >60 11/19/2020    GLUCOSE 291 11/19/2020    PROT 6.0 11/19/2020    LABALBU 2.7 11/19/2020    CALCIUM 8.4 11/19/2020    BILITOT 0.5 11/19/2020    ALKPHOS 107 11/19/2020    AST 45 11/19/2020    ALT 42 11/19/2020          Microbiology :  No results for input(s): BC in the last 72 hours. No results for input(s): Cletis Daft in the last 72 hours. No results for input(s): LABURIN in the last 72 hours. No results for input(s): CULTRESP in the last 72 hours. No results for input(s): WNDABS in the last 72 hours. Radiology :  XR CHEST PORTABLE   Final Result   Endotracheal tube in good position. Persistent a bi basilar infiltrates. XR ABDOMEN FOR NG/OG/NE TUBE PLACEMENT   Final Result   Distal tip of a gastric tube is present in the left upper quadrant of the   abdomen, in the expected region of the proximal stomach. Side port projected   below the diaphragmatic level. Stable small left pleural effusion with adjacent atelectasis and/or pneumonia   in the left lung base   Stable right lower lung infiltrate may be interstitial edema and/or   interstitial pneumonitis   Slight cardiomegaly      XR CHEST PORTABLE   Final Result   No interval change in the multifocal bilateral pulmonary infiltrates left   greater than right. Suspected small left pleural effusion. XR CHEST PORTABLE   Final Result   1. No significant interval change in the multifocal bilateral lower lobe   infiltrates. 2. Stable position of the support lines and tubes. There is an NG tube   within the stomach. XR ABDOMEN FOR NG/OG/NE TUBE PLACEMENT   Final Result   The enteric tube is in good position in the stomach. XR CHEST PORTABLE   Final Result   New right IJ line with the distal tip in the SVC. No pneumothorax. No other   significant interval change. XR CHEST PORTABLE   Final Result   Status post intubation with ET tube approximately 4.4 cm from the level of   the drew. Increasing airspace disease bilaterally predominantly in the lower lobes   consistent with multifocal pneumonia. CT HEAD WO CONTRAST   Final Result   No acute intracranial abnormality. Age-related loss of brain volume and   chronic periventricular ischemic changes. Chronic left basal ganglia lacunar   infarctions versus prominent perivascular space, unchanged since the prior   examination. Persistent contrast enhancement from recent intravenous contrast   administration. CTA PULMONARY W CONTRAST   Final Result   1. There is no evidence of a pulmonary embolus. 2. Multifocal bilateral ground-glass and semi solid pulmonary infiltrates   within the right and left lower lobes, right middle lobe and lingula. The   findings are highly suspicious for findings of COVID-19.   3. Advanced emphysematous changes. 4. Cardiomegaly. There is no pericardial effusion. XR CHEST PORTABLE   Final Result   1. When compared to prior chest series there has been interval worsening in   the retrocardiac and bibasilar interstitial opacities. (Broad differential which includes worsening interstitial lung disease,   superimposed infection, and edema. Please correlate with clinical   presentation.)      2. Lung hyperinflation and coarse interstitial markings suggestive of   underlying COPD. 3.  Atherosclerotic disease. URINARY CATHETER OUTPUT (Lloyd):  Urethral Catheter Temperature probe 16 fr-Output (mL): 750 mL    Assessment and Plan:      · Acute resp failure - intubated  · COVID 19 pneumonia  · Secondary bacterial infection     Plan  - Torres Garvin. 3.75 q8hrly DAY 6 based on lung changes, high procalcitonin  Remdesivir, watch closely, DAY 5 stop after today , liver enzymes also trending up  Decadron 6mgdaily  On lovenox  Vit B/thiamine/c  resp culture results pending not back yet- ?cancelled    Electronically signed by Audrey Rubio MD on 11/19/2020 at 7:24 PM

## 2020-11-20 NOTE — PROGRESS NOTES
Critical Care Team - Daily Progress Note         Date and time: 11/20/2020 6:14 PM  Patient's name:  Marissa Tong  Medical Record Number: [id number]  Patient's account/billing number: [de-identified]  Patient's YOB: 1945  Age: 67 y.o. Date of Admission: 11/13/2020  1:46 PM  Length of stay during current admission: 7      Primary Care Physician: Fiorella Montemayor MD  ICU Attending Physician: Dr. Zoran Granger    Code Status: Full Code    Reason for ICU admission: Acute hypercapnic hypoxic respiratory failure secondary to Covid pneumonia      SUBJECTIVE:     BRIEF HISTORY: The patient is a 67 y.o. female with significant past medical history of COPD, diabetes, hypertension, hyperlipidemia presented to the ED for shortness of breath. Patient is currently intubated and sedated so history obtained from chart review.     Per emergency department, patient had history of 3 days of worsening shortness of breath. She is chronically on 3 L of oxygen at home when she ambulates but over the last few days has had increase her oxygen requirement to 5 L at all times. 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 She subsequently presented to the ED for evaluation.     Patient was found to be hypoxic and and placed on nasal cannula oxygen. Patient was Positive for COVID-19. While in the emergency department patient was placed on a nonrebreather and subsequently decompensated. She became altered and was taking agonal respirations. She was then intubated for further airway protection. ABG demonstrated respiratory acidosis with a CO2 of greater than 113. Patient was admitted to the ICU for further evaluation care. OVERNIGHT EVENTS:    11/15/20:  Some soft BP's overnight, responded to fluid bolus and switched from propofol gtt to versed gtt  11/16/20: Lilibeth Ripa, tolerating vent.  Attempt to wean sedation and pressure support  11/17/20: Tolerating pressure support, off or Versed  20: Attempt pressure support trials. Patient becoming tachycardic and hypertensive  20: Attempted extubation. Patient less than 30 minutes. She became tachypneic, tachycardic and not tolerating secretions. Patient subsequently reintubated  20: Payton Vasquez pressors overnight, agitated on the vent.     CURRENT VENTILATION STATUS:     [x] Ventilator  [] BIPAP  [] Nasal Cannula [] Room Air      IF INTUBATED, ET TUBE MARKING AT LOWER LIP:       SECRETIONS : Amount:  [] Small [] Moderate  [] Large  [x] None  Color:     [] White [] Colored  [] Bloody    SEDATION:  RAAS Score:  [x] Propofol gtt  [] Versed gtt and fentanyl  [] Ativan gtt   [] No Sedation    PARALYZED:  [x] No    [] Yes      VASOPRESSORS:  [] No    [x] Yes    If yes -   [x] Levophed       [] Dopamine     [] Vasopressin       [] Dobutamine  [] Phenylephrine         [] Epinephrine    CENTRAL LINES:     [] No   [x] Yes   (Date of Insertion:   )           If yes -     [x] Right IJ     [] Left IJ [] Right Femoral [] Left Femoral                   [] Right Subclavian [] Left Subclavian       RAM'S CATHETER:   [] No   [x] Yes  (Date of Insertion:   )     URINE OUTPUT:            [] Good   [x] Low              [] Anuric      OBJECTIVE:     VITAL SIGNS:  BP (!) 141/57   Pulse 105   Temp 99.9 °F (37.7 °C) (Bladder)   Resp 16   Ht 5' 6\" (1.676 m)   Wt 160 lb 11.5 oz (72.9 kg)   SpO2 95%   BMI 25.94 kg/m²   Tmax over 24 hours:  Temp (24hrs), Av.3 °F (37.4 °C), Min:98.4 °F (36.9 °C), Max:99.9 °F (37.7 °C)      Patient Vitals for the past 6 hrs:   BP Temp Temp src Pulse Resp SpO2   20 1700 (!) 141/57 -- -- 105 16 95 %   20 1630 -- -- -- 94 15 97 %   20 1628 -- -- -- -- 18 98 %   20 1615 (!) 143/60 -- -- 100 18 --   20 1600 (!) 143/60 99.9 °F (37.7 °C) Bladder 98 16 98 %   20 1526 (!) 173/72 -- -- 100 15 97 %   20 1500 137/64 -- -- 73 24 97 % 11/20/20 1450 (!) 144/63 -- -- 78 17 97 %   11/20/20 1445 (!) 69/36 -- -- 77 14 97 %   11/20/20 1430 (!) 60/35 -- -- 76 17 95 %   11/20/20 1415 (!) 67/37 -- -- 78 15 94 %   11/20/20 1400 (!) 70/37 -- -- 92 12 93 %   11/20/20 1345 (!) 156/70 -- -- 121 13 92 %   11/20/20 1330 (!) 187/86 -- -- 130 20 90 %   11/20/20 1315 (!) 193/79 -- -- 132 19 90 %   11/20/20 1300 (!) 154/73 -- -- 126 23 91 %   11/20/20 1245 (!) 99/48 -- -- 93 12 94 %   11/20/20 1230 (!) 99/47 -- -- 98 20 94 %   11/20/20 1220 (!) 99/47 -- -- 103 16 93 %   11/20/20 1215 (!) 93/47 -- -- 103 15 91 %         Intake/Output Summary (Last 24 hours) at 11/20/2020 1814  Last data filed at 11/20/2020 1800  Gross per 24 hour   Intake 2568.86 ml   Output 1775 ml   Net 793.86 ml     Wt Readings from Last 2 Encounters:   11/19/20 160 lb 11.5 oz (72.9 kg)   10/29/20 140 lb (63.5 kg)     Body mass index is 25.94 kg/m². PHYSICAL EXAM:  General: Intubated  Eyes:  Pupils equal and reactive  Ears: no obvious scars, no lesions, no masses, hearing intact  Mouth: ET tube in place  Head: normocephalic, atraumatic  Neck: no JVD, no adenopathy, no thyromegaly, neck is supple, trachea is midline.   Chest: no pain on palpation  Lungs: Clear to auscultation bilaterally , no wheezes, rhonchi  Heart: regular rate and regular rhythm, no murmur, normal S1, S2  Abdomen: soft, non-tender  Extremities: no lower extremity edema  Skin: normal color, normal texture, normal turgor, no rashes, no lesions  Neurologic:  Following commands, will give thumbs up, shakes her head yes and no to questions  MEDICATIONS:    Scheduled Meds:   insulin glargine  20 Units Subcutaneous Nightly    lidocaine PF  5 mL Intradermal Once    heparin flush  3 mL Intravenous 2 times per day    senna  1 tablet Oral Nightly    insulin glargine  15 Units Subcutaneous Daily    furosemide  20 mg Intravenous BID    insulin lispro  0-18 Units Subcutaneous Q6H    enoxaparin  40 mg Subcutaneous Daily    dexamethasone  6 mg Intravenous Daily    zinc sulfate  50 mg Oral Daily    ascorbic acid  1,000 mg Oral QAM    Arformoterol Tartrate  15 mcg Nebulization BID    budesonide  0.5 mg Nebulization BID    ipratropium-albuterol  1 ampule Inhalation Q4H    piperacillin-tazobactam  3.375 g Intravenous Q8H    pantoprazole  40 mg Intravenous Daily    sodium chloride flush  10 mL Intravenous 2 times per day    vitamin B and C  1 tablet Oral QAM    Vitamin D  2,000 Units Oral QAM     Continuous Infusions:   propofol 5 mcg/kg/min (11/20/20 1335)    fentaNYL 5 mcg/ml in 0.9%  ml infusion 200 mcg/hr (11/20/20 1721)    dexmedetomidine (PRECEDEX) IV infusion 1 mcg/kg/hr (11/20/20 1810)    norepinephrine 12 mcg/min (11/20/20 1545)    sodium chloride 12.5 mL/hr at 11/20/20 0647    dextrose       PRN Meds:   sodium chloride flush, 10 mL, PRN  heparin flush, 3 mL, PRN  albuterol sulfate HFA, 2 puff, Q6H PRN  sodium chloride flush, 10 mL, PRN  acetaminophen, 650 mg, Q6H PRN    Or  acetaminophen, 650 mg, Q6H PRN  polyethylene glycol, 17 g, Daily PRN  promethazine, 12.5 mg, Q6H PRN    Or  ondansetron, 4 mg, Q6H PRN  guaiFENesin, 400 mg, 4x Daily PRN  glucose, 15 g, PRN  dextrose, 12.5 g, PRN  glucagon (rDNA), 1 mg, PRN  dextrose, 100 mL/hr, PRN        VENT SETTINGS (Comprehensive) (if applicable):  Vent Information  $Ventilation: $Subsequent Day  Skin Assessment: Clean, dry, & intact  Equipment ID: [id number]  Vent Type: 840  Vent Mode: AC/VC  Vt Ordered: 450 mL  Rate Set: 18 bmp  Peak Flow: 55 L/min  Pressure Support: 0 cmH20  FiO2 : 60 %  SpO2: 95 %  SpO2/FiO2 ratio: 158.33  Sensitivity: 3  PEEP/CPAP: 8  I Time/ I Time %: 0 s  Humidification Source: HME  Mask Type: Full face mask  Mask Size: Medium  Additional Respiratory  Assessments  Pulse: 105  Resp: 16  SpO2: 95 %  $End Tidal CO2: 36  Position: Semi-Hunt's  Humidification Source: E  Oral Care: Mouth suctioned  Subglottic Suction Done?: Yes  Cuff Pressure (cm H2O): 298 cm H2O    ABGs:   Recent Labs     11/20/20  0441   PH 7.451*   PCO2 43.9   PO2 65.1*   HCO3 29.9*   BE 5.3*   O2SAT 92.8       Laboratory findings:    Complete Blood Count:   Recent Labs     11/18/20  0522 11/19/20  0600 11/20/20  0430   WBC 6.2 11.0 12.6*   HGB 8.5* 10.2* 9.6*   HCT 28.8* 32.3* 31.9*    280 339        Last 3 Blood Glucose:   Recent Labs     11/18/20  0522 11/19/20  0600 11/20/20  0430   GLUCOSE 244* 291* 206*        PT/INR:    Lab Results   Component Value Date    PROTIME 13.6 11/20/2020    INR 1.1 11/20/2020     PTT:    Lab Results   Component Value Date    APTT 26.6 11/20/2020       Comprehensive Metabolic Profile:   Recent Labs     11/18/20  0522 11/19/20  0600 11/20/20  0430    137 139   K 3.5 3.8 3.9   * 100 100   CO2 26 29 32*   BUN 31* 26* 26*   CREATININE 0.5 0.4* 0.5   GLUCOSE 244* 291* 206*   CALCIUM 8.1* 8.4* 8.2*   PROT 5.1* 6.0* 5.7*   LABALBU 2.6* 2.7* 2.5*   BILITOT 0.3 0.5 0.5   ALKPHOS 74 107* 85   AST 21 45* 25   ALT 24 42* 33*      Magnesium:   Lab Results   Component Value Date    MG 2.1 11/20/2020     Phosphorus:   Lab Results   Component Value Date    PHOS 2.4 11/20/2020     Ionized Calcium: No results found for: CAION       Troponin:   No results for input(s): TROPONINI in the last 72 hours.     Microbiology:  Cultures during this admission:     Blood cultures:                 [x] None drawn      [] Negative             []  Positive (Details:  )  Urine Culture:                   [x] None drawn      [] Negative             []  Positive (Details:  )  Sputum Culture:               [] None drawn       [] Negative             []  Positive (Details: pending )   Endotracheal aspirate:     [] None drawn       [] Negative             []  Positive (Details: pending )     Other pertinent Labs:     Radiology/Imaging:     Chest x-ray:  11/17/20:      ASSESSMENT:       - Acute respiratory failure with hypoxia, hypercarbia  - COVID-19 pneumonia  - Superimposed bacterial pneumonia  - COPD  -Type 2 diabetes    Additional assessment:        PLAN:     WEAN PER PROTOCOL:  [x] No   [] Yes  [] N/A    DISCONTINUE ANY LABS:   [x] No   [] Yes    ICU PROPHYLAXIS:  Stress ulcer:  [x] PPI Agent  [] A9Eudcm [] Sucralfate  [] Other:  VTE:   [x] Enoxaparin  [] Unfract. Heparin Subcut  [] EPC Cuffs    NUTRITION:  [] NPO [] Tube Feeding (Specify: ) [] TPN  [x] PO (Diet: DIET TUBE FEED CONTINUOUS/CYCLIC NPO; Semi-elemental; Orogastric; Continuous; 25; 40)    HOME MEDICATIONS RECONCILED: [] No  [x] Yes    INSULIN DRIP:   [x] No   [] Yes    CONSULTATION NEEDED:  [x] No   [] Yes    FAMILY UPDATED:    [x] No   [] Yes    TRANSFER OUT OF ICU:   [x] No   [] Yes       SYSTEMS ASSESSMENT       Of note, I do not see a progress note from the ICU for yesterday 11/20. That note was written and signed by Dr. Sedrick Felty. We cannot find it in the computer. Is not in with incomplete notes. Will call the help desk and attempt to retrieve her that note is. Neuro   AMS--most likely secondary to hypercapnia              -CT head negative     Intubated and sedated on the vent              -Did not tolerate extubation. Reintubated. Trouble with agitation. Stop Precedex and start removal     Respiratory   Acute hypoxic and hypercapnic respiratory failure secondary to Covid pneumonia              -resp panel + SARS-COV2              -former smoker              -vit c, decadron (day 8), remdesivir-finished              -Pulmonology following              -procal 2.54   -Failed extubation yesterday. Subsequently reintubated.   -Wean FiO2 as able   -Comes agitated quickly, possibility that patient might need trach PEG    Superimposed bacterial PNA   -elevated procal-2.54   -resp culture   -Zosyn, day 7      COPD              -O2 PRN and with ambulation @ home, usual 3L              -Brovana, Pulmicort, nebs     Cardiovascular   CTA              -No evidence of PE     Hypotension, shock?    -Levophed as needed to keep MAP greater than 65   -Continue to monitor     Gastrointestinal   Tube feeds   PPI     Renal   No acute issues     Infectious Disease   COVID PNA              -ID following              -Decadron, 6mg q12. Remdesivir,-finished  decadron day 8. Vitamin C, zinc    Superimposed bacterial PNA   -elevated procal-2.54   -resp culture   -Zosyn, day 5     Hematology/Oncology   Lymphopenia              -2/2 covid pna    Anemia   -Likely iatrogenic, no signs of bleeding. Continue to monitor    Patient had a CTA with no PE, decreased to DVT prophylactic dose of Lovenox     Endocrine   thyroid nodules  DM -sugars uncontrolled, second secondary to Decadron. Increase Lantus signs scale     Social/Spiritual/DNR/Other   Full      Diet: DIET TUBE FEED CONTINUOUS/CYCLIC NPO; Semi-elemental; Orogastric; Continuous; 25; 40  CODE Status: Full Code    Dispo: maintain ICU level care    ASSESSMENT/ PLAN   1. As above  2. Continue sedation as patient becomes agitated while on the vent  3. Increase insulin  4. PICC line, remove CVC  5. Pressors as needed  6. GI prophylaxis-Protonix  7. DVT Prophylaxis-Lovenox  8. Discuss case and plan with attending, Dr. Dallin Roca, DO  Resident, PGY-2  11/20/2020  6:14 PM    I personally saw, examined and provided care for the patient. Radiographs, labs and medication list were reviewed by me independently. I spoke with bedside nursing, therapists and consultants. Critical care services and times documented are independent of procedures and multidisciplinary rounds with Residents. Additionally comprehensive, multidisciplinary rounds were conducted with the MICU team. The case was discussed in detail and plans for care were established. Review of Residents documentation was conducted and revisions were made as appropriate.  I agree with the above documented exam, problem list and plan of care with the following additions:    Remains critically ill on mechanical ventilation  Zosyn

## 2020-11-21 NOTE — PROGRESS NOTES
ID Progress Note                [address] [facility], [address]            Phone [phone]     Fax [phone]      Chief complaint   unchanged    Subjective:    FiO2 60%. PEEP 8. No changes. Remains intubated and sedated. She is still on vasopressors. Minimal secretions from the ET tube. Objective:    Vitals:    11/21/20 0817   BP:    Pulse: 85   Resp: 17   Temp:    SpO2: 95%     VENT SETTINGS:   Vent Information  $Ventilation: $Subsequent Day  Skin Assessment: Clean, dry, & intact  Equipment ID: [id number]  Vent Type: 840  Vent Mode: AC/VC  Vt Ordered: 450 mL  Rate Set: 18 bmp  Peak Flow: 55 L/min  Pressure Support: 0 cmH20  FiO2 : 60 %  SpO2: 95 %  SpO2/FiO2 ratio: 158.33  Sensitivity: 3  PEEP/CPAP: 8  I Time/ I Time %: 0 s  Humidification Source: HME  Mask Type: Full face mask  Mask Size: Medium  General Appearance:   Intubated and sedated. She is in the ICU. HEENT:   Round pupils. Minimally reactive. No jaundice. Lungs:    Coarse breath sounds to auscultation bilaterally. No crackles. Heart:   Heart sounds rhythmic and regular. Abdomen:    Round, soft. Benign to palpation. Extremities:  Minimal edema.    Skin:   no rashes or lesions   Left PICC  Lloyd catheter    Labs:  Recent Labs     11/19/20  0600 11/20/20  0430 11/21/20  0615   WBC 11.0 12.6* 10.8   RBC 3.57 3.49* 3.15*   HGB 10.2* 9.6* 8.9*   HCT 32.3* 31.9* 28.8*   MCV 90.5 91.4 91.4   MCH 28.6 27.5 28.3   MCHC 31.6* 30.1* 30.9*   RDW 15.6* 16.0* 16.3*    339 295   MPV 10.9 10.9 11.0     CMP:    Lab Results   Component Value Date     11/21/2020    K 4.7 11/21/2020    K 5.7 11/13/2020    CL 98 11/21/2020    CO2 34 11/21/2020    BUN 27 11/21/2020    CREATININE 0.4 11/21/2020    CREATININE 0.5 01/23/2019    GFRAA >60 11/21/2020    LABGLOM >60 11/21/2020    GLUCOSE 262 11/21/2020    PROT 5.6 11/21/2020    LABALBU 2.6 11/21/2020    CALCIUM 8.4 11/21/2020    BILITOT 0.4 11/21/2020    ALKPHOS 102 11/21/2020    AST 16 11/21/2020    ALT 26 11/21/2020          Microbiology :  Respiratory panel: SARS-CoV-2 detected  Nares screen MRSA: Negative  Respiratory culture 11/20/2020: Pending    Radiology :  Chest x-ray shows a more defined right lower lobe infiltrate    URINARY CATHETER OUTPUT (Lloyd):  Urethral Catheter Temperature probe 16 fr-Output (mL): 50 mL    Assessment and Plan:      · COVID 19 pneumonia causing acute respiratory failure.   She completed Remdesivir on 11/18/2020 5 days of  · Secondary right lower lobe pneumonia  · Intermittent leukocytosis associated to the above     Plan  - Continue Zosyn, day 8  Decadron 6mgdaily  On lovenox  Vit B/thiamine/c  Chest x-ray looks unchanged  Check respiratory culture    Discussed with Dr. Francine Nunez    Electronically signed by Jonatan Fields MD on 11/21/2020 at 9:05 AM

## 2020-11-21 NOTE — PROGRESS NOTES
Vascular Access Procedure Note    Procedure Date:   11/20/2020    Pre-procedure Verification/Time-Out:  The proposed risks versus benefits of this procedure were discussed in detail by the physician with daughterMontse. written consent was obtained from daughterMontse. Relevant documentation was reviewed prior to procedure including signed consent form and medications. All necessary equipment for procedure is available at time of procedure yes. An audible time out was done at 1950PM by team members, correctly identifying patients name, medical record number, correct side, correct site, and correct procedure to be performed with registered nurse members of the procedure team all in agreement.         Indication for Procedure:   Reason for Insertion: other multiple meds    ASA Assessment (Required for Moderate & Deep Sedation):    Procedure:   Reason for Consultation: power PICC line    Clinician Performing Procedure:   April Vivar rn    Assistant:  none    Sedation:   Analgesia Used: lidocaine 1%    Procedure Details/Findings:  Catheter Scottish Size: 5 fr dl  Lot Number: [id number]  Product #: [id number]  Expiration Date: 10/31/2021  Maximum Barrier Precautions: cap, eye shield, full body drape, gloves, gown, handwashing and mask  Skin Prep: chlorhexidine  Technique: modified seldinger and ultrasound guided with VPS  Attempts: 1  Exposed (cm): 3  Total (cm): 40  Placement Site: left brachial vein  Vessel Size: 0.55 cm  Dressing: securement device, transparent dressing and biopatch  Blood Return: Yes   Ultrasound Guidance: Yes   Arm Circumference Mid-Bicep (cm): 25 cm  Chest X-Ray Ordered: VasoNova VPS  End Placement: caj    Complications:   none     Post-operative Condition:  stable  Patient Tolerated Procedure: well     Comments/Post-operative Education:   Post Procedure Interventions: no blood pressure sign placed above bed    Shon Parada  11/20/20  9:01 PM

## 2020-11-21 NOTE — PROGRESS NOTES
[facility] Hospitalist   Progress Note    Admitting Date and Time: 11/13/2020  1:46 PM  Admit Dx: Acute respiratory failure with hypoxia (HCC) [J96.01]  Acute respiratory failure with hypoxia (HCC) [J96.01]  Acute respiratory failure with hypoxia (HCC) [J96.01]  Acute respiratory failure with hypoxia ([address] [J96.01]     Seen for follow-up on multiple problems as listed below    Subjective:  Seen in ICU , intubated and on vent , d/w  Bedside nurse, reviewed care.     ROS: Not possible sec to above     insulin glargine  20 Units Subcutaneous Nightly    lidocaine PF  5 mL Intradermal Once    heparin flush  3 mL Intravenous 2 times per day    senna  1 tablet Oral Nightly    insulin glargine  15 Units Subcutaneous Daily    furosemide  20 mg Intravenous BID    insulin lispro  0-18 Units Subcutaneous Q6H    enoxaparin  40 mg Subcutaneous Daily    dexamethasone  6 mg Intravenous Daily    zinc sulfate  50 mg Oral Daily    ascorbic acid  1,000 mg Oral QAM    Arformoterol Tartrate  15 mcg Nebulization BID    budesonide  0.5 mg Nebulization BID    ipratropium-albuterol  1 ampule Inhalation Q4H    piperacillin-tazobactam  3.375 g Intravenous Q8H    pantoprazole  40 mg Intravenous Daily    sodium chloride flush  10 mL Intravenous 2 times per day    vitamin B and C  1 tablet Oral QAM    Vitamin D  2,000 Units Oral QAM     sodium chloride flush, 10 mL, PRN  heparin flush, 3 mL, PRN  albuterol sulfate HFA, 2 puff, Q6H PRN  sodium chloride flush, 10 mL, PRN  acetaminophen, 650 mg, Q6H PRN    Or  acetaminophen, 650 mg, Q6H PRN  polyethylene glycol, 17 g, Daily PRN  promethazine, 12.5 mg, Q6H PRN    Or  ondansetron, 4 mg, Q6H PRN  guaiFENesin, 400 mg, 4x Daily PRN  glucose, 15 g, PRN  dextrose, 12.5 g, PRN  glucagon (rDNA), 1 mg, PRN  dextrose, 100 mL/hr, PRN         Objective:    /60   Pulse 92   Temp 99.9 °F (37.7 °C) (Bladder)   Resp 17   Ht 5' 6\" (1.676 m)   Wt 160 lb 11.5 oz (72.9 kg) SpO2 93%   BMI 25.94 kg/m²      Due to patient's COVID-19 positive status, to reduce exposure, contamination and in  an effort to conserve PPE this patient was evaluated through a combination of review of the medical record,, nursing assessment and other physicians exams and assessments as well as telemedicine interaction. General Appearance: Intubated on vent  Skin: warm and dry  Head: normocephalic and atraumatic  Neck: supple and non-tender  Pulmon carmela/Chest: clear to auscultation bilaterally, few coarse BS  Cardiovascular: normal rate, regular rhythm, normal S1 and S2  Abdomen: soft, non-tender, non-distended, normal bowel sounds  Extremities: no cyanosis, clubbing   Neurologic: cannot assess    . j  Recent Labs     11/19/20  0600 11/20/20  0430 11/21/20  0615    139 137   K 3.8 3.9 4.7    100 98   CO2 29 32* 34*   BUN 26* 26* 27*   CREATININE 0.4* 0.5 0.4*   GLUCOSE 291* 206* 262*   CALCIUM 8.4* 8.2* 8.4*       Recent Labs     11/19/20  0600 11/20/20  0430 11/21/20  0615   WBC 11.0 12.6* 10.8   RBC 3.57 3.49* 3.15*   HGB 10.2* 9.6* 8.9*   HCT 32.3* 31.9* 28.8*   MCV 90.5 91.4 91.4   MCH 28.6 27.5 28.3   MCHC 31.6* 30.1* 30.9*   RDW 15.6* 16.0* 16.3*    339 295   MPV 10.9 10.9 11.0       Labs and images reviewed    Radiology:   XR CHEST PORTABLE   Final Result   Unchanged infiltrates and left pleural effusion. New left-sided PICC line tip is in the SVC. XR CHEST PORTABLE   Final Result   Endotracheal tube in good position. Persistent a bi basilar infiltrates. XR ABDOMEN FOR NG/OG/NE TUBE PLACEMENT   Final Result   Distal tip of a gastric tube is present in the left upper quadrant of the   abdomen, in the expected region of the proximal stomach. Side port projected   below the diaphragmatic level.    Stable small left pleural effusion with adjacent atelectasis and/or pneumonia   in the left lung base   Stable right lower lung infiltrate may be interstitial edema and/or interstitial pneumonitis   Slight cardiomegaly      XR CHEST PORTABLE   Final Result   No interval change in the multifocal bilateral pulmonary infiltrates left   greater than right. Suspected small left pleural effusion. XR CHEST PORTABLE   Final Result   1. No significant interval change in the multifocal bilateral lower lobe   infiltrates. 2. Stable position of the support lines and tubes. There is an NG tube   within the stomach. XR ABDOMEN FOR NG/OG/NE TUBE PLACEMENT   Final Result   The enteric tube is in good position in the stomach. XR CHEST PORTABLE   Final Result   New right IJ line with the distal tip in the SVC. No pneumothorax. No other   significant interval change. XR CHEST PORTABLE   Final Result   Status post intubation with ET tube approximately 4.4 cm from the level of   the drew. Increasing airspace disease bilaterally predominantly in the lower lobes   consistent with multifocal pneumonia. CT HEAD WO CONTRAST   Final Result   No acute intracranial abnormality. Age-related loss of brain volume and   chronic periventricular ischemic changes. Chronic left basal ganglia lacunar   infarctions versus prominent perivascular space, unchanged since the prior   examination. Persistent contrast enhancement from recent intravenous contrast   administration. CTA PULMONARY W CONTRAST   Final Result   1. There is no evidence of a pulmonary embolus. 2. Multifocal bilateral ground-glass and semi solid pulmonary infiltrates   within the right and left lower lobes, right middle lobe and lingula. The   findings are highly suspicious for findings of COVID-19.   3. Advanced emphysematous changes. 4. Cardiomegaly. There is no pericardial effusion. XR CHEST PORTABLE   Final Result   1. When compared to prior chest series there has been interval worsening in   the retrocardiac and bibasilar interstitial opacities.       (Broad differential which includes worsening interstitial lung disease,   superimposed infection, and edema. Please correlate with clinical   presentation.)      2. Lung hyperinflation and coarse interstitial markings suggestive of   underlying COPD. 3.  Atherosclerotic disease. Assessment:    Active Problems:    Acute respiratory failure with hypoxia (HCC)  Resolved Problems:    * No resolved hospital problems. *      Plan:  Acute respiratory failure with hypoxia secondary to COVID-19 pneumonia-currently intubated on vent, critical care managing. As per CC might need trach/peg. COVID-19 pneumonia-ID and critical care following. On vit c, Decadron and remdesivir, on Lovenox as per Covid protocol on vent support . Procal 2.54 - on Zosyn . Continue to monitor in ICU. ID following. Has completed remdesivir course. Severe emphysema-follows with Dr. Papo Tsang as outpatient, continue bronchodilators, Brovana and Pulmicort. At home on 3 L nasal cannula. DM 2-on Lantus +  ISS    Hypertension- Hypotenive at present sec to above , requires levophed.     Hyperlipidemia-Previously on Pravachol    On lovenox for dvt prophy  Full code         Electronically signed by Gilbert Torres MD on 11/21/2020 at 11:13 AM

## 2020-11-21 NOTE — PLAN OF CARE
Problem: Falls - Risk of:  Goal: Will remain free from falls  Description: Will remain free from falls  Outcome: Met This Shift  Goal: Absence of physical injury  Description: Absence of physical injury  Outcome: Met This Shift     Problem: Restraint Use - Nonviolent/Non-Self-Destructive Behavior:  Goal: Absence of restraint-related injury  Description: Absence of restraint-related injury  Outcome: Met This Shift     Problem: Airway Clearance - Ineffective  Goal: Achieve or maintain patent airway  Outcome: Met This Shift     Problem: Gas Exchange - Impaired  Goal: Absence of hypoxia  Outcome: Met This Shift  Goal: Promote optimal lung function  Outcome: Met This Shift     Problem: Breathing Pattern - Ineffective  Goal: Ability to achieve and maintain a regular respiratory rate  Outcome: Met This Shift     Problem:  Body Temperature -  Risk of, Imbalanced  Goal: Ability to maintain a body temperature within defined limits  Outcome: Met This Shift  Goal: Complications related to the disease process, condition or treatment will be avoided or minimized  Outcome: Met This Shift     Problem: Isolation Precautions - Risk of Spread of Infection  Goal: Prevent transmission of infection  Outcome: Met This Shift     Problem: Nutrition Deficits  Goal: Optimize nutrtional status  Outcome: Met This Shift     Problem: Risk for Fluid Volume Deficit  Goal: Maintain normal heart rhythm  Outcome: Met This Shift  Goal: Maintain absence of muscle cramping  Outcome: Met This Shift  Goal: Maintain normal serum potassium, sodium, calcium, phosphorus, and pH  Outcome: Met This Shift     Problem: Loneliness or Risk for Loneliness  Goal: Demonstrate positive use of time alone when socialization is not possible  Outcome: Met This Shift     Problem: Fatigue  Goal: Verbalize increase energy and improved vitality  Outcome: Met This Shift     Problem: Patient Education: Go to Patient Education Activity  Goal: Patient/Family Education  Outcome: Met This Shift     Problem: Skin Integrity:  Goal: Will show no infection signs and symptoms  Description: Will show no infection signs and symptoms  Outcome: Met This Shift  Goal: Absence of new skin breakdown  Description: Absence of new skin breakdown  Outcome: Met This Shift     Problem: Pain:  Goal: Pain level will decrease  Description: Pain level will decrease  Outcome: Met This Shift  Goal: Control of acute pain  Description: Control of acute pain  Outcome: Met This Shift  Goal: Control of chronic pain  Description: Control of chronic pain  Outcome: Met This Shift

## 2020-11-21 NOTE — PATIENT CARE CONFERENCE
Intensive Care Daily Quality Rounding Checklist        ICU Team Members: Dr. Jennifer Huang, Loi Stanton & Mica (residents), bedside RN, nursing leadership, respiratory therapist      ICU Day #: NUMBER: 9     Intubation Date: November 13, 2020     Ventilator Day #: NUMBER: 9     Central Line Insertion Date: PICC November 20, 2020                                                    Day #: NUMBER: 2      Arterial Line Insertion Date:  n/a                             Day #: n/a     DVT Prophylaxis: Lovenox    GI Prophylaxis: tube feed     Lloyd Catheter Insertion Alla Carty, 2020                                        Day #: 9                             Continued need (if yes, reason documented and discussed with physician): yes, strict I & O     Skin Issues/ Wounds and ordered treatment discussed on rounds: none, sos precautions     Goals/ Plans for the Day: wean sedation, stop precedex, weaning trail tomorrow, stop decadron, increase lantus to 25 units BID, restraints, continue critical care management

## 2020-11-21 NOTE — PLAN OF CARE
Nutrition Deficits  Goal: Optimize nutrtional status  11/21/2020 0206 by Padmini Finley RN  Outcome: Met This Shift  11/20/2020 1858 by Dede Duran RN  Outcome: Met This Shift     Problem: Risk for Fluid Volume Deficit  Goal: Maintain normal heart rhythm  11/21/2020 0206 by Padmini Finley RN  Outcome: Met This Shift  11/20/2020 1858 by Dede Duran RN  Outcome: Met This Shift  Goal: Maintain absence of muscle cramping  11/21/2020 0206 by Padmini Finley RN  Outcome: Met This Shift  11/20/2020 1858 by Dede Duran RN  Outcome: Met This Shift  Goal: Maintain normal serum potassium, sodium, calcium, phosphorus, and pH  11/21/2020 0206 by Padmini Finley RN  Outcome: Met This Shift  11/20/2020 1858 by Dede Duran RN  Outcome: Met This Shift     Problem: Loneliness or Risk for Loneliness  Goal: Demonstrate positive use of time alone when socialization is not possible  11/20/2020 1858 by Dede Duran RN  Outcome: Met This Shift     Problem: Fatigue  Goal: Verbalize increase energy and improved vitality  11/20/2020 1858 by Dede Duran RN  Outcome: Met This Shift     Problem: Patient Education: Go to Patient Education Activity  Goal: Patient/Family Education  11/21/2020 0206 by Padmini Finley RN  Outcome: Met This Shift  11/20/2020 1858 by Dede Duran RN  Outcome: Met This Shift     Problem: Skin Integrity:  Goal: Will show no infection signs and symptoms  Description: Will show no infection signs and symptoms  11/21/2020 0206 by Padmini Finley RN  Outcome: Met This Shift  11/20/2020 1858 by Dede Duran RN  Outcome: Met This Shift  Goal: Absence of new skin breakdown  Description: Absence of new skin breakdown  11/21/2020 0206 by Padmini Finley RN  Outcome: Met This Shift  11/20/2020 1858 by Dede Duran RN  Outcome: Met This Shift     Problem: Pain:  Goal: Pain level will decrease  Description: Pain level will decrease  11/21/2020 0206 by Padmini Finley RN  Outcome: Met This Shift  11/20/2020 1858 by Elle Jones RN  Outcome: Met This Shift  Goal: Control of acute pain  Description: Control of acute pain  11/21/2020 0206 by Ginger Grider RN  Outcome: Met This Shift  11/20/2020 1858 by Elle Jones RN  Outcome: Met This Shift  Goal: Control of chronic pain  Description: Control of chronic pain  11/21/2020 0206 by Ginger Grider RN  Outcome: Met This Shift  11/20/2020 1858 by Elle Jones RN  Outcome: Met This Shift     Problem: Restraint Use - Nonviolent/Non-Self-Destructive Behavior:  Goal: Absence of restraint indications  Description: Absence of restraint indications  11/21/2020 0206 by Ginger Grider RN  Outcome: Not Met This Shift  11/20/2020 1858 by Elle Jones RN  Outcome: Not Met This Shift     Problem:  Body Temperature -  Risk of, Imbalanced  Goal: Ability to maintain a body temperature within defined limits  11/21/2020 0206 by Ginger Grider RN  Outcome: Not Met This Shift  11/20/2020 1858 by Elle Jones RN  Outcome: Met This Shift  Goal: Will regain or maintain usual level of consciousness  11/21/2020 0206 by Ginger Grider RN  Outcome: Not Met This Shift  11/20/2020 1858 by Elle Jones RN  Outcome: Not Met This Shift

## 2020-11-21 NOTE — PROGRESS NOTES
.         Critical Care Team - Daily Progress Note         Date and time: 11/21/2020 3:31 PM  Patient's name:  Richard Zuniga  Medical Record Number: [id number]  Patient's account/billing number: [de-identified]  Patient's YOB: 1945  Age: 67 y.o. Date of Admission: 11/13/2020  1:46 PM  Length of stay during current admission: 8      Primary Care Physician: David Bowles MD  ICU Attending Physician: Dr. Mandie Guo    Code Status: Full Code    Reason for ICU admission: Acute hypercapnic hypoxic respiratory failure secondary to Covid pneumonia      SUBJECTIVE:     BRIEF HISTORY: The patient is a 67 y.o. female with significant past medical history of COPD, diabetes, hypertension, hyperlipidemia presented to the ED for shortness of breath. Patient is currently intubated and sedated so history obtained from chart review.     Per emergency department, patient had history of 3 days of worsening shortness of breath. She is chronically on 3 L of oxygen at home when she ambulates but over the last few days has had increase her oxygen requirement to 5 L at all times. 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 She subsequently presented to the ED for evaluation.     Patient was found to be hypoxic and and placed on nasal cannula oxygen. Patient was Positive for COVID-19. While in the emergency department patient was placed on a nonrebreather and subsequently decompensated. She became altered and was taking agonal respirations. She was then intubated for further airway protection. ABG demonstrated respiratory acidosis with a CO2 of greater than 113. Patient was admitted to the ICU for further evaluation care. OVERNIGHT EVENTS:    11/15/20:  Some soft BP's overnight, responded to fluid bolus and switched from propofol gtt to versed gtt  11/16/20: [place], tolerating vent.  Attempt to wean sedation and pressure support  20: Tolerating pressure support, off or Versed  20: Attempt pressure support trials. Patient becoming tachycardic and hypertensive  20: Attempted extubation. Patient less than 30 minutes. She became tachypneic, tachycardic and not tolerating secretions. Patient subsequently reintubated  20: [place] pressors overnight, agitated on the vent. 20: Intermittently required pressors overnight. Still hyperglycemic.       CURRENT VENTILATION STATUS:     [x] Ventilator  [] BIPAP  [] Nasal Cannula [] Room Air      IF INTUBATED, ET TUBE MARKING AT LOWER LIP:       SECRETIONS : Amount:  [] Small [] Moderate  [] Large  [x] None  Color:     [] White [] Colored  [] Bloody    SEDATION:  RAAS Score:  [x] Propofol gtt  [] Versed gtt and fentanyl  [] Ativan gtt   [] No Sedation    PARALYZED:  [x] No    [] Yes      VASOPRESSORS:  [] No    [x] Yes    If yes -   [x] Levophed       [] Dopamine     [] Vasopressin       [] Dobutamine  [] Phenylephrine         [] Epinephrine    CENTRAL LINES:     [] No   [x] Yes   (Date of Insertion:   )           If yes -     [x] Right IJ     [] Left IJ [] Right Femoral [] Left Femoral                   [] Right Subclavian [] Left Subclavian       RAM'S CATHETER:   [] No   [x] Yes  (Date of Insertion:   )     URINE OUTPUT:            [] Good   [x] Low              [] Anuric      OBJECTIVE:     VITAL SIGNS:  BP (!) 102/53   Pulse 117   Temp 99.3 °F (37.4 °C) (Bladder)   Resp 15   Ht 5' 6\" (1.676 m)   Wt 160 lb 11.5 oz (72.9 kg)   SpO2 93%   BMI 25.94 kg/m²   Tmax over 24 hours:  Temp (24hrs), Av.8 °F (37.7 °C), Min:99.3 °F (37.4 °C), Max:100.2 °F (37.9 °C)      Patient Vitals for the past 6 hrs:   BP Temp Temp src Pulse Resp SpO2   20 1500 (!) 102/53 -- -- 117 15 93 %   20 1400 (!) 149/68 -- -- 117 18 92 %   20 1320 -- -- -- 104 17 94 %   20 1319 -- -- -- -- 18 95 %   20 1300 (!) 176/77 -- -- 114 24 --   20 1200 133/65 99.3 °F (37.4 °C) Bladder 100 17 --   11/21/20 1100 135/67 -- -- 95 18 --   11/21/20 1015 133/60 -- -- 92 17 93 %   11/21/20 1000 (!) 99/51 -- -- 98 17 93 %         Intake/Output Summary (Last 24 hours) at 11/21/2020 1531  Last data filed at 11/21/2020 1500  Gross per 24 hour   Intake 4321 ml   Output 2165 ml   Net 2156 ml     Wt Readings from Last 2 Encounters:   11/19/20 160 lb 11.5 oz (72.9 kg)   10/29/20 140 lb (63.5 kg)     Body mass index is 25.94 kg/m². PHYSICAL EXAM:  General: Intubated  Eyes:  Pupils equal and reactive  Ears: no obvious scars, no lesions, no masses, hearing intact  Mouth: ET tube in place  Head: normocephalic, atraumatic  Neck: no JVD, no adenopathy, no thyromegaly, neck is supple, trachea is midline. Chest: no pain on palpation  Lungs: Clear to auscultation bilaterally , no wheezes, rhonchi  Heart: regular rate and regular rhythm, no murmur, normal S1, S2  Abdomen: soft, non-tender  Extremities: no lower extremity edema  Skin: normal color, normal texture, normal turgor, no rashes, no lesions  Neurologic:  Less arousable than yesterday, will open eyes and stare. She follow me on the room. Would not squeeze my hand.       MEDICATIONS:    Scheduled Meds:   insulin glargine  25 Units Subcutaneous BID    lidocaine PF  5 mL Intradermal Once    heparin flush  3 mL Intravenous 2 times per day    senna  1 tablet Oral Nightly    furosemide  20 mg Intravenous BID    insulin lispro  0-18 Units Subcutaneous Q6H    enoxaparin  40 mg Subcutaneous Daily    zinc sulfate  50 mg Oral Daily    ascorbic acid  1,000 mg Oral QAM    Arformoterol Tartrate  15 mcg Nebulization BID    budesonide  0.5 mg Nebulization BID    ipratropium-albuterol  1 ampule Inhalation Q4H    piperacillin-tazobactam  3.375 g Intravenous Q8H    pantoprazole  40 mg Intravenous Daily    sodium chloride flush  10 mL Intravenous 2 times per day    vitamin B and C  1 tablet Oral QAM    Vitamin D  2,000 Units Oral QAM     Continuous Infusions:   propofol 5 mcg/kg/min (11/20/20 1335)    fentaNYL 5 mcg/ml in 0.9%  ml infusion 200 mcg/hr (11/21/20 0823)    norepinephrine 3 mcg/min (11/20/20 2300)    sodium chloride 12.5 mL/hr at 11/21/20 1151    dextrose       PRN Meds:   sodium chloride flush, 10 mL, PRN  heparin flush, 3 mL, PRN  albuterol sulfate HFA, 2 puff, Q6H PRN  sodium chloride flush, 10 mL, PRN  acetaminophen, 650 mg, Q6H PRN    Or  acetaminophen, 650 mg, Q6H PRN  polyethylene glycol, 17 g, Daily PRN  promethazine, 12.5 mg, Q6H PRN    Or  ondansetron, 4 mg, Q6H PRN  guaiFENesin, 400 mg, 4x Daily PRN  glucose, 15 g, PRN  dextrose, 12.5 g, PRN  glucagon (rDNA), 1 mg, PRN  dextrose, 100 mL/hr, PRN        VENT SETTINGS (Comprehensive) (if applicable):  Vent Information  $Ventilation: $Subsequent Day  Skin Assessment: Clean, dry, & intact  Equipment ID: [id number]  Vent Type: [phone]  Vent Mode: AC/VC  Vt Ordered: 450 mL  Rate Set: 18 bmp  Peak Flow: 55 L/min  Pressure Support: 0 cmH20  FiO2 : 60 %  SpO2: 93 %  SpO2/FiO2 ratio: 155  Sensitivity: 3  PEEP/CPAP: 8  I Time/ I Time %: 0 s  Humidification Source: HME  Mask Type: Full face mask  Mask Size: Medium  Additional Respiratory  Assessments  Pulse: 117  Resp: 15  SpO2: 93 %  $End Tidal CO2: 36  Position: Semi-Hunt's  Humidification Source: HME  Oral Care: Mouth suctioned  Subglottic Suction Done?: Yes  Cuff Pressure (cm H2O): 29 cm H2O    ABGs:   Recent Labs     11/21/20  0649   PH 7.413   PCO2 51.7*   PO2 98.6   HCO3 32.3*   BE 6.7*   O2SAT 97.4       Laboratory findings:    Complete Blood Count:   Recent Labs     11/19/20  0600 11/20/20  0430 11/21/20  0615   WBC 11.0 12.6* 10.8   HGB 10.2* 9.6* 8.9*   HCT 32.3* 31.9* 28.8*    339 295        Last 3 Blood Glucose:   Recent Labs     11/19/20  0600 11/20/20  0430 11/21/20  0615   GLUCOSE 291* 206* 262*        PT/INR:    Lab Results   Component Value Date    PROTIME 12.1 11/21/2020    INR 1.1 11/21/2020     PTT:    Lab Results   Component Value Date    APTT 24.3 11/21/2020       Comprehensive Metabolic Profile:   Recent Labs     11/19/20  0600 11/20/20  0430 11/21/20  0615    139 137   K 3.8 3.9 4.7    100 98   CO2 29 32* 34*   BUN 26* 26* 27*   CREATININE 0.4* 0.5 0.4*   GLUCOSE 291* 206* 262*   CALCIUM 8.4* 8.2* 8.4*   PROT 6.0* 5.7* 5.6*   LABALBU 2.7* 2.5* 2.6*   BILITOT 0.5 0.5 0.4   ALKPHOS 107* 85 102   AST 45* 25 16   ALT 42* 33* 26      Magnesium:   Lab Results   Component Value Date    MG 2.4 11/21/2020     Phosphorus:   Lab Results   Component Value Date    PHOS 3.3 11/21/2020     Ionized Calcium: No results found for: CAION       Troponin:   No results for input(s): TROPONINI in the last 72 hours. Microbiology:  Cultures during this admission:     Blood cultures:                 [x] None drawn      [] Negative             []  Positive (Details:  )  Urine Culture:                   [x] None drawn      [] Negative             []  Positive (Details:  )  Sputum Culture:               [] None drawn       [] Negative             []  Positive (Details: pending )   Endotracheal aspirate:     [] None drawn       [] Negative             []  Positive (Details: pending )     Other pertinent Labs:     Radiology/Imaging:     Chest x-ray:  11/21/20:      ASSESSMENT:       - Acute respiratory failure with hypoxia, hypercarbia  - COVID-19 pneumonia  - Superimposed bacterial pneumonia  - COPD  -Type 2 diabetes    Additional assessment:        PLAN:     WEAN PER PROTOCOL:  [x] No   [] Yes  [] N/A    DISCONTINUE ANY LABS:   [x] No   [] Yes    ICU PROPHYLAXIS:  Stress ulcer:  [x] PPI Agent  [] [id number] [] Sucralfate  [] Other:  VTE:   [x] Enoxaparin  [] Unfract.  Heparin Subcut  [] EPC Cuffs    NUTRITION:  [] NPO [] Tube Feeding (Specify: ) [] TPN  [x] PO (Diet: DIET TUBE FEED CONTINUOUS/CYCLIC NPO; Semi-elemental; Orogastric; Continuous; 25; 40)    HOME MEDICATIONS RECONCILED: [] No  [x] Yes    INSULIN DRIP:   [x] No   [] Yes    CONSULTATION NEEDED:  [x] No   [] Yes    FAMILY UPDATED:    [x] No   [] Yes    TRANSFER OUT OF ICU:   [x] No   [] Yes       SYSTEMS ASSESSMENT       Of note, I do not see a progress note from the ICU for 11/20. That note was written and signed by Dr. José Miguel Puga. We cannot find it in the computer. Is not in with incomplete notes. Will call the help desk and attempt to retrieve her that note is. Neuro   AMS--most likely secondary to hypercapnia              -CT head negative     Intubated and sedated on the vent              -Did not tolerate extubation. Reintubated. Trouble with agitation. Stop Precedex as has not helped. Continue fentanyl and propofol.      Respiratory   Acute hypoxic and hypercapnic respiratory failure secondary to Covid pneumonia              -resp panel + SARS-COV2              -former smoker              -vit c, decadron (day 9)-- stopped, remdesivir-finished              -Pulmonology following              -procal 2.54   -Failed extubation 11/19  Subsequently reintubated.   -Wean FiO2 as able   -Will possibly attempt weaning trials    Superimposed bacterial PNA   -elevated procal-2.54   -resp culture   -Zosyn, day 8      COPD              -O2 PRN and with ambulation @ home, usual 3L              -[place], Pulmicort, nebs     Cardiovascular   CTA              -No evidence of PE     Hypotension, shock? -Levophed as needed to keep MAP greater than 65   -Continue to monitor      Gastrointestinal   Tube feeds   PPI     Renal   No acute issues     Infectious Disease   COVID PNA              -ID following              -Decadron--stopped today secondary to continued hyperglycemia. -Remdesivir,-finished. Vitamin C, zinc     Superimposed bacterial PNA   -elevated procal-2.54   -resp culture   -Zosyn, day 8     Hematology/Oncology   Lymphopenia              -2/2 covid pna    Anemia   -Likely iatrogenic, no signs of bleeding.   Continue to monitor    Patient had a CTA with no PE, decreased to DVT prophylactic dose of Lovenox     Endocrine   thyroid nodules  DM -sugars uncontrolled, stop decadron. Increase lantus.      Social/Spiritual/DNR/Other   Full      Diet: DIET TUBE FEED CONTINUOUS/CYCLIC NPO; Semi-elemental; Orogastric; Continuous; 25; 40  CODE Status: Full Code    Dispo: maintain ICU level care    ASSESSMENT/ PLAN   1. As above  2. Attempt weaning tomorrow  3. Stop precedex  4. Stop decadron  5. Increase lantus  6. Pressors as needed  7. GI prophylaxis-Protonix  8. DVT Prophylaxis-Lovenox  9. Discuss case and plan with attending, Dr. Celine Jo, DO  Resident, PGY-2  11/21/2020  3:31 PM    I personally saw, examined and provided care for the patient. Radiographs, labs and medication list were reviewed by me independently. I spoke with bedside nursing, therapists and consultants. Critical care services and times documented are independent of procedures and multidisciplinary rounds with Residents. Additionally comprehensive, multidisciplinary rounds were conducted with the MICU team. The case was discussed in detail and plans for care were established. Review of Residents documentation was conducted and revisions were made as appropriate. I agree with the above documented exam, problem list and plan of care with the following additions:    Remains critically ill on mechanical ventilation  Wean sedation today  SBT in the AM  May need a tracheostomy if unable to wean soon  Increase insulin    33 minutes of CCT spent with the patient, reviewing the chart including imaging studies, and discussing the case with other health care professionals. This time excludes procedures.      Topher Rosales MD

## 2020-11-22 NOTE — PROGRESS NOTES
.         Critical Care Team - Daily Progress Note         Date and time: 11/22/2020 7:20 AM  Patient's name:  Idalia Rothman  Medical Record Number: [id number]  Patient's account/billing number: [de-identified]  Patient's YOB: 1945  Age: 67 y.o. Date of Admission: 11/13/2020  1:46 PM  Length of stay during current admission: 9      Primary Care Physician: Pedro Pablo Jack MD  ICU Attending Physician: Dr. Miguelangel Kaiser    Code Status: Full Code    Reason for ICU admission: Acute hypercapnic hypoxic respiratory failure secondary to Covid pneumonia      SUBJECTIVE:     BRIEF HISTORY: The patient is a 67 y.o. female with significant past medical history of COPD, diabetes, hypertension, hyperlipidemia presented to the ED for shortness of breath. Patient is currently intubated and sedated so history obtained from chart review.     Per emergency department, patient had history of 3 days of worsening shortness of breath. She is chronically on 3 L of oxygen at home when she ambulates but over the last few days has had increase her oxygen requirement to 5 L at all times. 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 She subsequently presented to the ED for evaluation.     Patient was found to be hypoxic and and placed on nasal cannula oxygen. Patient was Positive for COVID-19. While in the emergency department patient was placed on a nonrebreather and subsequently decompensated. She became altered and was taking agonal respirations. She was then intubated for further airway protection. ABG demonstrated respiratory acidosis with a CO2 of greater than 113. Patient was admitted to the ICU for further evaluation care. OVERNIGHT EVENTS:    11/15/20:  Some soft BP's overnight, responded to fluid bolus and switched from propofol gtt to versed gtt  11/16/20: Eloina Boateng, tolerating vent.  Attempt to wean sedation and pressure 99.1 °F (37.3 °C) Bladder 120 18 96 % 158 lb 4.6 oz (71.8 kg)   11/22/20 0352 -- -- -- -- 17 100 % --   11/22/20 0350 -- -- -- 80 17 96 % --   11/22/20 0300 (!) 85/44 -- -- 85 18 96 % --   11/22/20 0203 -- -- -- 81 17 96 % --   11/22/20 0200 (!) 83/43 -- -- 73 18 96 % --         Intake/Output Summary (Last 24 hours) at 11/22/2020 0720  Last data filed at 11/22/2020 0400  Gross per 24 hour   Intake 2016 ml   Output 2435 ml   Net -419 ml     Wt Readings from Last 2 Encounters:   11/22/20 158 lb 4.6 oz (71.8 kg)   10/29/20 140 lb (63.5 kg)     Body mass index is 25.55 kg/m². PHYSICAL EXAM:  General: Intubated  Eyes:  Pupils equal and reactive  Ears: no obvious scars, no lesions, no masses, hearing intact  Mouth: ET tube in place  Head: normocephalic, atraumatic  Neck: no JVD, no adenopathy, no thyromegaly, neck is supple, trachea is midline. Chest: no pain on palpation  Lungs: Clear to auscultation bilaterally , no wheezes, rhonchi  Heart: regular rate and regular rhythm, no murmur, normal S1, S2  Abdomen: soft, non-tender  Extremities: no lower extremity edema  Skin: normal color, normal texture, normal turgor, no rashes, no lesions  Neurologic:  will open eyes and stare. She follows me on the room.  Did squeeze my hand      MEDICATIONS:    Scheduled Meds:   insulin glargine  25 Units Subcutaneous BID    lidocaine PF  5 mL Intradermal Once    heparin flush  3 mL Intravenous 2 times per day    senna  1 tablet Oral Nightly    furosemide  20 mg Intravenous BID    insulin lispro  0-18 Units Subcutaneous Q6H    enoxaparin  40 mg Subcutaneous Daily    zinc sulfate  50 mg Oral Daily    ascorbic acid  1,000 mg Oral QAM    Arformoterol Tartrate  15 mcg Nebulization BID    budesonide  0.5 mg Nebulization BID    ipratropium-albuterol  1 ampule Inhalation Q4H    piperacillin-tazobactam  3.375 g Intravenous Q8H    pantoprazole  40 mg Intravenous Daily    sodium chloride flush  10 mL Intravenous 2 times per day  vitamin B and C  1 tablet Oral QAM    Vitamin D  2,000 Units Oral QAM     Continuous Infusions:   propofol 20 mcg/kg/min (11/22/20 0510)    fentaNYL 5 mcg/ml in 0.9%  ml infusion 200 mcg/hr (11/22/20 0718)    norepinephrine Stopped (11/22/20 0415)    sodium chloride 12.5 mL/hr at 11/22/20 0400    dextrose       PRN Meds:   sodium chloride flush, 10 mL, PRN  heparin flush, 3 mL, PRN  albuterol sulfate HFA, 2 puff, Q6H PRN  sodium chloride flush, 10 mL, PRN  acetaminophen, 650 mg, Q6H PRN    Or  acetaminophen, 650 mg, Q6H PRN  polyethylene glycol, 17 g, Daily PRN  promethazine, 12.5 mg, Q6H PRN    Or  ondansetron, 4 mg, Q6H PRN  guaiFENesin, 400 mg, 4x Daily PRN  glucose, 15 g, PRN  dextrose, 12.5 g, PRN  glucagon (rDNA), 1 mg, PRN  dextrose, 100 mL/hr, PRN        VENT SETTINGS (Comprehensive) (if applicable):  Vent Information  $Ventilation: $Subsequent Day  Skin Assessment: Clean, dry, & intact  Equipment ID: [id number]  Vent Type: 840  Vent Mode: AC/VC  Vt Ordered: 450 mL  Rate Set: 18 bmp  Peak Flow: 55 L/min  Pressure Support: 0 cmH20  FiO2 : 60 %  SpO2: (!) 89 %  SpO2/FiO2 ratio: 148.33  Sensitivity: 3  PEEP/CPAP: 8  I Time/ I Time %: 0 s  Humidification Source: HME  Mask Type: Full face mask  Mask Size: Medium  Additional Respiratory  Assessments  Pulse: 112  Resp: 18  SpO2: (!) 89 %  $End Tidal CO2: 36  Position: Semi-Hunt's  Humidification Source: HME  Oral Care: Mouth swabbed, Mouth moisturizer, Mouth suctioned, Suction toothette, Lip moisturizer applied  Subglottic Suction Done?: Yes  Cuff Pressure (cm H2O): 29 cm H2O    ABGs:   Recent Labs     11/22/20 0590   PH 7.401   PCO2 57.3*   PO2 65.8*   HCO3 34.8*   BE 8.5*   O2SAT 91.9*       Laboratory findings:    Complete Blood Count:   Recent Labs     11/20/20  0430 11/21/20  0615 11/22/20  0530   WBC 12.6* 10.8 13.1*   HGB 9.6* 8.9* 9.4*   HCT 31.9* 28.8* 31.8*    295 317        Last 3 Blood Glucose:   Recent Labs     11/20/20  0430 11/21/20  0615 11/22/20  0530   GLUCOSE 206* 262* 82        PT/INR:    Lab Results   Component Value Date    PROTIME 10.5 11/22/2020    INR 0.9 11/22/2020     PTT:    Lab Results   Component Value Date    APTT 23.6 11/22/2020       Comprehensive Metabolic Profile:   Recent Labs     11/20/20  0430 11/21/20  0615 11/22/20  0530    137 138   K 3.9 4.7 4.5    98 96*   CO2 32* 34* 34*   BUN 26* 27* 28*   CREATININE 0.5 0.4* 0.5   GLUCOSE 206* 262* 82   CALCIUM 8.2* 8.4* 9.0   PROT 5.7* 5.6* 6.4   LABALBU 2.5* 2.6* 2.6*   BILITOT 0.5 0.4 0.4   ALKPHOS 85 102 96   AST 25 16 30   ALT 33* 26 28      Magnesium:   Lab Results   Component Value Date    MG 2.1 11/22/2020     Phosphorus:   Lab Results   Component Value Date    PHOS 3.7 11/22/2020     Ionized Calcium: No results found for: CAION       Troponin:   No results for input(s): TROPONINI in the last 72 hours. Microbiology:  Cultures during this admission:     Blood cultures:                 [x] None drawn      [] Negative             []  Positive (Details:  )  Urine Culture:                   [x] None drawn      [] Negative             []  Positive (Details:  )  Sputum Culture:               [] None drawn       [] Negative             [x]  Positive (Details: yeast )   Endotracheal aspirate:     [] None drawn       [] Negative             []  Positive (Details: pending )     Other pertinent Labs:      Radiology/Imaging:     Chest x-ray:  11/21/20:     Impression    Unchanged infiltrates and left pleural effusion. New left-sided PICC line tip is in the SVC.           ASSESSMENT:       - Acute respiratory failure with hypoxia, hypercarbia  - COVID-19 pneumonia  - Superimposed bacterial pneumonia  - COPD  -Type 2 diabetes    Additional assessment:        PLAN:     WEAN PER PROTOCOL:  [] No   [x] Yes  [] N/A    DISCONTINUE ANY LABS:   [x] No   [] Yes    ICU PROPHYLAXIS:  Stress ulcer:  [x] PPI Agent  [] [id number] [] Sucralfate  [] Other:  VTE:   [x] Enoxaparin  [] Unfract. Heparin Subcut  [] EPC Cuffs    NUTRITION:  [] NPO [] Tube Feeding (Specify: ) [] TPN  [x] PO (Diet: DIET TUBE FEED CONTINUOUS/CYCLIC NPO; Semi-elemental; Orogastric; Continuous; 25; 40)    HOME MEDICATIONS RECONCILED: [] No  [x] Yes    INSULIN DRIP:   [x] No   [] Yes    CONSULTATION NEEDED:  [x] No   [] Yes    FAMILY UPDATED:    [] No   [] Yes     TRANSFER OUT OF ICU:   [x] No   [] Yes       SYSTEMS ASSESSMENT       Of note, I do not see a progress note from the ICU for 11/20. That note was written and signed by Dr. Clements Leader. We cannot find it in the computer. Is not in with incomplete notes. Will call the help desk and attempt to retrieve her that note is. Neuro   AMS--most likely secondary to hypercapnia              -CT head negative     Intubated and sedated on the vent              -Did not tolerate extubation. Reintubated. Trouble with agitation. Stop Precedex as has not helped. Continue fentanyl and propofol. Wean sedation. Switch to PO oxy add seroquel? ?     Respiratory   Acute hypoxic and hypercapnic respiratory failure secondary to Covid pneumonia              -resp panel + SARS-COV2              -former smoker              -vit c, decadron (day 9)-- stopped, remdesivir-finished              -Pulmonology following              -procal 2.54   -Failed extubation 11/19  Subsequently reintubated.   -Wean FiO2 as able   -pressure support trial today   -talk to family about possible trach    Superimposed bacterial PNA   -elevated procal-2.54   -resp culture   -Zosyn, day 9      COPD              -O2 PRN and with ambulation @ home, usual 3L              -Brovana, Pulmicort, nebs     Cardiovascular   CTA              -No evidence of PE     Hypotension, shock? -Levophed as needed to keep MAP greater than 65   -Continue to monitor    -very unpredicatable.  Will need 2mcg for map 60 and will then be 572B systolic.     Gastrointestinal   Tube feeds   PPI     Renal   No acute issues Infectious Disease   COVID PNA              -ID following              -Decadron--stopped today secondary to continued hyperglycemia. -Remdesivir,-finished. Vitamin C, zinc     Superimposed bacterial PNA   -elevated procal-2.54   -resp culture   -Zosyn, day 9     Hematology/Oncology   Lymphopenia              -2/2 covid pna    Anemia   -Likely iatrogenic, no signs of bleeding. Continue to monitor    Patient had a CTA with no PE, decreased to DVT prophylactic dose of Lovenox     Endocrine   thyroid nodules  DM -sugars uncontrolled, stop decadron. Increase lantus. ---improved today. May need to decrease lantus     Social/Spiritual/DNR/Other   Full      Diet: DIET TUBE FEED CONTINUOUS/CYCLIC NPO; Semi-elemental; Orogastric; Continuous; 25; 40  CODE Status: Full Code    Dispo: maintain ICU level care    ASSESSMENT/ PLAN   1. As above  2. Pressure support/ SBT  3. Pressors as needed  4. Monitor sugars, may need to decrease lantus now off decadron  5. GI prophylaxis-Protonix  6. DVT Prophylaxis-Lovenox  7. Discuss case and plan with attending, Dr. Claire Zarco, DO  Resident, PGY-2  11/22/2020  7:20 AM    I personally saw, examined and provided care for the patient. Radiographs, labs and medication list were reviewed by me independently. I spoke with bedside nursing, therapists and consultants. Critical care services and times documented are independent of procedures and multidisciplinary rounds with Residents. Additionally comprehensive, multidisciplinary rounds were conducted with the MICU team. The case was discussed in detail and plans for care were established. Review of Residents documentation was conducted and revisions were made as appropriate.  I agree with the above documented exam, problem list and plan of care with the following additions:    Continue Abx  Off steroids  Blood sugar is better  Increase lasix  SBT in AM  Supportive care    33 minutes of CCT spent with the patient, reviewing the chart including imaging studies, and discussing the case with other health care professionals. This time excludes procedures.      Spencer Jane MD

## 2020-11-22 NOTE — PROGRESS NOTES
to conserve PPE this patient was evaluated through a combination of review of the medical record,, nursing assessment and other physicians exams and assessments as well as telemedicine interaction. General Appearance: Intubated on vent  Skin: warm and dry  Head: normocephalic and atraumatic  Neck: supple and non-tender  Pulmon carmela/Chest: clear to auscultation bilaterally, few coarse BS  Cardiovascular: normal rate, regular rhythm, normal S1 and S2  Abdomen: soft, non-tender, non-distended, normal bowel sounds  Extremities: no cyanosis, clubbing   Neurologic: cannot assess    . j  Recent Labs     11/20/20  0430 11/21/20  0615 11/22/20  0530    137 138   K 3.9 4.7 4.5    98 96*   CO2 32* 34* 34*   BUN 26* 27* 28*   CREATININE 0.5 0.4* 0.5   GLUCOSE 206* 262* 82   CALCIUM 8.2* 8.4* 9.0       Recent Labs     11/20/20 0430 11/21/20  0615 11/22/20  0530   WBC 12.6* 10.8 13.1*   RBC 3.49* 3.15* 3.38*   HGB 9.6* 8.9* 9.4*   HCT 31.9* 28.8* 31.8*   MCV 91.4 91.4 94.1   MCH 27.5 28.3 27.8   MCHC 30.1* 30.9* 29.6*   RDW 16.0* 16.3* 16.3*    295 317   MPV 10.9 11.0 10.7       Labs and images reviewed    Radiology:   XR CHEST PORTABLE   Final Result   Unchanged infiltrates and left pleural effusion. New left-sided PICC line tip is in the SVC. XR CHEST PORTABLE   Final Result   Endotracheal tube in good position. Persistent a bi basilar infiltrates. XR ABDOMEN FOR NG/OG/NE TUBE PLACEMENT   Final Result   Distal tip of a gastric tube is present in the left upper quadrant of the   abdomen, in the expected region of the proximal stomach. Side port projected   below the diaphragmatic level.    Stable small left pleural effusion with adjacent atelectasis and/or pneumonia   in the left lung base   Stable right lower lung infiltrate may be interstitial edema and/or   interstitial pneumonitis   Slight cardiomegaly      XR CHEST PORTABLE   Final Result   No interval change in the multifocal bilateral pulmonary infiltrates left   greater than right. Suspected small left pleural effusion. XR CHEST PORTABLE   Final Result   1. No significant interval change in the multifocal bilateral lower lobe   infiltrates. 2. Stable position of the support lines and tubes. There is an NG tube   within the stomach. XR ABDOMEN FOR NG/OG/NE TUBE PLACEMENT   Final Result   The enteric tube is in good position in the stomach. XR CHEST PORTABLE   Final Result   New right IJ line with the distal tip in the SVC. No pneumothorax. No other   significant interval change. XR CHEST PORTABLE   Final Result   Status post intubation with ET tube approximately 4.4 cm from the level of   the drew. Increasing airspace disease bilaterally predominantly in the lower lobes   consistent with multifocal pneumonia. CT HEAD WO CONTRAST   Final Result   No acute intracranial abnormality. Age-related loss of brain volume and   chronic periventricular ischemic changes. Chronic left basal ganglia lacunar   infarctions versus prominent perivascular space, unchanged since the prior   examination. Persistent contrast enhancement from recent intravenous contrast   administration. CTA PULMONARY W CONTRAST   Final Result   1. There is no evidence of a pulmonary embolus. 2. Multifocal bilateral ground-glass and semi solid pulmonary infiltrates   within the right and left lower lobes, right middle lobe and lingula. The   findings are highly suspicious for findings of COVID-19.   3. Advanced emphysematous changes. 4. Cardiomegaly. There is no pericardial effusion. XR CHEST PORTABLE   Final Result   1. When compared to prior chest series there has been interval worsening in   the retrocardiac and bibasilar interstitial opacities. (Broad differential which includes worsening interstitial lung disease,   superimposed infection, and edema.   Please correlate with clinical   presentation.) 2.  Lung hyperinflation and coarse interstitial markings suggestive of   underlying COPD. 3.  Atherosclerotic disease. XR CHEST PORTABLE    (Results Pending)       Assessment:    Active Problems:    Acute respiratory failure with hypoxia (HCC)  Resolved Problems:    * No resolved hospital problems. *      Plan:  Acute respiratory failure with hypoxia secondary to COVID-19 pneumonia-currently intubated on vent, critical care managing. As per CC might need trach/peg. COVID-19 pneumonia-ID and critical care following. On vit c, Decadron and remdesivir, on Lovenox as per Covid protocol on vent support . Procal 2.54 - on Zosyn . Continue to monitor in ICU. ID following. Has completed remdesivir course. Severe emphysema-follows with Dr. Angel Kim as outpatient, continue bronchodilators, Brovana and Pulmicort. At home on 3 L nasal cannula. DM 2-on Lantus +  ISS    Hypertension- Hypotenive at present sec to above , requires levophed.     Hyperlipidemia-Previously on Pravachol    On lovenox for dvt prophy  Full code         Electronically signed by Zaid Jones MD on 11/22/2020 at 10:56 AM

## 2020-11-22 NOTE — PROGRESS NOTES
ID Progress Note                [address], [place], [address] [facility]            Phone [phone]     Fax [phone]      Chief complaint   unchanged    Subjective:    FiO2 60%. PEEP 8. No changes. Remains intubated and sedated. She is still on vasopressors. Minimal secretions from the ET tube. Objective:    Vitals:    11/22/20 0800   BP: (!) 109/51   Pulse: 96   Resp: 17   Temp: 99.3 °F (37.4 °C)   SpO2: 91%     VENT SETTINGS:   Vent Information  $Ventilation: $Subsequent Day  Skin Assessment: Clean, dry, & intact  Equipment ID: [id number]  Vent Type: 840  Vent Mode: AC/VC  Vt Ordered: 450 mL  Rate Set: 18 bmp  Peak Flow: 55 L/min  Pressure Support: 0 cmH20  FiO2 : 60 %  SpO2: 91 %  SpO2/FiO2 ratio: 151.67  Sensitivity: 3  PEEP/CPAP: 8  I Time/ I Time %: 0 s  Humidification Source: HME  Mask Type: Full face mask  Mask Size: Medium  General Appearance:   Intubated and sedated. She is in the ICU. HEENT:   Round pupils. Minimally reactive. No jaundice. Lungs:    Coarse breath sounds to auscultation bilaterally. No crackles. Heart:   Heart sounds rhythmic and regular. Abdomen:    Round, soft. Benign to palpation. Extremities:  Minimal edema.    Skin:   no rashes or lesions   Left PICC  Lloyd catheter    Labs:  Recent Labs     11/20/20  0430 11/21/20  0615 11/22/20  0530   WBC 12.6* 10.8 13.1*   RBC 3.49* 3.15* 3.38*   HGB 9.6* 8.9* 9.4*   HCT 31.9* 28.8* 31.8*   MCV 91.4 91.4 94.1   MCH 27.5 28.3 27.8   MCHC 30.1* 30.9* 29.6*   RDW 16.0* 16.3* 16.3*    295 317   MPV 10.9 11.0 10.7     CMP:    Lab Results   Component Value Date     11/22/2020    K 4.5 11/22/2020    K 5.7 11/13/2020    CL 96 11/22/2020    CO2 34 11/22/2020    BUN 28 11/22/2020    CREATININE 0.5 11/22/2020    CREATININE 0.5 01/23/2019    GFRAA >60 11/22/2020    LABGLOM >60 11/22/2020    GLUCOSE 82 11/22/2020    PROT 6.4 11/22/2020    LABALBU 2.6 11/22/2020    CALCIUM 9.0 11/22/2020    BILITOT 0.4 11/22/2020    ALKPHOS 96 11/22/2020    AST 30 11/22/2020    ALT 28 11/22/2020          Microbiology :  Respiratory panel: SARS-CoV-2 detected  Nares screen MRSA: Negative  Respiratory culture 11/20/2020: Serratia marcescens, Klebsiella pneumoniae    Radiology :  No new chest x-rays today    URINARY CATHETER OUTPUT (Lloyd):  Urethral Catheter Temperature probe 16 fr-Output (mL): 170 mL    Assessment and Plan:      · COVID 19 pneumonia causing acute respiratory failure.   She completed 5 days of remdesivir on 11/18/2020   · Superimposed right lower lobe bacterial pneumonia with Serratia and Klebsiella  · Intermittent leukocytosis associated to the above     Plan  - Continue Zosyn, day 9  Decadron 6mgdaily  Continue anticoagulation  Vit B/thiamine/c  Check respiratory culture    Discussed with Dr. Clements Leader    Electronically signed by Anni Ayala MD on 11/22/2020 at 8:53 AM

## 2020-11-22 NOTE — PLAN OF CARE
Problem: Falls - Risk of:  Goal: Will remain free from falls  Description: Will remain free from falls  11/22/2020 1008 by Nessa Hong RN  Outcome: Met This Shift  11/22/2020 0115 by Sherwin Halsted, RN  Outcome: Met This Shift  Goal: Absence of physical injury  Description: Absence of physical injury  11/22/2020 1008 by Nessa Hong RN  Outcome: Met This Shift  11/22/2020 0115 by Sherwin Halsted, RN  Outcome: Met This Shift     Problem: Restraint Use - Nonviolent/Non-Self-Destructive Behavior:  Goal: Absence of restraint-related injury  Description: Absence of restraint-related injury  11/22/2020 1008 by Nessa Hong RN  Outcome: Met This Shift  11/22/2020 0115 by Sherwin Halsted, RN  Outcome: Met This Shift     Problem: Airway Clearance - Ineffective  Goal: Achieve or maintain patent airway  Outcome: Met This Shift     Problem: Isolation Precautions - Risk of Spread of Infection  Goal: Prevent transmission of infection  11/22/2020 0115 by Sherwin Halsted, RN  Outcome: Met This Shift     Problem: Nutrition Deficits  Goal: Optimize nutrtional status  11/22/2020 0115 by Sherwin Halsted, RN  Outcome: Met This Shift     Problem: Patient Education: Go to Patient Education Activity  Goal: Patient/Family Education  11/22/2020 0115 by Sherwin Halsted, RN  Outcome: Met This Shift     Problem: Skin Integrity:  Goal: Will show no infection signs and symptoms  Description: Will show no infection signs and symptoms  11/22/2020 0115 by Sherwin Halsted, RN  Outcome: Met This Shift  Goal: Absence of new skin breakdown  Description: Absence of new skin breakdown  11/22/2020 0115 by Sherwin Halsted, RN  Outcome: Met This Shift     Problem: Pain:  Goal: Pain level will decrease  Description: Pain level will decrease  11/22/2020 0115 by Sherwin Halsted, RN  Outcome: Met This Shift  Goal: Control of acute pain  Description: Control of acute pain  11/22/2020 0115 by Sherwin Halsted, RN  Outcome:  Met cramping  11/22/2020 0115 by Petra Bruner RN  Outcome: Not Met This Shift  Goal: Maintain normal serum potassium, sodium, calcium, phosphorus, and pH  11/22/2020 0115 by Petra Bruner RN  Outcome: Not Met This Shift     Problem: Loneliness or Risk for Loneliness  Goal: Demonstrate positive use of time alone when socialization is not possible  11/22/2020 0115 by Petra Bruner RN  Outcome: Not Met This Shift     Problem: Fatigue  Goal: Verbalize increase energy and improved vitality  11/22/2020 0115 by Petra Bruner RN  Outcome: Not Met This Shift

## 2020-11-23 NOTE — CARE COORDINATION
COVID POSITIVE 11/13. Vent/ intubated 11/13- attempted extubation 11/19 but required re-intubation- requiring FIO2 60% PEEP 8. Out of network for [employer]- referral made to [employer]. VM left w/ daughter Latrell Albarran [phone] to update-awaiting return call.  Will follow Jared Anthony

## 2020-11-23 NOTE — PROGRESS NOTES
ID Progress Note                [address], [place], [address] [facility]            Phone [phone]     Fax [phone]      Chief complaint   resp failure    Subjective:  Awake nut not following commands  FiO2 60%. PEEP 8. No changes. Remains intubated   She is still on vasopressors. Minimal secretions from the ET tube. Objective:    Vitals:    11/23/20 0801   BP:    Pulse:    Resp: 21   Temp:    SpO2: 96%     VENT SETTINGS:   Vent Information  $Ventilation: $Subsequent Day  Skin Assessment: Clean, dry, & intact  Equipment ID: [id number]  Vent Type: 840  Vent Mode: AC/VC  Vt Ordered: 450 mL  Rate Set: 22 bmp  Peak Flow: 55 L/min  Pressure Support: 0 cmH20  FiO2 : 60 %  SpO2: 96 %  SpO2/FiO2 ratio: 160  Sensitivity: 3  PEEP/CPAP: 8  I Time/ I Time %: 0 s  Humidification Source: HME  Mask Type: Full face mask  Mask Size: Medium  General Appearance:   Intubated/awake but not following commands. HEENT:   Round pupils. Minimally reactive. No jaundice. Lungs:    Coarse breath sounds to auscultation bilaterally. No crackles. Heart:   Heart sounds rhythmic and regular. Abdomen:    Round, soft. Benign to palpation. Extremities:  Minimal edema.    Skin:   no rashes or lesions   Left PICC  Lloyd catheter    Labs:  Recent Labs     11/21/20  0615 11/22/20  0530 11/23/20  0610   WBC 10.8 13.1* 12.5*   RBC 3.15* 3.38* 2.95*   HGB 8.9* 9.4* 8.4*   HCT 28.8* 31.8* 27.1*   MCV 91.4 94.1 91.9   MCH 28.3 27.8 28.5   MCHC 30.9* 29.6* 31.0*   RDW 16.3* 16.3* 16.7*    317 246   MPV 11.0 10.7 10.4     CMP:    Lab Results   Component Value Date     11/23/2020    K 3.7 11/23/2020    K 5.7 11/13/2020    CL 96 11/23/2020    CO2 36 11/23/2020    BUN 25 11/23/2020    CREATININE 0.4 11/23/2020    CREATININE 0.5 01/23/2019    GFRAA >60 11/23/2020    LABGLOM >60 11/23/2020    GLUCOSE 103 11/23/2020    PROT 6.0 11/23/2020    LABALBU 2.5 11/23/2020    CALCIUM 8.7 11/23/2020    BILITOT 0.6 11/23/2020 ALKPHOS 105 11/23/2020    AST 45 11/23/2020    ALT 27 11/23/2020          Microbiology :  Respiratory panel: SARS-CoV-2 detected  Nares screen MRSA: Negative  Respiratory culture 11/20/2020: Serratia marcescens, Klebsiella pneumoniae    Radiology :  No new chest x-rays today    URINARY CATHETER OUTPUT (Lloyd):  Urethral Catheter Temperature probe 16 fr-Output (mL): 800 mL    Assessment and Plan:      · COVID 19 pneumonia causing acute respiratory failure.   She completed 5 days of remdesivir on 11/18/2020   · Superimposed right lower lobe bacterial pneumonia with Serratia and Klebsiella sensitive to Zosyn  · Intermittent leukocytosis associated to the above     Plan  - Continue Zosyn, day 10, complete 14-day course  Decadron 6mgdaily, stop after 10 days of treatment  Continue anticoagulation  Vit B/thiamine/c        Electronically signed by Delia Boyle MD on 11/23/2020 at 11:16 AM

## 2020-11-23 NOTE — PROGRESS NOTES
[facility]   Progress Note    Admitting Date and Time: 11/13/2020  1:46 PM  Admit Dx: Acute respiratory failure with hypoxia (HCC) [J96.01]  Acute respiratory failure with hypoxia (HCC) [J96.01]  Acute respiratory failure with hypoxia (HCC) [J96.01]  Acute respiratory failure with hypoxia ([address] [J96.01]     Seen for follow-up on multiple problems as listed below    Subjective:  Seen in ICU , intubated on vent . Reviewed care.       ROS: Not possible sec to above     potassium chloride  40 mEq Intravenous Once    furosemide  40 mg Intravenous BID    insulin glargine  25 Units Subcutaneous BID    lidocaine PF  5 mL Intradermal Once    heparin flush  3 mL Intravenous 2 times per day    senna  1 tablet Oral Nightly    insulin lispro  0-18 Units Subcutaneous Q6H    enoxaparin  40 mg Subcutaneous Daily    zinc sulfate  50 mg Oral Daily    ascorbic acid  1,000 mg Oral QAM    Arformoterol Tartrate  15 mcg Nebulization BID    budesonide  0.5 mg Nebulization BID    ipratropium-albuterol  1 ampule Inhalation Q4H    piperacillin-tazobactam  3.375 g Intravenous Q8H    pantoprazole  40 mg Intravenous Daily    sodium chloride flush  10 mL Intravenous 2 times per day    vitamin B and C  1 tablet Oral QAM    Vitamin D  2,000 Units Oral QAM     sodium chloride flush, 10 mL, PRN  heparin flush, 3 mL, PRN  albuterol sulfate HFA, 2 puff, Q6H PRN  sodium chloride flush, 10 mL, PRN  acetaminophen, 650 mg, Q6H PRN    Or  acetaminophen, 650 mg, Q6H PRN  polyethylene glycol, 17 g, Daily PRN  promethazine, 12.5 mg, Q6H PRN    Or  ondansetron, 4 mg, Q6H PRN  guaiFENesin, 400 mg, 4x Daily PRN  glucose, 15 g, PRN  dextrose, 12.5 g, PRN  glucagon (rDNA), 1 mg, PRN  dextrose, 100 mL/hr, PRN         Objective:    BP (!) 156/59   Pulse 117   Temp 99.5 °F (37.5 °C) (Bladder)   Resp 23   Ht 5' 6\" (1.676 m)   Wt 158 lb 4.6 oz (71.8 kg)   SpO2 93%   BMI 25.55 kg/m²      Due to patient's [id number] positive status, to reduce exposure, contamination and in  an effort to conserve PPE this patient was evaluated through a combination of review of the medical record,, nursing assessment and other physicians exams and assessments as well as telemedicine interaction. General Appearance: Intubated on vent  Skin: warm and dry  Head: normocephalic and atraumatic  Neck: supple and non-tender  Pulmon carmela/Chest: clear to auscultation bilaterally, few coarse BS  Cardiovascular: normal rate, regular rhythm, normal S1 and S2  Abdomen: soft, non-tender, non-distended, normal bowel sounds  Extremities: no cyanosis, clubbing   Neurologic: cannot assess    . j  Recent Labs     11/21/20 0615 11/22/20  0530 11/23/20  0610    138 139   K 4.7 4.5 3.7   CL 98 96* 96*   CO2 34* 34* 36*   BUN 27* 28* 25*   CREATININE 0.4* 0.5 0.4*   GLUCOSE 262* 82 103*   CALCIUM 8.4* 9.0 8.7       Recent Labs     11/21/20 0615 11/22/20 0530 11/23/20  0610   WBC 10.8 13.1* 12.5*   RBC 3.15* 3.38* 2.95*   HGB 8.9* 9.4* 8.4*   HCT 28.8* 31.8* 27.1*   MCV 91.4 94.1 91.9   MCH 28.3 27.8 28.5   MCHC 30.9* 29.6* 31.0*   RDW 16.3* 16.3* 16.7*    317 246   MPV 11.0 10.7 10.4       Labs and images reviewed    Radiology:   XR CHEST PORTABLE   Final Result   Bilateral lower lobe infiltrates and small to moderate left pleural effusion. Probable interstitial edema. XR CHEST PORTABLE   Final Result   Unchanged infiltrates and left pleural effusion. New left-sided PICC line tip is in the SVC. XR CHEST PORTABLE   Final Result   Endotracheal tube in good position. Persistent a bi basilar infiltrates. XR ABDOMEN FOR NG/OG/NE TUBE PLACEMENT   Final Result   Distal tip of a gastric tube is present in the left upper quadrant of the   abdomen, in the expected region of the proximal stomach. Side port projected   below the diaphragmatic level.    Stable small left pleural effusion with adjacent atelectasis and/or pneumonia   in the left lung base   Stable right lower lung infiltrate may be interstitial edema and/or   interstitial pneumonitis   Slight cardiomegaly      XR CHEST PORTABLE   Final Result   No interval change in the multifocal bilateral pulmonary infiltrates left   greater than right. Suspected small left pleural effusion. XR CHEST PORTABLE   Final Result   1. No significant interval change in the multifocal bilateral lower lobe   infiltrates. 2. Stable position of the support lines and tubes. There is an NG tube   within the stomach. XR ABDOMEN FOR NG/OG/NE TUBE PLACEMENT   Final Result   The enteric tube is in good position in the stomach. XR CHEST PORTABLE   Final Result   New right IJ line with the distal tip in the SVC. No pneumothorax. No other   significant interval change. XR CHEST PORTABLE   Final Result   Status post intubation with ET tube approximately 4.4 cm from the level of   the drew. Increasing airspace disease bilaterally predominantly in the lower lobes   consistent with multifocal pneumonia. CT HEAD WO CONTRAST   Final Result   No acute intracranial abnormality. Age-related loss of brain volume and   chronic periventricular ischemic changes. Chronic left basal ganglia lacunar   infarctions versus prominent perivascular space, unchanged since the prior   examination. Persistent contrast enhancement from recent intravenous contrast   administration. CTA PULMONARY W CONTRAST   Final Result   1. There is no evidence of a pulmonary embolus. 2. Multifocal bilateral ground-glass and semi solid pulmonary infiltrates   within the right and left lower lobes, right middle lobe and lingula. The   findings are highly suspicious for findings of COVID-19.   3. Advanced emphysematous changes. 4. Cardiomegaly. There is no pericardial effusion. XR CHEST PORTABLE   Final Result   1.   When compared to prior chest series there has been interval worsening in the retrocardiac and bibasilar interstitial opacities. (Broad differential which includes worsening interstitial lung disease,   superimposed infection, and edema. Please correlate with clinical   presentation.)      2. Lung hyperinflation and coarse interstitial markings suggestive of   underlying COPD. 3.  Atherosclerotic disease. Assessment:    Active Problems:    Acute respiratory failure with hypoxia (HCC)  Resolved Problems:    * No resolved hospital problems. *      Plan:  Acute respiratory failure with hypoxia secondary to COVID-19 pneumonia-currently intubated on vent, critical care managing. As per CC might need trach/peg. COVID-19 pneumonia-ID and critical care following. On vit c, Decadron and remdesivir,  on vent support . Procal 2.54 - on Zosyn . ID following. Has completed remdesivir,decadron  course. Oropharyngeal cultures positive for Serratia and Klebsiella sensitive to Zosyn. As per ID continue for 14 days. Currently day 10. Severe emphysema-follows with Dr. Ismael Paredes as outpatient, continue bronchodilators, Brovana and Pulmicort. At home on 3 L nasal cannula. DM 2-on Lantus +  ISS    Hypertension- Hypotenive at present sec to above , requires levophed.     Hyperlipidemia-Previously on Pravachol    On lovenox for dvt prophy  Full code         Electronically signed by Morena Mancera MD on 11/23/2020 at 1:18 PM

## 2020-11-23 NOTE — PROGRESS NOTES
.         Critical Care Team - Daily Progress Note         Date and time: 11/23/2020 4:34 PM  Patient's name:  Ellin Galeazzi  Medical Record Number: [id number]  Patient's account/billing number: [de-identified]  Patient's YOB: 1945  Age: 67 y.o. Date of Admission: 11/13/2020  1:46 PM  Length of stay during current admission: 10      Primary Care Physician: Dontae Bernal MD  ICU Attending Physician: Dr. Santa Bush Status: Full Code    Reason for ICU admission: Acute hypercapnic hypoxic respiratory failure secondary to Covid pneumonia      SUBJECTIVE:     BRIEF HISTORY: The patient is a 67 y.o. female with significant past medical history of COPD, diabetes, hypertension, hyperlipidemia presented to the ED for shortness of breath. Patient is currently intubated and sedated so history obtained from chart review.     Per emergency department, patient had history of 3 days of worsening shortness of breath. She is chronically on 3 L of oxygen at home when she ambulates but over the last few days has had increase her oxygen requirement to 5 L at all times. 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 She subsequently presented to the ED for evaluation.     Patient was found to be hypoxic and and placed on nasal cannula oxygen. Patient was Positive for COVID-19. While in the emergency department patient was placed on a nonrebreather and subsequently decompensated. She became altered and was taking agonal respirations. She was then intubated for further airway protection. ABG demonstrated respiratory acidosis with a CO2 of greater than 113. Patient was admitted to the ICU for further evaluation care. OVERNIGHT EVENTS:    11/15/20:  Some soft BP's overnight, responded to fluid bolus and switched from propofol gtt to versed gtt  11/16/20: Sariah Burt, tolerating vent.  Attempt to wean sedation and pressure support  20: Tolerating pressure support, off or Versed  20: Attempt pressure support trials. Patient becoming tachycardic and hypertensive  20: Attempted extubation. Patient less than 30 minutes. She became tachypneic, tachycardic and not tolerating secretions. Patient subsequently reintubated  20: Payton Christina pressors overnight, agitated on the vent. 20: Intermittently required pressors overnight. Still hyperglycemic.  20: pressures all over the place. intermittant Levo. HTN with agitation  20: Hypotensive overnight. Levo restarted. Oxygenating well.       CURRENT VENTILATION STATUS:     [x] Ventilator  [] BIPAP  [] Nasal Cannula [] Room Air      IF INTUBATED, ET TUBE MARKING AT LOWER LIP:       SECRETIONS : Amount:  [] Small [] Moderate  [] Large  [x] None  Color:     [] White [] Colored  [] Bloody    SEDATION:  RAAS Score:  [x] Propofol gtt  [] Versed gtt and fentanyl  [] Ativan gtt   [] No Sedation    PARALYZED:  [x] No    [] Yes      VASOPRESSORS:  [] No    [x] Yes    If yes -   [x] Levophed       [] Dopamine     [] Vasopressin       [] Dobutamine  [] Phenylephrine         [] Epinephrine    CENTRAL LINES:     [] No   [x] Yes   (Date of Insertion:   )           If yes -     [x] Right IJ     [] Left IJ [] Right Femoral [] Left Femoral                   [] Right Subclavian [] Left Subclavian       RAM'S CATHETER:   [] No   [x] Yes  (Date of Insertion:   )     URINE OUTPUT:            [] Good   [x] Low              [] Anuric      OBJECTIVE:     VITAL SIGNS:  BP (!) 150/56   Pulse 112   Temp 99.7 °F (37.6 °C) (Bladder)   Resp 21   Ht 5' 6\" (1.676 m)   Wt 158 lb 4.6 oz (71.8 kg)   SpO2 94%   BMI 25.55 kg/m²   Tmax over 24 hours:  Temp (24hrs), Av.6 °F (37.6 °C), Min:99.2 °F (37.3 °C), Max:100.2 °F (37.9 °C)      Patient Vitals for the past 6 hrs:   BP Temp Temp src Pulse Resp SpO2 Height   20 1612 -- 99.7 °F (37.6 °C) Bladder 112 21 94 % -- 11/23/20 1600 (!) 150/56 -- -- 110 22 94 % --   11/23/20 1500 (!) 193/87 -- -- 124 21 91 % --   11/23/20 1401 -- -- -- 119 21 91 % --   11/23/20 1400 (!) 156/84 -- -- 121 (!) 32 90 % --   11/23/20 1300 (!) 156/59 -- -- 117 23 93 % --   11/23/20 1225 -- -- -- -- -- -- 5' 6\" (1.676 m)   11/23/20 1200 (!) 135/53 99.5 °F (37.5 °C) Bladder 116 25 94 % --   11/23/20 1100 (!) 120/49 -- -- 116 21 94 % --         Intake/Output Summary (Last 24 hours) at 11/23/2020 1634  Last data filed at 11/23/2020 1446  Gross per 24 hour   Intake 2804.65 ml   Output 3980 ml   Net -1175.35 ml     Wt Readings from Last 2 Encounters:   11/22/20 158 lb 4.6 oz (71.8 kg)   10/29/20 140 lb (63.5 kg)     Body mass index is 25.55 kg/m². PHYSICAL EXAM:  General: Intubated  Eyes:  Pupils equal and reactive  Ears: no obvious scars, no lesions, no masses, hearing intact  Mouth: ET tube in place  Head: normocephalic, atraumatic  Neck: no JVD, no adenopathy, no thyromegaly, neck is supple, trachea is midline. Chest: no pain on palpation  Lungs: Clear to auscultation bilaterally , no wheezes, rhonchi  Heart: regular rate and regular rhythm, no murmur, normal S1, S2  Abdomen: soft, non-tender  Extremities: no lower extremity edema  Skin: normal color, normal texture, normal turgor, no rashes, no lesions  Neurologic:  will open eyes and stare. She follows me on the room. Would not squeeze or move her hands. Both feet moved to pain.     MEDICATIONS:    Scheduled Meds:   furosemide  40 mg Intravenous BID    insulin glargine  25 Units Subcutaneous BID    lidocaine PF  5 mL Intradermal Once    heparin flush  3 mL Intravenous 2 times per day    senna  1 tablet Oral Nightly    insulin lispro  0-18 Units Subcutaneous Q6H    enoxaparin  40 mg Subcutaneous Daily    zinc sulfate  50 mg Oral Daily    ascorbic acid  1,000 mg Oral QAM    Arformoterol Tartrate  15 mcg Nebulization BID    budesonide  0.5 mg Nebulization BID    ipratropium-albuterol 1 ampule Inhalation Q4H    piperacillin-tazobactam  3.375 g Intravenous Q8H    pantoprazole  40 mg Intravenous Daily    sodium chloride flush  10 mL Intravenous 2 times per day    vitamin B and C  1 tablet Oral QAM    Vitamin D  2,000 Units Oral QAM     Continuous Infusions:   propofol 15 mcg/kg/min (11/23/20 1445)    fentaNYL 5 mcg/ml in 0.9%  ml infusion 150 mcg/hr (11/23/20 1256)    norepinephrine Stopped (11/23/20 0922)    sodium chloride 12.5 mL/hr at 11/23/20 1258    dextrose       PRN Meds:   sodium phosphate IVPB, 0.16 mmol/kg, PRN    Or  sodium phosphate IVPB, 0.32 mmol/kg, PRN  potassium chloride, 20 mEq, PRN  magnesium sulfate, 1 g, PRN  sodium chloride flush, 10 mL, PRN  heparin flush, 3 mL, PRN  albuterol sulfate HFA, 2 puff, Q6H PRN  sodium chloride flush, 10 mL, PRN  acetaminophen, 650 mg, Q6H PRN    Or  acetaminophen, 650 mg, Q6H PRN  polyethylene glycol, 17 g, Daily PRN  promethazine, 12.5 mg, Q6H PRN    Or  ondansetron, 4 mg, Q6H PRN  guaiFENesin, 400 mg, 4x Daily PRN  glucose, 15 g, PRN  dextrose, 12.5 g, PRN  glucagon (rDNA), 1 mg, PRN  dextrose, 100 mL/hr, PRN        VENT SETTINGS (Comprehensive) (if applicable):  Vent Information  $Ventilation: $Subsequent Day  Skin Assessment: Clean, dry, & intact  Equipment ID: [id number]  Vent Type: 840  Vent Mode: AC/VC  Vt Ordered: 450 mL  Rate Set: 22 bmp  Peak Flow: 65 L/min  Pressure Support: 0 cmH20  FiO2 : 60 %  SpO2: 94 %  SpO2/FiO2 ratio: 156.67  Sensitivity: 3  PEEP/CPAP: 6  I Time/ I Time %: 0 s  Humidification Source: HME  Mask Type: Full face mask  Mask Size: Medium  Additional Respiratory  Assessments  Pulse: 112  Resp: 21  SpO2: 94 %  $End Tidal CO2: 36  Position: Semi-Hunt's  Humidification Source: HME  Oral Care: Lip moisturizer applied, Mouth swabbed, Mouth moisturizer, Mouth suctioned  Subglottic Suction Done?: Yes  Cuff Pressure (cm H2O): 29 cm H2O    ABGs:   Recent Labs     11/23/20  0636   PH 7.464*   PCO2 47.3*   PO2 101.0*   HCO3 33.2*   BE 8.5*   O2SAT 97.8       Laboratory findings:    Complete Blood Count:   Recent Labs     11/21/20  0615 11/22/20  0530 11/23/20  0610   WBC 10.8 13.1* 12.5*   HGB 8.9* 9.4* 8.4*   HCT 28.8* 31.8* 27.1*    317 246        Last 3 Blood Glucose:   Recent Labs     11/21/20  0615 11/22/20  0530 11/23/20  0610   GLUCOSE 262* 82 103*        PT/INR:    Lab Results   Component Value Date    PROTIME 11.2 11/23/2020    INR 1.0 11/23/2020     PTT:    Lab Results   Component Value Date    APTT 25.6 11/23/2020       Comprehensive Metabolic Profile:   Recent Labs     11/21/20  0615 11/22/20 0530 11/23/20  0610    138 139   K 4.7 4.5 3.7   CL 98 96* 96*   CO2 34* 34* 36*   BUN 27* 28* 25*   CREATININE 0.4* 0.5 0.4*   GLUCOSE 262* 82 103*   CALCIUM 8.4* 9.0 8.7   PROT 5.6* 6.4 6.0*   LABALBU 2.6* 2.6* 2.5*   BILITOT 0.4 0.4 0.6   ALKPHOS 102 96 105*   AST 16 30 45*   ALT 26 28 27      Magnesium:   Lab Results   Component Value Date    MG 2.1 11/23/2020     Phosphorus:   Lab Results   Component Value Date    PHOS 3.9 11/23/2020     Ionized Calcium: No results found for: CAION       Troponin:   No results for input(s): TROPONINI in the last 72 hours. Microbiology:  Cultures during this admission:     Blood cultures:                 [x] None drawn      [] Negative             []  Positive (Details:  )  Urine Culture:                   [x] None drawn      [] Negative             []  Positive (Details:  )  Sputum Culture:               [] None drawn       [] Negative             [x]  Positive (Details: yeast )   Endotracheal aspirate:     [] None drawn       [] Negative             []  Positive (Details: pending )     Other pertinent Labs:      Radiology/Imaging:     Chest x-ray:  11/23/20:  Impression    Bilateral lower lobe infiltrates and small to moderate left pleural effusion.     Probable interstitial edema.               ASSESSMENT:       - Acute respiratory failure with hypoxia, hypercarbia  - COVID-19 pneumonia  - Superimposed bacterial pneumonia  - COPD  -Type 2 diabetes    Additional assessment:        PLAN:     WEAN PER PROTOCOL:  [] No   [x] Yes  [] N/A    DISCONTINUE ANY LABS:   [x] No   [] Yes    ICU PROPHYLAXIS:  Stress ulcer:  [x] PPI Agent  [] [id number] [] Sucralfate  [] Other:  VTE:   [x] Enoxaparin  [] Unfract. Heparin Subcut  [] EPC Cuffs    NUTRITION:  [] NPO [] Tube Feeding (Specify: ) [] TPN  [x] PO (Diet: DIET TUBE FEED CONTINUOUS/CYCLIC NPO; Semi-elemental; Orogastric; Continuous; 25; 45)    HOME MEDICATIONS RECONCILED: [] No  [x] Yes    INSULIN DRIP:   [x] No   [] Yes    CONSULTATION NEEDED:  [x] No   [] Yes    FAMILY UPDATED:    [x] No   [] Yes     TRANSFER OUT OF ICU:   [x] No   [] Yes       SYSTEMS ASSESSMENT       Of note, I do not see a progress note from the ICU for 11/20. That note was written and signed by Dr. Cayla Vargas. We cannot find it in the computer. Is not in with incomplete notes. Will call the help desk and attempt to retrieve her that note is. Neuro   AMS--most likely secondary to hypercapnia              -CT head negative     Intubated and sedated on the vent              -Did not tolerate extubation. Reintubated. Trouble with agitation. Stop Precedex as has not helped. Continue fentanyl and propofol.  Wean sedation as able.     Respiratory   Acute hypoxic and hypercapnic respiratory failure secondary to Covid pneumonia              -resp panel + SARS-COV2              -former smoker              -vit c, decadron-- stopped, remdesivir-finished              -Pulmonology following              -procal 2.54   -Failed extubation 11/19  Subsequently reintubated.   -Wean FiO2 as able   -talk to family about possible trach if unable to wean   -No bleeding today, will diurese and attempt tomorrow   -Decrease PEEP    Superimposed bacterial PNA   -elevated procal-2.54   -resp culture   -Zosyn, day 10      COPD              -O2 PRN and with ambulation @ home, usual 3L              -Brovana, Pulmicort, nebs     Cardiovascular   CTA              -No evidence of PE     Hypotension, shock? -Levophed as needed to keep MAP greater than 65   -Continue to monitor    -very unpredicatable. Will need 2mcg for map 60 and will then be 133A systolic.     Gastrointestinal   Tube feeds   PPI     Renal   Up 8 liters since admission--Lasix twice daily     Infectious Disease   COVID PNA              -ID following              -Decadron--stopped today secondary to continued hyperglycemia. -Remdesivir,-finished. Vitamin C, zinc     Superimposed bacterial PNA   -elevated procal-2.54   -resp culture   -Zosyn, day 10     Hematology/Oncology   Lymphopenia              -2/2 covid pna    Anemia   -Likely iatrogenic, no signs of bleeding. Continue to monitor    Patient had a CTA with no PE, decreased to DVT prophylactic dose of Lovenox     Endocrine   thyroid nodules  DM -sugars uncontrolled, stop decadron. Increase lantus. ---improved today. May need to decrease lantus     Social/Spiritual/DNR/Other   Full      Diet: DIET TUBE FEED CONTINUOUS/CYCLIC NPO; Semi-elemental; Orogastric; Continuous; 25; 45  CODE Status: Full Code    Dispo: maintain ICU level care    ASSESSMENT/ PLAN   1. As above  2. Diurese today  3. Attempt SBT tomorrow  4. Pressors as needed for map >65  5. Monitor sugars, may need to decrease lantus now off decadron  6. GI prophylaxis-Protonix  7. DVT Prophylaxis-Lovenox  8. Discuss case and plan with attending, Dr. Shasha Tripp, DO  Resident, PGY-2  11/23/2020  4:34 PM    I personally saw, examined and provided care for the patient. Radiographs, labs and medication list were reviewed by me independently. I spoke with bedside nursing, therapists and consultants. Critical care services and times documented are independent of procedures and multidisciplinary rounds with Residents.  Additionally comprehensive, multidisciplinary rounds were conducted with the MICU team. The case was discussed in detail and plans for care were established. Review of Residents documentation was conducted and revisions were made as appropriate. I agree with the above documented exam, problem list and plan of care. Ricky Argueta M.D.    Pulmonary/Critical Care Medicine   35 min cct excluding procedures

## 2020-11-23 NOTE — PLAN OF CARE
Problem: Falls - Risk of:  Goal: Will remain free from falls  Description: Will remain free from falls  11/23/2020 1805 by Amilcar Cortes RN  Outcome: Met This Shift  11/23/2020 0758 by Jayashree Winters RN  Outcome: Met This Shift  Goal: Absence of physical injury  Description: Absence of physical injury  11/23/2020 1805 by Amilcar Cortes RN  Outcome: Met This Shift  11/23/2020 0758 by Jayashree Winters RN  Outcome: Met This Shift     Problem: Restraint Use - Nonviolent/Non-Self-Destructive Behavior:  Goal: Absence of restraint indications  Description: Absence of restraint indications  11/23/2020 1805 by Amilcar Cortes RN  Outcome: Not Met This Shift  11/23/2020 0758 by Jayashree Winters RN  Outcome: Not Met This Shift  Goal: Absence of restraint-related injury  Description: Absence of restraint-related injury  11/23/2020 1805 by Amilcar Cortes RN  Outcome: Met This Shift  11/23/2020 0758 by Jayashree Winters RN  Outcome: Met This Shift     Problem: Airway Clearance - Ineffective  Goal: Achieve or maintain patent airway  11/23/2020 1805 by Amilcar Cortes RN  Outcome: Not Met This Shift  11/23/2020 0758 by Jayashree Winters RN  Outcome: Met This Shift     Problem: Breathing Pattern - Ineffective  Goal: Ability to achieve and maintain a regular respiratory rate  11/23/2020 1805 by Amilcar Cortes RN  Outcome: Not Met This Shift  11/23/2020 0758 by Jayashree Winters RN  Outcome: Met This Shift     Problem:  Body Temperature -  Risk of, Imbalanced  Goal: Ability to maintain a body temperature within defined limits  Outcome: Not Met This Shift  Goal: Will regain or maintain usual level of consciousness  11/23/2020 1805 by Amilcar Cortes RN  Outcome: Not Met This Shift  11/23/2020 0758 by Jayashree Winters RN  Outcome: Not Met This Shift     Problem: Isolation Precautions - Risk of Spread of Infection  Goal: Prevent transmission of infection  11/23/2020 1805 by Amilcar Cortes RN  Outcome: Met This Shift  11/23/2020 0758 by Yarely Delgado RN  Outcome: Met This Shift     Problem: Nutrition Deficits  Goal: Optimize nutrtional status  11/23/2020 1805 by Herlinda Lugo RN  Outcome: Met This Shift  11/23/2020 0758 by Yarely Delgado RN  Outcome: Met This Shift

## 2020-11-23 NOTE — PROGRESS NOTES
Dentures removed from patients mouth upon extubation. Placed in denture cup with patient label and cup placed in patient belonging bag.

## 2020-11-23 NOTE — PATIENT CARE CONFERENCE
Intensive Care Daily Quality Rounding Checklist        ICU Team Members: Dr. Rajni Alegre, bedside RN, nursing leadership      ICU Day #: NUMBER: 11     Intubation Date: November 13, 2020     Ventilator Day #: NUMBER: 11     Central Line Insertion Date: PICC November 20, 2020                                                    Day #: NUMBER: 4      Arterial Line Insertion Date:  n/a                             Day #: n/a     DVT Prophylaxis: Lovenox    GI Prophylaxis: tube feed, protonix     Lloyd Catheter Insertion Emmanuel Durbins, 2020                                        Day #: 11                             Continued need (if yes, reason documented and discussed with physician): yes, strict I & O     Skin Issues/ Wounds and ordered treatment discussed on rounds: none, sos precautions     Goals/ Plans for the Day:wean vent, monitor the labs, replace as needed,

## 2020-11-23 NOTE — PROGRESS NOTES
Comprehensive Nutrition Assessment    Type and Reason for Visit:  Reassess    Nutrition Recommendations/Plan: Continue NPO. Recommend to Modify Current TF Order to meet current estimated needs. TF Recommendations:  Semi-Elemental (Vital AF 1.2) @ 45 ml/hr Continuous x 24 hrs/d to provide 1080 ml TV, 1296 kcals, 81 gm Pro, 876 ml water. 20 ml/hr flush to provide 1356 ml total water (TF+Flush); or per Critical Care. TF Recommendation w/ current rate of propofol will meet 100% of Kcal/Pro/Fluid needs at this time. Nutrition Assessment:  Pt currently stable from a nutritional stand-point AEB pt tolerating EN at goal via OGT however remains at risk d/t continued NPO/Vent 2/2 COVID19+ s/p failed vent wean trial 11/19 and now w/ noted possible plan for Trach/PEG soon if no improvement. Will provide updated EN rec's to appropriately meet current needs. Malnutrition Assessment:  Malnutrition Status: At risk for malnutrition (Comment)    Context:  Chronic Illness     Findings of the 6 clinical characteristics of malnutrition:  Energy Intake:  Mild decrease in energy intake (Comment)(Current NPO/intubated)  Weight Loss:  7 - Greater than 20% over 1 year     Body Fat Loss:  Unable to assess(Covid Isolation)     Muscle Mass Loss:  Unable to assess(Covid Isolation)    Fluid Accumulation:  Unable to assess     Strength:  Not Performed    Estimated Daily Nutrient Needs:  Energy (kcal):  4477-7752(PSE 03 (MinVol:  10.6 L/min, Tmax:  100.2°F)= 1371 kcals per ABW); Weight Used for Energy Requirements:  Admission     Protein (g):  70-85(1.5-1.8 gm/kg per ABW);  Weight Used for Protein Requirements:  Admission        Fluid (ml/day):  6257-5985 or per Critical Care; Method Used for Fluid Requirements:  1 ml/kcal      Nutrition Related Findings:  Intubated/Sedated, MAP 65 (noted to be 61 this AM, distended/non-tender Abd, +BS, +OGT, +2 BUE and +1 BLE edema, +I/O's(Failed Vent Wean/Extubation w/ Re-Intubation 11/19)      Wounds:  None       Current Nutrition Therapies:    Current Tube Feeding (TF) Orders:  · Feeding Route: Orogastric  · Formula: Semi-Elemental  · Schedule: Continuous  · Additives/Modulars:    · Water Flushes: 30 ml/hr= 720 ml total flush/d  · Current TF & Flush Orders Provides: Same as Goal  · Goal TF & Flush Orders Provides: 1152 kcals, 72 gm Pro, 1499 ml total water (TF+Flush)      Anthropometric Measures:  · Height: 5' 6\" (167.6 cm)  · Current Body Weight: 158 lb (71.7 kg)(actual 11/22)   · Admission Body Weight: 101 lb (45.8 kg)(bed 11/14; noted large wt change x ~1 day 2/2 trans to ICU w/ suspected bedscale discrepancy)    · Usual Body Weight: 132 lb (59.9 kg)(per EMR x 1 yr)     · Ideal Body Weight: 130 lbs; % Ideal Body Weight 121.5 %   · BMI: 25.5  · Adjusted Body Weight:  ; No Adjustment   · Adjusted BMI:      · BMI Categories: Underweight (BMI less than 22) age over 65(per ABW)       Nutrition Diagnosis:   · Inadequate oral intake related to impaired respiratory function(2/2 COVID19+ w/ ARF/COPD) as evidenced by NPO or clear liquid status due to medical condition, intubation, nutrition support - enteral nutrition, weight loss, weight loss greater than or equal to 20% in 1 year      Nutrition Interventions:   Food and/or Nutrient Delivery:  Modify Tube Feeding(Continue NPO. Recommend to Modify Current TF Order to meet current estimated needs.  TF Rec:  Semi-Elemental (Vital AF 1.2) @ 45 ml/hr Continuous x 24 hrs/d to provide 1080 ml TV, 1296 kcals, 81 gm Pro, 876 ml water.)  Nutrition Education/Counseling:  No recommendation at this time   Coordination of Nutrition Care:  Continue to monitor while inpatient    Goals:  Pt to tolerate EN at goal.       Nutrition Monitoring and Evaluation:   Behavioral-Environmental Outcomes:  None Identified   Food/Nutrient Intake Outcomes:  Enteral Nutrition Intake/Tolerance  Physical Signs/Symptoms Outcomes:  Biochemical Data, GI Status, Fluid Status or Edema, Hemodynamic Status, Nutrition Focused Physical Findings, Skin, Weight     Discharge Planning:     Too soon to determine     Electronically signed by Lucinda Anderson RD, LD on 11/23/20 at 12:51 PM EST    Contact: ext [phone]

## 2020-11-24 NOTE — PATIENT CARE CONFERENCE
Intensive Care Daily Quality Rounding Checklist        ICU Team Members: Dr. Cong Nair, bedside RN, nursing leadership      ICU Day #: NUMBER: 12     Intubation Date: November 13, 2020     Ventilator Day #: NUMBER: 12     Central Line Insertion Date: PICC November 20, 2020                                                    Day #: NUMBER: 5      Arterial Line Insertion Date:  n/a                             Day #: n/a     DVT Prophylaxis: Lovenox    GI Prophylaxis: tube feed, protonix     Lloyd Catheter Insertion Sohail Horowitz, 2020                                        Day #: 12                             Continued need (if yes, reason documented and discussed with physician): yes, strict I & O     Skin Issues/ Wounds and ordered treatment discussed on rounds: none, sos precautions     Goals/ Plans for the Day:stat ct head and chest, wean sedation as tolerated, vent management,

## 2020-11-24 NOTE — CARE COORDINATION
COVID POSITIVE 11/13. Vent/ intubated 11/13- attempted extubation 11/19 but required re-intubation- requiring FIO2 60% PEEP 6. On iv pressor, iv abx. [facility] following(out of network for [facility]). Attempted once again to call annie Daugherty [phone] to update-awaiting return call.  Will follow  Herberth Conley

## 2020-11-24 NOTE — PROGRESS NOTES
.         Critical Care Team - Daily Progress Note         Date and time: 11/24/2020 6:57 PM  Patient's name:  Alan Parks  Medical Record Number: [id number]  Patient's account/billing number: [de-identified]  Patient's YOB: 1945  Age: 67 y.o. Date of Admission: 11/13/2020  1:46 PM  Length of stay during current admission: 11      Primary Care Physician: Sandra Chaidez MD  ICU Attending Physician: Dr. Maurisio Hidalgo Status: Full Code    Reason for ICU admission: Acute hypercapnic hypoxic respiratory failure secondary to Covid pneumonia      SUBJECTIVE:     BRIEF HISTORY: The patient is a 67 y.o. female with significant past medical history of COPD, diabetes, hypertension, hyperlipidemia presented to the ED for shortness of breath. Patient is currently intubated and sedated so history obtained from chart review.     Per emergency department, patient had history of 3 days of worsening shortness of breath. She is chronically on 3 L of oxygen at home when she ambulates but over the last few days has had increase her oxygen requirement to 5 L at all times. 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 She subsequently presented to the ED for evaluation.     Patient was found to be hypoxic and and placed on nasal cannula oxygen. Patient was Positive for COVID-19. While in the emergency department patient was placed on a nonrebreather and subsequently decompensated. She became altered and was taking agonal respirations. She was then intubated for further airway protection. ABG demonstrated respiratory acidosis with a CO2 of greater than 113. Patient was admitted to the ICU for further evaluation care. OVERNIGHT EVENTS:    11/15/20:  Some soft BP's overnight, responded to fluid bolus and switched from propofol gtt to versed gtt  11/16/20: Lenord Lied, tolerating vent.  Attempt to wean sedation and pressure support  20: Tolerating pressure support, off or Versed  20: Attempt pressure support trials. Patient becoming tachycardic and hypertensive  20: Attempted extubation. Patient less than 30 minutes. She became tachypneic, tachycardic and not tolerating secretions. Patient subsequently reintubated  20: Berdine Lucas pressors overnight, agitated on the vent. 20: Intermittently required pressors overnight. Still hyperglycemic.  20: pressures all over the place. intermittant Levo. HTN with agitation  20: Hypotensive overnight. Levo restarted. Oxygenating well. 20: On and off levo overnight.   Fevers overnight    CURRENT VENTILATION STATUS:     [x] Ventilator  [] BIPAP  [] Nasal Cannula [] Room Air      IF INTUBATED, ET TUBE MARKING AT LOWER LIP:       SECRETIONS : Amount:  [] Small [x] Moderate  [] Large  [] None  Color:     [] White [] Colored  [] Bloody    SEDATION:  RAAS Score:  [x] Propofol gtt  [] Versed gtt and fentanyl  [] Ativan gtt   [] No Sedation    PARALYZED:  [x] No    [] Yes      VASOPRESSORS:  [] No    [x] Yes    If yes -   [x] Levophed       [] Dopamine     [] Vasopressin       [] Dobutamine  [] Phenylephrine         [] Epinephrine    CENTRAL LINES:     [] No   [x] Yes   (Date of Insertion:   )           If yes -     [x] Right IJ     [] Left IJ [] Right Femoral [] Left Femoral                   [] Right Subclavian [] Left Subclavian       RAM'S CATHETER:   [] No   [x] Yes  (Date of Insertion:   )     URINE OUTPUT:            [] Good   [x] Low              [] Anuric      OBJECTIVE:     VITAL SIGNS:  BP (!) 150/68   Pulse 114   Temp 99.1 °F (37.3 °C) (Bladder)   Resp 21   Ht 5' 6\" (1.676 m)   Wt 158 lb 4.6 oz (71.8 kg)   SpO2 (!) 88%   BMI 25.55 kg/m²   Tmax over 24 hours:  Temp (24hrs), Av.7 °F (37.6 °C), Min:99.1 °F (37.3 °C), Max:100.9 °F (38.3 °C)      Patient Vitals for the past 6 hrs:   BP Temp Temp src Pulse Resp SpO2   20 1800 (!) 150/68 -- -- 114 21 (!) 88 %   11/24/20 1745 -- -- -- 113 21 90 %   11/24/20 1700 (!) 140/62 -- -- 109 22 93 %   11/24/20 1600 137/75 99.1 °F (37.3 °C) Bladder 103 21 91 %   11/24/20 1500 (!) 115/51 -- -- 113 21 94 %   11/24/20 1400 (!) 126/51 -- -- 110 21 93 %   11/24/20 1344 -- -- -- 102 21 93 %   11/24/20 1300 (!) 118/58 -- -- 111 22 96 %         Intake/Output Summary (Last 24 hours) at 11/24/2020 1857  Last data filed at 11/24/2020 1700  Gross per 24 hour   Intake 2726.07 ml   Output 3010 ml   Net -283.93 ml     Wt Readings from Last 2 Encounters:   11/22/20 158 lb 4.6 oz (71.8 kg)   10/29/20 140 lb (63.5 kg)     Body mass index is 25.55 kg/m². PHYSICAL EXAM:  General: Intubated  Eyes:  Pupils equal and reactive  Ears: no obvious scars, no lesions, no masses, hearing intact  Mouth: ET tube in place  Head: normocephalic, atraumatic  Neck: no JVD, no adenopathy, no thyromegaly, neck is supple, trachea is midline. Chest: no pain on palpation  Lungs: Clear to auscultation bilaterally , no wheezes, rhonchi  Heart: regular rate and regular rhythm, no murmur, normal S1, S2  Abdomen: soft, non-tender  Extremities: no lower extremity edema  Skin: normal color, normal texture, normal turgor, no rashes, no lesions  Neurologic:  will open eyes and stare. She follows me on the room. Squeezed with left hand today.  Moves all extremities to pain    MEDICATIONS:    Scheduled Meds:   potassium chloride  40 mEq Intravenous Q2H    guaiFENesin  400 mg Oral 4x Daily    furosemide  40 mg Intravenous BID    insulin glargine  25 Units Subcutaneous BID    lidocaine PF  5 mL Intradermal Once    heparin flush  3 mL Intravenous 2 times per day    senna  1 tablet Oral Nightly    insulin lispro  0-18 Units Subcutaneous Q6H    enoxaparin  40 mg Subcutaneous Daily    zinc sulfate  50 mg Oral Daily    ascorbic acid  1,000 mg Oral QAM    Arformoterol Tartrate  15 mcg Nebulization BID    budesonide  0.5 mg Nebulization 95.1       Laboratory findings:    Complete Blood Count:   Recent Labs     11/22/20  0530 11/23/20  0610 11/24/20  0525   WBC 13.1* 12.5* 14.0*   HGB 9.4* 8.4* 7.8*   HCT 31.8* 27.1* 25.7*    246 228        Last 3 Blood Glucose:   Recent Labs     11/22/20  0530 11/23/20  0610 11/24/20  0525   GLUCOSE 82 103* 176*        PT/INR:    Lab Results   Component Value Date    PROTIME 11.7 11/24/2020    INR 1.0 11/24/2020     PTT:    Lab Results   Component Value Date    APTT 28.0 11/24/2020       Comprehensive Metabolic Profile:   Recent Labs     11/22/20  0530 11/23/20  0610 11/24/20  0525    139 140   K 4.5 3.7 3.3*   CL 96* 96* 96*   CO2 34* 36* 35*   BUN 28* 25* 24*   CREATININE 0.5 0.4* 0.5   GLUCOSE 82 103* 176*   CALCIUM 9.0 8.7 8.7   PROT 6.4 6.0* 6.2*   LABALBU 2.6* 2.5* 2.4*   BILITOT 0.4 0.6 0.6   ALKPHOS 96 105* 109*   AST 30 45* 47*   ALT 28 27 31      Magnesium:   Lab Results   Component Value Date    MG 2.2 11/24/2020     Phosphorus:   Lab Results   Component Value Date    PHOS 3.1 11/24/2020     Ionized Calcium: No results found for: CAION       Troponin:   No results for input(s): TROPONINI in the last 72 hours. Microbiology:  Cultures during this admission:     Blood cultures:                 [x] None drawn      [] Negative             []  Positive (Details:  )  Urine Culture:                   [x] None drawn      [] Negative             []  Positive (Details:  )  Sputum Culture:               [] None drawn       [] Negative             [x]  Positive (Details: yeast )   Endotracheal aspirate:     [] None drawn       [] Negative             []  Positive (Details: pending )     Other pertinent Labs:      Radiology/Imaging:     Chest x-ray:  11/23/20:  Impression    Bilateral lower lobe infiltrates and small to moderate left pleural effusion.     Probable interstitial edema.               ASSESSMENT:       - Acute respiratory failure with hypoxia, hypercarbia  - COVID-19 pneumonia  - 11/14      COPD              -O2 PRN and with ambulation @ home, usual 3L              -Brovana, Pulmicort, nebs     Cardiovascular   CTA              -No evidence of PE     Hypotension, shock? -Levophed as needed to keep MAP greater than 65   -Continue to monitor    -very unpredicatable. Will need 2mcg for map 60 and will then be 110E systolic. -off pressors currently     Gastrointestinal   Tube feeds   PPI     Renal   Up 7 liters since admission--Lasix twice daily. Good uo  Hypokalemia- replace     Infectious Disease   COVID PNA              -ID following              -Decadron--stopped today secondary to continued hyperglycemia. -Remdesivir,-finished. Vitamin C, zinc     Superimposed bacterial PNA   -elevated procal-2.54   -resp culture   -Zosyn, day 11    Febrile and leukocytosis   -repeat blood, urine and resp cultures   -continue Zosyn     Hematology/Oncology   Lymphopenia              -2/2 covid pna    Anemia   -Likely iatrogenic, no signs of bleeding. Continue to monitor    Patient had a CTA with no PE, decreased to DVT prophylactic dose of Lovenox     Endocrine   thyroid nodules  DM -sugars uncontrolled, stop decadron. Increase lantus. ---improved today. May need to decrease lantus     Social/Spiritual/DNR/Other   Full      Diet: DIET TUBE FEED CONTINUOUS/CYCLIC NPO; Semi-elemental; Orogastric; Continuous; 25; 45  CODE Status: Full Code    Dispo: maintain ICU level care    ASSESSMENT/ PLAN   1. As above  2. Diurese today  3. Attempt SBT tomorrow  4. Pressors as needed for map >65  5. CT head and chest  6. GI prophylaxis-Protonix  7. DVT Prophylaxis-Lovenox  8. Discuss case and plan with attending, Dr. Carolina Moscoso, DO  Resident, PGY-2  11/24/2020  6:57 PM    I personally saw, examined and provided care for the patient. Radiographs, labs and medication list were reviewed by me independently. I spoke with bedside nursing, therapists and consultants.  Critical care services and times documented are independent of procedures and multidisciplinary rounds with Residents. Additionally comprehensive, multidisciplinary rounds were conducted with the MICU team. The case was discussed in detail and plans for care were established. Review of Residents documentation was conducted and revisions were made as appropriate. I agree with the above documented exam, problem list and plan of care. Yumiko Thompson M.D.    Pulmonary/Critical Care Medicine   36 min cct excluding procedures

## 2020-11-24 NOTE — PROGRESS NOTES
ID Progress Note                [address] [facility], [place], [address]            Phone [phone]     Fax [phone]      Chief complaint   resp failure    Subjective:  She is not following commands, CT head and chest has been ordered  She is off pressors as well as off propofol, only on fentanyl. 60% FiO2 and 6 of PEEP    Objective:    Vitals:    11/24/20 1344   BP:    Pulse: 102   Resp: 21   Temp:    SpO2: 93%     VENT SETTINGS:   Vent Information  $Ventilation: $Subsequent Day  Skin Assessment: Clean, dry, & intact  Equipment ID: [id number]  Vent Type: 840  Vent Mode: AC/VC  Vt Ordered: 450 mL  Rate Set: 22 bmp  Peak Flow: 65 L/min  Pressure Support: 0 cmH20  FiO2 : 60 %  SpO2: 93 %  SpO2/FiO2 ratio: 155  Sensitivity: 3  PEEP/CPAP: 6  I Time/ I Time %: 0 s  Humidification Source: HME  Mask Type: Full face mask  Mask Size: Medium  General Appearance:   Intubated/off sedation awake but not following commands. HEENT:   Round pupils. Minimally reactive. No jaundice. Lungs:    Coarse breath sounds to auscultation bilaterally. No crackles. Heart:   Heart sounds rhythmic and regular. Abdomen:    Round, soft. Benign to palpation. Extremities:  Minimal edema.    Skin:   no rashes or lesions   Left PICC  Lloyd catheter    Labs:  Recent Labs     11/22/20  0530 11/23/20  0610 11/24/20  0525   WBC 13.1* 12.5* 14.0*   RBC 3.38* 2.95* 2.75*   HGB 9.4* 8.4* 7.8*   HCT 31.8* 27.1* 25.7*   MCV 94.1 91.9 93.5   MCH 27.8 28.5 28.4   MCHC 29.6* 31.0* 30.4*   RDW 16.3* 16.7* 16.7*    246 228   MPV 10.7 10.4 10.8     CMP:    Lab Results   Component Value Date     11/24/2020    K 3.3 11/24/2020    K 5.7 11/13/2020    CL 96 11/24/2020    CO2 35 11/24/2020    BUN 24 11/24/2020    CREATININE 0.5 11/24/2020    CREATININE 0.5 01/23/2019    GFRAA >60 11/24/2020    LABGLOM >60 11/24/2020    GLUCOSE 176 11/24/2020    PROT 6.2 11/24/2020    LABALBU 2.4 11/24/2020    CALCIUM 8.7 11/24/2020 BILITOT 0.6 11/24/2020    ALKPHOS 109 11/24/2020    AST 47 11/24/2020    ALT 31 11/24/2020          Microbiology :  Respiratory panel: SARS-CoV-2 detected  Nares screen MRSA: Negative  Respiratory culture 11/20/2020: Serratia marcescens, Klebsiella pneumoniae    Radiology :  No new chest x-rays today    URINARY CATHETER OUTPUT (Lloyd):  Urethral Catheter Temperature probe 16 fr-Output (mL): 1100 mL    Assessment and Plan:      · COVID 19 pneumonia causing acute respiratory failure.   She completed 5 days of remdesivir on 11/18/2020   · Superimposed right lower lobe bacterial pneumonia with Serratia and Klebsiella sensitive to Zosyn  · Intermittent leukocytosis associated to the above  · Encephalopathy-sedation versus stroke     Plan  - Continue Zosyn, day 11, complete 14-day course  Decadron 6mgdaily, stop after 10 days of treatment  Continue anticoagulation  Vit B/thiamine/c  Will follow with results of CT head without contrast as well as CT chest without contrast      Electronically signed by MD Fozia on 11/24/2020 at 2:07 PM

## 2020-11-24 NOTE — PLAN OF CARE
Problem: Falls - Risk of:  Goal: Will remain free from falls  Description: Will remain free from falls  11/24/2020 0405 by Sharifa Martins RN  Outcome: Met This Shift  11/23/2020 1805 by Jaleel Ko RN  Outcome: Met This Shift  Goal: Absence of physical injury  Description: Absence of physical injury  11/24/2020 0405 by Sharifa Martins RN  Outcome: Met This Shift  11/23/2020 1805 by Jaleel Ko RN  Outcome: Met This Shift     Problem: Restraint Use - Nonviolent/Non-Self-Destructive Behavior:  Goal: Absence of restraint-related injury  Description: Absence of restraint-related injury  11/24/2020 0405 by Sharifa Martins RN  Outcome: Met This Shift  11/23/2020 1805 by Jaleel Ko RN  Outcome: Met This Shift     Problem: Airway Clearance - Ineffective  Goal: Achieve or maintain patent airway  11/24/2020 0405 by Sharifa Martins RN  Outcome: Met This Shift  11/23/2020 1805 by Jaleel Ko RN  Outcome: Not Met This Shift     Problem: Breathing Pattern - Ineffective  Goal: Ability to achieve and maintain a regular respiratory rate  11/24/2020 0405 by Sharifa Martins RN  Outcome: Met This Shift  11/23/2020 1805 by Jaleel Ko RN  Outcome: Not Met This Shift     Problem: Isolation Precautions - Risk of Spread of Infection  Goal: Prevent transmission of infection  11/24/2020 0405 by Sharifa Martins RN  Outcome: Met This Shift  11/23/2020 1805 by Jaleel Ko RN  Outcome: Met This Shift     Problem: Nutrition Deficits  Goal: Optimize nutrtional status  11/24/2020 0405 by Sharifa Martins RN  Outcome: Met This Shift  11/23/2020 1805 by Jaleel Ko RN  Outcome: Met This Shift     Problem: Risk for Fluid Volume Deficit  Goal: Maintain normal heart rhythm  Outcome: Met This Shift  Goal: Maintain absence of muscle cramping  Outcome: Met This Shift  Goal: Maintain normal serum potassium, sodium, calcium, phosphorus, and pH  Outcome: Met This Shift     Problem: Patient Education: Go to Patient Education Activity  Goal: Patient/Family Education  Outcome: Met This Shift     Problem: Restraint Use - Nonviolent/Non-Self-Destructive Behavior:  Goal: Absence of restraint indications  Description: Absence of restraint indications  11/24/2020 0405 by Monster Warner RN  Outcome: Not Met This Shift  11/23/2020 1805 by Jaylan Harris RN  Outcome: Not Met This Shift     Problem:  Body Temperature -  Risk of, Imbalanced  Goal: Ability to maintain a body temperature within defined limits  11/24/2020 0405 by Monster Warner RN  Outcome: Not Met This Shift  11/23/2020 1805 by Jaylan Harris RN  Outcome: Not Met This Shift  Goal: Will regain or maintain usual level of consciousness  11/24/2020 0405 by Monster Warner RN  Outcome: Not Met This Shift  11/23/2020 1805 by Jaylan Harris RN  Outcome: Not Met This Shift     Problem: Loneliness or Risk for Loneliness  Goal: Demonstrate positive use of time alone when socialization is not possible  Outcome: Not Met This Shift     Problem: Fatigue  Goal: Verbalize increase energy and improved vitality  Outcome: Not Met This Shift

## 2020-11-24 NOTE — PROGRESS NOTES
[facility] Hospitalist   Progress Note    Admitting Date and Time: 11/13/2020  1:46 PM  Admit Dx: Acute respiratory failure with hypoxia (HCC) [J96.01]  Acute respiratory failure with hypoxia (HCC) [J96.01]  Acute respiratory failure with hypoxia (HCC) [J96.01]  Acute respiratory failure with hypoxia ([address] [J96.01]     Seen for follow-up on multiple problems as listed below    Subjective: In ICU , intubated on vent, reviewed care , consultants notes reviewed. Off of vasopressors.      ROS: Not possible sec to above     potassium chloride  40 mEq Intravenous Q2H    guaiFENesin  400 mg Oral 4x Daily    furosemide  40 mg Intravenous BID    insulin glargine  25 Units Subcutaneous BID    lidocaine PF  5 mL Intradermal Once    heparin flush  3 mL Intravenous 2 times per day    senna  1 tablet Oral Nightly    insulin lispro  0-18 Units Subcutaneous Q6H    enoxaparin  40 mg Subcutaneous Daily    zinc sulfate  50 mg Oral Daily    ascorbic acid  1,000 mg Oral QAM    Arformoterol Tartrate  15 mcg Nebulization BID    budesonide  0.5 mg Nebulization BID    ipratropium-albuterol  1 ampule Inhalation Q4H    piperacillin-tazobactam  3.375 g Intravenous Q8H    pantoprazole  40 mg Intravenous Daily    sodium chloride flush  10 mL Intravenous 2 times per day    vitamin B and C  1 tablet Oral QAM    Vitamin D  2,000 Units Oral QAM     sodium phosphate IVPB, 0.16 mmol/kg, PRN    Or  sodium phosphate IVPB, 0.32 mmol/kg, PRN  potassium chloride, 20 mEq, PRN  magnesium sulfate, 1 g, PRN  sodium chloride flush, 10 mL, PRN  heparin flush, 3 mL, PRN  albuterol sulfate HFA, 2 puff, Q6H PRN  sodium chloride flush, 10 mL, PRN  acetaminophen, 650 mg, Q6H PRN    Or  acetaminophen, 650 mg, Q6H PRN  polyethylene glycol, 17 g, Daily PRN  promethazine, 12.5 mg, Q6H PRN    Or  ondansetron, 4 mg, Q6H PRN  glucose, 15 g, PRN  dextrose, 12.5 g, PRN  glucagon (rDNA), 1 mg, PRN  dextrose, 100 mL/hr, PRN Objective:    BP (!) 119/53   Pulse 102   Temp 99.1 °F (37.3 °C) (Bladder)   Resp 21   Ht 5' 6\" (1.676 m)   Wt 158 lb 4.6 oz (71.8 kg)   SpO2 93%   BMI 25.55 kg/m²      Due to patient's COVID-19 positive status, to reduce exposure, contamination and in  an effort to conserve PPE this patient was evaluated through a combination of review of the medical record,, nursing assessment and other physicians exams and assessments as well as telemedicine interaction. General Appearance: Intubated on vent  Skin: warm and dry  Head: normocephalic and atraumatic  Neck: supple and non-tender  Pulmon carmela/Chest: clear to auscultation bilaterally, few coarse BS  Cardiovascular: normal rate, regular rhythm, normal S1 and S2  Abdomen: soft, non-tender, non-distended, normal bowel sounds  Extremities: no cyanosis, clubbing   Neurologic: cannot assess    . j  Recent Labs     11/22/20  0530 11/23/20  0610 11/24/20  0525    139 140   K 4.5 3.7 3.3*   CL 96* 96* 96*   CO2 34* 36* 35*   BUN 28* 25* 24*   CREATININE 0.5 0.4* 0.5   GLUCOSE 82 103* 176*   CALCIUM 9.0 8.7 8.7       Recent Labs     11/22/20  0530 11/23/20  0610 11/24/20  0525   WBC 13.1* 12.5* 14.0*   RBC 3.38* 2.95* 2.75*   HGB 9.4* 8.4* 7.8*   HCT 31.8* 27.1* 25.7*   MCV 94.1 91.9 93.5   MCH 27.8 28.5 28.4   MCHC 29.6* 31.0* 30.4*   RDW 16.3* 16.7* 16.7*    246 228   MPV 10.7 10.4 10.8       Labs and images reviewed    Radiology:   XR CHEST PORTABLE   Final Result   Bilateral lower lobe infiltrates and small to moderate left pleural effusion. Probable interstitial edema. XR CHEST PORTABLE   Final Result   Unchanged infiltrates and left pleural effusion. New left-sided PICC line tip is in the SVC. XR CHEST PORTABLE   Final Result   Endotracheal tube in good position. Persistent a bi basilar infiltrates.       XR ABDOMEN FOR NG/OG/NE TUBE PLACEMENT   Final Result   Distal tip of a gastric tube is present in the left upper quadrant of the abdomen, in the expected region of the proximal stomach. Side port projected   below the diaphragmatic level. Stable small left pleural effusion with adjacent atelectasis and/or pneumonia   in the left lung base   Stable right lower lung infiltrate may be interstitial edema and/or   interstitial pneumonitis   Slight cardiomegaly      XR CHEST PORTABLE   Final Result   No interval change in the multifocal bilateral pulmonary infiltrates left   greater than right. Suspected small left pleural effusion. XR CHEST PORTABLE   Final Result   1. No significant interval change in the multifocal bilateral lower lobe   infiltrates. 2. Stable position of the support lines and tubes. There is an NG tube   within the stomach. XR ABDOMEN FOR NG/OG/NE TUBE PLACEMENT   Final Result   The enteric tube is in good position in the stomach. XR CHEST PORTABLE   Final Result   New right IJ line with the distal tip in the SVC. No pneumothorax. No other   significant interval change. XR CHEST PORTABLE   Final Result   Status post intubation with ET tube approximately 4.4 cm from the level of   the drew. Increasing airspace disease bilaterally predominantly in the lower lobes   consistent with multifocal pneumonia. CT HEAD WO CONTRAST   Final Result   No acute intracranial abnormality. Age-related loss of brain volume and   chronic periventricular ischemic changes. Chronic left basal ganglia lacunar   infarctions versus prominent perivascular space, unchanged since the prior   examination. Persistent contrast enhancement from recent intravenous contrast   administration. CTA PULMONARY W CONTRAST   Final Result   1. There is no evidence of a pulmonary embolus. 2. Multifocal bilateral ground-glass and semi solid pulmonary infiltrates   within the right and left lower lobes, right middle lobe and lingula.   The   findings are highly suspicious for findings of COVID-19.   3. Advanced emphysematous changes. 4. Cardiomegaly. There is no pericardial effusion. XR CHEST PORTABLE   Final Result   1. When compared to prior chest series there has been interval worsening in   the retrocardiac and bibasilar interstitial opacities. (Broad differential which includes worsening interstitial lung disease,   superimposed infection, and edema. Please correlate with clinical   presentation.)      2. Lung hyperinflation and coarse interstitial markings suggestive of   underlying COPD. 3.  Atherosclerotic disease. XR CHEST PORTABLE    (Results Pending)   CT HEAD WO CONTRAST    (Results Pending)   CT CHEST WO CONTRAST    (Results Pending)       Assessment:    Active Problems:    Acute respiratory failure with hypoxia (HCC)  Resolved Problems:    * No resolved hospital problems. *      Plan:  Acute respiratory failure with hypoxia secondary to COVID-19 pneumonia-currently intubated on vent, critical care managing. As per CC ,might need trach/peg. COVID-19 pneumonia-ID and critical care following. On vit c, Decadron and remdesivir,  on vent support . Procal 2.54 - on Zosyn . ID following. Has completed remdesivir,decadron  course. Oropharyngeal cultures positive for Serratia and Klebsiella sensitive to Zosyn. As per ID continue for 14 days. Severe emphysema-follows with Dr. Kuldip Jerez as outpatient, continue bronchodilators, Brovana and Pulmicort. At home on 3 L nasal cannula. DM 2-on Lantus +  ISS    Hypertension- Hypotenive at present sec to above , requires levophed.     Hyperlipidemia-Previously on Pravachol    On lovenox for dvt prophy  Full code         Electronically signed by Fer Sanchez MD on 11/24/2020 at 2:29 PM

## 2020-11-25 NOTE — PROGRESS NOTES
55 11/25/2020    ALT 38 11/25/2020          Microbiology :  Respiratory panel: SARS-CoV-2 detected  Nares screen MRSA: Negative  Respiratory culture 11/20/2020: Serratia marcescens, Klebsiella pneumoniae    Radiology :      URINARY CATHETER OUTPUT ([facility]):  Urethral Catheter Temperature probe 16 fr-Output (mL): 175 mL    Assessment and Plan:      · COVID 19 pneumonia causing acute respiratory failure. She completed 5 days of remdesivir on 11/18/2020   · Superimposed right lower lobe bacterial pneumonia with Serratia and Klebsiella sensitive to Zosyn  · Intermittent leukocytosis associated to the above  · Encephalopathy-sedation versus stroke  · B/L mastoid cell opacification in intubated pt     Plan  - Continue Zosyn, day 12, Ct chest increased density in LLL which I think is progression of disease.  New resp  Cultures still shows abundant PMNs, I'l add levaquin pending final GNR sensitivity  Decadron 6mgdaily, stop after 10 days of treatment  Continue anticoagulation  Vit B/thiamine/c        Electronically signed by Trice Shepherd MD on 11/25/2020 at 2:37 PM

## 2020-11-25 NOTE — PROGRESS NOTES
[facility] Hospitalist   Progress Note    Admitting Date and Time: 11/13/2020  1:46 PM  Admit Dx: Acute respiratory failure with hypoxia (HCC) [J96.01]  Acute respiratory failure with hypoxia (HCC) [J96.01]  Acute respiratory failure with hypoxia (HCC) [J96.01]  Acute respiratory failure with hypoxia ([address] [J96.01]     Seen for follow-up on multiple problems as listed below    Subjective: Intubated on vent , reviewed care ,reviewed consultants notes. Currently off of pressors.      ROS: Not possible sec to above     levofloxacin  750 mg Intravenous Q24H    fluticasone  2 spray Each Nostril Daily    guaiFENesin  400 mg Oral 4x Daily    furosemide  40 mg Intravenous BID    insulin glargine  25 Units Subcutaneous BID    lidocaine PF  5 mL Intradermal Once    heparin flush  3 mL Intravenous 2 times per day    senna  1 tablet Oral Nightly    insulin lispro  0-18 Units Subcutaneous Q6H    enoxaparin  40 mg Subcutaneous Daily    zinc sulfate  50 mg Oral Daily    ascorbic acid  1,000 mg Oral QAM    Arformoterol Tartrate  15 mcg Nebulization BID    budesonide  0.5 mg Nebulization BID    ipratropium-albuterol  1 ampule Inhalation Q4H    piperacillin-tazobactam  3.375 g Intravenous Q8H    pantoprazole  40 mg Intravenous Daily    sodium chloride flush  10 mL Intravenous 2 times per day    vitamin B and C  1 tablet Oral QAM    Vitamin D  2,000 Units Oral QAM     sodium phosphate IVPB, 0.16 mmol/kg, PRN    Or  sodium phosphate IVPB, 0.32 mmol/kg, PRN  potassium chloride, 20 mEq, PRN  magnesium sulfate, 1 g, PRN  sodium chloride flush, 10 mL, PRN  heparin flush, 3 mL, PRN  albuterol sulfate HFA, 2 puff, Q6H PRN  sodium chloride flush, 10 mL, PRN  acetaminophen, 650 mg, Q6H PRN    Or  acetaminophen, 650 mg, Q6H PRN  polyethylene glycol, 17 g, Daily PRN  promethazine, 12.5 mg, Q6H PRN    Or  ondansetron, 4 mg, Q6H PRN  glucose, 15 g, PRN  dextrose, 12.5 g, PRN  glucagon (rDNA), 1 mg, PRN  dextrose, 100 mL/hr, PRN         Objective:    BP (!) 114/53   Pulse 121   Temp 99.9 °F (37.7 °C) (Bladder)   Resp 23   Ht 5' 6\" (1.676 m)   Wt 158 lb 4.6 oz (71.8 kg)   SpO2 92%   BMI 25.55 kg/m²      Due to patient's COVID-19 positive status, to reduce exposure, contamination and in  an effort to conserve PPE this patient was evaluated through a combination of review of the medical record,, nursing assessment and other physicians exams and assessments as well as telemedicine interaction. General Appearance: Intubated on vent  Skin: warm and dry  Head: normocephalic and atraumatic  Neck: supple and non-tender  Pulmon carmela/Chest: with diffuse coarse BS   Cardiovascular: normal rate, regular rhythm, normal S1 and S2  Abdomen: soft, non-tender, non-distended, normal bowel sounds  Extremities: no cyanosis, clubbing   Neurologic: cannot assess    . j  Recent Labs     11/23/20  0610 11/24/20  0525 11/25/20  0540    140 140   K 3.7 3.3* 4.8   CL 96* 96* 98   CO2 36* 35* 35*   BUN 25* 24* 29*   CREATININE 0.4* 0.5 0.5   GLUCOSE 103* 176* 108*   CALCIUM 8.7 8.7 9.0       Recent Labs     11/23/20  0610 11/24/20  0525 11/25/20  0540   WBC 12.5* 14.0* 13.5*   RBC 2.95* 2.75* 2.73*   HGB 8.4* 7.8* 7.6*   HCT 27.1* 25.7* 26.2*   MCV 91.9 93.5 96.0   MCH 28.5 28.4 27.8   MCHC 31.0* 30.4* 29.0*   RDW 16.7* 16.7* 17.2*    228 215   MPV 10.4 10.8 10.6       Labs and images reviewed    Radiology:   XR CHEST PORTABLE   Final Result   Unchanged bilateral infiltrates and left pleural effusion. CT HEAD WO CONTRAST   Final Result   No evidence of acute intracranial pathology. The   Age-related loss of brain volume and chronic periventricular ischemic changes. Chronic left basal ganglia lacunar infarction versus prominent perivascular   space, unchanged.    Opacification of bilateral mastoid air cells and middle ears, not present on   the prior examination and which may reflect otomastoiditis      CT CHEST within the stomach. XR ABDOMEN FOR NG/OG/NE TUBE PLACEMENT   Final Result   The enteric tube is in good position in the stomach. XR CHEST PORTABLE   Final Result   New right IJ line with the distal tip in the SVC. No pneumothorax. No other   significant interval change. XR CHEST PORTABLE   Final Result   Status post intubation with ET tube approximately 4.4 cm from the level of   the drew. Increasing airspace disease bilaterally predominantly in the lower lobes   consistent with multifocal pneumonia. CT HEAD WO CONTRAST   Final Result   No acute intracranial abnormality. Age-related loss of brain volume and   chronic periventricular ischemic changes. Chronic left basal ganglia lacunar   infarctions versus prominent perivascular space, unchanged since the prior   examination. Persistent contrast enhancement from recent intravenous contrast   administration. CTA PULMONARY W CONTRAST   Final Result   1. There is no evidence of a pulmonary embolus. 2. Multifocal bilateral ground-glass and semi solid pulmonary infiltrates   within the right and left lower lobes, right middle lobe and lingula. The   findings are highly suspicious for findings of COVID-19.   3. Advanced emphysematous changes. 4. Cardiomegaly. There is no pericardial effusion. XR CHEST PORTABLE   Final Result   1. When compared to prior chest series there has been interval worsening in   the retrocardiac and bibasilar interstitial opacities. (Broad differential which includes worsening interstitial lung disease,   superimposed infection, and edema. Please correlate with clinical   presentation.)      2. Lung hyperinflation and coarse interstitial markings suggestive of   underlying COPD. 3.  Atherosclerotic disease. Assessment:    Active Problems:    Acute respiratory failure with hypoxia (HCC)  Resolved Problems:    * No resolved hospital problems.

## 2020-11-25 NOTE — PROGRESS NOTES
.         Critical Care Team - Daily Progress Note         Date and time: 11/25/2020 3:39 PM  Patient's name:  Pepe Barreto  Medical Record Number: [id number]  Patient's account/billing number: [de-identified]  Patient's YOB: 1945  Age: 67 y.o. Date of Admission: 11/13/2020  1:46 PM  Length of stay during current admission: 12      Primary Care Physician: Arslan Fuentes MD  ICU Attending Physician: Dr. Vianca Bucio Status: Full Code    Reason for ICU admission: Acute hypercapnic hypoxic respiratory failure secondary to Covid pneumonia      SUBJECTIVE:     BRIEF HISTORY: The patient is a 67 y.o. female with significant past medical history of COPD, diabetes, hypertension, hyperlipidemia presented to the ED for shortness of breath. Patient is currently intubated and sedated so history obtained from chart review.     Per emergency department, patient had history of 3 days of worsening shortness of breath. She is chronically on 3 L of oxygen at home when she ambulates but over the last few days has had increase her oxygen requirement to 5 L at all times. 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 She subsequently presented to the ED for evaluation.     Patient was found to be hypoxic and and placed on nasal cannula oxygen. Patient was Positive for COVID-19. While in the emergency department patient was placed on a nonrebreather and subsequently decompensated. She became altered and was taking agonal respirations. She was then intubated for further airway protection. ABG demonstrated respiratory acidosis with a CO2 of greater than 113. Patient was admitted to the ICU for further evaluation care. OVERNIGHT EVENTS:    11/15/20:  Some soft BP's overnight, responded to fluid bolus and switched from propofol gtt to versed gtt  11/16/20: Ernesto Gamma, tolerating vent.  Attempt to wean sedation and pressure hrs:   BP Pulse Resp SpO2   11/25/20 1344 -- 122 16 90 %   11/25/20 1140 -- 99 21 97 %   11/25/20 1139 -- -- 21 97 %   11/25/20 1000 (!) 114/53 105 21 93 %         Intake/Output Summary (Last 24 hours) at 11/25/2020 1539  Last data filed at 11/25/2020 1000  Gross per 24 hour   Intake 1965 ml   Output 1785 ml   Net 180 ml     Wt Readings from Last 2 Encounters:   11/22/20 158 lb 4.6 oz (71.8 kg)   10/29/20 140 lb (63.5 kg)     Body mass index is 25.55 kg/m². PHYSICAL EXAM:  General: Intubated  Eyes:  Pupils equal and reactive  Ears: no obvious scars, no lesions, no masses, hearing intact  Mouth: ET tube in place  Head: normocephalic, atraumatic  Neck: no JVD, no adenopathy, no thyromegaly, neck is supple, trachea is midline. Chest: no pain on palpation  Lungs: Clear to auscultation bilaterally , no wheezes, rhonchi  Heart: regular rate and regular rhythm, no murmur, normal S1, S2  Abdomen: soft, non-tender  Extremities: no lower extremity edema  Skin: normal color, normal texture, normal turgor, no rashes, no lesions  Neurologic:  will open eyes and stare. She follows me on the room. squeezed with both hands.  Moves all extremities to pain    MEDICATIONS:    Scheduled Meds:   levofloxacin  750 mg Intravenous Q24H    fluticasone  2 spray Each Nostril Daily    guaiFENesin  400 mg Oral 4x Daily    furosemide  40 mg Intravenous BID    insulin glargine  25 Units Subcutaneous BID    lidocaine PF  5 mL Intradermal Once    heparin flush  3 mL Intravenous 2 times per day    senna  1 tablet Oral Nightly    insulin lispro  0-18 Units Subcutaneous Q6H    enoxaparin  40 mg Subcutaneous Daily    zinc sulfate  50 mg Oral Daily    ascorbic acid  1,000 mg Oral QAM    Arformoterol Tartrate  15 mcg Nebulization BID    budesonide  0.5 mg Nebulization BID    ipratropium-albuterol  1 ampule Inhalation Q4H    piperacillin-tazobactam  3.375 g Intravenous Q8H    pantoprazole  40 mg Intravenous Daily    sodium chloride flush  10 mL Intravenous 2 times per day    vitamin B and C  1 tablet Oral QAM    Vitamin D  2,000 Units Oral QAM     Continuous Infusions:   propofol Stopped (11/24/20 1200)    fentaNYL 5 mcg/ml in 0.9%  ml infusion 200 mcg/hr (11/25/20 0654)    norepinephrine Stopped (11/24/20 0835)    sodium chloride 12.5 mL/hr at 11/25/20 1200    dextrose       PRN Meds:   sodium phosphate IVPB, 0.16 mmol/kg, PRN    Or  sodium phosphate IVPB, 0.32 mmol/kg, PRN  potassium chloride, 20 mEq, PRN  magnesium sulfate, 1 g, PRN  sodium chloride flush, 10 mL, PRN  heparin flush, 3 mL, PRN  albuterol sulfate HFA, 2 puff, Q6H PRN  sodium chloride flush, 10 mL, PRN  acetaminophen, 650 mg, Q6H PRN    Or  acetaminophen, 650 mg, Q6H PRN  polyethylene glycol, 17 g, Daily PRN  promethazine, 12.5 mg, Q6H PRN    Or  ondansetron, 4 mg, Q6H PRN  glucose, 15 g, PRN  dextrose, 12.5 g, PRN  glucagon (rDNA), 1 mg, PRN  dextrose, 100 mL/hr, PRN        VENT SETTINGS (Comprehensive) (if applicable):  Vent Information  $Ventilation: $Subsequent Day  Skin Assessment: Clean, dry, & intact  Equipment ID: [id number]  Vent Type: 840  Vent Mode: (S) PS  Vt Ordered: 450 mL  Rate Set: 0 bmp  Peak Flow: 65 L/min  Pressure Support: (S) 5 cmH20  FiO2 : 50 %  SpO2: 90 %  SpO2/FiO2 ratio: 180  Sensitivity: 3  PEEP/CPAP: 6  I Time/ I Time %: 0 s  Humidification Source: HME  Mask Type: Full face mask  Mask Size: Medium  Additional Respiratory  Assessments  Pulse: 122  Resp: 16  SpO2: 90 %  $End Tidal CO2: 36  Position: Semi-Hunt's  Humidification Source: HME  Oral Care: Lip moisturizer applied, Mouth swabbed, Mouth moisturizer, Mouth suctioned  Subglottic Suction Done?: Yes  Cuff Pressure (cm H2O): 29 cm H2O    ABGs:   Recent Labs     11/24/20  0602 11/25/20  0644   PH 7.433 7.382   PCO2 47.4*  --    PO2 75.8  --    HCO3 31.0*  --    BE 6.0* 8.9*   O2SAT 95.1 95.5       Laboratory findings:    Complete Blood Count:   Recent Labs     11/23/20  0610 airspace disease previously noted    predominantly in the lung bases. Atelectatic changes in the right lung base and smaller right pleural effusion. Mild cardiomegaly. Mild heterogeneous enlargement of the left lobe of the thyroid likely    secondary to thyroid nodules.  Consider further evaluation and follow up with    thyroid ultrasound. Large gallstone. Impression    No evidence of acute intracranial pathology.  The    Age-related loss of brain volume and chronic periventricular ischemic changes. Chronic left basal ganglia lacunar infarction versus prominent perivascular    space, unchanged. Opacification of bilateral mastoid air cells and middle ears, not present on    the prior examination and which may reflect otomastoiditis           ASSESSMENT:       - Acute respiratory failure with hypoxia, hypercarbia  - COVID-19 pneumonia  - Superimposed bacterial pneumonia  - COPD  -Type 2 diabetes    Additional assessment:        PLAN:     WEAN PER PROTOCOL:  [] No   [x] Yes  [] N/A    DISCONTINUE ANY LABS:   [x] No   [] Yes    ICU PROPHYLAXIS:  Stress ulcer:  [x] PPI Agent  [] V3Phucj [] Sucralfate  [] Other:  VTE:   [x] Enoxaparin  [] Unfract. Heparin Subcut  [] EPC Cuffs    NUTRITION:  [] NPO [] Tube Feeding (Specify: ) [] TPN  [x] PO (Diet: DIET TUBE FEED CONTINUOUS/CYCLIC NPO; Semi-elemental; Orogastric; Continuous; 25; 45)    HOME MEDICATIONS RECONCILED: [] No  [x] Yes    INSULIN DRIP:   [x] No   [] Yes    CONSULTATION NEEDED:  [x] No   [] Yes    FAMILY UPDATED:    [x] No   [] Yes     TRANSFER OUT OF ICU:   [x] No   [] Yes       SYSTEMS ASSESSMENT       Of note, I do not see a progress note from the ICU for 11/20. That note was written and signed by Dr. Lazara Pillai. We cannot find it in the computer. Is not in with incomplete notes. Will call the help desk and attempt to retrieve her that note is.       Neuro   AMS--most likely secondary to hypercapnia              -CT head negative     Intubated and sedated on the vent              -Continue fentanyl and propofol. Wean sedation as able.      Respiratory   Acute hypoxic and hypercapnic respiratory failure secondary to Covid pneumonia              -resp panel + SARS-COV2              -former smoker              -vit c, decadron-- stopped, remdesivir-finished              -Pulmonology following              -procal 2.54   -Failed extubation 11/19  Subsequently reintubated.   -Wean FiO2 as able   -incrase secretions --guaifenesin   -CT chest with evidence of pneumonia   -will call family and discuss possible trach/peg   -pressure support trials    Superimposed bacterial PNA   -elevated procal-2.54   - repeat resp culture gram - rods   -Zosyn, day 12/14   -CT scan with evidence of PNA, ID following, levaquin added per ID      COPD              -O2 PRN and with ambulation @ home, usual 3L              -Brovana, Pulmicort, nebs     Cardiovascular   CTA              -No evidence of PE     Hypotension, shock --  resolved   -Levophed as needed to keep MAP greater than 65   -Continue to monitor    -very unpredicatable. Will need 2mcg for map 60 and will then be 802X systolic. -off pressors      Gastrointestinal   Tube feeds   PPI     Renal   Up 7 liters since admission--Lasix twice daily. Good uo  Hypokalemia- resovled     Infectious Disease   COVID PNA              -ID following              -Decadron--stopped today secondary to continued hyperglycemia. -Remdesivir,-finished. Vitamin C, zinc     Superimposed bacterial PNA   -elevated procal-2.54   -resp culture   -Zosyn, day 12    Febrile and leukocytosis   -repeat blood, urine pending   -resp cx gram - rods   -continue Zosyn, levaquin added per ID    Otomastoiditis?   -zosyn should cover   -add flonase     Hematology/Oncology   Lymphopenia              -2/2 covid pna    Anemia   -Likely iatrogenic, no signs of bleeding.   Continue to monitor    Patient had a CTA with no PE, decreased to DVT prophylactic dose of Lovenox     Endocrine   thyroid nodules  DM -sugars uncontrolled. monitor     Social/Spiritual/DNR/Other   Full      Diet: DIET TUBE FEED CONTINUOUS/CYCLIC NPO; Semi-elemental; Orogastric; Continuous; 25; 45  CODE Status: Full Code    Dispo: maintain ICU level care    ASSESSMENT/ PLAN   1. As above  2. GI prophylaxis-Protonix  3. DVT Prophylaxis-Lovenox  4. Discuss case and plan with attending, Dr. Long Pettit, DO  Resident, PGY-2  11/25/2020  3:39 PM     I personally saw, examined and provided care for the patient. Radiographs, labs and medication list were reviewed by me independently. I spoke with bedside nursing, therapists and consultants. Critical care services and times documented are independent of procedures and multidisciplinary rounds with Residents. Additionally comprehensive, multidisciplinary rounds were conducted with the MICU team. The case was discussed in detail and plans for care were established. Review of Residents documentation was conducted and revisions were made as appropriate. I agree with the above documented exam, problem list and plan of care. psv trial as tolerated if she fails likely will need trach and peg   Peña Mccormick M.D.    Pulmonary/Critical Care Medicine   38 min cct excluding procedures

## 2020-11-25 NOTE — CARE COORDINATION
COVID POSITIVE 11/13. Vent/ intubated 11/13- attempted extubation 11/19 but required re-intubation- requiring FIO2 60% PEEP 6. On  iv abx, iv Lasix bid,tube feedings via NG. [facility] following(out of network for [facility]). Awaiting return call from daughter Kyra Beck [phone] .  Will follow Mono Reyes

## 2020-11-26 NOTE — CONSULTS
Consulted due to [facility] port of PICC won't take Cathflow. I was able to get Cathflow into line using negative pressure. I also placed another IV site in the left forearm for additional access. Instructed ICU nurse to leave Cathflow in line for 2 hrs.   Kate Scherer RN, CPUI, [facility]

## 2020-11-26 NOTE — CONSULTS
White picc port still occulded. Second dose of Cath flow instilled. Will let set 2 hrs. Primary RN aware. Mayi Ashby RN, CPUI, [facility]  11/2620 2704  ICU nurse aspirated blood from white port and PICC white port is now patent.   Mayi Ashby RN, CPUI, [facility]

## 2020-11-26 NOTE — PROGRESS NOTES
ID Progress Note                [address], [place], [address] [facility]            Phone [phone]     Fax [phone]      Chief complaint   resp failure    Subjective: Intermittent low-grade fever noted  Failed weaning trial  More awake  She is off pressors as well as off propofol, only on fentanyl. 60% FiO2 and 6 of PEEP    Objective:    Vitals:    11/26/20 1602   BP:    Pulse: 87   Resp: 21   Temp:    SpO2: 94%     VENT SETTINGS:   Vent Information  $Ventilation: $Subsequent Day  Skin Assessment: Clean, dry, & intact  Equipment ID: [id number]  Vent Type: 840  Vent Mode: AC/VC  Vt Ordered: 450 mL  Rate Set: 22 bmp  Peak Flow: 68 L/min  Pressure Support: 0 cmH20  FiO2 : (S) 65 %  SpO2: 94 %  SpO2/FiO2 ratio: 144.62  Sensitivity: 3  PEEP/CPAP: 6  I Time/ I Time %: 0 s  Humidification Source: HME  Mask Type: Full face mask  Mask Size: Medium  General Appearance:   Intubated/off sedation awake but not following commands. HEENT:   Round pupils. Minimally reactive. No jaundice. Lungs:    Coarse breath sounds to auscultation bilaterally. No crackles. Heart:   Heart sounds rhythmic and regular. Abdomen:    Round, soft. Benign to palpation. Extremities:  Minimal edema.    Skin:   no rashes or lesions   Left PICC  Lloyd catheter    Labs:  Recent Labs     11/24/20  0525 11/25/20  0540 11/26/20  0545   WBC 14.0* 13.5* 10.9   RBC 2.75* 2.73* 2.39*   HGB 7.8* 7.6* 6.8*   HCT 25.7* 26.2* 22.6*   MCV 93.5 96.0 94.6   MCH 28.4 27.8 28.5   MCHC 30.4* 29.0* 30.1*   RDW 16.7* 17.2* 17.2*    215 204   MPV 10.8 10.6 11.0     CMP:    Lab Results   Component Value Date     11/26/2020    K 3.9 11/26/2020    K 5.7 11/13/2020    CL 99 11/26/2020    CO2 35 11/26/2020    BUN 31 11/26/2020    CREATININE 0.4 11/26/2020    CREATININE 0.5 01/23/2019    GFRAA >60 11/26/2020    LABGLOM >60 11/26/2020    GLUCOSE 134 11/26/2020    PROT 4.8 11/26/2020    LABALBU 2.1 11/26/2020    CALCIUM 8.1 11/26/2020 BILITOT 0.7 11/26/2020    ALKPHOS 110 11/26/2020    AST 58 11/26/2020    ALT 40 11/26/2020          Microbiology :  Respiratory panel: SARS-CoV-2 detected  Nares screen MRSA: Negative  Respiratory culture 11/20/2020: Serratia marcescens, Klebsiella pneumoniae    Radiology :      URINARY CATHETER OUTPUT (Lloyd):  Urethral Catheter Temperature probe 16 fr-Output (mL): 200 mL    Assessment and Plan:      · COVID 19 pneumonia causing acute respiratory failure. She completed 5 days of remdesivir on 11/18/2020   · Superimposed right lower lobe bacterial pneumonia with Serratia and Klebsiella sensitive to Zosyn  · Intermittent leukocytosis associated to the above  · Encephalopathy-sedation versus stroke  · B/L mastoid cell opacification in intubated pt     Plan  - Continue Zosyn, day 13, Ct chest increased density in LLL which I think is progression of disease.  New resp  Cultures still shows abundant PMNs, added levaquin on 11/25/2020 -shows Serratia marcescens (Zosyn sensitivity pending)  Another gram-negative betsy also present  Continue anticoagulation  Vit B/thiamine/c  Getting Lasix  Plan for trach and PEG      Electronically signed by Elias Stewart MD on 11/26/2020 at 4:52 PM

## 2020-11-26 NOTE — PROGRESS NOTES
[facility] Progress Note    Admitting Date and Time: 11/13/2020  1:46 PM  Admit Dx: Acute respiratory failure with hypoxia (HCC) [J96.01]  Acute respiratory failure with hypoxia (HCC) [J96.01]  Acute respiratory failure with hypoxia (HCC) [J96.01]  Acute respiratory failure with hypoxia (HCC) [J96.01]    Subjective:  Patient is being followed for Acute respiratory failure with hypoxia ([address] [J96.01]  Acute respiratory failure with hypoxia ([address] [J96.01]  Acute respiratory failure with hypoxia ([address] [J96.01]  Acute respiratory failure with hypoxia ([address] [J96.01]   Pt remains intubated and sedated. Failed extubation. Plan is now for trach and PEG  ROS: denies fever, chills, cp, sob, n/v, HA unless stated above.       sodium chloride  20 mL Intravenous Once    lactulose  20 g Oral Once    levofloxacin  750 mg Intravenous Q24H    fluticasone  2 spray Each Nostril Daily    guaiFENesin  400 mg Oral 4x Daily    furosemide  40 mg Intravenous BID    insulin glargine  25 Units Subcutaneous BID    lidocaine PF  5 mL Intradermal Once    heparin flush  3 mL Intravenous 2 times per day    senna  1 tablet Oral Nightly    insulin lispro  0-18 Units Subcutaneous Q6H    enoxaparin  40 mg Subcutaneous Daily    zinc sulfate  50 mg Oral Daily    ascorbic acid  1,000 mg Oral QAM    Arformoterol Tartrate  15 mcg Nebulization BID    budesonide  0.5 mg Nebulization BID    ipratropium-albuterol  1 ampule Inhalation Q4H    piperacillin-tazobactam  3.375 g Intravenous Q8H    pantoprazole  40 mg Intravenous Daily    sodium chloride flush  10 mL Intravenous 2 times per day    vitamin B and C  1 tablet Oral QAM    Vitamin D  2,000 Units Oral QAM     sodium phosphate IVPB, 0.16 mmol/kg, PRN    Or  sodium phosphate IVPB, 0.32 mmol/kg, PRN  potassium chloride, 20 mEq, PRN  magnesium sulfate, 1 g, PRN  sodium chloride flush, 10 mL, PRN  heparin flush, 3 mL, PRN  albuterol sulfate HFA, 2 puff, Q6H PRN  sodium chloride flush, 10 mL, PRN  acetaminophen, 650 mg, Q6H PRN    Or  acetaminophen, 650 mg, Q6H PRN  polyethylene glycol, 17 g, Daily PRN  promethazine, 12.5 mg, Q6H PRN    Or  ondansetron, 4 mg, Q6H PRN  glucose, 15 g, PRN  dextrose, 12.5 g, PRN  glucagon (rDNA), 1 mg, PRN  dextrose, 100 mL/hr, PRN         Objective:    BP (!) 98/44   Pulse 97   Temp 100 °F (37.8 °C) (Bladder)   Resp 21   Ht 5' 6\" (1.676 m)   Wt 158 lb 4.6 oz (71.8 kg)   SpO2 97%   BMI 25.55 kg/m²     Due to the patient's COVID-19-positive status, to reduce exposure, contamination, and in an effort to conserve PPE, this patient was evaluated through a combination of review of the medical record, nursing assessments, other physicians' exams and assessments, as well as telemedicine interaction. Recent Labs     11/24/20  0525 11/25/20  0540 11/26/20  0545    140 142   K 3.3* 4.8 3.9   CL 96* 98 99   CO2 35* 35* 35*   BUN 24* 29* 31*   CREATININE 0.5 0.5 0.4*   GLUCOSE 176* 108* 134*   CALCIUM 8.7 9.0 8.1*       Recent Labs     11/24/20  0525 11/25/20  0540 11/26/20  0545   WBC 14.0* 13.5* 10.9   RBC 2.75* 2.73* 2.39*   HGB 7.8* 7.6* 6.8*   HCT 25.7* 26.2* 22.6*   MCV 93.5 96.0 94.6   MCH 28.4 27.8 28.5   MCHC 30.4* 29.0* 30.1*   RDW 16.7* 17.2* 17.2*    215 204   MPV 10.8 10.6 11.0       Radiology:   EXAMINATION:    ONE XRAY VIEW OF THE CHEST    11/25/2020 6:58 am    COMPARISON:    11/23/2020    HISTORY:    ORDERING SYSTEM PROVIDED HISTORY: resp failure    TECHNOLOGIST PROVIDED HISTORY:    Reason for exam:->resp failure    FINDINGS:    Endotracheal tube and nasogastric tube are unchanged. [address] line is unchanged. Bilateral infiltrates are unchanged, left more than right. Layton Joseph is an    unchanged small left pleural effusion. There is no pneumothorax.  The         Impression    Unchanged bilateral infiltrates and left pleural effusion.         CT scan chest w/o contrast 11/24/20:    Impression    Confluent airspace disease in the posterobasal segment of the left lower lobe    with central air bronchograms, consistent with pneumonia, more extensive than    on the prior examination. Small to moderate left pleural effusion extending into the major fissure. Improvement in multifocal ground-glass airspace disease previously noted    predominantly in the lung bases. Atelectatic changes in the right lung base and smaller right pleural effusion. Mild cardiomegaly. Mild heterogeneous enlargement of the left lobe of the thyroid likely    secondary to thyroid nodules.  Consider further evaluation and follow up with    thyroid ultrasound. Large gallstone. Assessment:    Active Problems:    Acute respiratory failure with hypoxia (HCC)  Resolved Problems:    * No resolved hospital problems. *      Plan:  1. Acute respiratory failure with hypoxia and hypercapnea  -  2/2 COVID 19 pneumonia  -  Decadron therapy  -  Also superimposed bacterial pneumonia treated with abx  -  Patient remains intubated- pulm/cc management; FiO2 80%, RR 22, Vt 450, PEEP 6  -  ID on board:  Patient on Zosyn day 13, Levaquin day 2  -  Patient did receive remdesivir and decadron  -  Failed extubation 11/19-reintubated  -  ?trach/PEG  -  No evidence for PE on CTA chest  -  lovenox was decreased to DVT prophylatic dosing     2. COVID 19 pneumonia  -  Completed remdesivir and decadron  -  Continue supportive care    3. Bacterial pneumonia  -  ID following  -  Continue zosyn and levaquin  -  Procalcitonin 2.54 on 11/14    4. Severe emphysema  -  Follows with Dr. Ismael Paredes as outpatient and was on 3 L O2 continuously at home prior to admission- he is consulted  -  Former smoker  -  rosalva [place]    5. DM2-  Continue lantus, ISS, BS checks    6. Essential hypertension- with hypotension secondary to severe illness; requiring Levophed (keep MAP >65)    7. Mixed hyperlipidemia-previsouly on pravachol    8.   Leukocytosis-

## 2020-11-26 NOTE — PROGRESS NOTES
Av.5 °F (37.5 °C), Min:99 °F (37.2 °C), Max:100 °F (37.8 °C)      Patient Vitals for the past 6 hrs:   Pulse Resp SpO2   20 1259 77 22 96 %   20 0908 95 21 97 %   20 0907 -- 23 97 %   20 0906 -- 24 97 %   20 0905 -- 23 97 %         Intake/Output Summary (Last 24 hours) at 2020 1433  Last data filed at 2020 0600  Gross per 24 hour   Intake 1233.96 ml   Output 1700 ml   Net -466.04 ml     Wt Readings from Last 2 Encounters:   20 158 lb 4.6 oz (71.8 kg)   10/29/20 140 lb (63.5 kg)     Body mass index is 25.55 kg/m². PHYSICAL EXAM:  General: Intubated  Eyes:  Pupils equal and reactive  Ears: no obvious scars, no lesions, no masses, hearing intact  Mouth: ET tube in place  Head: normocephalic, atraumatic  Neck: no JVD, no adenopathy, no thyromegaly, neck is supple, trachea is midline. Chest: no pain on palpation  Lungs: Clear to auscultation bilaterally , no wheezes, rhonchi  Heart: regular rate and regular rhythm, no murmur, normal S1, S2  Abdomen: soft, non-tender  Extremities: no lower extremity edema  Skin: normal color, normal texture, normal turgor, no rashes, no lesions  Neurologic:  will open eyes and stare.   Spontaneously moves all extremitties, not following commands    MEDICATIONS:    Scheduled Meds:   levofloxacin  750 mg Intravenous Q24H    fluticasone  2 spray Each Nostril Daily    guaiFENesin  400 mg Oral 4x Daily    furosemide  40 mg Intravenous BID    insulin glargine  25 Units Subcutaneous BID    lidocaine PF  5 mL Intradermal Once    heparin flush  3 mL Intravenous 2 times per day    senna  1 tablet Oral Nightly    insulin lispro  0-18 Units Subcutaneous Q6H    enoxaparin  40 mg Subcutaneous Daily    zinc sulfate  50 mg Oral Daily    ascorbic acid  1,000 mg Oral QAM    Arformoterol Tartrate  15 mcg Nebulization BID    budesonide  0.5 mg Nebulization BID    ipratropium-albuterol  1 ampule Inhalation Q4H    piperacillin-tazobactam 3.375 g Intravenous Q8H    pantoprazole  40 mg Intravenous Daily    sodium chloride flush  10 mL Intravenous 2 times per day    vitamin B and C  1 tablet Oral QAM    Vitamin D  2,000 Units Oral QAM     Continuous Infusions:   propofol 10 mcg/kg/min (11/26/20 1012)    fentaNYL 5 mcg/ml in 0.9%  ml infusion 200 mcg/hr (11/26/20 0726)    norepinephrine 8 mcg/kg/min (11/26/20 1194)    sodium chloride 12.5 mL/hr at 11/26/20 1200    dextrose       PRN Meds:   sodium phosphate IVPB, 0.16 mmol/kg, PRN    Or  sodium phosphate IVPB, 0.32 mmol/kg, PRN  potassium chloride, 20 mEq, PRN  magnesium sulfate, 1 g, PRN  sodium chloride flush, 10 mL, PRN  heparin flush, 3 mL, PRN  albuterol sulfate HFA, 2 puff, Q6H PRN  sodium chloride flush, 10 mL, PRN  acetaminophen, 650 mg, Q6H PRN    Or  acetaminophen, 650 mg, Q6H PRN  polyethylene glycol, 17 g, Daily PRN  promethazine, 12.5 mg, Q6H PRN    Or  ondansetron, 4 mg, Q6H PRN  glucose, 15 g, PRN  dextrose, 12.5 g, PRN  glucagon (rDNA), 1 mg, PRN  dextrose, 100 mL/hr, PRN        VENT SETTINGS (Comprehensive) (if applicable):  Vent Information  $Ventilation: $Subsequent Day  Skin Assessment: Clean, dry, & intact  Equipment ID: [id number]  Vent Type: [id number]  Vent Mode: AC/VC  Vt Ordered: 450 mL  Rate Set: 22 bmp  Peak Flow: 68 L/min  Pressure Support: 0 cmH20  FiO2 : 80 %  SpO2: 96 %  SpO2/FiO2 ratio: 120  Sensitivity: 3  PEEP/CPAP: 6  I Time/ I Time %: 0 s  Humidification Source: HME  Mask Type: Full face mask  Mask Size: Medium  Additional Respiratory  Assessments  Pulse: 77  Resp: 22  SpO2: 96 %  $End Tidal CO2: 36  Position: Semi-Hunt's  Humidification Source: HME  Oral Care: Teeth brushed, Mouthwash, Lip moisturizer applied, Mouth swabbed, Mouth moisturizer, Mouth suctioned  Subglottic Suction Done?: Yes  Cuff Pressure (cm H2O): 29 cm H2O    ABGs:   Recent Labs     11/24/20  0602 11/25/20  0644   PH 7.433 7.382   PCO2 47.4*  --    PO2 75.8  --    HCO3 31.0*  --    BE 6.0* 8.9*   O2SAT 95.1 95.5       Laboratory findings:    Complete Blood Count:   Recent Labs     11/24/20  0525 11/25/20  0540 11/26/20  0545   WBC 14.0* 13.5* 10.9   HGB 7.8* 7.6* 6.8*   HCT 25.7* 26.2* 22.6*    215 204        Last 3 Blood Glucose:   Recent Labs     11/24/20  0525 11/25/20  0540 11/26/20  0545   GLUCOSE 176* 108* 134*        PT/INR:    Lab Results   Component Value Date    PROTIME 12.2 11/26/2020    INR 1.1 11/26/2020     PTT:    Lab Results   Component Value Date    APTT 26.5 11/26/2020       Comprehensive Metabolic Profile:   Recent Labs     11/24/20  0525 11/25/20  0540 11/26/20  0545    140 142   K 3.3* 4.8 3.9   CL 96* 98 99   CO2 35* 35* 35*   BUN 24* 29* 31*   CREATININE 0.5 0.5 0.4*   GLUCOSE 176* 108* 134*   CALCIUM 8.7 9.0 8.1*   PROT 6.2* 6.3* 4.8*   LABALBU 2.4* 2.3* 2.1*   BILITOT 0.6 0.7 0.7   ALKPHOS 109* 118* 110*   AST 47* 56* 58*   ALT 31 38* 40*      Magnesium:   Lab Results   Component Value Date    MG 1.9 11/26/2020     Phosphorus:   Lab Results   Component Value Date    PHOS 3.1 11/26/2020     Ionized Calcium: No results found for: CAION       Troponin:   No results for input(s): TROPONINI in the last 72 hours. Microbiology:  Cultures during this admission:     Blood cultures:                 [x] None drawn      [] Negative             []  Positive (Details:  )  Urine Culture:                   [x] None drawn      [] Negative             []  Positive (Details:  )  Sputum Culture:               [] None drawn       [] Negative             [x]  Positive (Details: yeast )   Endotracheal aspirate:     [] None drawn       [] Negative             [x]  Positive (Details: gram - betsy )     Other pertinent Labs:      Radiology/Imaging:     Chest x-ray:  11/24/20:     Impression    Confluent airspace disease in the posterobasal segment of the left lower lobe    with central air bronchograms, consistent with pneumonia, more extensive than    on the prior examination.     Small to moderate left pleural effusion extending into the major fissure. Improvement in multifocal ground-glass airspace disease previously noted    predominantly in the lung bases. Atelectatic changes in the right lung base and smaller right pleural effusion. Mild cardiomegaly. Mild heterogeneous enlargement of the left lobe of the thyroid likely    secondary to thyroid nodules.  Consider further evaluation and follow up with    thyroid ultrasound. Large gallstone. Impression    No evidence of acute intracranial pathology.  The    Age-related loss of brain volume and chronic periventricular ischemic changes. Chronic left basal ganglia lacunar infarction versus prominent perivascular    space, unchanged. Opacification of bilateral mastoid air cells and middle ears, not present on    the prior examination and which may reflect otomastoiditis           ASSESSMENT:       - Acute respiratory failure with hypoxia, hypercarbia  - COVID-19 pneumonia  - Superimposed bacterial pneumonia  - COPD  -Type 2 diabetes    Additional assessment:        PLAN:     WEAN PER PROTOCOL:  [] No   [x] Yes  [] N/A    DISCONTINUE ANY LABS:   [x] No   [] Yes    ICU PROPHYLAXIS:  Stress ulcer:  [x] PPI Agent  [] D9Docoh [] Sucralfate  [] Other:  VTE:   [x] Enoxaparin  [] Unfract. Heparin Subcut  [] EPC Cuffs    NUTRITION:  [] NPO [] Tube Feeding (Specify: ) [] TPN  [x] PO (Diet: DIET TUBE FEED CONTINUOUS/CYCLIC NPO; Semi-elemental; Orogastric; Continuous; 25; 45)    HOME MEDICATIONS RECONCILED: [] No  [x] Yes    INSULIN DRIP:   [x] No   [] Yes    CONSULTATION NEEDED:  [] No   [x] Yes    FAMILY UPDATED:    [] No   [x] Yes     TRANSFER OUT OF ICU:   [x] No   [] Yes       SYSTEMS ASSESSMENT       Of note, I do not see a progress note from the ICU for 11/20. That note was written and signed by Dr. Cornelia Carty. We cannot find it in the computer. Is not in with incomplete notes.   Will call the help desk and attempt to retrieve her that note is. Neuro   AMS--most likely secondary to hypercapnia              -CT head negative     Intubated and sedated on the vent              -Continue fentanyl and propofol. Wean sedation as able.      Respiratory   Acute hypoxic and hypercapnic respiratory failure secondary to Covid pneumonia              -resp panel + SARS-COV2              -former smoker              -vit c, decadron-- stopped, remdesivir-finished              -Pulmonology following              -procal 2.54   -Failed extubation 11/19  Subsequently reintubated.   -Wean FiO2 as able   -incrase secretions --guaifenesin   -CT chest with evidence of pneumonia   -failed wean   -consult for trach/peg    Superimposed bacterial PNA   -elevated procal-2.54   - repeat resp culture gram - rods   -Zosyn, day 12/14   -CT scan with evidence of PNA, ID following, levaquin       COPD              -O2 PRN and with ambulation @ home, usual 3L              -Brovana, Pulmicort, nebs     Cardiovascular   CTA              -No evidence of PE     Hypotension, shock --  resolved   -Levophed as needed to keep MAP greater than 65   -Continue to monitor    -very unpredicatable. Will need 2mcg for map 60 and will then be 863U systolic.     Gastrointestinal   Tube feeds   PPI  Constipation- dose of lactulose and enema if needed     Renal   Up 7 liters since admission--Lasix twice daily. Good uo  Hypokalemia- resovled     Infectious Disease   COVID PNA              -ID following              -Decadron--stopped today secondary to continued hyperglycemia. -Remdesivir,-finished.  Vitamin C, zinc     Superimposed bacterial PNA   -elevated procal-2.54   -resp culture   -Zosyn, day 12   -levaquin day 2    Febrile and leukocytosis   -repeat blood, urine pending   -resp cx gram - rods   -continue Zosyn, levaquin added per ID    Otomastoiditis?   -zosyn should cover   -add flonase     Hematology/Oncology   Lymphopenia              -2/2 covid pna    Anemia   -no signs of bleeding. Transfuse 1 unit. folow h/h. Continue DVT ppx for now as hypercoaguable with covid     Patient had a CTA with no PE, decreased to DVT prophylactic dose of Lovenox     Endocrine   thyroid nodules  DM -sugars uncontrolled. monitor     Social/Spiritual/DNR/Other   Full      Diet: DIET TUBE FEED CONTINUOUS/CYCLIC NPO; Semi-elemental; Orogastric; Continuous; 25; 45  CODE Status: Full Code    Dispo: maintain ICU level care    1. Discuss case and plan with attending, Dr. Blue Tong, DO  Resident, PGY-2  11/26/2020  2:33 PM     I personally saw, examined and provided care for the patient. Radiographs, labs and medication list were reviewed by me independently. I spoke with bedside nursing, therapists and consultants. Critical care services and times documented are independent of procedures and multidisciplinary rounds with Residents. Additionally comprehensive, multidisciplinary rounds were conducted with the MICU team. The case was discussed in detail and plans for care were established. Review of Residents documentation was conducted and revisions were made as appropriate. I agree with the above documented exam, problem list and plan of care. Will need trach and peg   Mery Hollingsworth M.D.    Pulmonary/Critical Care Medicine   42 min cct excluding procedures

## 2020-11-27 NOTE — PROGRESS NOTES
[facility] Progress Note    Admitting Date and Time: 11/13/2020  1:46 PM  Admit Dx: Acute respiratory failure with hypoxia (HCC) [J96.01]  Acute respiratory failure with hypoxia (HCC) [J96.01]  Acute respiratory failure with hypoxia (HCC) [J96.01]  Acute respiratory failure with hypoxia (HCC) [J96.01]    Subjective:  Patient is being followed for Acute respiratory failure with hypoxia ([address] [J96.01]  Acute respiratory failure with hypoxia ([address] [J96.01]  Acute respiratory failure with hypoxia ([address] [J96.01]  Acute respiratory failure with hypoxia ([address] [J96.01]   Pt remains intubated and sedated. Failed extubation.   Plan is now for trach and PEG  ROS: patient intubated, unable to respond     levofloxacin  750 mg Intravenous Q24H    fluticasone  2 spray Each Nostril Daily    guaiFENesin  400 mg Oral 4x Daily    furosemide  40 mg Intravenous BID    insulin glargine  25 Units Subcutaneous BID    lidocaine PF  5 mL Intradermal Once    heparin flush  3 mL Intravenous 2 times per day    senna  1 tablet Oral Nightly    insulin lispro  0-18 Units Subcutaneous Q6H    enoxaparin  40 mg Subcutaneous Daily    zinc sulfate  50 mg Oral Daily    ascorbic acid  1,000 mg Oral QAM    Arformoterol Tartrate  15 mcg Nebulization BID    budesonide  0.5 mg Nebulization BID    ipratropium-albuterol  1 ampule Inhalation Q4H    piperacillin-tazobactam  3.375 g Intravenous Q8H    pantoprazole  40 mg Intravenous Daily    sodium chloride flush  10 mL Intravenous 2 times per day    vitamin B and C  1 tablet Oral QAM    Vitamin D  2,000 Units Oral QAM     sodium phosphate IVPB, 0.16 mmol/kg, PRN    Or  sodium phosphate IVPB, 0.32 mmol/kg, PRN  potassium chloride, 20 mEq, PRN  magnesium sulfate, 1 g, PRN  sodium chloride flush, 10 mL, PRN  heparin flush, 3 mL, PRN  albuterol sulfate HFA, 2 puff, Q6H PRN  sodium chloride flush, 10 mL, PRN  acetaminophen, 650 mg, Q6H PRN    Or  acetaminophen, 650 mg, Q6H with pneumonia, more extensive than    on the prior examination. Small to moderate left pleural effusion extending into the major fissure. Improvement in multifocal ground-glass airspace disease previously noted    predominantly in the lung bases. Atelectatic changes in the right lung base and smaller right pleural effusion. Mild cardiomegaly. Mild heterogeneous enlargement of the left lobe of the thyroid likely    secondary to thyroid nodules.  Consider further evaluation and follow up with    thyroid ultrasound. Large gallstone. Assessment:    Active Problems:    Acute respiratory failure with hypoxia (HCC)  Resolved Problems:    * No resolved hospital problems. *      Plan:  1. Acute respiratory failure with hypoxia and hypercapnea  -  2/2 COVID 19 pneumonia  -  Decadron therapy  -  Also superimposed bacterial pneumonia treated with abx  -  Patient remains intubated- pulm/cc management; FiO2 80%, RR 22, Vt 450, PEEP 6  -  ID on board:  Patient on Zosyn day 13, Levaquin day 2  -  Patient did receive remdesivir and decadron  -  Failed extubation 11/19-reintubated  -  Plans for trach/PEG; gen surg was consulted  -  No evidence for PE on CTA chest  -  lovenox was decreased to DVT prophylatic dosing     2. COVID 19 pneumonia  -  Completed remdesivir and decadron  -  Continue supportive care    3. Bacterial pneumonia  -  ID following  -  Continue zosyn and levaquin  -  Procalcitonin 2.54 on 11/14    4. Severe emphysema  -  Follows with Dr. Lily Montano as outpatient and was on 3 L O2 continuously at home prior to admission- he is consulted  -  Former smoker  -  Hector Patch, nebs    5. DM2-  Continue lantus, ISS, BS checks    6. Essential hypertension- with hypotension secondary to severe illness; requiring Levophed (keep MAP >65)    7. Mixed hyperlipidemia-previsouly on pravachol    8.   Leukocytosis- improved today; WBC now 10.9  -  ID continues to follow  -  Zosyn and levaquin currently    9. AMS-2/2 hypercapnea; waxing and waning  -  CT head negative  -  Wean sedation as tolerated    10. Hypoalbuminemia  -  Tube feeds    11. Electrolyte abnormalities-replete as needed        NOTE: This report was transcribed using voice recognition software. Every effort was made to ensure accuracy; however, inadvertent computerized transcription errors may be present.   Electronically signed by Nieves Cogan, MD on 11/27/2020 at 8:08 AM

## 2020-11-27 NOTE — CARE COORDINATION
COVID POSITIVE 11/13. Vent/ intubated 11/13- attempted extubation 11/19 but required re-intubation- requiring FIO2 60% . Plan for trach/PEG tomorrow- [facility] @ [facility] notified to initiate precert after trach- will initiate on Monday(out of network for [facility]).  left w/ daughter Gilbert Avilez [phone] to update on discharge planning .  Will follow  Katarina Breauxch

## 2020-11-27 NOTE — CONSULTS
GENERAL SURGERY  CONSULT NOTE  11/27/2020    Physician Consulted: Dr. Reagan Woods  Reason for Consult: Trach/PEG  Referring Physician: Dr. Cresencio Pearson is a 67 y.o. female who presents for evaluation of trach PEG. Patient was admitted to the hospital 11/14/2020 found to be hypoxic and Covid positive. She subsequently decompensated and was intubated on 11/19/2020 and has remained since. Due to the Covid pandemic patient was not examined at bedside but review of records demonstrates no previous abdominal surgical history or evidence of ascites on recent imaging. She is not on any blood thinners. Past Medical History:   Diagnosis Date    [facility]) [facility] [address]     Diabetes type 2, controlled ([facility] [address]     Former smoker     Hyperlipidemia     Hypertension     Macular degeneration     OU    Multiple thyroid nodules 11/2017    Pulmonary nodule 05/2016       Past Surgical History:   Procedure Laterality Date    FOOT SURGERY  1976    LIPOSUCTION      TUBAL LIGATION  1970'a       Medications Prior to Admission:    Prior to Admission medications    Medication Sig Start Date End Date Taking?  Authorizing Provider   budesonide (PULMICORT) 0.5 MG/2ML nebulizer suspension Take 1 ampule by nebulization 2 times daily    Historical Provider, MD   pravastatin (PRAVACHOL) 40 MG tablet TAKE 1 TABLET EVERY EVENING. 10/16/20   R Latonia Ramos MD   naproxen (NAPROSYN) 500 MG tablet Take 1 tablet by mouth 2 times daily for 7 days 9/13/20 9/20/20  Frank Chaudhary,    Blood Glucose Monitoring Suppl (ACCU-CHEK RAYMOND PLUS) w/Device KIT USE AS DIRECTED 4/27/20   Pedro Pablo Jack MD   ACCU-CHEK RAYMOND PLUS strip 1 each by Does not apply route daily 4/24/20 7/23/20  R Latonia Ramos MD   Accu-Chek Softclix Lancets MISC USE AS DIRECTED EVERY DAY 3/12/20   R Latonia Ramos MD   Umeclidinium Bromide (INCRUSE ELLIPTA) 62.5 MCG/INH AEPB Inhale 1 puff into the lungs daily 2/25/20   Lanis Opitz Atilio Arreguin, APRN - CNP   albuterol sulfate  (90 Base) MCG/ACT inhaler Inhale 2 puffs into the lungs every 6 hours as needed for Wheezing    Historical Provider, MD   metFORMIN (GLUCOPHAGE-XR) 500 MG extended release tablet TAKE 2 TABLETS EVERY MORNING AND 2 TABLETS EVERY EVENING WITH MEALS 1/20/20   MIRIAN Velasquez MD   formoterol (PERFOROMIST) 20 MCG/2ML nebulizer solution Take 2 mLs by nebulization 2 times daily 11/11/19 6/9/20  Chalo Villarreal MD   guaiFENesin 400 MG tablet Take 400 mg by mouth 4 times daily as needed for Cough    Historical Provider, MD   naproxen sodium (ALEVE) 220 MG tablet Take 220 mg by mouth 2 times daily (with meals)    Historical Provider, MD   Cholecalciferol (VITAMIN D) 2000 UNITS CAPS capsule Take 2,000 Units by mouth every morning     Historical Provider, MD   ascorbic acid (VITAMIN C) 500 MG tablet Take 2,000 mg by mouth every morning     Historical Provider, MD   vitamin E 400 UNIT capsule Take 400 Units by mouth every morning     Historical Provider, MD   Copper Gluconate 2 MG CAPS Take 2 mg by mouth every morning     Historical Provider, MD   Zinc 25 MG TABS Take 25 mg by mouth every morning     Historical Provider, MD   b complex vitamins capsule Take 1 capsule by mouth every morning     Historical Provider, MD   Omega-3 Fatty Acids (FISH OIL) 1200 MG CAPS Take 2,400 mg by mouth every morning     Historical Provider, MD       No Known Allergies    Family History   Problem Relation Age of Onset    Diabetes Mother         NIDDM    High Blood Pressure Mother     Coronary Art Dis Mother     High Blood Pressure Father     Macular Degen Father     Coronary Art Dis Father         MI   [place] Favorite Macular Degen Sister     Other Other         1/2 sis with HTN, 1/2 bro with DM    Mult Sclerosis Daughter     Heart Disease Sister         valvular       Social History     Tobacco Use    Smoking status: Former Smoker     Packs/day: 3.00     Years: 40.00     Pack years: 120.00     Types: Cigarettes     Start date: 1973     Last attempt to quit: 2013     Years since quittin.3    Smokeless tobacco: Never Used   Substance Use Topics    Alcohol use: No    Drug use: Never         Review of Systems: Patient not examined at bedside due to Covid pandemic      PHYSICAL EXAM:    Vitals:    20 0600   BP: 125/70   Pulse: 85   Resp: 22   Temp:    SpO2: 92%     Patient not examined due to COVID PPE guidelines  General Appearance:  Intubated/sedated  Lungs:  Intubated AC/VC //60      LABS:  CBC  Recent Labs     20   WBC 11.4   HGB 7.7*   HCT 24.0*        BMP  Recent Labs     20      K 3.2*   CL 97*   CO2 36*   BUN 39*   CREATININE 0.5   CALCIUM 8.0*     Liver Function  Recent Labs     20   BILITOT 0.9   AST 50*   ALT 41*   ALKPHOS 118*   PROT 4.7*   LABALBU 2.1*     No results for input(s): LACTATE in the last 72 hours. Recent Labs     20   INR 1.0       RADIOLOGY  Ct Head Wo Contrast    Result Date: 2020  EXAMINATION: CT OF THE HEAD WITHOUT CONTRAST  2020 6:25 pm TECHNIQUE: CT of the head was performed without the administration of intravenous contrast. Dose modulation, iterative reconstruction, and/or weight based adjustment of the mA/kV was utilized to reduce the radiation dose to as low as reasonably achievable. COMPARISON:  HISTORY: ORDERING SYSTEM PROVIDED HISTORY: unresponsive TECHNOLOGIST PROVIDED HISTORY: Reason for exam:->unresponsive Has a \"code stroke\" or \"stroke alert\" been called? ->Yes FINDINGS: BRAIN/VENTRICLES: There is no acute intracranial hemorrhage, mass effect or midline shift. No abnormal extra-axial fluid collection. The gray-white differentiation is maintained without evidence of an acute infarct. There is prominence of the ventricles and sulci due to global parenchymal volume loss.  There are nonspecific areas of hypoattenuation within the periventricular and subcortical white matter, which likely represent chronic microvascular ischemic change. Chronic left basal ganglia lacunar infarction versus prominent perivascular space, unchanged. There is persistent contrast enhancement from prior intravenous contrast administration. ORBITS: The visualized portion of the orbits demonstrate no acute abnormality. SINUSES: The visualized paranasal sinuses and mastoid air cells demonstrate no acute abnormality. Paranasal sinus mucosal thickening, likely chronic. SOFT TISSUES/SKULL: No acute abnormality of the visualized skull or soft tissues. No acute intracranial abnormality. Age-related loss of brain volume and chronic periventricular ischemic changes. Chronic left basal ganglia lacunar infarctions versus prominent perivascular space, unchanged since the prior examination. Persistent contrast enhancement from recent intravenous contrast administration. Xr Chest Portable    Result Date: 11/14/2020  EXAMINATION: ONE XRAY VIEW OF THE CHEST 11/13/2020 10:47 pm COMPARISON: 1950 hours HISTORY: ORDERING SYSTEM PROVIDED HISTORY: line placemnt TECHNOLOGIST PROVIDED HISTORY: Reason for exam:->line placemnt FINDINGS: ET tube remains in place. New right IJ line with the distal tip in the SVC. No evidence of pneumothorax. No significant interval change in bilateral airspace disease predominantly in the lower lungs. New right IJ line with the distal tip in the SVC. No pneumothorax. No other significant interval change. Xr Chest Portable    Result Date: 11/13/2020  EXAMINATION: ONE XRAY VIEW OF THE CHEST 11/13/2020 7:10 pm COMPARISON: November 13 at 1459 hours HISTORY: ORDERING SYSTEM PROVIDED HISTORY: intubation TECHNOLOGIST PROVIDED HISTORY: Reason for exam:->intubation FINDINGS: Status post intubation. ET tube approximately 4.4 cm from the drew. There is increasing airspace disease bilaterally predominantly in the lower lobes consistent with multifocal pneumonia.  Pulmonary vasculature within normal limits. No pneumothorax. The cardiac silhouette is not enlarged. Old left rib fractures again noted. Status post intubation with ET tube approximately 4.4 cm from the level of the drew. Increasing airspace disease bilaterally predominantly in the lower lobes consistent with multifocal pneumonia. Xr Chest Portable    Result Date: 11/13/2020  EXAMINATION: ONE XRAY VIEW OF THE CHEST 11/13/2020 3:22 pm COMPARISON: Chest series from November 4, 2019 HISTORY: ORDERING SYSTEM PROVIDED HISTORY: SOB TECHNOLOGIST PROVIDED HISTORY: Reason for exam:->SOB FINDINGS: Lung hyperinflation and coarse interstitial markings. Interval slight worsening in the retrocardiac and bibasilar interstitial opacities. There is no large pleural effusion or pneumothorax. Atherosclerotic disease in the aortic arch. Normal pulmonary vascularity. Osseous mineralization is decreased. Osseous and thoracic soft tissue structures demonstrate no acute findings. 1.  When compared to prior chest series there has been interval worsening in the retrocardiac and bibasilar interstitial opacities. (Broad differential which includes worsening interstitial lung disease, superimposed infection, and edema. Please correlate with clinical presentation.) 2. Lung hyperinflation and coarse interstitial markings suggestive of underlying COPD. 3.  Atherosclerotic disease. Cta Pulmonary W Contrast    Result Date: 11/13/2020  EXAMINATION: CTA OF THE CHEST 11/13/2020 6:00 pm TECHNIQUE: CTA of the chest was performed after the administration of intravenous contrast.  Multiplanar reformatted images are provided for review. MIP images are provided for review. Dose modulation, iterative reconstruction, and/or weight based adjustment of the mA/kV was utilized to reduce the radiation dose to as low as reasonably achievable.  COMPARISON: 11/06/2019 HISTORY: ORDERING SYSTEM PROVIDED HISTORY: concern for covid TECHNOLOGIST PROVIDED HISTORY: Reason for exam:->concern for covid FINDINGS: Pulmonary Arteries: Pulmonary arteries are adequately opacified for evaluation. No evidence of intraluminal filling defect to suggest pulmonary embolism. Main pulmonary artery is normal in caliber. Mediastinum: No evidence of mediastinal lymphadenopathy. The heart is enlarged. There is no pericardial effusion. Lungs/pleura: Advanced emphysematous changes are noted. The centrilobular emphysematous changes are most prominent within the upper lobes. Bilateral lower lobe ground-glass infiltrates are noted. Periphery within the lung bases semi solid infiltrates are also noted. There are small infiltrate seen within the lingula and right middle lobe. Upper Abdomen: Limited images of the upper abdomen are unremarkable. There are 2 stones seen within the gallbladder lumen. There is no evidence of acute cholecystitis. Soft Tissues/Bones:  Age related degenerative changes of the visualized osseous structures without focal destructive lesion. 1. There is no evidence of a pulmonary embolus. 2. Multifocal bilateral ground-glass and semi solid pulmonary infiltrates within the right and left lower lobes, right middle lobe and lingula. The findings are highly suspicious for findings of COVID-19. 3. Advanced emphysematous changes. 4. Cardiomegaly. There is no pericardial effusion. Xr Abdomen For Ng/og/ne Tube Placement    Result Date: 11/14/2020  EXAMINATION: ONE SUPINE XRAY VIEW(S) OF THE ABDOMEN 11/14/2020 2:20 am COMPARISON: None. HISTORY: ORDERING SYSTEM PROVIDED HISTORY: Confirmation of course of NG/OG/NE tube and location of tip of tube TECHNOLOGIST PROVIDED HISTORY: Reason for exam:->Confirmation of course of NG/OG/NE tube and location of tip of tube Portable? ->Yes FINDINGS: Enteric tube traverses the esophagus with the tip and side hole in the stomach. No dilated small bowel is seen though only a small portion of the abdomen was imaged.      The enteric tube is in good position in the stomach.          ASSESSMENT:  67 y.o. female with acute respiratory failure, moderate protein calorie malnutrition, dysphagia    PLAN:  Plan for trach/PEG 11/28/2020  D/w Dr. Terry Mg    Electronically signed by Soco Hodges MD on 11/27/20 at 7:49 AM EST

## 2020-11-27 NOTE — PLAN OF CARE
Problem: Inadequate oral food/beverage intake (NI-2.1)  Goal: Food and/or Nutrient Delivery  Pt will tolerate EN at goal rate  Description: Individualized approach for food/nutrient provision.   Outcome: Met This Shift

## 2020-11-27 NOTE — PROCEDURES
Arterial Line Placement Procedure Note                     Indication: mechanical ventilation and severe hypotension    Consent: Unable to be obtained due to the emergent nature of this procedure. Monroe's Test: Normal    Procedure: 2 attempts at the right femoral artery without successful passing of the guidewire and 1 attempt at the left femoral artery but the artery was deeper than the needle length. Then the skin over the right radial artery was prepped with betadine and draped in a sterile fashion. Local anesthesia was obtained by infiltration using 1% Lidocaine without epinephrine. A 20 gauge arterial line catheter was then inserted, using a modified Seldinger technique, into the vessel. The transducer set was then attached and securely fastened to the skin with sutures. Waveforms on the monitor were observed and found to be adequate. The patient had good distal perfusion after the procedure. The site was then dressed in a sterile fashion. The patient tolerated the procedure well.      Complications: None

## 2020-11-27 NOTE — PROGRESS NOTES
11/27/2020  11:36 AM      Comprehensive Nutrition Assessment    Type and Reason for Visit:  Reassess    Nutrition Recommendations/Plan: When able to use new PEG, recommend resume current TF. If pt is weaned from propofol, recommend re-consult Dietitian for updated TF recommendation/order. Nutrition Assessment:  Pt remains intubated/sedated 2/2 Covid-19+. She remains on TF via OGT. Plans for trach/PEG 11/28. Malnutrition Assessment:  Malnutrition Status: At risk for malnutrition (Comment)    Context:  Chronic Illness     Findings of the 6 clinical characteristics of malnutrition:  Energy Intake:  Mild decrease in energy intake (Comment)(Current NPO/intubated)  Weight Loss:  7 - Greater than 20% over 1 year     Body Fat Loss:  Unable to assess(Covid Isolation)     Muscle Mass Loss:  Unable to assess(Covid Isolation)    Fluid Accumulation:  Unable to assess     Strength:  Not Performed    Estimated Daily Nutrient Needs:  Energy (kcal):  ; Weight Used for Energy Requirements:  Usual     Protein (g):  85-95 (1.4-1.6 g/kg);  Weight Used for Protein Requirements:  Usual        Fluid (ml/day):   or per Critical care; Method Used for Fluid Requirements:  1 ml/kcal      Nutrition Related Findings:  intubated/sedated, pressor (MAP 73), +1-+3edema, edentulous, +I/O 5L, +BS, febrile      Wounds:  None       Current Nutrition Therapies:    Current Tube Feeding (TF) Orders:  · Feeding Route: Orogastric  · Formula: Semi-Elemental  · Schedule: Continuous  · Water Flushes: 20 ml/hr =480 ml/d  · Current TF & Flush Orders Provides: at goal  · Goal TF & Flush Orders Provides: 1080 ml/d, 1296 juanpablo (1526 juanpablo total w/propofol), 81 g pro, 1356 ml total fee water    Additional Calorie Sources:   propofol = 230 juanpablo    Anthropometric Measures:  · Height: 5' 6\" (167.6 cm)  · Current Body Weight: 158 lb (71.7 kg)(11/22 - unable to update/Covid isolation)   · Admission Body Weight: 101 lb (45.8 kg)(bed 11/14; noted large wt change x ~1 day 2/2 trans to ICU w/ suspected bedscale discrepancy)    · Usual Body Weight: 132 lb (59.9 kg)(per EMR x 1 yr)     · Ideal Body Weight: 130 lbs; % Ideal Body Weight 121.5 %   · BMI: 25.5  · Adjusted Body Weight:  ; No Adjustment   · Adjusted BMI:      · BMI Categories: Underweight (BMI less than 22) age over 65(at [facility] as CBW likely elevated 2/2 +Fluid balance)       Nutrition Diagnosis:   · Inadequate oral intake related to impaired respiratory function(2/2 COVID19+ w/ ARF/COPD) as evidenced by NPO or clear liquid status due to medical condition, intubation, nutrition support - enteral nutrition, weight loss, weight loss greater than or equal to 20% in 1 year      Nutrition Interventions:   Food and/or Nutrient Delivery:  Continue Current Tube Feeding(When able to use new PEG, recommend resume current TF order)  Nutrition Education/Counseling:  No recommendation at this time   Coordination of Nutrition Care:  Continue to monitor while inpatient    Goals:  Pt tolerate EN at goal rate via new PEG       Nutrition Monitoring and Evaluation:   Behavioral-Environmental Outcomes:  None Identified   Food/Nutrient Intake Outcomes:  Enteral Nutrition Intake/Tolerance  Physical Signs/Symptoms Outcomes:  Biochemical Data, GI Status, Hemodynamic Status, Fluid Status or Edema, Weight, Skin, Nutrition Focused Physical Findings     Discharge Planning:     Too soon to determine     Electronically signed by Carla Gonzalez RD, CNSC, LD on 11/27/20 at 11:36 AM EST    Contact: [phone]

## 2020-11-27 NOTE — PROGRESS NOTES
ID Progress Note                [address], [place], [address] [facility]            Phone [phone]     Fax [phone]      Chief complaint   resp failure    Subjective: Intermittent low-grade fever noted  Failed weaning trial  More awake  She is off pressors as well as off propofol, only on fentanyl. 60% FiO2 and 6 of PEEP    Objective:    Vitals:    11/27/20 0845   BP:    Pulse: 82   Resp: 21   Temp:    SpO2: 93%     VENT SETTINGS:   Vent Information  $Ventilation: $Subsequent Day  Skin Assessment: Clean, dry, & intact  Equipment ID: [id number]  Vent Type: 840  Vent Mode: AC/VC  Vt Ordered: 450 mL  Rate Set: 22 bmp  Peak Flow: 68 L/min  Pressure Support: 0 cmH20  FiO2 : 60 %  SpO2: 93 %  SpO2/FiO2 ratio: 155  Sensitivity: 3  PEEP/CPAP: 6  I Time/ I Time %: 0 s  Humidification Source: HME  Mask Type: Full face mask  Mask Size: Medium  General Appearance:   Intubated/off sedation awake but not following commands. HEENT:   Round pupils. Minimally reactive. No jaundice. Lungs:    Coarse breath sounds to auscultation bilaterally. No crackles. Heart:   Heart sounds rhythmic and regular. Abdomen:    Round, soft. Benign to palpation. Extremities:  Minimal edema.    Skin:   no rashes or lesions   Left PICC  Lloyd catheter    Labs:  Recent Labs     11/25/20  0540 11/26/20  0545 11/27/20  0528   WBC 13.5* 10.9 11.4   RBC 2.73* 2.39* 2.54*   HGB 7.6* 6.8* 7.7*   HCT 26.2* 22.6* 24.0*   MCV 96.0 94.6 94.5   MCH 27.8 28.5 30.3   MCHC 29.0* 30.1* 32.1   RDW 17.2* 17.2* 16.5*    204 196   MPV 10.6 11.0 11.1     CMP:    Lab Results   Component Value Date     11/27/2020    K 3.2 11/27/2020    K 5.7 11/13/2020    CL 97 11/27/2020    CO2 36 11/27/2020    BUN 39 11/27/2020    CREATININE 0.5 11/27/2020    CREATININE 0.5 01/23/2019    GFRAA >60 11/27/2020    LABGLOM >60 11/27/2020    GLUCOSE 132 11/27/2020    PROT 4.7 11/27/2020    LABALBU 2.1 11/27/2020    CALCIUM 8.0 11/27/2020    BILITOT 0.9 11/27/2020    ALKPHOS 118 11/27/2020    AST 50 11/27/2020    ALT 41 11/27/2020          Microbiology :  Respiratory panel: SARS-CoV-2 detected  Nares screen MRSA: Negative  Respiratory culture 11/20/2020: Serratia marcescens, Klebsiella pneumoniae    Radiology :      URINARY CATHETER OUTPUT (Lloyd):  Urethral Catheter Temperature probe 16 fr-Output (mL): 70 mL    Assessment and Plan:      · COVID 19 pneumonia causing acute respiratory failure. She completed 5 days of remdesivir on 11/18/2020   · Superimposed right lower lobe bacterial pneumonia with Serratia and Klebsiella sensitive to Zosyn  · Intermittent leukocytosis associated to the above  · Encephalopathy-sedation versus stroke  · B/L mastoid cell opacification in intubated pt     Plan  - Continue Zosyn, day 14, stop zosyn  Ct chest increased density in LLL which I think is progression of disease.  New resp  Cultures still shows abundant PMNs, added levaquin on 11/25/2020, d/c levaquin and consolidate therapy to Ertapenem   - resp culture shows Serratia marcescens (Zosyn sensitivity pending) and K.penumoniae persistently present  - still has intermittent fever, prolonged intubation, candidiasis, start fluconazole, check fungitell  Continue anticoagulation  Vit B/thiamine/c  Getting Lasix  Plan for trach and PEG tomorrow      Electronically signed by Saint Bran, MD on 11/27/2020 at 11:56 AM
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
## 2020-11-27 NOTE — PROGRESS NOTES
.         Critical Care Team - Daily Progress Note         Date and time: 11/27/2020 6:14 PM  Patient's name:  Thor Lee  Medical Record Number: [id number]  Patient's account/billing number: [de-identified]  Patient's YOB: 1945  Age: 67 y.o. Date of Admission: 11/13/2020  1:46 PM  Length of stay during current admission: 14      Primary Care Physician: Jessica Stone MD  ICU Attending Physician: Dr. Sharon Hendricks Status: Full Code    Reason for ICU admission: Acute hypercapnic hypoxic respiratory failure secondary to Covid pneumonia      SUBJECTIVE:     BRIEF HISTORY: The patient is a 67 y.o. female with significant past medical history of COPD, diabetes, hypertension, hyperlipidemia presented to the ED for shortness of breath. Patient is currently intubated and sedated so history obtained from chart review.     Per emergency department, patient had history of 3 days of worsening shortness of breath. She is chronically on 3 L of oxygen at home when she ambulates but over the last few days has had increase her oxygen requirement to 5 L at all times. 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 She subsequently presented to the ED for evaluation.     Patient was found to be hypoxic and and placed on nasal cannula oxygen. Patient was Positive for COVID-19. While in the emergency department patient was placed on a nonrebreather and subsequently decompensated. She became altered and was taking agonal respirations. She was then intubated for further airway protection. ABG demonstrated respiratory acidosis with a CO2 of greater than 113. Patient was admitted to the ICU for further evaluation care. OVERNIGHT EVENTS:    11/15/20:  Some soft BP's overnight, responded to fluid bolus and switched from propofol gtt to versed gtt  11/16/20: Marina Lock, tolerating vent.  Attempt to wean sedation and pressure Nebulization BID    ipratropium-albuterol  1 ampule Inhalation Q4H    pantoprazole  40 mg Intravenous Daily    sodium chloride flush  10 mL Intravenous 2 times per day    vitamin B and C  1 tablet Oral QAM    Vitamin D  2,000 Units Oral QAM     Continuous Infusions:   propofol 10 mcg/kg/min (11/27/20 1354)    fentaNYL 5 mcg/ml in 0.9%  ml infusion 100 mcg/hr (11/27/20 0951)    norepinephrine Stopped (11/27/20 1100)    sodium chloride Stopped (11/27/20 1637)    dextrose       PRN Meds:   sodium phosphate IVPB, 0.16 mmol/kg, PRN    Or  sodium phosphate IVPB, 0.32 mmol/kg, PRN  potassium chloride, 20 mEq, PRN  magnesium sulfate, 1 g, PRN  sodium chloride flush, 10 mL, PRN  heparin flush, 3 mL, PRN  albuterol sulfate HFA, 2 puff, Q6H PRN  sodium chloride flush, 10 mL, PRN  acetaminophen, 650 mg, Q6H PRN    Or  acetaminophen, 650 mg, Q6H PRN  polyethylene glycol, 17 g, Daily PRN  promethazine, 12.5 mg, Q6H PRN    Or  ondansetron, 4 mg, Q6H PRN  glucose, 15 g, PRN  dextrose, 12.5 g, PRN  glucagon (rDNA), 1 mg, PRN  dextrose, 100 mL/hr, PRN        VENT SETTINGS (Comprehensive) (if applicable):  Vent Information  $Ventilation: $Subsequent Day  Skin Assessment: Clean, dry, & intact  Equipment ID: [id number]  Vent Type: 840  Vent Mode: AC/VC  Vt Ordered: 450 mL  Rate Set: 22 bmp  Peak Flow: 68 L/min  Pressure Support: 0 cmH20  FiO2 : 60 %  SpO2: 98 %  SpO2/FiO2 ratio: 163.33  Sensitivity: 3  PEEP/CPAP: 6  I Time/ I Time %: 0 s  Humidification Source: HME  Mask Type: Full face mask  Mask Size: Medium  Additional Respiratory  Assessments  Pulse: 117  Resp: 23  SpO2: 98 %  $End Tidal CO2: 36  Position: Semi-Hunt's  Humidification Source: HME  Oral Care: Lip moisturizer applied, Mouth moisturizer  Subglottic Suction Done?: Yes  Cuff Pressure (cm H2O): 29 cm H2O    ABGs:   Recent Labs     11/25/20  0614   PH 7.382   BE 8.9*   O2SAT 95.5       Laboratory findings:    Complete Blood Count:   Recent Labs 11/25/20  0540 11/26/20  0545 11/27/20  0528   WBC 13.5* 10.9 11.4   HGB 7.6* 6.8* 7.7*   HCT 26.2* 22.6* 24.0*    204 196        Last 3 Blood Glucose:   Recent Labs     11/25/20  0540 11/26/20  0545 11/27/20  0528   GLUCOSE 108* 134* 132*        PT/INR:    Lab Results   Component Value Date    PROTIME 11.8 11/27/2020    INR 1.0 11/27/2020     PTT:    Lab Results   Component Value Date    APTT 26.1 11/27/2020       Comprehensive Metabolic Profile:   Recent Labs     11/25/20  0540 11/26/20  0545 11/27/20  0528    142 139   K 4.8 3.9 3.2*   CL 98 99 97*   CO2 35* 35* 36*   BUN 29* 31* 39*   CREATININE 0.5 0.4* 0.5   GLUCOSE 108* 134* 132*   CALCIUM 9.0 8.1* 8.0*   PROT 6.3* 4.8* 4.7*   LABALBU 2.3* 2.1* 2.1*   BILITOT 0.7 0.7 0.9   ALKPHOS 118* 110* 118*   AST 56* 58* 50*   ALT 38* 40* 41*      Magnesium:   Lab Results   Component Value Date    MG 1.9 11/27/2020     Phosphorus:   Lab Results   Component Value Date    PHOS 2.8 11/27/2020     Ionized Calcium: No results found for: CAION       Troponin:   No results for input(s): TROPONINI in the last 72 hours. Microbiology:  Cultures during this admission:     Blood cultures:                 [x] None drawn      [] Negative             []  Positive (Details:  )  Urine Culture:                   [x] None drawn      [] Negative             []  Positive (Details:  )  Sputum Culture:               [] None drawn       [] Negative             [x]  Positive (Details: yeast )   Endotracheal aspirate:     [] None drawn       [] Negative             [x]  Positive (Details: gram - betsy )     Other pertinent Labs:      Radiology/Imaging:     Chest x-ray:  11/24/20:     Impression    Confluent airspace disease in the posterobasal segment of the left lower lobe    with central air bronchograms, consistent with pneumonia, more extensive than    on the prior examination. Small to moderate left pleural effusion extending into the major fissure.     Improvement in multifocal ground-glass airspace disease previously noted    predominantly in the lung bases. Atelectatic changes in the right lung base and smaller right pleural effusion. Mild cardiomegaly. Mild heterogeneous enlargement of the left lobe of the thyroid likely    secondary to thyroid nodules.  Consider further evaluation and follow up with    thyroid ultrasound. Large gallstone. Impression    No evidence of acute intracranial pathology.  The    Age-related loss of brain volume and chronic periventricular ischemic changes. Chronic left basal ganglia lacunar infarction versus prominent perivascular    space, unchanged. Opacification of bilateral mastoid air cells and middle ears, not present on    the prior examination and which may reflect otomastoiditis     11/27  CXR  Impression    1. Multifocal bilateral perihilar and lower lobe infiltrates slightly more    prominent when compared to the prior study of 11/25/2020.    2. Cardiomegaly. ASSESSMENT:       - Acute respiratory failure with hypoxia, hypercarbia  - COVID-19 pneumonia  - Superimposed bacterial pneumonia  - COPD  -Type 2 diabetes    Additional assessment:        PLAN:     WEAN PER PROTOCOL:  [] No   [x] Yes  [] N/A    DISCONTINUE ANY LABS:   [x] No   [] Yes    ICU PROPHYLAXIS:  Stress ulcer:  [x] PPI Agent  [] P0Befrh [] Sucralfate  [] Other:  VTE:   [x] Enoxaparin  [] Unfract. Heparin Subcut  [] EPC Cuffs    NUTRITION:  [] NPO [] Tube Feeding (Specify: ) [] TPN  [x] PO (Diet: DIET TUBE FEED CONTINUOUS/CYCLIC NPO; Semi-elemental; Orogastric; Continuous; 25; 45  Diet NPO, After Midnight Exceptions are: Sips with Meds)    HOME MEDICATIONS RECONCILED: [] No  [x] Yes    INSULIN DRIP:   [x] No   [] Yes    CONSULTATION NEEDED:  [x] No   [] Yes    FAMILY UPDATED:    [x] No   [] Yes     TRANSFER OUT OF ICU:   [x] No   [] Yes       SYSTEMS ASSESSMENT   Of note, I do not see a progress note from the ICU for 11/20.   That note was written and signed by Dr. Francine Nunez. We cannot find it in the computer. Is not in with incomplete notes. Will call the help desk and attempt to retrieve her that note is. Neuro   AMS--most likely secondary to hypercapnia              -CT head negative     Intubated and sedated on the vent              -Continue fentanyl and propofol. Wean sedation as able.      Respiratory   Acute hypoxic and hypercapnic respiratory failure secondary to Covid pneumonia              -resp panel + SARS-COV2              -former smoker              -vit c, decadron-- stopped, remdesivir-finished              -Pulmonology following              -procal 2.54   -Failed extubation 11/19  Subsequently reintubated.   -Wean FiO2 as able   -incrase secretions --guaifenesin   -CT chest with evidence of pneumonia   -failed wean   -not tolerating pressure support trials, will pursue trach/peg   -no abg yesterday 2/2 hard stick. Will get abg after art line    Superimposed bacterial PNA   -elevated procal-2.54   - repeat resp culture gram - rods   -Zosyn stopped day 13. Levaquin stopped. Start ertapenum per ID      COPD              -O2 PRN and with ambulation @ home, usual 3L              -Brovana, Pulmicort, nebs     Cardiovascular   CTA              -No evidence of PE     Hypotension, shock --   -Levophed as needed to keep MAP greater than 65   -Continue to monitor    -very unpredicatable. Will need 2mcg for map 60 and will then be 750C systolic.   -if continues, NICOM? Up 6L since admission so most likely not fluid responsive   -place arterial line     Gastrointestinal   Tube feeds   PPI  Constipation- enema     Renal   Up 7 liters since admission--Lasix twice daily. Good uo  Hypokalemia- replace     Infectious Disease   COVID PNA              -ID following              -Decadron--finished.      -Remdesivir,-finished.  Vitamin C, zinc     Superimposed bacterial PNA   -elevated procal-2.54   -resp culture serratia, candida   -stop

## 2020-11-28 NOTE — PATIENT CARE CONFERENCE
Intensive Care Daily Quality Rounding Checklist        ICU Team Members: Dr. Sandhya Guzman RN, nursing leadership      ICU Day #: NUMBER: 16     Intubation Date: November 13, 2020     Ventilator Day #: NUMBER: 16     Central Line Insertion Date: PICC November 20, 2020                                                    Day #: NUMBER: 9      Arterial Line Insertion Date:  n/a                             Day #: n/a     DVT Prophylaxis: Lovenox    GI Prophylaxis: tube feed, protonix     Lloyd Catheter Insertion Narvis Littles, 2020                                        Day #: 16                             Continued need (if yes, reason documented and discussed with physician): yes, strict I & O     Skin Issues/ Wounds and ordered treatment discussed on rounds: none, sos precautions     Goals/ Plans for the Day: trach/peg, wean vent, LTAC

## 2020-11-28 NOTE — PROGRESS NOTES
ID Progress Note                [address], [place], [address] [facility]            Phone [phone]     Fax [phone]      Chief complaint   resp failure    Subjective:  Awake  S/p trach and PEG  More awake  She is off pressors as well as off propofol, only on fentanyl. 60% FiO2 and 6 of PEEP    Objective:    Vitals:    11/28/20 1000   BP: (!) 103/43   Pulse: 103   Resp:    Temp: 98.6 °F (37 °C)   SpO2: 93%     VENT SETTINGS:   Vent Information  $Ventilation: $Subsequent Day  Skin Assessment: Clean, dry, & intact  Equipment ID: [id number]  Vent Type: 840  Vent Mode: AC/VC  Vt Ordered: 450 mL  Rate Set: 22 bmp  Peak Flow: 68 L/min  Pressure Support: 0 cmH20  FiO2 : 60 %  SpO2: 93 %  SpO2/FiO2 ratio: 155  Sensitivity: 3  PEEP/CPAP: 6  I Time/ I Time %: 0 s  Humidification Source: HME  Mask Type: Full face mask  Mask Size: Medium  General : awake s/p trach   HEENT:   Round pupils. Minimally reactive. No jaundice. Lungs:    Coarse breath sounds to auscultation bilaterally. No crackles. Heart:   Heart sounds rhythmic and regular. Abdomen:    Round, soft. Benign to palpation. Extremities:  Minimal edema.    Skin:   no rashes or lesions   Left PICC  Lloyd catheter    Labs:  Recent Labs     11/26/20  0545 11/27/20  0528 11/28/20  0540   WBC 10.9 11.4 12.5*   RBC 2.39* 2.54* 2.55*   HGB 6.8* 7.7* 7.4*   HCT 22.6* 24.0* 25.0*   MCV 94.6 94.5 98.0   MCH 28.5 30.3 29.0   MCHC 30.1* 32.1 29.6*   RDW 17.2* 16.5* 17.6*    196 212   MPV 11.0 11.1 11.0     CMP:    Lab Results   Component Value Date     11/28/2020    K 3.3 11/28/2020    K 5.7 11/13/2020     11/28/2020    CO2 37 11/28/2020    BUN 38 11/28/2020    CREATININE 0.4 11/28/2020    CREATININE 0.5 01/23/2019    GFRAA >60 11/28/2020    LABGLOM >60 11/28/2020    GLUCOSE 182 11/28/2020    PROT 6.2 11/28/2020    LABALBU 2.1 11/28/2020    CALCIUM 8.8 11/28/2020    BILITOT 1.1 11/28/2020    ALKPHOS 132 11/28/2020    AST 50 11/28/2020    ALT 43 11/28/2020          Microbiology :  Respiratory panel: SARS-CoV-2 detected  Nares screen MRSA: Negative  Respiratory culture 11/20/2020: Serratia marcescens, Klebsiella pneumoniae    Radiology :      URINARY CATHETER OUTPUT (Lloyd):  Urethral Catheter Temperature probe 16 fr-Output (mL): 1150 mL    Assessment and Plan:      · COVID 19 pneumonia causing acute respiratory failure.   She completed 5 days of remdesivir on 11/18/2020   · Superimposed right lower lobe bacterial pneumonia with Serratia and Klebsiella sensitive to Zosyn  · Intermittent leukocytosis associated to the above  · Encephalopathy-sedation versus stroke  · B/L mastoid cell opacification in intubated pt     Plan  - Ertapenem - not planning on long term therapy  - still has intermittent fever, prolonged intubation, candidiasis, started fluconazole, check fungitell  Continue anticoagulation  Vit B/thiamine/c  Getting Lasix  Plan for trach and PEG done today      Electronically signed by Lisa Bay MD on 11/28/2020 at 11:25 AM

## 2020-11-28 NOTE — ANESTHESIA POSTPROCEDURE EVALUATION
Department of Anesthesiology  Postprocedure Note    Patient: Marlin Moran  MRN: [id number]  YOB: 1945  Date of evaluation: 11/28/2020  Time:  8:16 AM     Procedure Summary     Date:  11/28/20 Room / Location:  [facility] OR 08 / [facility]    Anesthesia Start:  4667 Anesthesia Stop:  0806    Procedures:       TRACHEOTOMY (N/A Neck)      EGD ESOPHAGOGASTRODUODENOSCOPY PEG TUBE INSERTION   +++COVID 19+++ ++DROPLET PLUS++ (N/A ) Diagnosis:  (/)    Surgeon:  Damari Mcgovern MD Responsible Provider:  Zaid Johnson MD    Anesthesia Type:  general ASA Status:  4          Anesthesia Type: general    Gigi Phase I:      Gigi Phase II:      Last vitals: Reviewed and per EMR flowsheets.        Anesthesia Post Evaluation    Patient location during evaluation: ICU  Patient participation: complete - patient cannot participate  Level of consciousness: sedated and ventilated  Complications: no  Cardiovascular status: hemodynamically stable  Respiratory status: ventilator

## 2020-11-28 NOTE — PROGRESS NOTES
.         Critical Care Team - Daily Progress Note         Date and time: 11/28/2020 7:23 AM  Patient's name:  Nanette Kelly Record Number: [id number]  Patient's account/billing number: [de-identified]  Patient's YOB: 1945  Age: 67 y.o. Date of Admission: 11/13/2020  1:46 PM  Length of stay during current admission: 15      Primary Care Physician: David Bowles MD  ICU Attending Physician: Dr. Victorine Boxer Status: Full Code    Reason for ICU admission: Acute hypercapnic hypoxic respiratory failure secondary to Covid pneumonia      SUBJECTIVE:     BRIEF HISTORY: The patient is a 67 y.o. female with significant past medical history of COPD, diabetes, hypertension, hyperlipidemia presented to the ED for shortness of breath. Patient is currently intubated and sedated so history obtained from chart review.     Per emergency department, patient had history of 3 days of worsening shortness of breath. She is chronically on 3 L of oxygen at home when she ambulates but over the last few days has had increase her oxygen requirement to 5 L at all times. 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 She subsequently presented to the ED for evaluation.     Patient was found to be hypoxic and and placed on nasal cannula oxygen. Patient was Positive for COVID-19. While in the emergency department patient was placed on a nonrebreather and subsequently decompensated. She became altered and was taking agonal respirations. She was then intubated for further airway protection. ABG demonstrated respiratory acidosis with a CO2 of greater than 113. Patient was admitted to the ICU for further evaluation care. OVERNIGHT EVENTS:    11/15/20:  Some soft BP's overnight, responded to fluid bolus and switched from propofol gtt to versed gtt  11/16/20: [place], tolerating vent.  Attempt to wean sedation and pressure support  11/17/20: Tolerating pressure support, off or Versed  11/18/20: Attempt pressure support trials. Patient becoming tachycardic and hypertensive  11/19/20: Attempted extubation. Patient less than 30 minutes. She became tachypneic, tachycardic and not tolerating secretions. Patient subsequently reintubated  11/20/20: Roselia Hollister pressors overnight, agitated on the vent. 11/21/20: Intermittently required pressors overnight. Still hyperglycemic.  11/22/20: pressures all over the place. intermittant Levo. HTN with agitation  11/23/20: Hypotensive overnight. Levo restarted. Oxygenating well. 11/24/20: On and off levo overnight. Fevers overnight  11/25/20: off of pressors. Pressure support trials  11/26/20: intermittently back on pressors. Still no BM. Agitated when sedation off. H/h fall, received 1 unit  11-27-20: still with intermittant pressor requirement. Low grade fever 100.3  11/28/20: off pressors overnight, art line worked for 4hr then stopped after abg was drawn, it was removed.  Trach/peg today    CURRENT VENTILATION STATUS:     [x] Ventilator  [] BIPAP  [] Nasal Cannula [] Room Air      IF INTUBATED, ET TUBE MARKING AT LOWER LIP:       SECRETIONS : Amount:  [x] Small [] Moderate  [] Large  [] None  Color:     [] White [x] Colored  [] Bloody    SEDATION:  RAAS Score:  [x] Propofol gtt  [] Versed gtt and fentanyl  [] Ativan gtt   [] No Sedation    PARALYZED:  [x] No    [] Yes      VASOPRESSORS:  [] No    [x] Yes    If yes -   [x] Levophed       [] Dopamine     [] Vasopressin       [] Dobutamine  [] Phenylephrine         [] Epinephrine    CENTRAL LINES:     [] No   [x] Yes   (Date of Insertion: PICC)           If yes -     [] Right IJ     [] Left IJ [] Right Femoral [] Left Femoral                   [] Right Subclavian [] Left Subclavian       RAM'S CATHETER:   [] No   [x] Yes  (Date of Insertion: 11/13  )     URINE OUTPUT:            [x] Good   [] Low              [] Anuric      OBJECTIVE: VITAL SIGNS:  BP (!) 172/66   Pulse 103   Temp 99.7 °F (37.6 °C) (Bladder)   Resp 22   Ht 5' 6\" (1.676 m)   Wt 158 lb 4.6 oz (71.8 kg)   SpO2 95%   BMI 25.55 kg/m²   Tmax over 24 hours:  Temp (24hrs), Av.7 °F (37.6 °C), Min:99.3 °F (37.4 °C), Max:100.4 °F (38 °C)      Patient Vitals for the past 6 hrs:   BP Temp Temp src Pulse SpO2   20 0600 (!) 172/66 -- -- 103 95 %   20 0500 (!) 154/47 -- -- 100 98 %   20 0400 (!) 150/53 99.7 °F (37.6 °C) Bladder 103 100 %   20 0323 -- -- -- -- 100 %   20 0300 (!) 134/43 -- -- 78 100 %   20 0200 (!) 141/55 -- -- 101 100 %         Intake/Output Summary (Last 24 hours) at 2020 0723  Last data filed at 2020 1800  Gross per 24 hour   Intake 1006 ml   Output 1320 ml   Net -314 ml     Wt Readings from Last 2 Encounters:   20 158 lb 4.6 oz (71.8 kg)   10/29/20 140 lb (63.5 kg)     Body mass index is 25.55 kg/m². PHYSICAL EXAM:  General: Intubated  Eyes:  Pupils equal and reactive  Ears: no obvious scars, no lesions, no masses, hearing intact  Mouth: ET tube in place  Head: normocephalic, atraumatic  Neck: no JVD, no adenopathy, no thyromegaly, neck is supple, trachea is midline. Chest: no pain on palpation  Lungs: Clear to auscultation bilaterally , no wheezes, rhonchi  Heart: regular rate and regular rhythm, no murmur, normal S1, S2  Abdomen: soft, non-tender, distended  Extremities: no lower extremity edema, 2+ edema b/l uper extrmities  Skin: normal color, normal texture, normal turgor, no rashes, no lesions  Neurologic:  will open eyes and stare.   Spontaneously moves all extremitties, not following commands    MEDICATIONS:    Scheduled Meds:   fluconazole  200 mg Oral Daily    ertapenem (INVanz) IVPB  1 g Intravenous Q24H    fluticasone  2 spray Each Nostril Daily    guaiFENesin  400 mg Oral 4x Daily    furosemide  40 mg Intravenous BID    insulin glargine  25 Units Subcutaneous BID    lidocaine PF  5 mL Intradermal Once    heparin flush  3 mL Intravenous 2 times per day    senna  1 tablet Oral Nightly    insulin lispro  0-18 Units Subcutaneous Q6H    enoxaparin  40 mg Subcutaneous Daily    zinc sulfate  50 mg Oral Daily    ascorbic acid  1,000 mg Oral QAM    Arformoterol Tartrate  15 mcg Nebulization BID    budesonide  0.5 mg Nebulization BID    ipratropium-albuterol  1 ampule Inhalation Q4H    pantoprazole  40 mg Intravenous Daily    sodium chloride flush  10 mL Intravenous 2 times per day    vitamin B and C  1 tablet Oral QAM    Vitamin D  2,000 Units Oral QAM     Continuous Infusions:   propofol 10 mcg/kg/min (11/28/20 0005)    fentaNYL 5 mcg/ml in 0.9%  ml infusion 200 mcg/hr (11/28/20 0047)    norepinephrine Stopped (11/27/20 1100)    sodium chloride 12.5 mL/hr at 11/28/20 0300    dextrose       PRN Meds:   sodium phosphate IVPB, 0.16 mmol/kg, PRN    Or  sodium phosphate IVPB, 0.32 mmol/kg, PRN  potassium chloride, 20 mEq, PRN  magnesium sulfate, 1 g, PRN  sodium chloride flush, 10 mL, PRN  heparin flush, 3 mL, PRN  albuterol sulfate HFA, 2 puff, Q6H PRN  sodium chloride flush, 10 mL, PRN  acetaminophen, 650 mg, Q6H PRN    Or  acetaminophen, 650 mg, Q6H PRN  polyethylene glycol, 17 g, Daily PRN  promethazine, 12.5 mg, Q6H PRN    Or  ondansetron, 4 mg, Q6H PRN  glucose, 15 g, PRN  dextrose, 12.5 g, PRN  glucagon (rDNA), 1 mg, PRN  dextrose, 100 mL/hr, PRN        VENT SETTINGS (Comprehensive) (if applicable):  Vent Information  $Ventilation: $Subsequent Day  Skin Assessment: Clean, dry, & intact  Equipment ID: [id number]  Vent Type: 840  Vent Mode: AC/VC  Vt Ordered: 450 mL  Rate Set: 22 bmp  Peak Flow: 68 L/min  Pressure Support: 0 cmH20  FiO2 : 60 %  SpO2: 95 %  SpO2/FiO2 ratio: 158.33  Sensitivity: 3  PEEP/CPAP: 6  I Time/ I Time %: 0 s  Humidification Source: HME  Mask Type: Full face mask  Mask Size: Medium  Additional Respiratory  Assessments  Pulse: 103  Resp: 22  SpO2: 95 %  $End x-ray:  11/24/20:     Impression    Confluent airspace disease in the posterobasal segment of the left lower lobe    with central air bronchograms, consistent with pneumonia, more extensive than    on the prior examination. Small to moderate left pleural effusion extending into the major fissure. Improvement in multifocal ground-glass airspace disease previously noted    predominantly in the lung bases. Atelectatic changes in the right lung base and smaller right pleural effusion. Mild cardiomegaly. Mild heterogeneous enlargement of the left lobe of the thyroid likely    secondary to thyroid nodules.  Consider further evaluation and follow up with    thyroid ultrasound. Large gallstone. Impression    No evidence of acute intracranial pathology.  The    Age-related loss of brain volume and chronic periventricular ischemic changes. Chronic left basal ganglia lacunar infarction versus prominent perivascular    space, unchanged. Opacification of bilateral mastoid air cells and middle ears, not present on    the prior examination and which may reflect otomastoiditis     11/27  CXR  Impression    1. Multifocal bilateral perihilar and lower lobe infiltrates slightly more    prominent when compared to the prior study of 11/25/2020.    2. Cardiomegaly. ASSESSMENT:       - Acute respiratory failure with hypoxia, hypercarbia  - COVID-19 pneumonia  - Superimposed bacterial pneumonia  - COPD  -Type 2 diabetes    Additional assessment:        PLAN:     WEAN PER PROTOCOL:  [] No   [x] Yes  [] N/A    DISCONTINUE ANY LABS:   [x] No   [] Yes    ICU PROPHYLAXIS:  Stress ulcer:  [x] PPI Agent  [] C3Tisxt [] Sucralfate  [] Other:  VTE:   [x] Enoxaparin  [] Unfract.  Heparin Subcut  [] EPC Cuffs    NUTRITION:  [] NPO [] Tube Feeding (Specify: ) [] TPN  [x] PO (Diet: Diet NPO, After Midnight Exceptions are: Sips with Meds)    HOME MEDICATIONS RECONCILED: [] No  [x] Yes    INSULIN DRIP:   [x] No   [] Yes    CONSULTATION NEEDED:  [x] No   [] Yes    FAMILY UPDATED:    [x] No   [] Yes     TRANSFER OUT OF ICU:   [x] No   [] Yes       SYSTEMS ASSESSMENT   Of note, I do not see a progress note from the ICU for 11/20. That note was written and signed by Dr. Rajni Alegre. We cannot find it in the computer. Is not in with incomplete notes. Will call the help desk and attempt to retrieve her that note is. Neuro   AMS--most likely secondary to hypercapnia              -CT head negative     Intubated and sedated on the vent              -Continue fentanyl and propofol. Wean sedation as able.      Respiratory   Acute hypoxic and hypercapnic respiratory failure secondary to Covid pneumonia              -resp panel + SARS-COV2              -former smoker              -vit c, decadron-- stopped, remdesivir-finished              -Pulmonology following              -procal 2.54   -Failed extubation 11/19  Subsequently reintubated.   -Wean FiO2 as able   -CT chest with evidence of pneumonia   -failed wean   -not tolerating pressure support trials   -trach/peg today    Superimposed bacterial PNA   -elevated procal-2.54   - repeat resp culture gram - rods   -Zosyn stopped day 13. Levaquin stopped. ertapenum per ID      COPD              -O2 PRN and with ambulation @ home, usual 3L              -Brovana, Pulmicort, nebs     Cardiovascular   CTA              -No evidence of PE     Hypotension, shock --   -Levophed as needed to keep MAP greater than 65   -Continue to monitor    -very unpredicatable. Will need 2mcg for map 60 and will then be 810F systolic.   -art line in place in for a few hours then quit working after abg draw   -replace today if still have pressor requirement     Gastrointestinal   Tube feeds   PPI  Constipation- enema     Renal   Up 5.5 liters since admission--Lasix twice daily. Good uo  Hypokalemia- replace     Infectious Disease   COVID PNA              -ID following              -Decadron--finished. -Remdesivir,-finished. Vitamin C, zinc     Superimposed bacterial PNA   -elevated procal-2.54   -resp culture serratia, candida   -stop zosyn/levaquin. ertapenum and fluconazole       Hematology/Oncology   Lymphopenia              -2/2 covid pna    Anemia   -no signs of bleeding. 1 unit prbc yesterday. H/h stable today   -monitor    Patient had a CTA with no PE, decreased to DVT prophylactic dose of Lovenox     Endocrine   thyroid nodules  DM -sugars uncontrolled. monitor     Social/Spiritual/DNR/Other   Full      Diet: Diet NPO, After Midnight Exceptions are: Sips with Meds  CODE Status: Full Code    Dispo: maintain ICU level care    1. Discuss case and plan with attending, Dr. Carine Pagan, DO  Resident, PGY-2  11/28/2020  7:23 AM     I personally saw, examined and provided care for the patient. Radiographs, labs and medication list were reviewed by me independently. I spoke with bedside nursing, therapists and consultants. Critical care services and times documented are independent of procedures and multidisciplinary rounds with Residents. Additionally comprehensive, multidisciplinary rounds were conducted with the MICU team. The case was discussed in detail and plans for care were established. Review of Residents documentation was conducted and revisions were made as appropriate. I agree with the above documented exam, problem list and plan of care. Dilip Singh M.D.    Pulmonary/Critical Care Medicine   38 min cct excluding procedures

## 2020-11-28 NOTE — OP NOTE
Op Note    [address]  YOB: 1945  [id number]    DATE OF PROCEDURE: 11/28/2020    SURGEON: AMADOR Wynne: None    PREOPERATIVE DIAGNOSIS: Respiratory failure, Dysphagia   . POSTOPERATIVE DIAGNOSIS: Same    OPERATION: EGD with PEG placement, Bronchoscopy, Percutaneous Tracheostomy with #8 shiley    ANESTHESIA: General    ESTIMATED BLOOD LOSS: none    COMPLICATIONS: None. SPECIMENS: None. DESCRIPTION OF PROCEDURE: The patient was taken to the endoscopy suite and  placed on the endoscopy table in a supine position. LMAC anesthesia was  administered. A bite block was inserted. A lubricated gastroscope was inserted through the oropharynx and advanced  under direct vision to the esophagus and then the stomach. The stomach was  insufflated. The anterior abdominal wall was transilluminated. This area  was marked, prepped, and draped. Local anesthetic was injected. An 11 blade  was used to make a transverse incision. A snare forceps was inserted through the  gastroscope and deployed into the gastric lumen. An angiocatheter was  inserted through the incision and inserted into the gastric lumen under  direct vision. A wire was inserted through the angiocatheter and grasped with  a snare. The gastroscope, snare, and wire were then retrieved. The wire was   connected to the gastrostomy tube and then pulled through the esophagus,   stomach, and out through the anterior abdominal wall. The gastroscope was   reintroduced into the stomach. The PEG tube was seen in good position without   evidence of bleeding. The gastroscope was removed. The PEG tube was fit with   a bumper and feeding port and connected to gravity. Next, a shoulder roll was placed under the patient. The area of the neck was prepped and draped in the standard sterile fashion. Next 5 cc1% lidocaine was inserted into the subcutaneous tissue approx. 2 fingerbreaths above the sternal notch.  Using a #15 blade, 2cm vertical incision was made and the hemostat was used to dilate the space. The flexible bronchoscope was inserted into the endotracheal tube. Next the bronchoscope and ET tube were withdrawn such that the ET tube was just distal to the vocal cords. Transillumination with the bronchoscope through the created aperture verified location. The needle was inserted into the trachea at the 2nd tracheal ring. Using the Seldinger technique, the guidewire was inserted into the trachea, visualizing placement with the scope. The  needle was withdrawn. Next the small dilator was used X3, then the large dilator was used to dilate the trachea. Finally the #8 shiley was inserted over the [address] [place] dilator. The bronchoscope was positioned such that the entire process was visualized. Next, the inner canula was placed into the shiley. The End tidal CO2 detector confirmed the position of the tracheostomy tube. Next the bronchoscope was placed through the shiley, which also confirmed placement. The airways were inspected and blood and secretions were suctioned clear. There were no complications from the procedure.

## 2020-11-28 NOTE — PROGRESS NOTES
SpO2 95%   BMI 25.55 kg/m²     Due to the patient's COVID-19-positive status, to reduce exposure, contamination, and in an effort to conserve PPE, this patient was evaluated through a combination of review of the medical record, nursing assessments, other physicians' exams and assessments, as well as telemedicine interaction. Recent Labs     11/26/20  0545 11/27/20  0528 11/28/20  0540    139 145   K 3.9 3.2* 3.3*   CL 99 97* 100   CO2 35* 36* 37*   BUN 31* 39* 38*   CREATININE 0.4* 0.5 0.4*   GLUCOSE 134* 132* 182*   CALCIUM 8.1* 8.0* 8.8       Recent Labs     11/26/20  0545 11/27/20  0528 11/28/20  0540   WBC 10.9 11.4 12.5*   RBC 2.39* 2.54* 2.55*   HGB 6.8* 7.7* 7.4*   HCT 22.6* 24.0* 25.0*   MCV 94.6 94.5 98.0   MCH 28.5 30.3 29.0   MCHC 30.1* 32.1 29.6*   RDW 17.2* 16.5* 17.6*    196 212   MPV 11.0 11.1 11.0       Radiology:   EXAMINATION:    ONE XRAY VIEW OF THE CHEST    11/25/2020 6:58 am    COMPARISON:    11/23/2020    HISTORY:    ORDERING SYSTEM PROVIDED HISTORY: resp failure    TECHNOLOGIST PROVIDED HISTORY:    Reason for exam:->resp failure    FINDINGS:    Endotracheal tube and nasogastric tube are unchanged. [address] line is unchanged. Bilateral infiltrates are unchanged, left more than right. Juris Lupe is an    unchanged small left pleural effusion. There is no pneumothorax.  The         Impression    Unchanged bilateral infiltrates and left pleural effusion. CT scan chest w/o contrast 11/24/20:    Impression    Confluent airspace disease in the posterobasal segment of the left lower lobe    with central air bronchograms, consistent with pneumonia, more extensive than    on the prior examination. Small to moderate left pleural effusion extending into the major fissure. Improvement in multifocal ground-glass airspace disease previously noted    predominantly in the lung bases. Atelectatic changes in the right lung base and smaller right pleural effusion.     Mild cardiomegaly. Mild heterogeneous enlargement of the left lobe of the thyroid likely    secondary to thyroid nodules.  Consider further evaluation and follow up with    thyroid ultrasound. Large gallstone. Assessment:    Active Problems:    Acute respiratory failure with hypoxia (HCC)  Resolved Problems:    * No resolved hospital problems. *      Plan:  1. Acute respiratory failure with hypoxia and hypercapnea  -  2/2 COVID 19 pneumonia  -  Decadron therapy  -  Also superimposed bacterial pneumonia treated with abx  -  Patient remains intubated- pulm/cc management  -  ID on board:  Patient was on Zosyn x 14 days-now stopped, Levaquin given x 3 days. Now on Invanz and fluconazole  -  Patient did receive remdesivir and decadron  -  Failed extubation 11/19-reintubated  -  Plans for trach/PEG today  -  No evidence for PE on CTA chest  -  lovenox was decreased to DVT prophylatic dosing     2. COVID 19 pneumonia  -  Completed remdesivir and decadron  -  Continue supportive care    3. Bacterial pneumonia  -  ID following  -  Continue zosyn and levaquin  -  Procalcitonin 2.54 on 11/14    4. Severe emphysema  -  Follows with Dr. Becca Cramer as outpatient and was on 3 L O2 continuously at home prior to admission- he is consulted  -  Former smoker  -  rosalva Strickland    5. DM2-  Continue lantus, ISS, BS checks    6. Essential hypertension- with hypotension secondary to severe illness; requiring Levophed (keep MAP >65)  -  Arterial line placed 11/27/20    7. Mixed hyperlipidemia-previsouly on pravachol    8. Leukocytosis- improved today; WBC now 10.9  -  ID continues to follow  -  Zosyn and levaquin currently    9. AMS-2/2 hypercapnea; waxing and waning  -  CT head negative  -  Wean sedation as tolerated    10. Hypoalbuminemia  -  Tube feeds-  For PEG today    11. Electrolyte abnormalities-replete as needed        NOTE: This report was transcribed using voice recognition software.  Every effort was made to ensure accuracy; however, inadvertent computerized transcription errors may be present.   Electronically signed by Lora Deng MD on 11/28/2020 at 8:04 AM

## 2020-11-29 NOTE — PROGRESS NOTES
[facility] Progress Note    Admitting Date and Time: 11/13/2020  1:46 PM  Admit Dx: Acute respiratory failure with hypoxia (HCC) [J96.01]    Subjective:  Patient is being followed for Acute respiratory failure with hypoxia ([address] [J96.01]    Pt is intubated and sedated,  trach and peg in place  ROS: patient intubated & sedated     sodium chloride  250 mL Intravenous Once    fluconazole  200 mg Oral Daily    ertapenem (INVanz) IVPB  1 g Intravenous Q24H    fluticasone  2 spray Each Nostril Daily    guaiFENesin  400 mg Oral 4x Daily    furosemide  40 mg Intravenous BID    insulin glargine  25 Units Subcutaneous BID    lidocaine PF  5 mL Intradermal Once    heparin flush  3 mL Intravenous 2 times per day    senna  1 tablet Oral Nightly    insulin lispro  0-18 Units Subcutaneous Q6H    [Held by provider] enoxaparin  40 mg Subcutaneous Daily    zinc sulfate  50 mg Oral Daily    ascorbic acid  1,000 mg Oral QAM    Arformoterol Tartrate  15 mcg Nebulization BID    budesonide  0.5 mg Nebulization BID    ipratropium-albuterol  1 ampule Inhalation Q4H    pantoprazole  40 mg Intravenous Daily    sodium chloride flush  10 mL Intravenous 2 times per day    vitamin B and C  1 tablet Oral QAM    Vitamin D  2,000 Units Oral QAM     sodium phosphate IVPB, 0.16 mmol/kg, PRN    Or  sodium phosphate IVPB, 0.32 mmol/kg, PRN  potassium chloride, 20 mEq, PRN  magnesium sulfate, 1 g, PRN  sodium chloride flush, 10 mL, PRN  heparin flush, 3 mL, PRN  albuterol sulfate HFA, 2 puff, Q6H PRN  sodium chloride flush, 10 mL, PRN  acetaminophen, 650 mg, Q6H PRN    Or  acetaminophen, 650 mg, Q6H PRN  polyethylene glycol, 17 g, Daily PRN  promethazine, 12.5 mg, Q6H PRN    Or  ondansetron, 4 mg, Q6H PRN  glucose, 15 g, PRN  dextrose, 12.5 g, PRN  glucagon (rDNA), 1 mg, PRN  dextrose, 100 mL/hr, PRN         Objective:    BP (!) 82/39   Pulse 93   Temp 100.2 °F (37.9 °C) (Bladder)   Resp 22   Ht 5' 6\" (1.676 right pleural effusion. Mild cardiomegaly. Mild heterogeneous enlargement of the left lobe of the thyroid likely    secondary to thyroid nodules.  Consider further evaluation and follow up with    thyroid ultrasound. Large gallstone. Assessment:    Active Problems:    Acute respiratory failure with hypoxia (HCC)  Resolved Problems:    * No resolved hospital problems. *      Plan:  1. Acute respiratory failure with hypoxia and hypercapnea  -  2/2 COVID 19 pneumonia  -  Decadron therapy  -  Also superimposed bacterial pneumonia treated with abx  -  Patient remains intubated- pulm/cc management  -  ID on board:  Patient was on Zosyn x 14 days-now stopped, Levaquin given x 3 days. Now on Invanz and fluconazole  -  Patient did receive remdesivir and decadron  -  Failed extubation 11/19-reintubated  -  No evidence for PE on CTA chest  -  lovenox was decreased to DVT prophylatic dosing     2. COVID 19 pneumonia  -  Completed remdesivir and decadron  -  Continue supportive care    3. Bacterial pneumonia  -  ID following  -  Procalcitonin 2.54 on 11/14  -  invanz and fluconazole per ID    4. Severe emphysema  -  Follows with Dr. Becca Cramer as outpatient and was on 3 L O2 continuously at home prior to admission- he is consulted  -  Former smoker  -  rosalva Strickland    5. DM2-  Continue lantus, ISS, BS checks    6. Essential hypertension- with hypotension secondary to severe illness; requiring Levophed (keep MAP >65)  -  Arterial line placed 11/27/20    7. Mixed hyperlipidemia-previsouly on pravachol    8. Leukocytosis- improved today; WBC now 10.3  -  ID continues to follow    9. AMS-2/2 hypercapnea; waxing and waning  -  CT head negative  -  Wean sedation as tolerated    10. Hypoalbuminemia  -  Tube feeds- PEG     11. Electrolyte abnormalities-replete as needed        NOTE: This report was transcribed using voice recognition software.  Every effort was made to ensure accuracy; however, inadvertent computerized transcription errors may be present.   Electronically signed by Cheryl Finch MD on 11/29/2020 at 11:12 AM

## 2020-11-29 NOTE — PATIENT CARE CONFERENCE
Intensive Care Daily Quality Rounding Checklist        ICU Team Members: Dr. Geoff De La Torre, Dr. Fermín Swift (resident), charge nurse, bedside nurse, respiratory therapist      ICU Day #: NUMBER: 17     Intubation Date: November 13, 2020     Ventilator Day #: NUMBER: 17     Central Line Insertion Date: PICC November 20, 2020                                                    Day #: NUMBER: 10      Arterial Line Insertion Date:  n/a                             Day #: n/a     DVT Prophylaxis: Lovenox    GI Prophylaxis: tube feed, protonix     Lloyd Catheter Insertion Genice Job, 2020                                        Day #: 17                             Continued need (if yes, reason documented and discussed with physician): yes, strict I & O     Skin Issues/ Wounds and ordered treatment discussed on rounds: none, sos precautions     Goals/ Plans for the Day: Daily labs, wean sedation to off, to LTAC vs. Stepdown floor soon

## 2020-11-29 NOTE — PROGRESS NOTES
.         Critical Care Team - Daily Progress Note         Date and time: 11/29/2020 4:29 PM  Patient's name:  Rosas Matson  Medical Record Number: [id number]  Patient's account/billing number: [de-identified]  Patient's YOB: 1945  Age: 67 y.o. Date of Admission: 11/13/2020  1:46 PM  Length of stay during current admission: 16      Primary Care Physician: Jolly Montano MD  ICU Attending Physician: Dr. Chloe Miner Status: Full Code    Reason for ICU admission: Acute hypercapnic hypoxic respiratory failure secondary to Covid pneumonia      SUBJECTIVE:     BRIEF HISTORY: The patient is a 67 y.o. female with significant past medical history of COPD, diabetes, hypertension, hyperlipidemia presented to the ED for shortness of breath. Patient is currently intubated and sedated so history obtained from chart review.     Per emergency department, patient had history of 3 days of worsening shortness of breath. She is chronically on 3 L of oxygen at home when she ambulates but over the last few days has had increase her oxygen requirement to 5 L at all times. 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 She subsequently presented to the ED for evaluation.     Patient was found to be hypoxic and and placed on nasal cannula oxygen. Patient was Positive for COVID-19. While in the emergency department patient was placed on a nonrebreather and subsequently decompensated. She became altered and was taking agonal respirations. She was then intubated for further airway protection. ABG demonstrated respiratory acidosis with a CO2 of greater than 113. Patient was admitted to the ICU for further evaluation care. OVERNIGHT EVENTS:    11/15/20:  Some soft BP's overnight, responded to fluid bolus and switched from propofol gtt to versed gtt  11/16/20: Vanessa Nettle, tolerating vent.  Attempt to wean sedation and pressure support  11/17/20: Tolerating pressure support, off or Versed  11/18/20: Attempt pressure support trials. Patient becoming tachycardic and hypertensive  11/19/20: Attempted extubation. Patient less than 30 minutes. She became tachypneic, tachycardic and not tolerating secretions. Patient subsequently reintubated  11/20/20: Lauren Oka pressors overnight, agitated on the vent. 11/21/20: Intermittently required pressors overnight. Still hyperglycemic.  11/22/20: pressures all over the place. intermittant Levo. HTN with agitation  11/23/20: Hypotensive overnight. Levo restarted. Oxygenating well. 11/24/20: On and off levo overnight. Fevers overnight  11/25/20: off of pressors. Pressure support trials  11/26/20: intermittently back on pressors. Still no BM. Agitated when sedation off. H/h fall, received 1 unit  11-27-20: still with intermittant pressor requirement. Low grade fever 100.3  11/28/20: off pressors overnight, art line worked for 4hr then stopped after abg was drawn, it was removed. Trach/peg today    11/29: Low hemoglobin this morning, 1 unit of blood ordered. Trach/PEG yesterday.     CURRENT VENTILATION STATUS:     [x] Ventilator  [] BIPAP  [] Nasal Cannula [] Room Air      IF INTUBATED, ET TUBE MARKING AT LOWER LIP:       SECRETIONS : Amount:  [x] Small [] Moderate  [] Large  [] None  Color:     [] White [x] Colored  [] Bloody    SEDATION:  RAAS Score:  [x] Propofol gtt  [] Versed gtt and fentanyl  [] Ativan gtt   [] No Sedation    PARALYZED:  [x] No    [] Yes      VASOPRESSORS:  [] No    [x] Yes    If yes -   [x] Levophed       [] Dopamine     [] Vasopressin       [] Dobutamine  [] Phenylephrine         [] Epinephrine    CENTRAL LINES:     [] No   [x] Yes   (Date of Insertion: PICC)           If yes -     [] Right IJ     [] Left IJ [] Right Femoral [] Left Femoral                   [] Right Subclavian [] Left Subclavian       RAM'S CATHETER:   [] No   [x] Yes  (Date of Insertion: 11/13  ) URINE OUTPUT:            [x] Good   [] Low              [] Anuric      OBJECTIVE:     VITAL SIGNS:  BP (!) 89/36   Pulse 103   Temp 100.2 °F (37.9 °C) (Bladder)   Resp 22   Ht 5' 6\" (1.676 m)   Wt 158 lb 4.6 oz (71.8 kg)   SpO2 (!) 89%   BMI 25.55 kg/m²   Tmax over 24 hours:  Temp (24hrs), Av.2 °F (37.9 °C), Min:99.9 °F (37.7 °C), Max:100.5 °F (38.1 °C)      Patient Vitals for the past 6 hrs:   BP Temp Temp src Pulse Resp SpO2   20 1615 (!) 89/36 100.2 °F (37.9 °C) Bladder 103 22 (!) 89 %   20 1600 (!) 111/43 100.2 °F (37.9 °C) Bladder 107 22 91 %   20 1545 (!) 153/62 100.4 °F (38 °C) Bladder 110 22 93 %   20 1530 -- 100.5 °F (38.1 °C) Bladder -- -- --   20 1500 (!) 151/79 -- -- 120 23 93 %   20 1433 (!) 158/69 100.4 °F (38 °C) Bladder 115 21 --   20 1432 (!) 99/41 -- -- 118 23 --   20 1400 (!) 95/38 -- -- 100 22 92 %   20 1320 (!) 110/55 100 °F (37.8 °C) Bladder 98 22 92 %   20 1319 (!) 92/38 -- -- 98 21 92 %   20 1218 -- -- -- 90 22 96 %   20 1200 (!) 84/40 100.3 °F (37.9 °C) Bladder 91 22 96 %   20 1100 (!) 95/41 -- -- 100 22 94 %         Intake/Output Summary (Last 24 hours) at 2020 1629  Last data filed at 2020 1600  Gross per 24 hour   Intake 2046.91 ml   Output 1945 ml   Net 101.91 ml     Wt Readings from Last 2 Encounters:   20 158 lb 4.6 oz (71.8 kg)   10/29/20 140 lb (63.5 kg)     Body mass index is 25.55 kg/m². PHYSICAL EXAM:  General: Intubated  Eyes:  Pupils equal and reactive  Ears: no obvious scars, no lesions, no masses, hearing intact  Mouth: ET tube in place  Head: normocephalic, atraumatic  Neck: no JVD, no adenopathy, no thyromegaly, neck is supple, trachea is midline.   Chest: no pain on palpation  Lungs: Clear to auscultation bilaterally , no wheezes, rhonchi  Heart: regular rate and regular rhythm, no murmur, normal S1, S2  Abdomen: soft, non-tender, distended  Extremities: no lower extremity edema, 2+ edema b/l uper extrmities  Skin: normal color, normal texture, normal turgor, no rashes, no lesions  Neurologic:  will open eyes and stare.   Spontaneously moves all extremitties, not following commands    MEDICATIONS:    Scheduled Meds:   sodium chloride  250 mL Intravenous Once    fluconazole  200 mg Oral Daily    ertapenem (INVanz) IVPB  1 g Intravenous Q24H    fluticasone  2 spray Each Nostril Daily    guaiFENesin  400 mg Oral 4x Daily    furosemide  40 mg Intravenous BID    insulin glargine  25 Units Subcutaneous BID    lidocaine PF  5 mL Intradermal Once    heparin flush  3 mL Intravenous 2 times per day    senna  1 tablet Oral Nightly    insulin lispro  0-18 Units Subcutaneous Q6H    [Held by provider] enoxaparin  40 mg Subcutaneous Daily    zinc sulfate  50 mg Oral Daily    ascorbic acid  1,000 mg Oral QAM    Arformoterol Tartrate  15 mcg Nebulization BID    budesonide  0.5 mg Nebulization BID    ipratropium-albuterol  1 ampule Inhalation Q4H    pantoprazole  40 mg Intravenous Daily    sodium chloride flush  10 mL Intravenous 2 times per day    vitamin B and C  1 tablet Oral QAM    Vitamin D  2,000 Units Oral QAM     Continuous Infusions:   fentaNYL 5 mcg/ml in 0.9%  ml infusion 100 mcg/hr (11/29/20 0949)    norepinephrine Stopped (11/29/20 0100)    sodium chloride 12.5 mL/hr at 11/29/20 1200    dextrose       PRN Meds:   LORazepam, 2 mg, Q2H PRN  fentanNYL, 50 mcg, Q2H PRN  sodium phosphate IVPB, 0.16 mmol/kg, PRN    Or  sodium phosphate IVPB, 0.32 mmol/kg, PRN  potassium chloride, 20 mEq, PRN  magnesium sulfate, 1 g, PRN  sodium chloride flush, 10 mL, PRN  heparin flush, 3 mL, PRN  albuterol sulfate HFA, 2 puff, Q6H PRN  acetaminophen, 650 mg, Q6H PRN    Or  acetaminophen, 650 mg, Q6H PRN  polyethylene glycol, 17 g, Daily PRN  promethazine, 12.5 mg, Q6H PRN    Or  ondansetron, 4 mg, Q6H PRN  glucose, 15 g, PRN  dextrose, 12.5 g, PRN  glucagon (rDNA), 1 mg, PRN  dextrose, 100 mL/hr, PRN        VENT SETTINGS (Comprehensive) (if applicable):  Vent Information  $Ventilation: $Subsequent Day  Skin Assessment: Clean, dry, & intact  Equipment ID: [id number]  Equipment Changed: (S) HME  Vent Type: [id number]  Vent Mode: AC/VC  Vt Ordered: 450 mL  Rate Set: 22 bmp  Peak Flow: 70 L/min  Pressure Support: 0 cmH20  FiO2 : 55 %  SpO2: (!) 89 %  SpO2/FiO2 ratio: 165.45  Sensitivity: 3  PEEP/CPAP: 6  I Time/ I Time %: 0 s  Humidification Source: HME  Mask Type: Full face mask  Mask Size: Medium  Additional Respiratory  Assessments  Pulse: 103  Resp: 22  SpO2: (!) 89 %  $End Tidal CO2: 36  Position: Semi-Hunt's  Humidification Source: HME  Oral Care: Lip moisturizer applied, Mouth swabbed, Mouth moisturizer, Mouth suctioned  Subglottic Suction Done?: No  Cuff Pressure (cm H2O): 29 cm H2O    ABGs:   Recent Labs     11/28/20  0611   PH 7.461*   PCO2 51.5*   PO2 62.4*   HCO3 35.9*   BE 10.9*   O2SAT 92.2       Laboratory findings:    Complete Blood Count:   Recent Labs     11/27/20 0528 11/28/20  0540 11/29/20  0550   WBC 11.4 12.5* 10.3   HGB 7.7* 7.4* 6.1*   HCT 24.0* 25.0* 20.0*    212 201        Last 3 Blood Glucose:   Recent Labs     11/27/20 0528 11/28/20  0540 11/29/20  0550   GLUCOSE 132* 182* 151*        PT/INR:    Lab Results   Component Value Date    PROTIME 12.3 11/29/2020    INR 1.1 11/29/2020     PTT:    Lab Results   Component Value Date    APTT 27.2 11/29/2020       Comprehensive Metabolic Profile:   Recent Labs     11/27/20 0528 11/28/20  0540 11/29/20  0550    145 145   K 3.2* 3.3* 4.0   CL 97* 100 104   CO2 36* 37* 34*   BUN 39* 38* 40*   CREATININE 0.5 0.4* 0.4*   GLUCOSE 132* 182* 151*   CALCIUM 8.0* 8.8 8.0*   PROT 4.7* 6.2* 4.8*   LABALBU 2.1* 2.1* 2.0*   BILITOT 0.9 1.1 0.8   ALKPHOS 118* 132* 125*   AST 50* 50* 66*   ALT 41* 43* 43*      Magnesium:   Lab Results   Component Value Date    MG 2.5 11/29/2020     Phosphorus:   Lab Results   Component Value Date    PHOS 3.4 11/29/2020     Ionized Calcium: No results found for: CAION       Troponin:   No results for input(s): TROPONINI in the last 72 hours. Microbiology:  Cultures during this admission:     Blood cultures:                 [x] None drawn      [] Negative             []  Positive (Details:  )  Urine Culture:                   [x] None drawn      [] Negative             []  Positive (Details:  )  Sputum Culture:               [] None drawn       [] Negative             [x]  Positive (Details: yeast )   Endotracheal aspirate:     [] None drawn       [] Negative             [x]  Positive (Details: gram - betsy )     Other pertinent Labs:      Radiology/Imaging:     Chest x-ray:  11/24/20:     Impression    Confluent airspace disease in the posterobasal segment of the left lower lobe    with central air bronchograms, consistent with pneumonia, more extensive than    on the prior examination. Small to moderate left pleural effusion extending into the major fissure. Improvement in multifocal ground-glass airspace disease previously noted    predominantly in the lung bases. Atelectatic changes in the right lung base and smaller right pleural effusion. Mild cardiomegaly. Mild heterogeneous enlargement of the left lobe of the thyroid likely    secondary to thyroid nodules.  Consider further evaluation and follow up with    thyroid ultrasound. Large gallstone. Impression    No evidence of acute intracranial pathology.  The    Age-related loss of brain volume and chronic periventricular ischemic changes. Chronic left basal ganglia lacunar infarction versus prominent perivascular    space, unchanged. Opacification of bilateral mastoid air cells and middle ears, not present on    the prior examination and which may reflect otomastoiditis     11/27  CXR  Impression    1.  Multifocal bilateral perihilar and lower lobe infiltrates slightly more    prominent when compared to the prior study of 11/25/2020.    2. Cardiomegaly. ASSESSMENT:       - Acute respiratory failure with hypoxia, hypercarbia  - COVID-19 pneumonia  - Superimposed bacterial pneumonia  - COPD  -Type 2 diabetes    Additional assessment:    Prolonged QTc ( 625)      PLAN:     WEAN PER PROTOCOL:  [] No   [x] Yes  [] N/A    DISCONTINUE ANY LABS:   [x] No   [] Yes    ICU PROPHYLAXIS:  Stress ulcer:  [x] PPI Agent  [] V4Auksy [] Sucralfate  [] Other:  VTE:   [x] Enoxaparin  [] Unfract. Heparin Subcut  [] EPC Cuffs    NUTRITION:  [] NPO [] Tube Feeding (Specify: ) [] TPN  [x] PO (Diet: DIET TUBE FEED CONTINUOUS/CYCLIC NPO; Semi-elemental; Gastrostomy; Continuous; 45)    HOME MEDICATIONS RECONCILED: [] No  [x] Yes    INSULIN DRIP:   [x] No   [] Yes    CONSULTATION NEEDED:  [x] No   [] Yes    FAMILY UPDATED:    [x] No   [] Yes     TRANSFER OUT OF ICU:   [x] No   [] Yes       SYSTEMS ASSESSMENT   Of note, I do not see a progress note from the ICU for 11/20. That note was written and signed by Dr. Lazara Pillai. We cannot find it in the computer. Is not in with incomplete notes. Will call the help desk and attempt to retrieve her that note is. Neuro   AMS--most likely secondary to hypercapnia              -CT head negative     Intubated and sedated on the vent              -Continue fentanyl and propofol. Wean sedation as able. -Start as needed Ativan, fentanyl.   Was planning to start Seroquel but QTc is prolonged at 625 (11/29)    Respiratory   Acute hypoxic and hypercapnic respiratory failure secondary to Covid pneumonia              -resp panel + SARS-COV2              -former smoker              -vit c, decadron-- stopped, remdesivir-finished              -Pulmonology following              -procal 2.54   -Failed extubation 11/19  Subsequently reintubated.   -Wean FiO2 as able   -CT chest with evidence of pneumonia   -failed wean   -not tolerating pressure support trials   -trach/peg today    Superimposed bacterial PNA   -elevated procal-2.54   - repeat resp culture gram - rods   -Zosyn stopped day 13. Levaquin stopped. ertapenum per ID      COPD              -O2 PRN and with ambulation @ home, usual 3L              -Brovana, Pulmicort, nebs     Cardiovascular   CTA              -No evidence of PE     Hypotension, shock --   -Levophed as needed to keep MAP greater than 65   -Continue to monitor    -very unpredicatable. Will need 2mcg for map 60 and will then be 046H systolic.   -art line in place in for a few hours then quit working after abg draw   -replace today if still have pressor requirement    - 11/29: No pressor requirements today    Gastrointestinal   Tube feeds   PPI  Constipation- enema     Renal   Up 5.5 liters since admission--Lasix twice daily. Good uo  Hypokalemia- replace     Infectious Disease   COVID PNA              -ID following              -Decadron--finished.      -Remdesivir,-finished. Vitamin C, zinc     Superimposed bacterial PNA   -elevated procal-2.54   -resp culture serratia, candida   -stop zosyn/levaquin. ertapenum and fluconazole       Hematology/Oncology   Lymphopenia              -2/2 covid pna    Anemia   -no signs of bleeding. 1 unit prbc yesterday. H/h stable today   -monitor    Patient had a CTA with no PE, decreased to DVT prophylactic dose of Lovenox    11/29: hemoglobin 6.1 this morning, Lovenox held, 1 unit of PRBCs ordered. Will monitor closely. Endocrine   thyroid nodules  DM -sugars uncontrolled. monitor     Social/Spiritual/DNR/Other   Full      Diet: DIET TUBE FEED CONTINUOUS/CYCLIC NPO; Semi-elemental; Gastrostomy; Continuous; 45  CODE Status: Full Code    Dispo: maintain ICU level care        MIRIAN Weaver 50, DO  Resident, PGY-1  11/29/2020  4:29 PM     I personally saw, examined and provided care for the patient. Radiographs, labs and medication list were reviewed by me independently. I spoke with bedside nursing, therapists and consultants.  Critical care services and times documented are independent of procedures and multidisciplinary rounds with Residents. Additionally comprehensive, multidisciplinary rounds were conducted with the MICU team. The case was discussed in detail and plans for care were established. Review of Residents documentation was conducted and revisions were made as appropriate. I agree with the above documented exam, problem list and plan of care. Adjust sedation   Insert carlos   Potentially for ltac if she tolerates being off sedation   Rocio Bee M.D.    Pulmonary/Critical Care Medicine   40 min cct excluding procedures

## 2020-11-29 NOTE — PLAN OF CARE
Problem: Falls - Risk of:  Goal: Will remain free from falls  Description: Will remain free from falls  11/29/2020 1721 by Velma Dias RN  Outcome: Met This Shift  11/29/2020 0737 by Augusta Mahmood RN  Outcome: Ongoing  Goal: Absence of physical injury  Description: Absence of physical injury  11/29/2020 1721 by Velma Dias RN  Outcome: Met This Shift  11/29/2020 0737 by Augusta Mahmood RN  Outcome: Ongoing     Problem: Restraint Use - Nonviolent/Non-Self-Destructive Behavior:  Goal: Absence of restraint indications  Description: Absence of restraint indications  11/29/2020 1721 by Velma Dias RN  Outcome: Not Met This Shift  11/29/2020 0737 by Augusta Mahmood RN  Outcome: Ongoing  Goal: Absence of restraint-related injury  Description: Absence of restraint-related injury  11/29/2020 1721 by Velma Dias RN  Outcome: Met This Shift  11/29/2020 0737 by Augusta Mahmood RN  Outcome: Ongoing     Problem: Airway Clearance - Ineffective  Goal: Achieve or maintain patent airway  11/29/2020 1721 by Velma Dias RN  Outcome: Met This Shift  11/29/2020 0737 by Augusta Mahmood RN  Outcome: Ongoing     Problem: Gas Exchange - Impaired  Goal: Absence of hypoxia  11/29/2020 1721 by Velma Dias RN  Outcome: Ongoing  11/29/2020 0737 by Augusta Mahmood RN  Outcome: Ongoing  Goal: Promote optimal lung function  11/29/2020 0737 by Augusta Mahmood RN  Outcome: Ongoing     Problem: Breathing Pattern - Ineffective  Goal: Ability to achieve and maintain a regular respiratory rate  11/29/2020 1721 by Velma Dias RN  Outcome: Not Met This Shift  11/29/2020 0737 by Augusta Mahmood RN  Outcome: Ongoing     Problem:  Body Temperature -  Risk of, Imbalanced  Goal: Ability to maintain a body temperature within defined limits  11/29/2020 1721 by Velma Dias RN  Outcome: Not Met This Shift  11/29/2020 0737 by Augusta Mahmood RN  Outcome: Ongoing  Goal: Will regain or maintain usual level of consciousness  11/29/2020 0737 by Dontrell Alexander RN  Outcome: Ongoing  Goal: Complications related to the disease process, condition or treatment will be avoided or minimized  11/29/2020 0737 by Dontrell Alexander RN  Outcome: Ongoing     Problem: Isolation Precautions - Risk of Spread of Infection  Goal: Prevent transmission of infection  11/29/2020 1721 by Dereje Ellison RN  Outcome: Met This Shift  11/29/2020 0737 by Dontrell Alexander RN  Outcome: Ongoing     Problem: Nutrition Deficits  Goal: Optimize nutrtional status  11/29/2020 1721 by Dereje Ellison RN  Outcome: Met This Shift  11/29/2020 0737 by Dontrell Alexander RN  Outcome: Ongoing     Problem: Risk for Fluid Volume Deficit  Goal: Maintain normal heart rhythm  11/29/2020 0737 by Dontrell Alexander RN  Outcome: Ongoing  Goal: Maintain absence of muscle cramping  11/29/2020 0737 by Dontrell Alexander RN  Outcome: Ongoing  Goal: Maintain normal serum potassium, sodium, calcium, phosphorus, and pH  11/29/2020 0737 by Dontrell Alexander RN  Outcome: Ongoing     Problem: Skin Integrity:  Goal: Will show no infection signs and symptoms  Description: Will show no infection signs and symptoms  11/29/2020 1721 by Dereje Ellison RN  Outcome: Met This Shift  11/29/2020 0737 by Dontrell Alexander RN  Outcome: Ongoing  Goal: Absence of new skin breakdown  Description: Absence of new skin breakdown  11/29/2020 1721 by Dereje Ellison RN  Outcome: Met This Shift  11/29/2020 0737 by Dontrell Alexander RN  Outcome: Ongoing     Problem: Pain:  Goal: Pain level will decrease  Description: Pain level will decrease  11/29/2020 1721 by Dereje Ellison RN  Outcome: Met This Shift  11/29/2020 0737 by Dontrell Alexander RN  Outcome: Ongoing  Goal: Control of acute pain  Description: Control of acute pain  11/29/2020 1721 by Dereje Ellison RN  Outcome: Met This Shift  11/29/2020 0737 by Dontrell Alexander RN  Outcome: Ongoing  Goal: Control of chronic pain  Description: Control of chronic pain  11/29/2020 1721 by Oliver Lugo RN  Outcome: Met This Shift  11/29/2020 0737 by Allison Moreira RN  Outcome: Ongoing

## 2020-11-30 NOTE — PROGRESS NOTES
Temp 100.2 °F (37.9 °C) (Bladder)   Resp 23   Ht 5' 6\" (1.676 m)   Wt 158 lb 4.6 oz (71.8 kg)   SpO2 90%   BMI 25.55 kg/m²     Due to the patient's COVID-19-positive status, to reduce exposure, contamination, and in an effort to conserve PPE, this patient was evaluated through a combination of review of the medical record, nursing assessments, other physicians' exams and assessments, as well as telemedicine interaction. Recent Labs     11/28/20  0540 11/29/20  0550 11/30/20  0523    145 144   K 3.3* 4.0 3.2*    104 104   CO2 37* 34* 33*   BUN 38* 40* 40*   CREATININE 0.4* 0.4* 0.3*   GLUCOSE 182* 151* 167*   CALCIUM 8.8 8.0* 7.6*       Recent Labs     11/28/20  0540 11/29/20  0550 11/30/20  0523   WBC 12.5* 10.3 8.5   RBC 2.55* 2.01* 2.72*   HGB 7.4* 6.1* 8.2*   HCT 25.0* 20.0* 26.6*   MCV 98.0 99.5 97.8   MCH 29.0 30.3 30.1   MCHC 29.6* 30.5* 30.8*   RDW 17.6* 18.9* 19.9*    201 157   MPV 11.0 11.6 11.4       Radiology:   EXAMINATION:    ONE XRAY VIEW OF THE CHEST    11/25/2020 6:58 am    COMPARISON:    11/23/2020    HISTORY:    ORDERING SYSTEM PROVIDED HISTORY: resp failure    TECHNOLOGIST PROVIDED HISTORY:    Reason for exam:->resp failure    FINDINGS:    Endotracheal tube and nasogastric tube are unchanged. [address] line is unchanged. Bilateral infiltrates are unchanged, left more than right. Meacham Thompson is an    unchanged small left pleural effusion. There is no pneumothorax.  The         Impression    Unchanged bilateral infiltrates and left pleural effusion. CT scan chest w/o contrast 11/24/20:    Impression    Confluent airspace disease in the posterobasal segment of the left lower lobe    with central air bronchograms, consistent with pneumonia, more extensive than    on the prior examination. Small to moderate left pleural effusion extending into the major fissure.     Improvement in multifocal ground-glass airspace disease previously noted    predominantly in the lung needed        NOTE: This report was transcribed using voice recognition software. Every effort was made to ensure accuracy; however, inadvertent computerized transcription errors may be present.   Electronically signed by Denzel Felty, MD on 11/30/2020 at 11:54 AM

## 2020-11-30 NOTE — PROGRESS NOTES
.         Critical Care Team - Daily Progress Note         Date and time: 11/30/2020 2:09 PM  Patient's name:  Fidelina Botello  Medical Record Number: [id number]  Patient's account/billing number: [de-identified]  Patient's YOB: 1945  Age: 67 y.o. Date of Admission: 11/13/2020  1:46 PM  Length of stay during current admission: 17      Primary Care Physician: Ann Mancuso MD  ICU Attending Physician: Dr. Lugo Late    Code Status: Full Code    Reason for ICU admission: Acute hypercapnic hypoxic respiratory failure secondary to Covid pneumonia      SUBJECTIVE:     BRIEF HISTORY: The patient is a 67 y.o. female with significant past medical history of COPD, diabetes, hypertension, hyperlipidemia presented to the ED for shortness of breath. Patient is currently intubated and sedated so history obtained from chart review.     Per emergency department, patient had history of 3 days of worsening shortness of breath. She is chronically on 3 L of oxygen at home when she ambulates but over the last few days has had increase her oxygen requirement to 5 L at all times. 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 She subsequently presented to the ED for evaluation.     Patient was found to be hypoxic and and placed on nasal cannula oxygen. Patient was Positive for COVID-19. While in the emergency department patient was placed on a nonrebreather and subsequently decompensated. She became altered and was taking agonal respirations. She was then intubated for further airway protection. ABG demonstrated respiratory acidosis with a CO2 of greater than 113. Patient was admitted to the ICU for further evaluation care. OVERNIGHT EVENTS:    11/15/20:  Some soft BP's overnight, responded to fluid bolus and switched from propofol gtt to versed gtt  11/16/20: Gearld Ramal, tolerating vent.  Attempt to wean sedation and pressure support  11/17/20: Tolerating pressure support, off or Versed  11/18/20: Attempt pressure support trials. Patient becoming tachycardic and hypertensive  11/19/20: Attempted extubation. Patient less than 30 minutes. She became tachypneic, tachycardic and not tolerating secretions. Patient subsequently reintubated  11/20/20: Hollace Fill pressors overnight, agitated on the vent. 11/21/20: Intermittently required pressors overnight. Still hyperglycemic.  11/22/20: pressures all over the place. intermittant Levo. HTN with agitation  11/23/20: Hypotensive overnight. Levo restarted. Oxygenating well. 11/24/20: On and off levo overnight. Fevers overnight  11/25/20: off of pressors. Pressure support trials  11/26/20: intermittently back on pressors. Still no BM. Agitated when sedation off. H/h fall, received 1 unit  11-27-20: still with intermittant pressor requirement. Low grade fever 100.3  11/28/20: off pressors overnight, art line worked for 4hr then stopped after abg was drawn, it was removed. Trach/peg today    11/29: Low hemoglobin this morning, 1 unit of blood ordered. Trach/PEG yesterday. 11/30: No acute events overnight, mild hypokalemia this morning, repleted. No pressors, just on fentanyl gtt this morning, will wean this and schedule oxycodone. Fever last night, 100.2 F.      CURRENT VENTILATION STATUS:     [x] Ventilator  [] BIPAP  [] Nasal Cannula [] Room Air      IF INTUBATED, ET TUBE MARKING AT LOWER LIP:       SECRETIONS : Amount:  [x] Small [] Moderate  [] Large  [] None  Color:     [] White [x] Colored  [] Bloody    SEDATION:    [] Propofol gtt  [x] fentanyl gtt  [] Ativan gtt   [] No Sedation    PARALYZED:  [x] No    [] Yes      VASOPRESSORS:  [x] No    [] Yes    If yes -   [] Levophed       [] Dopamine     [] Vasopressin       [] Dobutamine  [] Phenylephrine         [] Epinephrine    CENTRAL LINES:     [] No   [x] Yes   (Date of Insertion: PICC)           If yes -     [] Right IJ     [] Left IJ [] Right Femoral [] Left Femoral                   [] Right Subclavian [] Left Subclavian       RAM'S CATHETER:   [] No   [x] Yes  (Date of Insertion:   )     URINE OUTPUT:            [x] Good   [] Low              [] Anuric      OBJECTIVE:     VITAL SIGNS:  /62   Pulse 105   Temp 100.5 °F (38.1 °C) (Bladder)   Resp 21   Ht 5' 6\" (1.676 m)   Wt 158 lb 4.6 oz (71.8 kg)   SpO2 93%   BMI 25.55 kg/m²   Tmax over 24 hours:  Temp (24hrs), Av.2 °F (37.9 °C), Min:99.7 °F (37.6 °C), Max:100.5 °F (38.1 °C)      Patient Vitals for the past 6 hrs:   BP Temp Temp src Pulse Resp SpO2   20 1202 -- -- -- 105 21 93 %   20 1201 -- -- -- -- 21 93 %   20 1200 128/62 100.5 °F (38.1 °C) Bladder 105 22 93 %   20 1130 (!) 111/53 100.2 °F (37.9 °C) Bladder 109 23 --   20 1100 (!) 114/47 -- -- 102 22 --   20 1000 (!) 112/49 -- -- 101 21 90 %   20 0900 (!) 120/45 -- -- 109 21 91 %         Intake/Output Summary (Last 24 hours) at 2020 1409  Last data filed at 2020 1300  Gross per 24 hour   Intake 1120.17 ml   Output 2300 ml   Net -1179.83 ml     Wt Readings from Last 2 Encounters:   20 158 lb 4.6 oz (71.8 kg)   10/29/20 140 lb (63.5 kg)     Body mass index is 25.55 kg/m². PHYSICAL EXAM:  General: Intubated  Eyes:  Pupils equal and reactive  Ears: no obvious scars, no lesions, no masses  Mouth: ET tube in place  Head: normocephalic, atraumatic  Neck: no JVD, no adenopathy, no thyromegaly, neck is supple, trachea is midline. Chest: no pain on palpation  Lungs: Clear to auscultation bilaterally , no wheezes, rhonchi  Heart: regular rate and regular rhythm, no murmur, normal S1, S2  Abdomen: soft, non-tender, round  Extremities: no lower extremity edema, 2+ edema b/l uper extrmities  Skin: normal color, normal texture, normal turgor, no rashes, no lesions  Neurologic:  will open eyes and stare.   Spontaneously moves all extremities, not following commands    MEDICATIONS:    Scheduled Meds:   oxyCODONE  5 mg Oral Q6H    [START ON 12/1/2020] lansoprazole  30 mg Per G Tube QAM AC    lactulose  20 g Oral TID    sennosides-docusate sodium  2 tablet Oral BID    [START ON 12/1/2020] Vitamin D  1,000 Units Oral QAM    fluconazole  200 mg Oral Daily    ertapenem (INVanz) IVPB  1 g Intravenous Q24H    fluticasone  2 spray Each Nostril Daily    guaiFENesin  400 mg Oral 4x Daily    furosemide  40 mg Intravenous BID    insulin glargine  25 Units Subcutaneous BID    heparin flush  3 mL Intravenous 2 times per day    insulin lispro  0-18 Units Subcutaneous Q6H    enoxaparin  40 mg Subcutaneous Daily    zinc sulfate  50 mg Oral Daily    ascorbic acid  1,000 mg Oral QAM    Arformoterol Tartrate  15 mcg Nebulization BID    budesonide  0.5 mg Nebulization BID    ipratropium-albuterol  1 ampule Inhalation Q4H    sodium chloride flush  10 mL Intravenous 2 times per day    vitamin B and C  1 tablet Oral QAM     Continuous Infusions:   fentaNYL 5 mcg/ml in 0.9%  ml infusion 50 mcg/hr (11/30/20 1120)    sodium chloride 12.5 mL/hr at 11/30/20 1137    dextrose       PRN Meds:   LORazepam, 2 mg, Q2H PRN  fentanNYL, 50 mcg, Q2H PRN  sodium phosphate IVPB, 0.16 mmol/kg, PRN    Or  sodium phosphate IVPB, 0.32 mmol/kg, PRN  potassium chloride, 20 mEq, PRN  magnesium sulfate, 1 g, PRN  sodium chloride flush, 10 mL, PRN  heparin flush, 3 mL, PRN  albuterol sulfate HFA, 2 puff, Q6H PRN  acetaminophen, 650 mg, Q6H PRN    Or  acetaminophen, 650 mg, Q6H PRN  polyethylene glycol, 17 g, Daily PRN  promethazine, 12.5 mg, Q6H PRN    Or  ondansetron, 4 mg, Q6H PRN  glucose, 15 g, PRN  dextrose, 12.5 g, PRN  glucagon (rDNA), 1 mg, PRN  dextrose, 100 mL/hr, PRN        VENT SETTINGS (Comprehensive) (if applicable):  Vent Information  $Ventilation: $Subsequent Day  Skin Assessment: Clean, dry, & intact  Equipment ID: [id number]  Equipment Changed: (S) HME  Vent Type: 840  Vent Mode: AC/VC  Vt Ordered: 450 mL  Rate Set: 22 bmp  Peak Flow: 65 L/min  Pressure Support: 0 cmH20  FiO2 : 55 %  SpO2: 93 %  SpO2/FiO2 ratio: 169.09  Sensitivity: 3  PEEP/CPAP: 6  I Time/ I Time %: 0 s  Humidification Source: HME  Mask Type: Full face mask  Mask Size: Medium  Additional Respiratory  Assessments  Pulse: 105  Resp: 21  SpO2: 93 %  $End Tidal CO2: 36  Position: Semi-Hunt's  Humidification Source: HME  Oral Care: Lip moisturizer applied, Mouth swabbed, Mouth moisturizer, Mouth suctioned  Subglottic Suction Done?: No  Cuff Pressure (cm H2O): 29 cm H2O    ABGs:   Recent Labs     11/28/20  0611   PH 7.461*   PCO2 51.5*   PO2 62.4*   HCO3 35.9*   BE 10.9*   O2SAT 92.2       Laboratory findings:    Complete Blood Count:   Recent Labs     11/28/20  0540 11/29/20  0550 11/30/20  0523   WBC 12.5* 10.3 8.5   HGB 7.4* 6.1* 8.2*   HCT 25.0* 20.0* 26.6*    201 157        Last 3 Blood Glucose:   Recent Labs     11/28/20  0540 11/29/20  0550 11/30/20  0523   GLUCOSE 182* 151* 167*        PT/INR:    Lab Results   Component Value Date    PROTIME 11.7 11/30/2020    INR 1.0 11/30/2020     PTT:    Lab Results   Component Value Date    APTT 24.6 11/30/2020       Comprehensive Metabolic Profile:   Recent Labs     11/28/20  0540 11/29/20  0550 11/30/20  0523    145 144   K 3.3* 4.0 3.2*    104 104   CO2 37* 34* 33*   BUN 38* 40* 40*   CREATININE 0.4* 0.4* 0.3*   GLUCOSE 182* 151* 167*   CALCIUM 8.8 8.0* 7.6*   PROT 6.2* 4.8* 4.8*   LABALBU 2.1* 2.0* 2.0*   BILITOT 1.1 0.8 0.9   ALKPHOS 132* 125* 119*   AST 50* 66* 71*   ALT 43* 43* 46*      Magnesium:   Lab Results   Component Value Date    MG 2.3 11/30/2020     Phosphorus:   Lab Results   Component Value Date    PHOS 3.2 11/30/2020     Ionized Calcium: No results found for: CAION       Troponin:   No results for input(s): TROPONINI in the last 72 hours.     Microbiology:  Cultures during this admission:     Blood cultures:                 [] None drawn [x] Negative             []  Positive (Details:  )  Urine Culture:                   [] None drawn      [x] Negative             []  Positive (Details:  )  Sputum Culture:               [] None drawn       [] Negative             [x]  Positive (Details: 11/24, serratia marcescens, klebsiella pneumoniae, candida albicans)   Endotracheal aspirate:     [] None drawn       [] Negative             [x]  Positive (Details: gram - betsy )     Other pertinent Labs:      Radiology/Imaging:     Chest x-ray:  11/29/20:     Impression    Confluent airspace disease in the posterobasal segment of the left lower lobe    with central air bronchograms, consistent with pneumonia, more extensive than    on the prior examination. Small to moderate left pleural effusion extending into the major fissure. Improvement in multifocal ground-glass airspace disease previously noted    predominantly in the lung bases. Atelectatic changes in the right lung base and smaller right pleural effusion. Mild cardiomegaly. Mild heterogeneous enlargement of the left lobe of the thyroid likely    secondary to thyroid nodules.  Consider further evaluation and follow up with    thyroid ultrasound. Large gallstone. Impression    No evidence of acute intracranial pathology.  The    Age-related loss of brain volume and chronic periventricular ischemic changes. Chronic left basal ganglia lacunar infarction versus prominent perivascular    space, unchanged. Opacification of bilateral mastoid air cells and middle ears, not present on    the prior examination and which may reflect otomastoiditis     11/30   CXR    There is no interval change in the bilateral perihilar and lower lobe    airspace disease more prominent within the left lung base.  Emphysematous    changes are noted. The left-sided PICC line and tracheostomy tube are unchanged in position.       11/29, XRAY KUB:    Nonobstructive bowel gas pattern.  Moderate stool and gas within the colon    Peg tube overlying the stomach        ASSESSMENT:     - Acute respiratory failure with hypoxia, hypercarbia  - COVID-19 pneumonia  - Superimposed bacterial pneumonia  - COPD  -Type 2 diabetes    Additional assessment:    Prolonged QTc ( 625)      PLAN:     WEAN PER PROTOCOL:  [] No   [x] Yes  [] N/A    DISCONTINUE ANY LABS:   [x] No   [] Yes    ICU PROPHYLAXIS:  Stress ulcer:  [x] PPI Agent  [] [id number] [] Sucralfate  [] Other:  VTE:   [x] Enoxaparin  [] Unfract. Heparin Subcut  [] EPC Cuffs    NUTRITION:  [] NPO [] Tube Feeding (Specify: ) [] TPN  [x] PO (Diet: DIET TUBE FEED CONTINUOUS/CYCLIC NPO; Semi-elemental; Gastrostomy; Continuous; 45)    HOME MEDICATIONS RECONCILED: [] No  [x] Yes    INSULIN DRIP:   [x] No   [] Yes    CONSULTATION NEEDED:  [x] No   [] Yes    FAMILY UPDATED:    [] No   [x] Yes     TRANSFER OUT OF ICU:   [x] No   [] Yes       SYSTEMS ASSESSMENT   Of note, I do not see a progress note from the ICU for 11/20. That note was written and signed by Dr. Sedrick Felty. We cannot find it in the computer. Is not in with incomplete notes. Will call the help desk and attempt to retrieve her that note is. Neuro   AMS--most likely secondary to hypercapnia              -CT head negative     Intubated and sedated on the vent              -Continue fentanyl and propofol. Wean sedation as able. -Start as needed Ativan, fentanyl. Was planning to start Seroquel but QTc is prolonged at 625 (11/29)      - 11/30: Wean off fentanyl drip, schedule oxy 5 mg every 6 hours.     Respiratory   Acute hypoxic and hypercapnic respiratory failure secondary to Covid pneumonia              -resp panel + SARS-COV2              -former smoker              -vit c, decadron-- stopped, remdesivir-finished              -Pulmonology following              -procal 2.54   -Failed extubation 11/19  Subsequently reintubated.   -Wean FiO2 as able   -CT chest with evidence of pneumonia   -failed wean   -not tolerating pressure support trials   -trach/peg today    Superimposed bacterial PNA   -elevated procal-2.54   - repeat resp culture gram - rods   -Zosyn stopped day 13. Levaquin stopped. ertapenum per ID      COPD              -O2 PRN and with ambulation @ home, usual 3L              -Brovana, Pulmicort, nebs      - 11/30: continue pressure support trials      Cardiovascular   CTA              -No evidence of PE     Hypotension, shock --   -Levophed as needed to keep MAP greater than 65   -Continue to monitor    -very unpredicatable. Will need 2mcg for map 60 and will then be 951X systolic.   -art line in place in for a few hours then quit working after abg draw   -replace today if still have pressor requirement    - 11/29: No pressor requirements today    Gastrointestinal   Tube feeds thru PEG  PPI  Constipation- enema   - 11/30: No BM, will start lactulose 20 TID. Increase Senna dose, add docusate. Non-obstruction bowel gas pattern on KUB Xray yesterday.        Renal   Up 5.5 liters since admission--Lasix twice daily. Good uo  Hypokalemia- replace     Infectious Disease   COVID PNA              -ID following              -Decadron--finished.      -Remdesivir,-finished. Vitamin C, zinc     Superimposed bacterial PNA   -elevated procal-2.54   -resp culture serratia, candida   -stop zosyn/levaquin. ertapenum and fluconazole       Hematology/Oncology   Lymphopenia              -2/2 covid pna    Anemia   -no signs of bleeding. 1 unit prbc yesterday. H/h stable today   -monitor    Patient had a CTA with no PE, decreased to DVT prophylactic dose of Lovenox    11/29: hemoglobin 6.1 this morning, Lovenox held, 1 unit of PRBCs ordered. Will monitor closely. 11/30: Hemoglobin stable, will restart Lovenox    Endocrine   thyroid nodules  DM -sugars uncontrolled.  monitor      Social/Spiritual/DNR/Other   Full      Diet: DIET TUBE FEED CONTINUOUS/CYCLIC NPO; Semi-elemental; Gastrostomy; Continuous; 45  CODE Status: Full Code    Dispo: maintain ICU level care, LTAC placement soon      [address] Young DO  Resident, PGY-1  11/30/2020  2:09 PM     I personally saw, examined and provided care for the patient. Radiographs, labs and medication list were reviewed by me independently. I spoke with bedside nursing, therapists and consultants. Critical care services and times documented are independent of procedures and multidisciplinary rounds with Residents. Additionally comprehensive, multidisciplinary rounds were conducted with the MICU team. The case was discussed in detail and plans for care were established. Review of Residents documentation was conducted and revisions were made as appropriate. I agree with the above documented exam, problem list and plan of care.   Tri Bhatti MD   CCT excluding procedures:38'

## 2020-11-30 NOTE — PROGRESS NOTES
ID Progress Note                [address] [facility] [address], [place], [address]            Phone [phone]     Fax [phone]      Chief complaint   resp failure    Subjective:  Awake, follows some commands  S/p trach and PEG  More awake  She is off pressors as well as off propofol, only on fentanyl. 60% FiO2 and 6 of PEEP    Objective:    Vitals:    11/29/20 2100   BP: (!) 108/46   Pulse: 101   Resp: 22   Temp:    SpO2: 93%     VENT SETTINGS:   Vent Information  $Ventilation: $Subsequent Day  Skin Assessment: Clean, dry, & intact  Equipment ID: [id number]  Equipment Changed: (S) HME  Vent Type: 840  Vent Mode: AC/VC  Vt Ordered: 450 mL  Rate Set: 22 bmp  Peak Flow: 65 L/min  Pressure Support: 0 cmH20  FiO2 : 55 %  SpO2: 93 %  SpO2/FiO2 ratio: 169.09  Sensitivity: 3  PEEP/CPAP: 6  I Time/ I Time %: 0 s  Humidification Source: HME  Mask Type: Full face mask  Mask Size: Medium  General : awake s/p trach   HEENT:   Round pupils. Minimally reactive. No jaundice. Lungs:    Coarse breath sounds to auscultation bilaterally. No crackles. Heart:   Heart sounds rhythmic and regular. Abdomen:    Round, soft. Benign to palpation. Extremities:  Minimal edema.    Skin:   no rashes or lesions   Left PICC  Lloyd catheter    Labs:  Recent Labs     11/27/20  0528 11/28/20  0540 11/29/20  0550   WBC 11.4 12.5* 10.3   RBC 2.54* 2.55* 2.01*   HGB 7.7* 7.4* 6.1*   HCT 24.0* 25.0* 20.0*   MCV 94.5 98.0 99.5   MCH 30.3 29.0 30.3   MCHC 32.1 29.6* 30.5*   RDW 16.5* 17.6* 18.9*    212 201   MPV 11.1 11.0 11.6     CMP:    Lab Results   Component Value Date     11/29/2020    K 4.0 11/29/2020    K 5.7 11/13/2020     11/29/2020    CO2 34 11/29/2020    BUN 40 11/29/2020    CREATININE 0.4 11/29/2020    CREATININE 0.5 01/23/2019    GFRAA >60 11/29/2020    LABGLOM >60 11/29/2020    GLUCOSE 151 11/29/2020    PROT 4.8 11/29/2020    LABALBU 2.0 11/29/2020    CALCIUM 8.0 11/29/2020    BILITOT 0.8 11/29/2020 ALKPHOS 125 11/29/2020    AST 66 11/29/2020    ALT 43 11/29/2020          Microbiology :  Respiratory panel: SARS-CoV-2 detected  Nares screen MRSA: Negative  Respiratory culture 11/20/2020: Serratia marcescens, Klebsiella pneumoniae    Radiology :      URINARY CATHETER OUTPUT (Lloyd):  Urethral Catheter Temperature probe 16 fr-Output (mL): 400 mL    Assessment and Plan:      · COVID 19 pneumonia causing acute respiratory failure.   She completed 5 days of remdesivir on 11/18/2020   · Superimposed right lower lobe bacterial pneumonia with Serratia and Klebsiella sensitive to Zosyn  · Intermittent leukocytosis associated to the above  · Encephalopathy-sedation versus stroke  · B/L mastoid cell opacification in intubated pt     Plan  - Ertapenem - not planning on long term therapy  - still has intermittent fever, prolonged intubation, candidiasis, started fluconazole, check fungitell-borderline high  Continue anticoagulation  Vit B/thiamine/c  Getting Lasix  Plan for trach and PEG done today      Electronically signed by Jossie Stinson MD on 11/29/2020 at 11:19 PM

## 2020-11-30 NOTE — CARE COORDINATION
COVID POSITIVE 11/13. Vent/ intubated 11/13- attempted extubation 11/19 but required re-intubation- requiring FIO2 55% .  POD # 2 trach/PEG- [facility] @ [facility] notified to initiate precert today (out of network for [facility]). Phone conversation w/ daughter Ana Rosa Mijares on discharge plan to [facility] [address] pending insurance approval- will email vent/ + Sherly [address] [facility] home facility choices to Gil @ [email or web address]  [place] Pod

## 2020-11-30 NOTE — PROGRESS NOTES
DAILY VENTILATOR WEANING ASSESSMENT PERFORMED    P/FIO2 Ratio =     NA   (<100= do not Wean)                  Cs =                 33         (<32= Instability)  Plat. Pressure = 18  MV =  RSBI =    Instabilities:       Cardiovascular =       CNS =       Respiratory =       Metabolic =    Parameters    no    Wean per protocol  yes    Ask Physician for a weaning plan yes    Additional Comments:   Ask during rounds  Performed by Thong Gardner RRT      Reference Table:    Cardiovascular     CNS      1. Mean BP less than or equal to 75   1. Neuromuscular blockade  2. Heart Rate greater than 130   2. RASS of -3, -4, -5  3. Myocardial Ischemia    3. RASS of +3, +4  4. Mechanical Assist Device    4. ICP greater than 15 or             Intracranial Hypertension         Respiratory      Metabolic  1. PEEP equal to or greater than 10cm/H20  1. Temp. (8hrs) less than 95 or > 103  2. Respiratory Rate greater than 35   2. WBC < 5000 or > 30474  3. Minute Volume greater than 15L  4. pH less than 7.30  5.  Deteriorating chest X-ray

## 2020-11-30 NOTE — PROGRESS NOTES
Event Note    · Transfer to the ICU is noted. We will follow peripherally while in the ICU, once transferred, will resume care. Tiffany Penn. Clarisse Avalos M.D., F.C.C.P.     [facility], [address] [place] [address], [address] [place] [address], [facility]

## 2020-11-30 NOTE — PATIENT CARE CONFERENCE
Intensive Care Daily Quality Rounding Checklist        ICU Team Members: Dr. Snow Serna, Loi Nino & Romy (residents), charge nurse, bedside nurse, respiratory therapist, clinical pharmacist      ICU Day #: NUMBER: 18     Intubation Date: November 13, 2020     Ventilator Day #: NUMBER: 18     Central Line Insertion Date: PICC November 20, 2020                                                    Day #: NUMBER: 11      Arterial Line Insertion Date:  n/a                             Day #: n/a     DVT Prophylaxis: Lovenox    GI Prophylaxis: tube feed, protonix     Lloyd Catheter Insertion Irena Hew, 2020                                        Day #: 18                             Continued need (if yes, reason documented and discussed with physician): yes, strict I & O     Skin Issues/ Wounds and ordered treatment discussed on rounds: none, sos precautions     Goals/ Plans for the Day: Daily labs, wean sedation to off, to LTAC vs. Stepdown floor soon

## 2020-12-01 NOTE — PROGRESS NOTES
.         Critical Care Team - Daily Progress Note         Date and time: 12/1/2020 11:55 AM  Patient's name:  Soco Cobb  Medical Record Number: [id number]  Patient's account/billing number: [de-identified]  Patient's YOB: 1945  Age: 67 y.o. Date of Admission: 11/13/2020  1:46 PM  Length of stay during current admission: 18      Primary Care Physician: Tato Guzman MD  ICU Attending Physician: Dr. Enid Gardner    Code Status: Full Code    Reason for ICU admission: Acute hypercapnic hypoxic respiratory failure secondary to Covid pneumonia      SUBJECTIVE:     BRIEF HISTORY: The patient is a 67 y.o. female with significant past medical history of COPD, diabetes, hypertension, hyperlipidemia presented to the ED for shortness of breath. Patient is currently intubated and sedated so history obtained from chart review.     Per emergency department, patient had history of 3 days of worsening shortness of breath. She is chronically on 3 L of oxygen at home when she ambulates but over the last few days has had increase her oxygen requirement to 5 L at all times. 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 She subsequently presented to the ED for evaluation.     Patient was found to be hypoxic and and placed on nasal cannula oxygen. Patient was Positive for COVID-19. While in the emergency department patient was placed on a nonrebreather and subsequently decompensated. She became altered and was taking agonal respirations. She was then intubated for further airway protection. ABG demonstrated respiratory acidosis with a CO2 of greater than 113. Patient was admitted to the ICU for further evaluation care. OVERNIGHT EVENTS:    11/15/20:  Some soft BP's overnight, responded to fluid bolus and switched from propofol gtt to versed gtt  11/16/20: Tania Birch, tolerating vent.  Attempt to wean sedation and pressure support  11/17/20: Tolerating pressure support, off or Versed  11/18/20: Attempt pressure support trials. Patient becoming tachycardic and hypertensive  11/19/20: Attempted extubation. Patient less than 30 minutes. She became tachypneic, tachycardic and not tolerating secretions. Patient subsequently reintubated  11/20/20: Lauren Oka pressors overnight, agitated on the vent. 11/21/20: Intermittently required pressors overnight. Still hyperglycemic.  11/22/20: pressures all over the place. intermittant Levo. HTN with agitation  11/23/20: Hypotensive overnight. Levo restarted. Oxygenating well. 11/24/20: On and off levo overnight. Fevers overnight  11/25/20: off of pressors. Pressure support trials  11/26/20: intermittently back on pressors. Still no BM. Agitated when sedation off. H/h fall, received 1 unit  11-27-20: still with intermittant pressor requirement. Low grade fever 100.3  11/28/20: off pressors overnight, art line worked for 4hr then stopped after abg was drawn, it was removed. Trach/peg today    11/29: Low hemoglobin this morning, 1 unit of blood ordered. Trach/PEG yesterday. 11/30: No acute events overnight, mild hypokalemia this morning, repleted. No pressors, just on fentanyl gtt this morning, will wean this and schedule oxycodone. Fever last night, 100.2 F.     12/1: Patient still having fevers, 100.9 at 0800, will panculture, and change the PICC line.   Patient has still not had a bowel movement, will obtain CT abdomen pelvis with oral and IV contrast.     CURRENT VENTILATION STATUS:     [x] Ventilator  [] BIPAP  [] Nasal Cannula [] Room Air      IF INTUBATED, ET TUBE MARKING AT LOWER LIP:       SECRETIONS : Amount:  [x] Small [] Moderate  [] Large  [] None  Color:     [] White [x] Colored  [] Bloody    SEDATION:    [] Propofol gtt  [x] fentanyl gtt  [] Ativan gtt   [] No Sedation    PARALYZED:  [x] No    [] Yes      VASOPRESSORS:  [x] No    [] Yes    If yes -   [] Levophed       [] Dopamine     [] Vasopressin       [] Dobutamine  [] Phenylephrine         [] Epinephrine    CENTRAL LINES:     [] No   [x] Yes   (Date of Insertion: PICC)           If yes -     [] Right IJ     [] Left IJ [] Right Femoral [] Left Femoral                   [] Right Subclavian [] Left Subclavian       RAM'S CATHETER:   [] No   [x] Yes  (Date of Insertion:   )     URINE OUTPUT:            [x] Good   [] Low              [] Anuric      OBJECTIVE:     VITAL SIGNS:  BP (!) 129/52   Pulse 89   Temp 99.5 °F (37.5 °C) (Bladder)   Resp 22   Ht 5' 6\" (1.676 m)   Wt 158 lb 4.6 oz (71.8 kg)   SpO2 93%   BMI 25.55 kg/m²   Tmax over 24 hours:  Temp (24hrs), Av.1 °F (37.8 °C), Min:98.8 °F (37.1 °C), Max:100.9 °F (38.3 °C)      Patient Vitals for the past 6 hrs:   BP Temp Temp src Pulse Resp SpO2   20 1000 (!) 129/52 99.5 °F (37.5 °C) Bladder 89 22 93 %   20 0900 (!) 109/53 -- -- 100 22 91 %   20 0800 134/74 100.9 °F (38.3 °C) Bladder 108 22 --   20 0754 -- -- -- 103 22 92 %   20 0753 -- -- -- -- 21 92 %   20 0752 -- -- -- -- 21 93 %   20 0600 (!) 117/51 -- -- 110 25 96 %         Intake/Output Summary (Last 24 hours) at 2020 1155  Last data filed at 2020 0800  Gross per 24 hour   Intake 1375.05 ml   Output 1645 ml   Net -269.95 ml     Wt Readings from Last 2 Encounters:   20 158 lb 4.6 oz (71.8 kg)   10/29/20 140 lb (63.5 kg)     Body mass index is 25.55 kg/m². PHYSICAL EXAM:  General: Intubated  Eyes:  Pupils equal and reactive  Ears: no obvious scars, no lesions, no masses  Mouth: ET tube in place  Head: normocephalic, atraumatic  Neck: no JVD, no adenopathy, no thyromegaly, neck is supple, trachea is midline.   Chest: no pain on palpation  Lungs: Clear to auscultation bilaterally , no wheezes, rhonchi  Heart: regular rate and regular rhythm, no murmur, normal S1, S2  Abdomen: Mildly distended, somewhat soft, non-tender, round  Extremities: no lower extremity edema, 2+ edema b/l uper extrmities  Skin: normal color, normal texture, normal turgor, no rashes, no lesions  Neurologic:  will open eyes and stare.   Spontaneously moves all extremities, not following commands    MEDICATIONS:    Scheduled Meds:   methylnaltrexone  12 mg Subcutaneous Once    potassium chloride  40 mEq Intravenous Once    fentaNYL  1 patch Transdermal Q72H    lansoprazole  30 mg Per G Tube QAM AC    lactulose  20 g Oral TID    sennosides-docusate sodium  2 tablet Oral BID    fluconazole  200 mg Oral Daily    ertapenem (INVanz) IVPB  1 g Intravenous Q24H    insulin glargine  25 Units Subcutaneous BID    heparin flush  3 mL Intravenous 2 times per day    insulin lispro  0-18 Units Subcutaneous Q6H    [Held by provider] enoxaparin  40 mg Subcutaneous Daily    Arformoterol Tartrate  15 mcg Nebulization BID    budesonide  0.5 mg Nebulization BID    ipratropium-albuterol  1 ampule Inhalation Q4H    sodium chloride flush  10 mL Intravenous 2 times per day    vitamin B and C  1 tablet Oral QAM     Continuous Infusions:   fentaNYL 5 mcg/ml in 0.9%  ml infusion 75 mcg/hr (12/01/20 0600)    dextrose       PRN Meds:   LORazepam, 2 mg, Q2H PRN  fentanNYL, 50 mcg, Q2H PRN  sodium phosphate IVPB, 0.16 mmol/kg, PRN    Or  sodium phosphate IVPB, 0.32 mmol/kg, PRN  potassium chloride, 20 mEq, PRN  magnesium sulfate, 1 g, PRN  sodium chloride flush, 10 mL, PRN  heparin flush, 3 mL, PRN  albuterol sulfate HFA, 2 puff, Q6H PRN  acetaminophen, 650 mg, Q6H PRN    Or  acetaminophen, 650 mg, Q6H PRN  polyethylene glycol, 17 g, Daily PRN  promethazine, 12.5 mg, Q6H PRN    Or  ondansetron, 4 mg, Q6H PRN  glucose, 15 g, PRN  dextrose, 12.5 g, PRN  glucagon (rDNA), 1 mg, PRN  dextrose, 100 mL/hr, PRN        VENT SETTINGS (Comprehensive) (if applicable):  Vent Information  $Ventilation: $Subsequent Day  Skin Assessment: Clean, dry, & intact  Equipment ID: [id number]  Equipment Changed: HME  Vent Type: 840  Vent Mode: AC/VC  Vt Ordered: 450 mL  Rate Set: 22 bmp  Peak Flow: 65 L/min  Pressure Support: 0 cmH20  FiO2 : 55 %  SpO2: 93 %  SpO2/FiO2 ratio: 167.27  Sensitivity: 3  PEEP/CPAP: 6  I Time/ I Time %: 0 s  Humidification Source: HME  Mask Type: Full face mask  Mask Size: Medium  Additional Respiratory  Assessments  Pulse: 89  Resp: 22  SpO2: 93 %  $End Tidal CO2: 36  Position: Semi-Hunt's  Humidification Source: HME  Oral Care: Lip moisturizer applied, Mouth swabbed, Mouth moisturizer, Mouth suctioned, Suction toothette  Subglottic Suction Done?: No  Cuff Pressure (cm H2O): 29 cm H2O    ABGs:   No results for input(s): PH, PCO2, PO2, HCO3, BE, O2SAT in the last 72 hours. Laboratory findings:    Complete Blood Count:   Recent Labs     11/29/20  0550 11/30/20  0523 12/01/20  0537   WBC 10.3 8.5 10.3   HGB 6.1* 8.2* 7.1*   HCT 20.0* 26.6* 24.1*    157 179        Last 3 Blood Glucose:   Recent Labs     11/29/20  0550 11/30/20  0523 12/01/20  0537   GLUCOSE 151* 167* 123*        PT/INR:    Lab Results   Component Value Date    PROTIME 12.5 12/01/2020    INR 1.1 12/01/2020     PTT:    Lab Results   Component Value Date    APTT 28.7 12/01/2020       Comprehensive Metabolic Profile:   Recent Labs     11/29/20  0550 11/30/20  0523 12/01/20  0537    144 146   K 4.0 3.2* 3.6    104 104   CO2 34* 33* 36*   BUN 40* 40* 33*   CREATININE 0.4* 0.3* 0.3*   GLUCOSE 151* 167* 123*   CALCIUM 8.0* 7.6* 7.9*   PROT 4.8* 4.8* 5.1*   LABALBU 2.0* 2.0* 2.1*   BILITOT 0.8 0.9 0.9   ALKPHOS 125* 119* 128*   AST 66* 71* 80*   ALT 43* 46* 57*      Magnesium:   Lab Results   Component Value Date    MG 2.2 12/01/2020     Phosphorus:   Lab Results   Component Value Date    PHOS 3.0 12/01/2020     Ionized Calcium: No results found for: CAION       Troponin:   No results for input(s): TROPONINI in the last 72 hours.     Microbiology:  Cultures during this admission:     Blood cultures:                 [] None drawn and gas within the colon    Peg tube overlying the stomach        ASSESSMENT:     - Acute respiratory failure with hypoxia, hypercarbia  - COVID-19 pneumonia  - Superimposed bacterial pneumonia  - COPD  -Type 2 diabetes    Additional assessment:    Prolonged QTc ( 625)      PLAN:     WEAN PER PROTOCOL:  [] No   [x] Yes  [] N/A    DISCONTINUE ANY LABS:   [x] No   [] Yes    ICU PROPHYLAXIS:  Stress ulcer:  [x] PPI Agent  [] [id number] [] Sucralfate  [] Other:  VTE:   [x] Enoxaparin  [] Unfract. Heparin Subcut  [] EPC Cuffs    NUTRITION:  [] NPO [] Tube Feeding (Specify: ) [] TPN  [x] PO (Diet: DIET TUBE FEED CONTINUOUS/CYCLIC NPO; Semi-elemental; Gastrostomy; Continuous; 45)    HOME MEDICATIONS RECONCILED: [] No  [x] Yes    INSULIN DRIP:   [x] No   [] Yes    CONSULTATION NEEDED:  [x] No   [] Yes    FAMILY UPDATED:    [] No   [x] Yes     TRANSFER OUT OF ICU:   [x] No   [] Yes       SYSTEMS ASSESSMENT   Of note, I do not see a progress note from the ICU for 11/20. That note was written and signed by Dr. Isadora Lambert. We cannot find it in the computer. Is not in with incomplete notes. Will call the help desk and attempt to retrieve her that note is. Neuro   AMS--most likely secondary to hypercapnia              -CT head negative     Intubated and sedated on the vent              -Continue fentanyl and propofol. Wean sedation as able. -Start as needed Ativan, fentanyl. Was planning to start Seroquel but QTc is prolonged at 625 (11/29)      - 11/30: Wean off fentanyl drip, schedule oxy 5 mg every 6 hours.   -12/1-continue fentanyl wean, change to patch    Respiratory   Acute hypoxic and hypercapnic respiratory failure secondary to Covid pneumonia              -resp panel + SARS-COV2              -former smoker              -vit c, decadron-- stopped, remdesivir-finished              -Pulmonology following              -procal 2.54   -Failed extubation 11/19  Subsequently reintubated.   -Wean FiO2 as able   -CT chest with evidence of pneumonia   -failed wean   -not tolerating pressure support trials   -trach/peg today    Superimposed bacterial PNA   -elevated procal-2.54   - repeat resp culture gram - rods   -Zosyn stopped day 13. Levaquin stopped. ertapenum per ID      COPD              -O2 PRN and with ambulation @ home, usual 3L              -Wade Rivera, nebs      - 11/30: continue pressure support trials   - 12/1: Not tolerating weaning trials, will wean fentanyl, and change to patch      Cardiovascular   CTA              -No evidence of PE     Hypotension, shock --   -Levophed as needed to keep MAP greater than 65   -Continue to monitor    -very unpredicatable. Will need 2mcg for map 60 and will then be 227P systolic.   -art line in place in for a few hours then quit working after abg draw   -replace today if still have pressor requirement    - 11/29: No pressor requirements today    Gastrointestinal   Tube feeds thru PEG  PPI  Constipation- enema   - 11/30: No BM, will start lactulose 20 TID. Increase Senna dose, add docusate. Non-obstruction bowel gas pattern on KUB Xray yesterday.        Renal   Up 5.5 liters since admission--Lasix twice daily. Good uo, no longer appears fluid overloaded, DC Lasix. Hypokalemia- replace     Infectious Disease   COVID PNA              -ID following              -Decadron--finished.      -Remdesivir,-finished. Vitamin C, zinc     Superimposed bacterial PNA   -elevated procal-2.54   -resp culture serratia, candida   -stop zosyn/levaquin. ertapenum and fluconazole    12/1-still having fevers, panculture, and change PICC line     Hematology/Oncology   Lymphopenia              -2/2 covid pna    Anemia   -no signs of bleeding. 1 unit prbc yesterday. H/h stable today   -monitor    Patient had a CTA with no PE, decreased to DVT prophylactic dose of Lovenox    11/29: hemoglobin 6.1 this morning, Lovenox held, 1 unit of PRBCs ordered. Will monitor closely.      11/30: Hemoglobin stable, will restart Lovenox    12/1: Hemoglobin 7.1 today, Lovenox held, continue to monitor. Unable to obtain stool guaiac as patient has not had a bowel movement. Endocrine   thyroid nodules  DM -sugars uncontrolled. monitor      Social/Spiritual/DNR/Other   Full    1. Weaning sedation, transitioning to fentanyl patch, continue vent weaning trials  2. Recurrent Fevers-change PICC line, panculture  3. Constipation-no reported bowel movement since admission, will give methylnaltrexone, and obtain CT abdomen pelvis with oral and IV contrast, tube feeds held  3. Anemia-globin 7.1 today, Lovenox held, will continue to monitor    Diet: DIET TUBE FEED CONTINUOUS/CYCLIC NPO; Semi-elemental; Gastrostomy; Continuous; 45  CODE Status: Full Code    Dispo: maintain ICU level care, LTAC placement soon      Sariah Suárez MD  Resident, PGY-1  12/1/2020  11:55 AM     I personally saw, examined and provided care for the patient. Radiographs, labs and medication list were reviewed by me independently. I spoke with bedside nursing, therapists and consultants. Critical care services and times documented are independent of procedures and multidisciplinary rounds with Residents. Additionally comprehensive, multidisciplinary rounds were conducted with the MICU team. The case was discussed in detail and plans for care were established. Review of Residents documentation was conducted and revisions were made as appropriate. I agree with the above documented exam, problem list and plan of care.   Maryam Calvo MD   CCT excluding procedures:40'

## 2020-12-01 NOTE — PATIENT CARE CONFERENCE
Intensive Care Daily Quality Rounding Checklist        ICU Team Members: Dr. Brad Sanchez, Loi Nino & Romy (residents), charge nurse, bedside nurse, respiratory therapist, clinical pharmacist      ICU Day #: NUMBER: 19     Intubation Date: November 13, 2020     Ventilator Day #: NUMBER: 19     Central Line Insertion Date: PICC November 20, 2020                                                    Day #: NUMBER: 12      Arterial Line Insertion Date:  n/a                             Day #: n/a     DVT Prophylaxis: Lovenox    GI Prophylaxis: tube feed, protonix     Lloyd Catheter Insertion Kyung Salomon, 2020                                        Day #: 19                             Continued need (if yes, reason documented and discussed with physician): yes, strict I & O     Skin Issues/ Wounds and ordered treatment discussed on rounds: none, sos precautions     Goals/ Plans for the Day: Daily labs, wean sedation to off, to LTAC vs. Stepdown floor soon, re-culture patient, check CT abdomen/ pelvis today

## 2020-12-01 NOTE — CARE COORDINATION
COVID POSITIVE 11/13. Vent/ intubated 11/13- failed extubation 11/19- re-intubated- requiring FIO2 55% . POD # 3 trach/PEG-awaiting insurance auth for [facility] - initiated 11/30 (out of network for [place]). Info on [facility] in [facility] [employer] that accepts vents and COVID + patients emailed to daughter Deisy Aleman yesterday in case denied for LTAC. No bed available at this time @ [facility] which also takes vents and COVID + patients.  Will follow Fidelia Trinh

## 2020-12-01 NOTE — PROGRESS NOTES
ID Progress Note                [address] [facility] [address], [place], [address]            Phone [phone]     Fax [phone]      Chief complaint   resp failure    Subjective:  Awake, follows some commands  S/p trach and PEG  More awake  She is off pressors as well as off propofol, only on fentanyl. 60% FiO2 and 6 of PEEP    Objective:    Vitals:    12/01/20 1609   BP:    Pulse:    Resp: (!) 32   Temp:    SpO2: 93%     VENT SETTINGS:   Vent Information  $Ventilation: $Subsequent Day  Skin Assessment: Clean, dry, & intact  Equipment ID: [id number]  Equipment Changed: HME  Vent Type: 840  Vent Mode: AC/VC  Vt Ordered: 450 mL  Rate Set: 22 bmp  Peak Flow: 65 L/min  Pressure Support: 0 cmH20  FiO2 : 55 %  SpO2: 93 %  SpO2/FiO2 ratio: 169.09  Sensitivity: 3  PEEP/CPAP: 6  I Time/ I Time %: 0 s  Humidification Source: HME  Mask Type: Full face mask  Mask Size: Medium  General : awake s/p trach   HEENT:   Round pupils. Minimally reactive. No jaundice. Lungs:    Coarse breath sounds to auscultation bilaterally. No crackles. Heart:   Heart sounds rhythmic and regular. Abdomen:    Round, soft. Benign to palpation. Extremities:  Minimal edema.    Skin:   no rashes or lesions   Left PICC  Lloyd catheter    Labs:  Recent Labs     11/29/20  0550 11/30/20  0523 12/01/20  0537   WBC 10.3 8.5 10.3   RBC 2.01* 2.72* 2.40*   HGB 6.1* 8.2* 7.1*   HCT 20.0* 26.6* 24.1*   MCV 99.5 97.8 100.4*   MCH 30.3 30.1 29.6   MCHC 30.5* 30.8* 29.5*   RDW 18.9* 19.9* 20.4*    157 179   MPV 11.6 11.4 11.1     CMP:    Lab Results   Component Value Date     12/01/2020    K 3.6 12/01/2020    K 5.7 11/13/2020     12/01/2020    CO2 36 12/01/2020    BUN 33 12/01/2020    CREATININE 0.3 12/01/2020    CREATININE 0.5 01/23/2019    GFRAA >60 12/01/2020    LABGLOM >60 12/01/2020    GLUCOSE 123 12/01/2020    PROT 5.1 12/01/2020    LABALBU 2.1 12/01/2020    CALCIUM 7.9 12/01/2020    BILITOT 0.9 12/01/2020    ALKPHOS 128 12/01/2020    AST 80 12/01/2020    ALT 57 12/01/2020          Microbiology :  Respiratory panel: SARS-CoV-2 detected  Nares screen MRSA: Negative  Respiratory culture 11/20/2020: Serratia marcescens, Klebsiella pneumoniae    Radiology :      URINARY CATHETER OUTPUT (Lloyd):  Urethral Catheter Temperature probe 16 fr-Output (mL): 50 mL    Assessment and Plan:      · COVID 19 pneumonia causing acute respiratory failure.   She completed 5 days of remdesivir on 11/18/2020   · Superimposed right lower lobe bacterial pneumonia with Serratia and Klebsiella sensitive to Zosyn  · Intermittent leukocytosis associated to the above  · Encephalopathy-sedation versus stroke  · B/L mastoid cell opacification in intubated pt     Plan  - Ertapenem - not planning on long term therapy  - still has intermittent fever, prolonged intubation, candidiasis, on fluconazole, fungitell-borderline high  Continue anticoagulation  Vit B/thiamine/C  Plan for trach and PEG done today  picc line will be changed- send tip for culture  She has been pancultures    No change from ID perspective    Electronically signed by Maryanne Castano MD on 12/1/2020 at 4:13 PM

## 2020-12-01 NOTE — PROGRESS NOTES
[facility] Progress Note    Admitting Date and Time: 11/13/2020  1:46 PM  Admit Dx: Acute respiratory failure with hypoxia (HCC) [J96.01]    Subjective:  Patient is being followed for Acute respiratory failure with hypoxia ([address] [J96.01]    Pt is intubated and sedated, trach and peg in place.   Currently off of pressors  ROS: patient intubated & sedated     methylnaltrexone  12 mg Subcutaneous Once    potassium chloride  40 mEq Intravenous Once    fentaNYL  1 patch Transdermal Q72H    lansoprazole  30 mg Per G Tube QAM AC    lactulose  20 g Oral TID    sennosides-docusate sodium  2 tablet Oral BID    fluconazole  200 mg Oral Daily    ertapenem ([employer]Vanz) IVPB  1 g Intravenous Q24H    insulin glargine  25 Units Subcutaneous BID    heparin flush  3 mL Intravenous 2 times per day    insulin lispro  0-18 Units Subcutaneous Q6H    [Held by provider] enoxaparin  40 mg Subcutaneous Daily    Arformoterol Tartrate  15 mcg Nebulization BID    budesonide  0.5 mg Nebulization BID    ipratropium-albuterol  1 ampule Inhalation Q4H    sodium chloride flush  10 mL Intravenous 2 times per day    vitamin B and C  1 tablet Oral QAM     LORazepam, 2 mg, Q2H PRN  fentanNYL, 50 mcg, Q2H PRN  sodium phosphate IVPB, 0.16 mmol/kg, PRN    Or  sodium phosphate IVPB, 0.32 mmol/kg, PRN  potassium chloride, 20 mEq, PRN  magnesium sulfate, 1 g, PRN  sodium chloride flush, 10 mL, PRN  heparin flush, 3 mL, PRN  albuterol sulfate HFA, 2 puff, Q6H PRN  acetaminophen, 650 mg, Q6H PRN    Or  acetaminophen, 650 mg, Q6H PRN  polyethylene glycol, 17 g, Daily PRN  promethazine, 12.5 mg, Q6H PRN    Or  ondansetron, 4 mg, Q6H PRN  glucose, 15 g, PRN  dextrose, 12.5 g, PRN  glucagon (rDNA), 1 mg, PRN  dextrose, 100 mL/hr, PRN         Objective:    BP (!) 129/52   Pulse 89   Temp 99.5 °F (37.5 °C) (Bladder)   Resp 22   Ht 5' 6\" (1.676 m)   Wt 158 lb 4.6 oz (71.8 kg)   SpO2 93%   BMI 25.55 kg/m²     Due to the patient's COVID-19-positive status, to reduce exposure, contamination, and in an effort to conserve PPE, this patient was evaluated through a combination of review of the medical record, nursing assessments, other physicians' exams and assessments, as well as telemedicine interaction. Recent Labs     11/29/20  0550 11/30/20  0523 12/01/20  0537    144 146   K 4.0 3.2* 3.6    104 104   CO2 34* 33* 36*   BUN 40* 40* 33*   CREATININE 0.4* 0.3* 0.3*   GLUCOSE 151* 167* 123*   CALCIUM 8.0* 7.6* 7.9*       Recent Labs     11/29/20  0550 11/30/20  0523 12/01/20  0537   WBC 10.3 8.5 10.3   RBC 2.01* 2.72* 2.40*   HGB 6.1* 8.2* 7.1*   HCT 20.0* 26.6* 24.1*   MCV 99.5 97.8 100.4*   MCH 30.3 30.1 29.6   MCHC 30.5* 30.8* 29.5*   RDW 18.9* 19.9* 20.4*    157 179   MPV 11.6 11.4 11.1       Radiology:   EXAMINATION:    ONE XRAY VIEW OF THE CHEST    11/25/2020 6:58 am    COMPARISON:    11/23/2020    HISTORY:    ORDERING SYSTEM PROVIDED HISTORY: resp failure    TECHNOLOGIST PROVIDED HISTORY:    Reason for exam:->resp failure    FINDINGS:    Endotracheal tube and nasogastric tube are unchanged. [address] line is unchanged. Bilateral infiltrates are unchanged, left more than right. Jony Beltrán is an    unchanged small left pleural effusion. There is no pneumothorax.  The         Impression    Unchanged bilateral infiltrates and left pleural effusion. CT scan chest w/o contrast 11/24/20:    Impression    Confluent airspace disease in the posterobasal segment of the left lower lobe    with central air bronchograms, consistent with pneumonia, more extensive than    on the prior examination. Small to moderate left pleural effusion extending into the major fissure. Improvement in multifocal ground-glass airspace disease previously noted    predominantly in the lung bases. Atelectatic changes in the right lung base and smaller right pleural effusion. Mild cardiomegaly.     Mild heterogeneous enlargement of the left lobe of the thyroid likely    secondary to thyroid nodules.  Consider further evaluation and follow up with    thyroid ultrasound. Large gallstone. Assessment:    Active Problems:    Acute respiratory failure with hypoxia (HCC)  Resolved Problems:    * No resolved hospital problems. *      Plan:  1. Acute respiratory failure with hypoxia and hypercapnea  -  2/2 COVID 19 pneumonia (positive 11/13/20)  -  Decadron therapy  -  Also superimposed bacterial pneumonia treated with abx  -  Patient now with tracheostomy- pulm/cc management  -  ID on board:  Patient was on Zosyn x 14 days-now stopped, Levaquin given x 3 days. Now on Invanz and fluconazole  -  Patient did receive remdesivir and decadron  -  Failed extubation 11/19-reintubated  -  No evidence for PE on CTA chest  -  lovenox was decreased to DVT prophylatic dosing     2. COVID 19 pneumonia  -  Completed remdesivir and decadron  -  Continue supportive care    3. Bacterial pneumonia  -  ID following  -  Procalcitonin 2.54 on 11/14  -  invanz and fluconazole per ID    4. Severe emphysema  -  Follows with Dr. Aracelis Silva as outpatient and was on 3 L O2 continuously at home prior to admission- he is consulted  -  Former smoker  -  rosalva Teixeira    5. DM2-  Continue lantus, ISS, BS checks    6. Essential hypertension- with hypotension secondary to severe illness; no longer requiring Levophed (keep MAP >65)  -  Arterial line placed 11/27/20, then lost; not currently on pressors, will monitor    7. Mixed hyperlipidemia-previsouly on pravachol    8. Leukocytosis- improved; WBC now 10.3  -  ID continues to follow    9. AMS-2/2 hypercapnea; waxing and waning  -  CT head negative  -  Wean sedation as tolerated    10. Hypoalbuminemia  -  Tube feeds- PEG     11. Electrolyte abnormalities-replete as needed        NOTE: This report was transcribed using voice recognition software.  Every effort was made to ensure accuracy; however, inadvertent computerized transcription errors may be present.   Electronically signed by She Peterson MD on 12/1/2020 at 10:13 AM

## 2020-12-02 NOTE — DISCHARGE SUMMARY
[facility] Physician Discharge Summary       No follow-up provider specified. Activity level: Bedridden; TRACH/peg    Dispo:LTAC      Condition on discharge: Stable     Patient ID:  Adrianne Mccoy  [id number]  59 y.o.  1945    Admit date: 11/13/2020    Discharge date and time:  12/2/2020  1:33 PM    Admission Diagnoses: Active Problems:    Acute respiratory failure with hypoxia (HCC)  Resolved Problems:    * No resolved hospital problems. *      Discharge Diagnoses: Active Problems:    Acute respiratory failure with hypoxia (HCC)  Resolved Problems:    * No resolved hospital problems. *      Consults:  IP CONSULT TO INFECTIOUS DISEASES  IP CONSULT TO PULMONOLOGY  IP CONSULT TO CRITICAL CARE  IP CONSULT TO PHARMACY  IP CONSULT TO PULMONOLOGY  IP CONSULT TO IV TEAM  IP CONSULT TO IV TEAM  IP CONSULT TO GENERAL SURGERY  IP CONSULT TO PHARMACY  IP CONSULT TO IV TEAM  IP CONSULT TO PALLIATIVE CARE    Procedures:   Intubation: 11/13/20  Arterial line placement:  11/27/20  Intubation:  11/19/20  PEG placement:  11/28/20  Tracheostomy placement:  11/28/20    Hospital Course:   Patient Adrianne Mccoy is a 67 y.o. presented with Acute respiratory failure with hypoxia ([facility] [address] [J96.01]    1. Acute respiratory failure with hypoxia and hypercapnea  -  2/2 COVID 19 pneumonia (positive 11/13/20)  -  Decadron therapy  -  Also superimposed bacterial pneumonia treated with abx  -  Patient now with tracheostomy- pulm/cc management  -  ID on board:  Patient was on Zosyn x 14 days-then stopped, Levaquin given x 3 days. Now on Invanz and fluconazole  -  Patient did receive remdesivir and decadron  -  Failed extubation 11/19-reintubated  -  No evidence for PE on CTA chest  -  lovenox was decreased to DVT prophylatic dosing     2. COVID 19 pneumonia  -  Completed remdesivir and decadron  -  Continue supportive care     3.   Bacterial pneumonia  -  ID consulted  -  Procalcitonin 2.54 on 11/14  -  invanz and fluconazole per ID     4.  Severe emphysema  -  Follows with Dr. Omer Liu as outpatient and was on 3 L O2 continuously at home prior to admission- he was consulted  -  Former smoker  -  rosalva Cabrera     5. DM2-  Continue lantus, ISS, BS checks     6.  Essential hypertension- with hypotension secondary to severe illness; no longer requiring Levophed (keep MAP >65)  -  Arterial line placed 11/27/20, then lost; not currently on pressors  -  Continue to monitor BP     7. Mixed hyperlipidemia-previously on pravachol     8.  Leukocytosis- improved; WBC now 9.1  -  ID continues to follow     9. AMS-2/2 hypercapnea; waxing and waning  -  CT head negative  -  Wean sedation as tolerated- now on fentanyl patch     10. Hypoalbuminemia  -  Tube feeds- PEG      11. Electrolyte abnormalities-replete as needed    12. Anemia- stable with hgb 7.2 on 12/2/20    [facility]    Discharge Exam:    General Appearance: INTUBATED AND SEDATED  Skin: warm and dry  Head: normocephalic and atraumatic  Neck: neck supple and non tender without mass   Pulmonary/Chest: reduced to auscultation bilaterally  Cardiovascular: normal rate  Extremities: no cyanosis, no clubbing and no edema    I/O last 3 completed shifts:   In: 1796 [I.V.:367; NG/GT:668; IV Piggyback:150]  Out: 3113 [Urine:1225]  I/O this shift:  In: -   Out: 405 [Urine:405]      LABS:  Recent Labs     11/30/20  0523 12/01/20  0537 12/02/20  0510    146 142   K 3.2* 3.6 4.0    104 102   CO2 33* 36* 34*   BUN 40* 33* 25*   CREATININE 0.3* 0.3* 0.3*   GLUCOSE 167* 123* 139*   CALCIUM 7.6* 7.9* 8.7       Recent Labs     11/30/20  0523 12/01/20  0537 12/01/20  1707 12/02/20  0510   WBC 8.5 10.3  --  9.1   RBC 2.72* 2.40*  --  2.39*   HGB 8.2* 7.1* 7.4* 7.2*   HCT 26.6* 24.1* 25.3* 24.4*   MCV 97.8 100.4*  --  102.1*   MCH 30.1 29.6  --  30.1   MCHC 30.8* 29.5*  --  29.5*   RDW 19.9* 20.4*  --  21.1*   PLT Osseous mineralization is decreased. Osseous and thoracic soft tissue structures demonstrate no acute findings. 1.  When compared to prior chest series there has been interval worsening in the retrocardiac and bibasilar interstitial opacities. (Broad differential which includes worsening interstitial lung disease, superimposed infection, and edema. Please correlate with clinical presentation.) 2. Lung hyperinflation and coarse interstitial markings suggestive of underlying COPD. 3.  Atherosclerotic disease. Cta Pulmonary W Contrast    Result Date: 11/13/2020  EXAMINATION: CTA OF THE CHEST 11/13/2020 6:00 pm TECHNIQUE: CTA of the chest was performed after the administration of intravenous contrast.  Multiplanar reformatted images are provided for review. MIP images are provided for review. Dose modulation, iterative reconstruction, and/or weight based adjustment of the mA/kV was utilized to reduce the radiation dose to as low as reasonably achievable. COMPARISON: 11/06/2019 HISTORY: ORDERING SYSTEM PROVIDED HISTORY: concern for covid TECHNOLOGIST PROVIDED HISTORY: Reason for exam:->concern for covid FINDINGS: Pulmonary Arteries: Pulmonary arteries are adequately opacified for evaluation. No evidence of intraluminal filling defect to suggest pulmonary embolism. Main pulmonary artery is normal in caliber. Mediastinum: No evidence of mediastinal lymphadenopathy. The heart is enlarged. There is no pericardial effusion. Lungs/pleura: Advanced emphysematous changes are noted. The centrilobular emphysematous changes are most prominent within the upper lobes. Bilateral lower lobe ground-glass infiltrates are noted. Periphery within the lung bases semi solid infiltrates are also noted. There are small infiltrate seen within the lingula and right middle lobe. Upper Abdomen: Limited images of the upper abdomen are unremarkable. There are 2 stones seen within the gallbladder lumen.   There is no evidence of acute cholecystitis. Soft Tissues/Bones:  Age related degenerative changes of the visualized osseous structures without focal destructive lesion. 1. There is no evidence of a pulmonary embolus. 2. Multifocal bilateral ground-glass and semi solid pulmonary infiltrates within the right and left lower lobes, right middle lobe and lingula. The findings are highly suspicious for findings of COVID-19. 3. Advanced emphysematous changes. 4. Cardiomegaly. There is no pericardial effusion. Xr Abdomen For Ng/og/ne Tube Placement    Result Date: 11/14/2020  EXAMINATION: ONE SUPINE XRAY VIEW(S) OF THE ABDOMEN 11/14/2020 2:20 am COMPARISON: None. HISTORY: ORDERING SYSTEM PROVIDED HISTORY: Confirmation of course of NG/OG/NE tube and location of tip of tube TECHNOLOGIST PROVIDED HISTORY: Reason for exam:->Confirmation of course of NG/OG/NE tube and location of tip of tube Portable? ->Yes FINDINGS: Enteric tube traverses the esophagus with the tip and side hole in the stomach. No dilated small bowel is seen though only a small portion of the abdomen was imaged. The enteric tube is in good position in the stomach. Patient Instructions:      Medication List      START taking these medications    Arformoterol Tartrate 15 MCG/2ML Nebu  Commonly known as:  BROVANA  Take 2 mLs by nebulization 2 times daily     ertapenem  infusion  Commonly known as:  INVANZ  Infuse 1,000 mg intravenously every 24 hours for 8 days Compound per protocol. fentaNYL 100 MCG/2ML injection  Commonly known as:  SUBLIMAZE  Infuse 1 mL intravenously every 2 hours as needed (pain/ discomfort) for up to 3 days. fentaNYL 50 MCG/HR  Commonly known as:  [address] 1 patch onto the skin every 72 hours for 30 days.   Start taking on:  December 4, 2020     fluconazole 200 MG tablet  Commonly known as:  DIFLUCAN  Take 1 tablet by mouth daily for 7 days  Start taking on:  December 3, 2020     Full Kit Nebulizer Set Misc  Use as directed with nebulized medication. insulin glargine 100 UNIT/ML injection vial  Commonly known as:  LANTUS  Inject 25 Units into the skin 2 times daily     insulin lispro 100 UNIT/ML injection vial  Commonly known as:  HUMALOG  Inject 0-18 Units into the skin every 6 hours     ipratropium-albuterol 0.5-2.5 (3) MG/3ML Soln nebulizer solution  Commonly known as:  DUONEB  Inhale 3 mLs into the lungs every 4 hours     lansoprazole 3 MG/ML Susp  10 mLs by Per G Tube route every morning (before breakfast)  Start taking on:  December 3, 2020     LORazepam 2 MG tablet  Commonly known as:  ATIVAN  1 tablet by PEG Tube route every 3 hours as needed for Anxiety for up to 30 days. vitamin B and C Tabs tablet  Take 1 tablet by mouth every morning  Start taking on:  December 3, 2020  Replaces:  b complex vitamins capsule        CHANGE how you take these medications    budesonide 0.5 MG/2ML nebulizer suspension  Commonly known as:  PULMICORT  Take 2 mLs by nebulization 2 times daily  What changed:  when to take this        CONTINUE taking these medications    albuterol sulfate  (90 Base) MCG/ACT inhaler        STOP taking these medications    Accu-Chek [place] Plus strip  Generic drug:  blood glucose test strips     Accu-Chek Savannah Plus w/Device Kit     Accu-Chek Softclix Lancets Misc     Aleve 220 MG tablet  Generic drug:  naproxen sodium     ascorbic acid 500 MG tablet  Commonly known as:  VITAMIN C     b complex vitamins capsule  Replaced by:  vitamin B and C Tabs tablet     Copper Gluconate 2 MG Caps     Fish Oil 1200 MG Caps     formoterol 20 MCG/2ML nebulizer solution  Commonly known as:  PERFOROMIST     guaiFENesin 400 MG tablet     metFORMIN 500 MG extended release tablet  Commonly known as:  GLUCOPHAGE-XR     naproxen 500 MG tablet  Commonly known as:  Naprosyn     pravastatin 40 MG tablet  Commonly known as:  PRAVACHOL     Umeclidinium Bromide 62.5 MCG/INH Aepb  Commonly known as:   Incruse Ellipta     vitamin D 50 MCG (2000 UT) Caps capsule     vitamin E 400 UNIT capsule     Zinc 25 MG Tabs           Where to Get Your Medications      You can get these medications from any pharmacy    Bring a paper prescription for each of these medications  · fentaNYL 100 MCG/2ML injection  · fentaNYL 50 MCG/HR  · LORazepam 2 MG tablet     Information about where to get these medications is not yet available    Ask your nurse or doctor about these medications  · Arformoterol Tartrate 15 MCG/2ML Nebu  · budesonide 0.5 MG/2ML nebulizer suspension  · ertapenem  infusion  · fluconazole 200 MG tablet  · Full Kit Nebulizer Set Misc  · insulin glargine 100 UNIT/ML injection vial  · insulin lispro 100 UNIT/ML injection vial  · ipratropium-albuterol 0.5-2.5 (3) MG/3ML Soln nebulizer solution  · lansoprazole 3 MG/ML Susp  · vitamin B and C Tabs tablet           Note that more than 30 minutes was spent in preparing discharge papers, discussing discharge with patient, medication review, etc.    Signed:  Electronically signed by Juan Leone MD on 12/2/2020 at 1:33 PM

## 2020-12-02 NOTE — PATIENT CARE CONFERENCE
Intensive Care Daily Quality Rounding Checklist      ICU Team Members: Dr. Anitha Mon, Loi Nino & Romy (residents), charge nurse, bedside nurse, respiratory therapist, clinical pharmacist    ICU Day #: NUMBER: 20    Intubation Date: November 30    Ventilator Day #: NUMBER: 20    Central Line Insertion Date: PICC November 20        Day #: NUMBER: 13     Arterial Line Insertion Date: N/A       Day #: N/A    DVT Prophylaxis: LOVENOX (ON HOLD)    GI Prophylaxis: Tubefeed, protonix    Lloyd Catheter Insertion Date: November 13       Day #: 20      Continued need (if yes, reason documented and discussed with physician): yes, Strict I&O    Skin Issues/ Wounds and ordered treatment discussed on rounds: None, SOS precautions    Goals/ Plans for the Day: Daily labs, LTAC vs Stepdown floor, check US BLE and BUE

## 2020-12-02 NOTE — PROGRESS NOTES
Patient transferring from ICU room 205 to 727, report called [facility], placed on telepack none, patient belongings consisting of glasses,keys,cell phone and , home alert button, pen light, jacket, pants, tshirt, shoes, socks ox bag, dentures upper and lower, mask . transported with patient.

## 2020-12-02 NOTE — PLAN OF CARE
Problem: Falls - Risk of:  Goal: Will remain free from falls  Description: Will remain free from falls  Outcome: Met This Shift  Goal: Absence of physical injury  Description: Absence of physical injury  Outcome: Met This Shift     Problem: Restraint Use - Nonviolent/Non-Self-Destructive Behavior:  Goal: Absence of restraint indications  Description: Absence of restraint indications  Outcome: Not Met This Shift  Goal: Absence of restraint-related injury  Description: Absence of restraint-related injury  Outcome: Met This Shift     Problem: Airway Clearance - Ineffective  Goal: Achieve or maintain patent airway  Outcome: Met This Shift     Problem: Gas Exchange - Impaired  Goal: Absence of hypoxia  Outcome: Met This Shift  Goal: Promote optimal lung function  Outcome: Met This Shift     Problem: Breathing Pattern - Ineffective  Goal: Ability to achieve and maintain a regular respiratory rate  Outcome: Met This Shift     Problem:  Body Temperature -  Risk of, Imbalanced  Goal: Ability to maintain a body temperature within defined limits  Outcome: Not Met This Shift  Goal: Will regain or maintain usual level of consciousness  Outcome: Not Met This Shift  Goal: Complications related to the disease process, condition or treatment will be avoided or minimized  Outcome: Ongoing     Problem: Isolation Precautions - Risk of Spread of Infection  Goal: Prevent transmission of infection  Outcome: Met This Shift     Problem: Nutrition Deficits  Goal: Optimize nutrtional status  Outcome: Ongoing     Problem: Risk for Fluid Volume Deficit  Goal: Maintain normal heart rhythm  Outcome: Ongoing  Goal: Maintain absence of muscle cramping  Outcome: Not Met This Shift  Goal: Maintain normal serum potassium, sodium, calcium, phosphorus, and pH  Outcome: Ongoing     Problem: Loneliness or Risk for Loneliness  Goal: Demonstrate positive use of time alone when socialization is not possible  Outcome: Not Met This Shift     Problem: Fatigue  Goal: Verbalize increase energy and improved vitality  Outcome: Not Met This Shift     Problem: Patient Education: Go to Patient Education Activity  Goal: Patient/Family Education  Outcome: Ongoing     Problem: Skin Integrity:  Goal: Will show no infection signs and symptoms  Description: Will show no infection signs and symptoms  Outcome: Met This Shift  Goal: Absence of new skin breakdown  Description: Absence of new skin breakdown  Outcome: Met This Shift     Problem: Pain:  Goal: Pain level will decrease  Description: Pain level will decrease  Outcome: Ongoing  Goal: Control of acute pain  Description: Control of acute pain  Outcome: Ongoing  Goal: Control of chronic pain  Description: Control of chronic pain  Outcome: Ongoing

## 2020-12-02 NOTE — PROGRESS NOTES
ID Progress Note                [address], [address], [address], [address] [place] [address]            Phone [phone]     Fax [phone]      Chief complaint   resp failure    Subjective:  No more fevers now  Awake, follows some commands  S/p trach and PEG  More awake  She is off pressors as well as off propofol, only on fentanyl. 60% FiO2 and 6 of PEEP    Objective:    Vitals:    12/02/20 1111   BP:    Pulse: 104   Resp: (!) 31   Temp:    SpO2: 96%     VENT SETTINGS:   Vent Information  $Ventilation: $Subsequent Day  Skin Assessment: Clean, dry, & intact  Equipment ID: [id number]  Equipment Changed: HME  Vent Type: 840  Vent Mode: PS  Vt Ordered: 450 mL  Rate Set: 0 bmp  Peak Flow: 65 L/min  Pressure Support: 16 cmH20  FiO2 : 50 %  SpO2: 96 %  SpO2/FiO2 ratio: 192  Sensitivity: 2  PEEP/CPAP: 6  I Time/ I Time %: 0 s  Humidification Source: HME  Mask Type: Full face mask  Mask Size: Medium  General : awake s/p trach   HEENT:   Round pupils. Minimally reactive. No jaundice. Lungs:    Coarse breath sounds to auscultation bilaterally. No crackles. Heart:   Heart sounds rhythmic and regular. Abdomen:    Round, soft. Benign to palpation. Extremities:  Minimal edema.    Skin:   no rashes or lesions   Left PICC  Lloyd catheter    Labs:  Recent Labs     11/30/20  0523 12/01/20  0537 12/01/20  1707 12/02/20  0510   WBC 8.5 10.3  --  9.1   RBC 2.72* 2.40*  --  2.39*   HGB 8.2* 7.1* 7.4* 7.2*   HCT 26.6* 24.1* 25.3* 24.4*   MCV 97.8 100.4*  --  102.1*   MCH 30.1 29.6  --  30.1   MCHC 30.8* 29.5*  --  29.5*   RDW 19.9* 20.4*  --  21.1*    179  --  201   MPV 11.4 11.1  --  11.1     CMP:    Lab Results   Component Value Date     12/02/2020    K 4.0 12/02/2020    K 5.7 11/13/2020     12/02/2020    CO2 34 12/02/2020    BUN 25 12/02/2020    CREATININE 0.3 12/02/2020    CREATININE 0.5 01/23/2019    GFRAA >60 12/02/2020    LABGLOM >60 12/02/2020    GLUCOSE 139 12/02/2020    PROT 6.0 12/02/2020

## 2020-12-02 NOTE — CONSULTS
Palliative Care Department  [phone]  Palliative Care Initial Consult  Provider Blanca Berger MD      PATIENT: Adrianne Mccoy  : 1945  MRN: [id number]  ADMISSION DATE: 2020  1:46 PM    Palliative Medicine was consulted on hospital day 19 for assistance with Goals of care, Code Status Discussion    HPI:     Andrei Garcia is a 67 y.o. y/o female with a history of COPD, diabetes, hypertension, hyperlipidemia  who presented to [facility]) on 2020 with shortness of breath. had history of 3 days of worsening shortness of breath at home. Orquidea Mondragon is chronically on 3 L of oxygen at home when she ambulates but over the last few days has had increase her oxygen requirement to 5 L at all times. She was diagnosed with being covid positive    ASSESSMENT/PLAN:     Pertinent Hospital Diagnoses      Covid 19 pneumonia   Acute respiratory failure with hypoxia   COPD   T2DM      Palliative Care Encounter / Counseling Regarding Goals of Care  Please see detailed goals of care discussion as below   At this time, Adrianne Mccoy, Does Not have capacity for medical decision-making. Capacity is time limited and situation/question specific   Outcome of goals of care meeting: Spoke to Bud Yeung, she mentioned that her mother would not want to have the like this. She wants DNR CCA, she will monitor her the ventilator. If she continues to decline or there is no improvement in the upcoming days. She will make a decision for her at that time   Code status DNR-CCA   Advanced Directives: POA or living will in [facility]   Surrogate/Legal NOK: Osvaldo Knott,  Child, [phone]    Sign off  · Will Sign off, no further Code status or [address] discussions at this time. Please re-consult if needed. Thank you for the opportunity to participate in the care of Adrianne Mccoy.      Blanca Berger MD  Palliative Medicine     SUBJECTIVE:       Details of Conversation: Sharri Elliott and jessika Roman in ICU, she is currently intubated. Spoke with her daughter Daphnie Bond, she is healthcare power of . Explained my role of palliative medicine. I asked for her understanding of the disease process that her mother currently has. She is aware of her multiple co morbidities. The mother Anibal Bernheim young has a living will, also she spoke to her daughter about her advanced directives. She was very independent woman, she did not want to be kept life support. But the daughter wants to see if it will benefit her, she understands that if her condition starts to decline, she does not want to pursue further interventions. She change her CODE STATUS to DNR CCA, she will reevaluate her condition after a few days. This is not how Juany wanted to live as per her daughter. Prognosis: Poor    OBJECTIVE:     BP (!) 216/69   Pulse 123   Temp 98.4 °F (36.9 °C) (Bladder)   Resp (!) 37   Ht 5' 6\" (1.676 m)   Wt 158 lb 4.6 oz (71.8 kg)   SpO2 (!) 89%   BMI 25.55 kg/m²     Physical Examination:  Due to the current efforts to prevent transmission of COVID-19 and also the need to preserve PPE for other caregivers, a face-to-face encounter with the patient was not performed. That being said, all relevant records and diagnostic tests were reviewed, including laboratory results and imaging. Please reference any relevant documentation elsewhere. Care will be coordinated with the primary service and other specialties as appropriate. Objective data reviewed: labs, images, records, medication use, vitals and chart    Time/Communication  Greater than 50% of time spent, total 30 minutes in counseling and coordination of care at the bedside regarding goals of care. Thank you for allowing Palliative Medicine to participate in the care of [address] [place] [address]. Note: This report was completed using [employer] voiced recognition software.   Every effort has been made to ensure accuracy; however, inadvertent computerized transcription errors may be

## 2020-12-02 NOTE — PROGRESS NOTES
Occupational Therapy  OT order received and chart reviewed. Hold per nursing d/t medical status, will continue to follow. Thank you.     Jakob Zimmer OTR/L  [id number]
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
## 2020-12-02 NOTE — PROGRESS NOTES
Phone call made to dinora daughter at her home. Her  stated she went to store. I told him I wanted to give updates. He said he would have her call me back when she gets home. When report was called to [facility]. Nurse said that daughter Lawanda Nina phoned in and talked to them and changed her code status to full code.

## 2020-12-02 NOTE — PROGRESS NOTES
.         Critical Care Team - Daily Progress Note         Date and time: 12/2/2020 3:29 PM  Patient's name:  Richard Zuniga  Medical Record Number: [id number]  Patient's account/billing number: [de-identified]  Patient's YOB: 1945  Age: 67 y.o. Date of Admission: 11/13/2020  1:46 PM  Length of stay during current admission: 19      Primary Care Physician: David Bowles MD  ICU Attending Physician: Dr. Sierra Smith    Code Status: DNR-CCA    Reason for ICU admission: Acute hypercapnic hypoxic respiratory failure secondary to Covid pneumonia      SUBJECTIVE:     BRIEF HISTORY: The patient is a 67 y.o. female with significant past medical history of COPD, diabetes, hypertension, hyperlipidemia presented to the ED for shortness of breath. Patient is currently intubated and sedated so history obtained from chart review.     Per emergency department, patient had history of 3 days of worsening shortness of breath. She is chronically on 3 L of oxygen at home when she ambulates but over the last few days has had increase her oxygen requirement to 5 L at all times. 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 She subsequently presented to the ED for evaluation.     Patient was found to be hypoxic and and placed on nasal cannula oxygen. Patient was Positive for COVID-19. While in the emergency department patient was placed on a nonrebreather and subsequently decompensated. She became altered and was taking agonal respirations. She was then intubated for further airway protection. ABG demonstrated respiratory acidosis with a CO2 of greater than 113. Patient was admitted to the ICU for further evaluation care. OVERNIGHT EVENTS:    11/15/20:  Some soft BP's overnight, responded to fluid bolus and switched from propofol gtt to versed gtt  11/16/20: Ole Vallecitos, tolerating vent.  Attempt to wean sedation and pressure support  11/17/20: Tolerating pressure support, off or Versed  11/18/20: Attempt pressure support trials. Patient becoming tachycardic and hypertensive  11/19/20: Attempted extubation. Patient less than 30 minutes. She became tachypneic, tachycardic and not tolerating secretions. Patient subsequently reintubated  11/20/20: Jose Luis Ishihara pressors overnight, agitated on the vent. 11/21/20: Intermittently required pressors overnight. Still hyperglycemic.  11/22/20: pressures all over the place. intermittant Levo. HTN with agitation  11/23/20: Hypotensive overnight. Levo restarted. Oxygenating well. 11/24/20: On and off levo overnight. Fevers overnight  11/25/20: off of pressors. Pressure support trials  11/26/20: intermittently back on pressors. Still no BM. Agitated when sedation off. H/h fall, received 1 unit  11-27-20: still with intermittant pressor requirement. Low grade fever 100.3  11/28/20: off pressors overnight, art line worked for 4hr then stopped after abg was drawn, it was removed. Trach/peg today    11/29: Low hemoglobin this morning, 1 unit of blood ordered. Trach/PEG yesterday. 11/30: No acute events overnight, mild hypokalemia this morning, repleted. No pressors, just on fentanyl gtt this morning, will wean this and schedule oxycodone. Fever last night, 100.2 F.     12/1: Patient still having fevers, 100.9 at 0800, will panculture, and change the PICC line.   Patient has still not had a bowel movement, will obtain CT abdomen pelvis with oral and IV contrast.     CURRENT VENTILATION STATUS:     [x] Ventilator  [] BIPAP  [] Nasal Cannula [] Room Air      IF INTUBATED, ET TUBE MARKING AT LOWER LIP:       SECRETIONS : Amount:  [x] Small [] Moderate  [] Large  [] None  Color:     [] White [x] Colored  [] Bloody    SEDATION:    [] Propofol gtt  [x] fentanyl gtt  [] Ativan gtt   [] No Sedation    PARALYZED:  [x] No    [] Yes      VASOPRESSORS:  [x] No    [] Yes    If yes -   [] Levophed       [] Dopamine     [] Vasopressin       [] Dobutamine  [] Phenylephrine         [] Epinephrine    CENTRAL LINES:     [] No   [x] Yes   (Date of Insertion: PICC)           If yes -     [] Right IJ     [] Left IJ [] Right Femoral [] Left Femoral                   [] Right Subclavian [] Left Subclavian       RAM'S CATHETER:   [] No   [x] Yes  (Date of Insertion:   )     URINE OUTPUT:            [x] Good   [] Low              [] Anuric      OBJECTIVE:     VITAL SIGNS:  BP (!) 189/74   Pulse 99   Temp 99.7 °F (37.6 °C) (Bladder)   Resp (!) 33   Ht 5' 6\" (1.676 m)   Wt 161 lb 13.1 oz (73.4 kg)   SpO2 96%   BMI 26.12 kg/m²   Tmax over 24 hours:  Temp (24hrs), Av.4 °F (37.4 °C), Min:98.4 °F (36.9 °C), Max:100.4 °F (38 °C)      Patient Vitals for the past 6 hrs:   BP Temp Temp src Pulse Resp SpO2 Height   20 1500 (!) 189/74 -- -- 99 (!) 33 96 % --   20 1430 (!) 174/62 -- -- 95 (!) 35 96 % --   20 1400 (!) 178/71 99.7 °F (37.6 °C) Bladder 101 (!) 33 95 % --   20 1330 (!) 180/62 -- -- 113 (!) 35 95 % --   20 1300 (!) 189/67 -- -- 114 (!) 35 95 % --   20 1230 (!) 191/72 -- -- 115 (!) 35 95 % --   20 1211 -- -- -- -- -- -- 5' 6\" (1.676 m)   20 1200 (!) 179/64 99.3 °F (37.4 °C) Bladder 116 (!) 35 95 % --   20 1130 (!) 168/88 -- -- 117 (!) 35 94 % --   20 1111 -- -- -- 104 (!) 31 96 % --   20 1100 (!) 160/62 -- -- 106 (!) 32 96 % --   20 1030 (!) 154/82 -- -- 108 (!) 32 96 % --   20 1000 (!) 185/70 -- -- 111 (!) 31 94 % --   20 0946 -- -- -- 115 (!) 34 94 % --   20 0944 -- -- -- 117 (!) 36 94 % --   20 0943 -- -- -- 118 (!) 37 93 % --   20 0930 (!) 191/76 -- -- 119 (!) 35 (!) 89 % --         Intake/Output Summary (Last 24 hours) at 2020 1529  Last data filed at 2020 1349  Gross per 24 hour   Intake 1127 ml   Output 1390 ml   Net -263 ml     Wt Readings from Last 2 Encounters:   20 161 lb 13.1 oz (73.4 kg)   10/29/20 140 lb (63.5 kg)     Body mass index is 26.12 kg/m². PHYSICAL EXAM:  General: Intubated  Eyes:  Pupils equal and reactive  Ears: no obvious scars, no lesions, no masses  Mouth: ET tube in place  Head: normocephalic, atraumatic  Neck: no JVD, no adenopathy, no thyromegaly, neck is supple, trachea is midline. Chest: no pain on palpation  Lungs: Clear to auscultation bilaterally , no wheezes, rhonchi  Heart: regular rate and regular rhythm, no murmur, normal S1, S2  Abdomen: Mildly distended, somewhat soft, non-tender, round  Extremities: no lower extremity edema, 2+ edema b/l uper extrmities  Skin: normal color, normal texture, normal turgor, no rashes, no lesions  Neurologic:  will open eyes and stare.   Spontaneously moves all extremities, not following commands    MEDICATIONS:    Scheduled Meds:   fentaNYL  1 patch Transdermal Q72H    lansoprazole  30 mg Per G Tube QAM AC    sennosides-docusate sodium  2 tablet Oral BID    fluconazole  200 mg Oral Daily    ertapenem (INVanz) IVPB  1 g Intravenous Q24H    insulin glargine  25 Units Subcutaneous BID    heparin flush  3 mL Intravenous 2 times per day    insulin lispro  0-18 Units Subcutaneous Q6H    [Held by provider] enoxaparin  40 mg Subcutaneous Daily    Arformoterol Tartrate  15 mcg Nebulization BID    budesonide  0.5 mg Nebulization BID    ipratropium-albuterol  1 ampule Inhalation Q4H    sodium chloride flush  10 mL Intravenous 2 times per day    vitamin B and C  1 tablet Oral QAM     Continuous Infusions:   dextrose       PRN Meds:   LORazepam, 2 mg, Q3H PRN  docusate, 100 mg, BID PRN  fentanNYL, 50 mcg, Q2H PRN  sodium phosphate IVPB, 0.16 mmol/kg, PRN    Or  sodium phosphate IVPB, 0.32 mmol/kg, PRN  potassium chloride, 20 mEq, PRN  magnesium sulfate, 1 g, PRN  sodium chloride flush, 10 mL, PRN  heparin flush, 3 mL, PRN  albuterol sulfate HFA, 2 puff, Q6H PRN  acetaminophen, 650 mg, Q6H PRN    Or  acetaminophen, 650 mg, Q6H PRN  polyethylene glycol, 17 g, Daily PRN  promethazine, 12.5 mg, Q6H PRN    Or  ondansetron, 4 mg, Q6H PRN  glucose, 15 g, PRN  dextrose, 12.5 g, PRN  glucagon (rDNA), 1 mg, PRN  dextrose, 100 mL/hr, PRN        VENT SETTINGS (Comprehensive) (if applicable):  Vent Information  $Ventilation: $Subsequent Day  Skin Assessment: Clean, dry, & intact  Equipment ID: [id number]  Equipment Changed: HME  Vent Type: [id number]  Vent Mode: PS  Vt Ordered: 450 mL  Rate Set: 0 bmp  Peak Flow: 65 L/min  Pressure Support: 16 cmH20  FiO2 : 50 %  SpO2: 96 %  SpO2/FiO2 ratio: 192  Sensitivity: 2  PEEP/CPAP: 6  I Time/ I Time %: 0 s  Humidification Source: HME  Mask Type: Full face mask  Mask Size: Medium  Additional Respiratory  Assessments  Pulse: 99  Resp: (!) 33  SpO2: 96 %  $End Tidal CO2: 36  Position: Semi-Hunt's  Humidification Source: HME  Oral Care: Mouthwash, Mouth suctioned  Subglottic Suction Done?: No  Cuff Pressure (cm H2O): 29 cm H2O    ABGs:   Recent Labs     12/02/20 0524   PH 7.463*   PCO2 46.7*   PO2 56.4*   HCO3 32.7*   BE 8.1*   O2SAT 89.3*       Laboratory findings:    Complete Blood Count:   Recent Labs     11/30/20  0523 12/01/20  0537 12/01/20  1707 12/02/20  0510   WBC 8.5 10.3  --  9.1   HGB 8.2* 7.1* 7.4* 7.2*   HCT 26.6* 24.1* 25.3* 24.4*    179  --  201        Last 3 Blood Glucose:   Recent Labs     11/30/20  0523 12/01/20  0537 12/02/20  0510   GLUCOSE 167* 123* 139*        PT/INR:    Lab Results   Component Value Date    PROTIME 12.3 12/02/2020    INR 1.1 12/02/2020     PTT:    Lab Results   Component Value Date    APTT 26.4 12/02/2020       Comprehensive Metabolic Profile:   Recent Labs     11/30/20 0523 12/01/20  0537 12/02/20  0510    146 142   K 3.2* 3.6 4.0    104 102   CO2 33* 36* 34*   BUN 40* 33* 25*   CREATININE 0.3* 0.3* 0.3*   GLUCOSE 167* 123* 139*   CALCIUM 7.6* 7.9* 8.7   PROT 4.8* 5.1* 6.0*   LABALBU 2.0* 2.1* 2.1*   BILITOT 0.9 0.9 0.7   ALKPHOS 119* 128* 127*   AST 71* 80* 70*   ALT 46* 57* 59*      Magnesium:   Lab Results   Component Value Date    MG 2.2 12/02/2020     Phosphorus:   Lab Results   Component Value Date    PHOS 2.9 12/02/2020     Ionized Calcium: No results found for: CAION       Troponin:   No results for input(s): TROPONINI in the last 72 hours. Microbiology:  Cultures during this admission:     Blood cultures:                 [] None drawn      [x] Negative             []  Positive (Details:  )  Urine Culture:                   [] None drawn      [x] Negative             []  Positive (Details:  )  Sputum Culture:               [] None drawn       [] Negative             [x]  Positive (Details: 11/24, serratia marcescens, klebsiella pneumoniae, candida albicans)   Endotracheal aspirate:     [] None drawn       [] Negative             [x]  Positive (Details: gram - betsy )     Other pertinent Labs:      Radiology/Imaging:     Chest x-ray:  11/29/20:     Impression    Confluent airspace disease in the posterobasal segment of the left lower lobe    with central air bronchograms, consistent with pneumonia, more extensive than    on the prior examination. Small to moderate left pleural effusion extending into the major fissure. Improvement in multifocal ground-glass airspace disease previously noted    predominantly in the lung bases. Atelectatic changes in the right lung base and smaller right pleural effusion. Mild cardiomegaly. Mild heterogeneous enlargement of the left lobe of the thyroid likely    secondary to thyroid nodules.  Consider further evaluation and follow up with    thyroid ultrasound. Large gallstone. Impression    No evidence of acute intracranial pathology.  The    Age-related loss of brain volume and chronic periventricular ischemic changes. Chronic left basal ganglia lacunar infarction versus prominent perivascular    space, unchanged.     Opacification of bilateral mastoid air cells and middle ears, not present on    the prior examination and which may reflect otomastoiditis     11/30   CXR    There is no interval change in the bilateral perihilar and lower lobe    airspace disease more prominent within the left lung base.  Emphysematous    changes are noted. The left-sided PICC line and tracheostomy tube are unchanged in position. 11/29, XRAY KUB:    Nonobstructive bowel gas pattern.  Moderate stool and gas within the colon    Peg tube overlying the stomach        ASSESSMENT:     - Acute respiratory failure with hypoxia, hypercarbia  - COVID-19 pneumonia  - Superimposed bacterial pneumonia  - COPD  -Type 2 diabetes    Additional assessment:    Prolonged QTc ( 625)      PLAN:     WEAN PER PROTOCOL:  [] No   [x] Yes  [] N/A    DISCONTINUE ANY LABS:   [x] No   [] Yes    ICU PROPHYLAXIS:  Stress ulcer:  [x] PPI Agent  [] [id number] [] Sucralfate  [] Other:  VTE:   [x] Enoxaparin  [] Unfract. Heparin Subcut  [] EPC Cuffs    NUTRITION:  [] NPO [] Tube Feeding (Specify: ) [] TPN  [x] PO (Diet: DIET TUBE FEED CONTINUOUS/CYCLIC NPO; Semi-elemental; Gastrostomy; Continuous; 45)    HOME MEDICATIONS RECONCILED: [] No  [x] Yes    INSULIN DRIP:   [x] No   [] Yes    CONSULTATION NEEDED:  [x] No   [] Yes    FAMILY UPDATED:    [] No   [x] Yes     TRANSFER OUT OF ICU:   [x] No   [] Yes       SYSTEMS ASSESSMENT   Of note, I do not see a progress note from the ICU for 11/20. That note was written and signed by Dr. Marti Dillon. We cannot find it in the computer. Is not in with incomplete notes. Will call the help desk and attempt to retrieve her that note is. Neuro   AMS--most likely secondary to hypercapnia              -CT head negative     Intubated and sedated on the vent              -Continue fentanyl and propofol. Wean sedation as able. -Start as needed Ativan, fentanyl. Was planning to start Seroquel but QTc is prolonged at 625 (11/29)      - 11/30: Wean off fentanyl drip, schedule oxy 5 mg every 6 hours.   -12/1-continue fentanyl wean, change to patch  -12/2-on fentanyl patch now    Respiratory   Acute hypoxic and hypercapnic respiratory failure secondary to Covid pneumonia              -resp panel + SARS-COV2              -former smoker              -vit c, decadron-- stopped, remdesivir-finished              -Pulmonology following              -procal 2.54   -Failed extubation 11/19  Subsequently reintubated.   -Wean FiO2 as able   -CT chest with evidence of pneumonia   -failed wean   -not tolerating pressure support trials   -trach/peged  Superimposed bacterial PNA   -elevated procal-2.54   - repeat resp culture gram - rods   -Zosyn stopped day 13. Levaquin stopped. ertapenum per ID completed      COPD              -O2 PRN and with ambulation @ home, usual 3L              -Brovana, Pulmicort, nebs      - 11/30: continue pressure support trials   - 12/1: Not tolerating weaning trials, will wean fentanyl, and change to patch  -12/2: Fentanyl weaned, now on patch      Cardiovascular   CTA              -No evidence of PE     Hypotension, shock --   -Levophed as needed to keep MAP greater than 65   -Continue to monitor    -very unpredicatable. Will need 2mcg for map 60 and will then be 367T systolic.   -art line in place in for a few hours then quit working after abg draw   -replace today if still have pressor requirement    - 11/29: No pressor requirements today   -12/2-remains off pressors    Gastrointestinal   Tube feeds thru PEG  PPI  Constipation- enema   - 11/30: No BM, will start lactulose 20 TID. Increase Senna dose, add docusate. Non-obstruction bowel gas pattern on KUB Xray yesterday. -12/1: Bowel movement yesterday, continue to monitor stool output        Renal   Up 5.5 liters since admission--Lasix twice daily. Good uo, no longer appears fluid overloaded, DC Lasix. Hypokalemia- replace     Infectious Disease   COVID PNA              -ID following              -Decadron--finished.      -Remdesivir,-finished.  Vitamin C, zinc Superimposed bacterial PNA   -elevated procal-2.54   -resp culture serratia, candida   -stop zosyn/levaquin. ertapenum and fluconazole    12/1-still having fevers, panculture, and change PICC line     Hematology/Oncology   Lymphopenia              -2/2 covid pna    Anemia   -no signs of bleeding. 1 unit prbc yesterday. H/h stable today   -monitor    Patient had a CTA with no PE, decreased to DVT prophylactic dose of Lovenox    11/29: hemoglobin 6.1 this morning, Lovenox held, 1 unit of PRBCs ordered. Will monitor closely. 11/30: Hemoglobin stable, will restart Lovenox    12/1: Hemoglobin 7.1 today, Lovenox held, continue to monitor. Unable to obtain stool guaiac as patient has not had a bowel movement. 12/2: Left lower extremity DVT, will start full dose anticoagulation    Endocrine   thyroid nodules  DM -sugars uncontrolled. monitor      Social/Spiritual/DNR/Other   Full    1. Weaning sedation-switched to fentanyl patch  2. Recurrent Fevers-change PICC line, pancultures pending, related to DVT in left lower extremity  3. Constipation-had large bowel movement yesterday  3. Anemia-hemoglobin 7.2, Lovenox being held  4. Lower extremity Dopplers showed a DVT in the left lower extremity, may be source of fever, will have to anticoagulate, and monitor blood counts  5. Will start low dose lopressor 12.5 mg bid    Diet: DIET TUBE FEED CONTINUOUS/CYCLIC NPO; Semi-elemental; Gastrostomy; Continuous; 45  CODE Status: DNR-CCA    Dispo: maintain ICU level care, LTAC placement soon      Krupa Hodges MD  Resident, PGY-1  12/2/2020  3:29 PM     I personally saw, examined and provided care for the patient. Radiographs, labs and medication list were reviewed by me independently. I spoke with bedside nursing, therapists and consultants. Critical care services and times documented are independent of procedures and multidisciplinary rounds with Residents.  Additionally comprehensive, multidisciplinary rounds were conducted with the MICU team. The case was discussed in detail and plans for care were established. Review of Residents documentation was conducted and revisions were made as appropriate. I agree with the above documented exam, problem list and plan of care.   Petrona Brito MD   CCT excluding procedures:38'

## 2020-12-02 NOTE — CARE COORDINATION
COVID POSITIVE 11/13. Received insurance approval for [facility] yesterday per Blanquita Mount- discharge held yesterday d/t elevated temp. Per nursing, cleared for discharge today after US extremities completed as per critical care. Dr. Arnett November notified of clearance for discharge and will put in discharge orders. Awaiting bed assignment from [facility].   Burak Mercado

## 2021-01-01 ENCOUNTER — APPOINTMENT (OUTPATIENT)
Dept: GENERAL RADIOLOGY | Age: 76
DRG: 871 | End: 2021-01-01
Payer: MEDICARE

## 2021-01-01 ENCOUNTER — HOSPITAL ENCOUNTER (INPATIENT)
Age: 76
LOS: 1 days | DRG: 871 | End: 2021-01-25
Attending: EMERGENCY MEDICINE | Admitting: INTERNAL MEDICINE
Payer: MEDICARE

## 2021-01-01 VITALS
SYSTOLIC BLOOD PRESSURE: 110 MMHG | BODY MASS INDEX: 25.88 KG/M2 | WEIGHT: 161 LBS | HEART RATE: 94 BPM | HEIGHT: 66 IN | OXYGEN SATURATION: 91 % | TEMPERATURE: 97.6 F | RESPIRATION RATE: 23 BRPM | DIASTOLIC BLOOD PRESSURE: 58 MMHG

## 2021-01-01 DIAGNOSIS — D64.9 ACUTE ON CHRONIC ANEMIA: ICD-10-CM

## 2021-01-01 DIAGNOSIS — R73.9 HYPERGLYCEMIA: ICD-10-CM

## 2021-01-01 DIAGNOSIS — J18.9 PNEUMONIA DUE TO ORGANISM: ICD-10-CM

## 2021-01-01 DIAGNOSIS — A41.9 SEPTICEMIA (HCC): ICD-10-CM

## 2021-01-01 DIAGNOSIS — J96.21 ACUTE ON CHRONIC RESPIRATORY FAILURE WITH HYPOXIA (HCC): Primary | ICD-10-CM

## 2021-01-01 LAB
AADO2: 356 MMHG
AADO2: 361.9 MMHG
AADO2: 390.5 MMHG
ADENOVIRUS BY PCR: NOT DETECTED
ALBUMIN SERPL-MCNC: 3 G/DL (ref 3.5–5.2)
ALP BLD-CCNC: 148 U/L (ref 35–104)
ALT SERPL-CCNC: 20 U/L (ref 0–32)
AMORPHOUS: ABNORMAL
ANION GAP SERPL CALCULATED.3IONS-SCNC: 5 MMOL/L (ref 7–16)
ANION GAP SERPL CALCULATED.3IONS-SCNC: 9 MMOL/L (ref 7–16)
ANISOCYTOSIS: ABNORMAL
AST SERPL-CCNC: 14 U/L (ref 0–31)
B.E.: 4 MMOL/L
B.E.: 7.6 MMOL/L (ref -3–3)
B.E.: 8.4 MMOL/L
BACTERIA: ABNORMAL /HPF
BASOPHILS ABSOLUTE: 0 E9/L (ref 0–0.2)
BASOPHILS RELATIVE PERCENT: 0 % (ref 0–2)
BILIRUB SERPL-MCNC: 0.2 MG/DL (ref 0–1.2)
BILIRUBIN URINE: NEGATIVE
BLOOD, URINE: NEGATIVE
BORDETELLA PARAPERTUSSIS BY PCR: NOT DETECTED
BORDETELLA PERTUSSIS BY PCR: NOT DETECTED
BUN BLDV-MCNC: 30 MG/DL (ref 8–23)
BUN BLDV-MCNC: 42 MG/DL (ref 8–23)
CALCIUM SERPL-MCNC: 9.2 MG/DL (ref 8.6–10.2)
CALCIUM SERPL-MCNC: 9.5 MG/DL (ref 8.6–10.2)
CHLAMYDOPHILIA PNEUMONIAE BY PCR: NOT DETECTED
CHLORIDE BLD-SCNC: 96 MMOL/L (ref 98–107)
CHLORIDE BLD-SCNC: 98 MMOL/L (ref 98–107)
CHP ED QC CHECK: NORMAL
CLARITY: ABNORMAL
CO2: 32 MMOL/L (ref 22–29)
CO2: 35 MMOL/L (ref 22–29)
COHB: 1 % (ref 0–1.5)
COHB: 3.1 % (ref 0–1.5)
COHB: 3.8 % (ref 0–1.5)
COLOR: ABNORMAL
COMMENT: ABNORMAL
CORONAVIRUS 229E BY PCR: NOT DETECTED
CORONAVIRUS HKU1 BY PCR: NOT DETECTED
CORONAVIRUS NL63 BY PCR: NOT DETECTED
CORONAVIRUS OC43 BY PCR: NOT DETECTED
CREAT SERPL-MCNC: 0.4 MG/DL (ref 0.5–1)
CREAT SERPL-MCNC: 0.5 MG/DL (ref 0.5–1)
CRITICAL: ABNORMAL
CRYSTALS, UA: ABNORMAL /HPF
DATE ANALYZED: ABNORMAL
DATE OF COLLECTION: ABNORMAL
EKG ATRIAL RATE: 73 BPM
EKG P AXIS: 65 DEGREES
EKG P-R INTERVAL: 126 MS
EKG Q-T INTERVAL: 420 MS
EKG QRS DURATION: 80 MS
EKG QTC CALCULATION (BAZETT): 462 MS
EKG R AXIS: 78 DEGREES
EKG T AXIS: 47 DEGREES
EKG VENTRICULAR RATE: 73 BPM
EOSINOPHILS ABSOLUTE: 0.14 E9/L (ref 0.05–0.5)
EOSINOPHILS RELATIVE PERCENT: 1 % (ref 0–6)
FIO2: 70 %
GFR AFRICAN AMERICAN: >60
GFR AFRICAN AMERICAN: >60
GFR NON-AFRICAN AMERICAN: >60 ML/MIN/1.73
GFR NON-AFRICAN AMERICAN: >60 ML/MIN/1.73
GLUCOSE BLD-MCNC: 191 MG/DL (ref 74–99)
GLUCOSE BLD-MCNC: 257 MG/DL (ref 74–99)
GLUCOSE BLD-MCNC: 269 MG/DL
GLUCOSE URINE: NEGATIVE MG/DL
HCO3: 27.7 MMOL/L
HCO3: 34.6 MMOL/L (ref 22–26)
HCO3: 35 MMOL/L
HCT VFR BLD CALC: 23.7 % (ref 34–48)
HCT VFR BLD CALC: 26.4 % (ref 34–48)
HEMOGLOBIN: 7 G/DL (ref 11.5–15.5)
HEMOGLOBIN: 7.4 G/DL (ref 11.5–15.5)
HHB: 10.3 %
HHB: 12.8 % (ref 0–5)
HHB: 13.3 %
HUMAN METAPNEUMOVIRUS BY PCR: NOT DETECTED
HUMAN RHINOVIRUS/ENTEROVIRUS BY PCR: NOT DETECTED
HYPOCHROMIA: ABNORMAL
INFLUENZA A BY PCR: NOT DETECTED
INFLUENZA B BY PCR: NOT DETECTED
KETONES, URINE: NEGATIVE MG/DL
LAB: ABNORMAL
LACTIC ACID, SEPSIS: 1.8 MMOL/L (ref 0.5–1.9)
LEUKOCYTE ESTERASE, URINE: ABNORMAL
LYMPHOCYTES ABSOLUTE: 1.36 E9/L (ref 1.5–4)
LYMPHOCYTES RELATIVE PERCENT: 10 % (ref 20–42)
Lab: ABNORMAL
MCH RBC QN AUTO: 27.9 PG (ref 26–35)
MCH RBC QN AUTO: 29.2 PG (ref 26–35)
MCHC RBC AUTO-ENTMCNC: 28 % (ref 32–34.5)
MCHC RBC AUTO-ENTMCNC: 29.5 % (ref 32–34.5)
MCV RBC AUTO: 98.8 FL (ref 80–99.9)
MCV RBC AUTO: 99.6 FL (ref 80–99.9)
METAMYELOCYTES RELATIVE PERCENT: 1 % (ref 0–1)
METER GLUCOSE: 107 MG/DL (ref 74–99)
METER GLUCOSE: 121 MG/DL (ref 74–99)
METER GLUCOSE: 188 MG/DL (ref 74–99)
METER GLUCOSE: 194 MG/DL (ref 74–99)
METER GLUCOSE: 269 MG/DL (ref 74–99)
METHB: 0 % (ref 0–1.5)
METHB: 0.1 % (ref 0–1.5)
METHB: 0.2 % (ref 0–1.5)
MODE: AC
MONOCYTES ABSOLUTE: 0.54 E9/L (ref 0.1–0.95)
MONOCYTES RELATIVE PERCENT: 4 % (ref 2–12)
MYCOPLASMA PNEUMONIAE BY PCR: NOT DETECTED
NEUTROPHILS ABSOLUTE: 11.56 E9/L (ref 1.8–7.3)
NEUTROPHILS RELATIVE PERCENT: 84 % (ref 43–80)
NITRITE, URINE: NEGATIVE
O2 CONTENT: 10.7 ML/DL
O2 CONTENT: 10.8 ML/DL
O2 CONTENT: 9.5 ML/DL
O2 SATURATION: 86.3 %
O2 SATURATION: 87 % (ref 92–98.5)
O2 SATURATION: 89.3 %
O2HB: 83.6 %
O2HB: 85.8 %
O2HB: 86 % (ref 94–97)
OPERATOR ID: 914
OPERATOR ID: ABNORMAL
OPERATOR ID: ABNORMAL
PARAINFLUENZA VIRUS 1 BY PCR: NOT DETECTED
PARAINFLUENZA VIRUS 2 BY PCR: NOT DETECTED
PARAINFLUENZA VIRUS 3 BY PCR: NOT DETECTED
PARAINFLUENZA VIRUS 4 BY PCR: NOT DETECTED
PATIENT TEMP: 37 C
PCO2: 37.7 MMHG (ref 40–52)
PCO2: 62.3 MMHG (ref 40–52)
PCO2: 64.7 MMHG (ref 35–45)
PDW BLD-RTO: 16.8 FL (ref 11.5–15)
PDW BLD-RTO: 17.2 FL (ref 11.5–15)
PEEP/CPAP: 8 CMH2O
PFO2: 0.72 MMHG/%
PFO2: 0.75 MMHG/%
PFO2: 0.8 MMHG/%
PH BLOOD GAS: 7.35 (ref 7.35–7.45)
PH BLOOD GAS: 7.37 (ref 7.3–7.42)
PH BLOOD GAS: 7.48 (ref 7.3–7.42)
PH UA: >=9 (ref 5–9)
PLATELET # BLD: 324 E9/L (ref 130–450)
PLATELET # BLD: 379 E9/L (ref 130–450)
PMV BLD AUTO: 10.7 FL (ref 7–12)
PMV BLD AUTO: 11 FL (ref 7–12)
PO2: 50.6 MMHG (ref 30–50)
PO2: 52.7 MMHG (ref 30–50)
PO2: 56.1 MMHG (ref 75–100)
POLYCHROMASIA: ABNORMAL
POTASSIUM REFLEX MAGNESIUM: 4.1 MMOL/L (ref 3.5–5)
POTASSIUM REFLEX MAGNESIUM: 4.9 MMOL/L (ref 3.5–5)
PRO-BNP: 6997 PG/ML (ref 0–450)
PROCALCITONIN: 0.3 NG/ML (ref 0–0.08)
PROTEIN UA: 100 MG/DL
RBC # BLD: 2.4 E12/L (ref 3.5–5.5)
RBC # BLD: 2.65 E12/L (ref 3.5–5.5)
RBC UA: ABNORMAL /HPF (ref 0–2)
RESPIRATORY SYNCYTIAL VIRUS BY PCR: NOT DETECTED
RI(T): 634 %
RI(T): 687 %
RI(T): 772 %
RR MECHANICAL: 14 B/MIN
SARS-COV-2, NAAT: NOT DETECTED
SARS-COV-2, PCR: DETECTED
SMUDGE CELLS: ABNORMAL
SODIUM BLD-SCNC: 137 MMOL/L (ref 132–146)
SODIUM BLD-SCNC: 138 MMOL/L (ref 132–146)
SOURCE, BLOOD GAS: ABNORMAL
SPECIFIC GRAVITY UA: 1.01 (ref 1–1.03)
THB: 7.8 G/DL (ref 11.5–16.5)
THB: 8.9 G/DL (ref 11.5–16.5)
THB: 9.1 G/DL (ref 11.5–16.5)
TIME ANALYZED: 1929
TIME ANALYZED: 2327
TIME ANALYZED: 528
TOTAL PROTEIN: 7.4 G/DL (ref 6.4–8.3)
TROPONIN: 0.03 NG/ML (ref 0–0.03)
UROBILINOGEN, URINE: 0.2 E.U./DL
VT MECHANICAL: 450 ML
WBC # BLD: 13.6 E9/L (ref 4.5–11.5)
WBC # BLD: 17.3 E9/L (ref 4.5–11.5)
WBC UA: ABNORMAL /HPF (ref 0–5)

## 2021-01-01 PROCEDURE — U0002 COVID-19 LAB TEST NON-CDC: HCPCS

## 2021-01-01 PROCEDURE — 93005 ELECTROCARDIOGRAM TRACING: CPT | Performed by: STUDENT IN AN ORGANIZED HEALTH CARE EDUCATION/TRAINING PROGRAM

## 2021-01-01 PROCEDURE — 87040 BLOOD CULTURE FOR BACTERIA: CPT

## 2021-01-01 PROCEDURE — 5A1945Z RESPIRATORY VENTILATION, 24-96 CONSECUTIVE HOURS: ICD-10-PCS | Performed by: EMERGENCY MEDICINE

## 2021-01-01 PROCEDURE — 2580000003 HC RX 258: Performed by: INTERNAL MEDICINE

## 2021-01-01 PROCEDURE — 99223 1ST HOSP IP/OBS HIGH 75: CPT | Performed by: INTERNAL MEDICINE

## 2021-01-01 PROCEDURE — 2500000003 HC RX 250 WO HCPCS: Performed by: STUDENT IN AN ORGANIZED HEALTH CARE EDUCATION/TRAINING PROGRAM

## 2021-01-01 PROCEDURE — 2580000003 HC RX 258: Performed by: NURSE PRACTITIONER

## 2021-01-01 PROCEDURE — 99285 EMERGENCY DEPT VISIT HI MDM: CPT

## 2021-01-01 PROCEDURE — 6370000000 HC RX 637 (ALT 250 FOR IP): Performed by: NURSE PRACTITIONER

## 2021-01-01 PROCEDURE — 82962 GLUCOSE BLOOD TEST: CPT

## 2021-01-01 PROCEDURE — 87186 SC STD MICRODIL/AGAR DIL: CPT

## 2021-01-01 PROCEDURE — 2060000000 HC ICU INTERMEDIATE R&B

## 2021-01-01 PROCEDURE — 71045 X-RAY EXAM CHEST 1 VIEW: CPT

## 2021-01-01 PROCEDURE — 93010 ELECTROCARDIOGRAM REPORT: CPT | Performed by: INTERNAL MEDICINE

## 2021-01-01 PROCEDURE — 84484 ASSAY OF TROPONIN QUANT: CPT

## 2021-01-01 PROCEDURE — 80053 COMPREHEN METABOLIC PANEL: CPT

## 2021-01-01 PROCEDURE — 96367 TX/PROPH/DG ADDL SEQ IV INF: CPT

## 2021-01-01 PROCEDURE — 0202U NFCT DS 22 TRGT SARS-COV-2: CPT

## 2021-01-01 PROCEDURE — 81001 URINALYSIS AUTO W/SCOPE: CPT

## 2021-01-01 PROCEDURE — 80048 BASIC METABOLIC PNL TOTAL CA: CPT

## 2021-01-01 PROCEDURE — 6360000002 HC RX W HCPCS: Performed by: INTERNAL MEDICINE

## 2021-01-01 PROCEDURE — 2580000003 HC RX 258: Performed by: STUDENT IN AN ORGANIZED HEALTH CARE EDUCATION/TRAINING PROGRAM

## 2021-01-01 PROCEDURE — 87088 URINE BACTERIA CULTURE: CPT

## 2021-01-01 PROCEDURE — 6360000002 HC RX W HCPCS: Performed by: NURSE PRACTITIONER

## 2021-01-01 PROCEDURE — 94002 VENT MGMT INPAT INIT DAY: CPT

## 2021-01-01 PROCEDURE — 6370000000 HC RX 637 (ALT 250 FOR IP): Performed by: EMERGENCY MEDICINE

## 2021-01-01 PROCEDURE — 96365 THER/PROPH/DIAG IV INF INIT: CPT

## 2021-01-01 PROCEDURE — 82805 BLOOD GASES W/O2 SATURATION: CPT

## 2021-01-01 PROCEDURE — 83605 ASSAY OF LACTIC ACID: CPT

## 2021-01-01 PROCEDURE — 94640 AIRWAY INHALATION TREATMENT: CPT

## 2021-01-01 PROCEDURE — 85027 COMPLETE CBC AUTOMATED: CPT

## 2021-01-01 PROCEDURE — 6360000002 HC RX W HCPCS: Performed by: STUDENT IN AN ORGANIZED HEALTH CARE EDUCATION/TRAINING PROGRAM

## 2021-01-01 PROCEDURE — 94003 VENT MGMT INPAT SUBQ DAY: CPT

## 2021-01-01 PROCEDURE — 99233 SBSQ HOSP IP/OBS HIGH 50: CPT | Performed by: INTERNAL MEDICINE

## 2021-01-01 PROCEDURE — 99222 1ST HOSP IP/OBS MODERATE 55: CPT | Performed by: STUDENT IN AN ORGANIZED HEALTH CARE EDUCATION/TRAINING PROGRAM

## 2021-01-01 PROCEDURE — 36415 COLL VENOUS BLD VENIPUNCTURE: CPT

## 2021-01-01 PROCEDURE — 6370000000 HC RX 637 (ALT 250 FOR IP): Performed by: INTERNAL MEDICINE

## 2021-01-01 PROCEDURE — 84145 PROCALCITONIN (PCT): CPT

## 2021-01-01 PROCEDURE — 83880 ASSAY OF NATRIURETIC PEPTIDE: CPT

## 2021-01-01 PROCEDURE — 85025 COMPLETE CBC W/AUTO DIFF WBC: CPT

## 2021-01-01 RX ORDER — POLYETHYLENE GLYCOL 3350 17 G/17G
17 POWDER, FOR SOLUTION ORAL DAILY PRN
Status: DISCONTINUED | OUTPATIENT
Start: 2021-01-01 | End: 2021-01-01

## 2021-01-01 RX ORDER — DEXTROSE MONOHYDRATE 50 MG/ML
100 INJECTION, SOLUTION INTRAVENOUS PRN
Status: DISCONTINUED | OUTPATIENT
Start: 2021-01-01 | End: 2021-01-26 | Stop reason: HOSPADM

## 2021-01-01 RX ORDER — SODIUM CHLORIDE 0.9 % (FLUSH) 0.9 %
10 SYRINGE (ML) INJECTION EVERY 12 HOURS SCHEDULED
Status: DISCONTINUED | OUTPATIENT
Start: 2021-01-01 | End: 2021-01-26 | Stop reason: HOSPADM

## 2021-01-01 RX ORDER — LORAZEPAM 2 MG/ML
1 INJECTION INTRAMUSCULAR EVERY 12 HOURS PRN
Status: DISCONTINUED | OUTPATIENT
Start: 2021-01-01 | End: 2021-01-01

## 2021-01-01 RX ORDER — MORPHINE SULFATE 2 MG/ML
1 INJECTION, SOLUTION INTRAMUSCULAR; INTRAVENOUS EVERY 4 HOURS PRN
Status: DISCONTINUED | OUTPATIENT
Start: 2021-01-01 | End: 2021-01-01

## 2021-01-01 RX ORDER — LEVOFLOXACIN 5 MG/ML
750 INJECTION, SOLUTION INTRAVENOUS EVERY 24 HOURS
Status: DISCONTINUED | OUTPATIENT
Start: 2021-01-01 | End: 2021-01-01

## 2021-01-01 RX ORDER — PROMETHAZINE HYDROCHLORIDE 25 MG/1
12.5 TABLET ORAL EVERY 6 HOURS PRN
Status: DISCONTINUED | OUTPATIENT
Start: 2021-01-01 | End: 2021-01-26 | Stop reason: HOSPADM

## 2021-01-01 RX ORDER — AMOXICILLIN 250 MG
2 CAPSULE ORAL DAILY
Status: DISCONTINUED | OUTPATIENT
Start: 2021-01-01 | End: 2021-01-01

## 2021-01-01 RX ORDER — FERROUS SULFATE 220 (44)/5
220 ELIXIR ORAL DAILY
COMMUNITY

## 2021-01-01 RX ORDER — IPRATROPIUM BROMIDE AND ALBUTEROL SULFATE 2.5; .5 MG/3ML; MG/3ML
1 SOLUTION RESPIRATORY (INHALATION) EVERY 4 HOURS PRN
Status: DISCONTINUED | OUTPATIENT
Start: 2021-01-01 | End: 2021-01-26 | Stop reason: HOSPADM

## 2021-01-01 RX ORDER — THIAMINE HYDROCHLORIDE 100 MG/ML
100 INJECTION, SOLUTION INTRAMUSCULAR; INTRAVENOUS DAILY
Status: ON HOLD | COMMUNITY
End: 2021-01-01

## 2021-01-01 RX ORDER — MORPHINE SULFATE 2 MG/ML
2 INJECTION, SOLUTION INTRAMUSCULAR; INTRAVENOUS EVERY 30 MIN PRN
Status: DISCONTINUED | OUTPATIENT
Start: 2021-01-01 | End: 2021-01-26 | Stop reason: HOSPADM

## 2021-01-01 RX ORDER — NICOTINE POLACRILEX 4 MG
15 LOZENGE BUCCAL PRN
Status: DISCONTINUED | OUTPATIENT
Start: 2021-01-01 | End: 2021-01-26 | Stop reason: HOSPADM

## 2021-01-01 RX ORDER — INSULIN GLARGINE 100 [IU]/ML
26 INJECTION, SOLUTION SUBCUTANEOUS 2 TIMES DAILY
COMMUNITY

## 2021-01-01 RX ORDER — ONDANSETRON 2 MG/ML
4 INJECTION INTRAMUSCULAR; INTRAVENOUS EVERY 6 HOURS PRN
Status: DISCONTINUED | OUTPATIENT
Start: 2021-01-01 | End: 2021-01-26 | Stop reason: HOSPADM

## 2021-01-01 RX ORDER — LANOLIN ALCOHOL/MO/W.PET/CERES
100 CREAM (GRAM) TOPICAL DAILY
Status: DISCONTINUED | OUTPATIENT
Start: 2021-01-01 | End: 2021-01-26 | Stop reason: HOSPADM

## 2021-01-01 RX ORDER — GABAPENTIN 100 MG/1
100 CAPSULE ORAL 2 TIMES DAILY
COMMUNITY

## 2021-01-01 RX ORDER — IPRATROPIUM BROMIDE AND ALBUTEROL SULFATE 2.5; .5 MG/3ML; MG/3ML
3 SOLUTION RESPIRATORY (INHALATION) EVERY 6 HOURS
Status: DISCONTINUED | OUTPATIENT
Start: 2021-01-01 | End: 2021-01-26 | Stop reason: HOSPADM

## 2021-01-01 RX ORDER — ASCORBIC ACID 500 MG
1000 TABLET ORAL DAILY
COMMUNITY

## 2021-01-01 RX ORDER — POLYETHYLENE GLYCOL 3350 17 G/17G
17 POWDER, FOR SOLUTION ORAL DAILY
COMMUNITY

## 2021-01-01 RX ORDER — MIRTAZAPINE 15 MG/1
15 TABLET, FILM COATED ORAL NIGHTLY
Status: DISCONTINUED | OUTPATIENT
Start: 2021-01-01 | End: 2021-01-26 | Stop reason: HOSPADM

## 2021-01-01 RX ORDER — ACETAMINOPHEN 325 MG/1
650 TABLET ORAL EVERY 6 HOURS PRN
Status: DISCONTINUED | OUTPATIENT
Start: 2021-01-01 | End: 2021-01-26 | Stop reason: HOSPADM

## 2021-01-01 RX ORDER — LORAZEPAM 2 MG/ML
0.5 INJECTION INTRAMUSCULAR EVERY 4 HOURS PRN
Status: DISCONTINUED | OUTPATIENT
Start: 2021-01-01 | End: 2021-01-01

## 2021-01-01 RX ORDER — ARFORMOTEROL TARTRATE 15 UG/2ML
15 SOLUTION RESPIRATORY (INHALATION) 2 TIMES DAILY
Status: DISCONTINUED | OUTPATIENT
Start: 2021-01-01 | End: 2021-01-26 | Stop reason: HOSPADM

## 2021-01-01 RX ORDER — INSULIN GLARGINE 100 [IU]/ML
20 INJECTION, SOLUTION SUBCUTANEOUS 2 TIMES DAILY
Status: DISCONTINUED | OUTPATIENT
Start: 2021-01-01 | End: 2021-01-26 | Stop reason: HOSPADM

## 2021-01-01 RX ORDER — FERROUS SULFATE 300 MG/5ML
300 LIQUID (ML) ORAL DAILY
Status: DISCONTINUED | OUTPATIENT
Start: 2021-01-01 | End: 2021-01-26 | Stop reason: HOSPADM

## 2021-01-01 RX ORDER — SODIUM CHLORIDE 9 MG/ML
25 INJECTION, SOLUTION INTRAVENOUS EVERY 8 HOURS
Status: DISCONTINUED | OUTPATIENT
Start: 2021-01-01 | End: 2021-01-01

## 2021-01-01 RX ORDER — PANTOPRAZOLE SODIUM 40 MG/1
40 GRANULE, DELAYED RELEASE ORAL
COMMUNITY

## 2021-01-01 RX ORDER — SENNA AND DOCUSATE SODIUM 50; 8.6 MG/1; MG/1
2 TABLET, FILM COATED ORAL DAILY
Status: DISCONTINUED | OUTPATIENT
Start: 2021-01-01 | End: 2021-01-26 | Stop reason: HOSPADM

## 2021-01-01 RX ORDER — SODIUM CHLORIDE 0.9 % (FLUSH) 0.9 %
10 SYRINGE (ML) INJECTION PRN
Status: DISCONTINUED | OUTPATIENT
Start: 2021-01-01 | End: 2021-01-26 | Stop reason: HOSPADM

## 2021-01-01 RX ORDER — LANSOPRAZOLE
30 KIT
Status: DISCONTINUED | OUTPATIENT
Start: 2021-01-01 | End: 2021-01-26 | Stop reason: HOSPADM

## 2021-01-01 RX ORDER — POLYETHYLENE GLYCOL 3350 17 G/17G
17 POWDER, FOR SOLUTION ORAL DAILY PRN
Status: DISCONTINUED | OUTPATIENT
Start: 2021-01-01 | End: 2021-01-26 | Stop reason: HOSPADM

## 2021-01-01 RX ORDER — IPRATROPIUM BROMIDE AND ALBUTEROL SULFATE 2.5; .5 MG/3ML; MG/3ML
3 SOLUTION RESPIRATORY (INHALATION) ONCE
Status: COMPLETED | OUTPATIENT
Start: 2021-01-01 | End: 2021-01-01

## 2021-01-01 RX ORDER — INSULIN GLARGINE 100 [IU]/ML
25 INJECTION, SOLUTION SUBCUTANEOUS 2 TIMES DAILY
Status: DISCONTINUED | OUTPATIENT
Start: 2021-01-01 | End: 2021-01-01

## 2021-01-01 RX ORDER — MIRTAZAPINE 15 MG/1
15 TABLET, FILM COATED ORAL NIGHTLY
COMMUNITY

## 2021-01-01 RX ORDER — ZINC SULFATE 50(220)MG
50 CAPSULE ORAL DAILY
COMMUNITY

## 2021-01-01 RX ORDER — BUDESONIDE 0.5 MG/2ML
0.5 INHALANT ORAL 2 TIMES DAILY
Status: DISCONTINUED | OUTPATIENT
Start: 2021-01-01 | End: 2021-01-26 | Stop reason: HOSPADM

## 2021-01-01 RX ORDER — ACETAMINOPHEN 650 MG/1
650 SUPPOSITORY RECTAL EVERY 6 HOURS PRN
Status: DISCONTINUED | OUTPATIENT
Start: 2021-01-01 | End: 2021-01-26 | Stop reason: HOSPADM

## 2021-01-01 RX ORDER — ACETAMINOPHEN 325 MG/1
650 TABLET ORAL EVERY 6 HOURS PRN
COMMUNITY

## 2021-01-01 RX ORDER — AMOXICILLIN 250 MG
2 CAPSULE ORAL DAILY
COMMUNITY

## 2021-01-01 RX ORDER — DOCUSATE SODIUM 100 MG/1
100 CAPSULE, LIQUID FILLED ORAL 2 TIMES DAILY PRN
COMMUNITY

## 2021-01-01 RX ORDER — GABAPENTIN 100 MG/1
100 CAPSULE ORAL 2 TIMES DAILY
Status: DISCONTINUED | OUTPATIENT
Start: 2021-01-01 | End: 2021-01-26 | Stop reason: HOSPADM

## 2021-01-01 RX ORDER — PANTOPRAZOLE SODIUM 20 MG/1
40 TABLET, DELAYED RELEASE ORAL EVERY MORNING
Status: ON HOLD | COMMUNITY
End: 2021-01-01

## 2021-01-01 RX ORDER — LORAZEPAM 2 MG/ML
1 INJECTION INTRAMUSCULAR EVERY 30 MIN PRN
Status: DISCONTINUED | OUTPATIENT
Start: 2021-01-01 | End: 2021-01-26 | Stop reason: HOSPADM

## 2021-01-01 RX ORDER — VITAMIN C
1 TAB ORAL EVERY MORNING
Status: DISCONTINUED | OUTPATIENT
Start: 2021-01-01 | End: 2021-01-26 | Stop reason: HOSPADM

## 2021-01-01 RX ORDER — DEXTROSE MONOHYDRATE 25 G/50ML
12.5 INJECTION, SOLUTION INTRAVENOUS PRN
Status: DISCONTINUED | OUTPATIENT
Start: 2021-01-01 | End: 2021-01-26 | Stop reason: HOSPADM

## 2021-01-01 RX ADMIN — MINERAL SUPPLEMENT IRON 300 MG / 5 ML STRENGTH LIQUID 100 PER BOX UNFLAVORED 300 MG: at 09:48

## 2021-01-01 RX ADMIN — VANCOMYCIN HYDROCHLORIDE 1000 MG: 1 INJECTION, POWDER, LYOPHILIZED, FOR SOLUTION INTRAVENOUS at 09:48

## 2021-01-01 RX ADMIN — INSULIN GLARGINE 20 UNITS: 100 INJECTION, SOLUTION SUBCUTANEOUS at 10:00

## 2021-01-01 RX ADMIN — SODIUM CHLORIDE 25 ML: 9 INJECTION, SOLUTION INTRAVENOUS at 04:06

## 2021-01-01 RX ADMIN — ARFORMOTEROL TARTRATE 15 MCG: 15 SOLUTION RESPIRATORY (INHALATION) at 20:50

## 2021-01-01 RX ADMIN — DOXYCYCLINE 100 MG: 100 INJECTION, POWDER, LYOPHILIZED, FOR SOLUTION INTRAVENOUS at 11:58

## 2021-01-01 RX ADMIN — Medication 100 MG: at 09:48

## 2021-01-01 RX ADMIN — MORPHINE SULFATE 2 MG: 2 INJECTION, SOLUTION INTRAMUSCULAR; INTRAVENOUS at 17:34

## 2021-01-01 RX ADMIN — ARFORMOTEROL TARTRATE 15 MCG: 15 SOLUTION RESPIRATORY (INHALATION) at 08:35

## 2021-01-01 RX ADMIN — IPRATROPIUM BROMIDE AND ALBUTEROL SULFATE 3 ML: .5; 3 SOLUTION RESPIRATORY (INHALATION) at 20:50

## 2021-01-01 RX ADMIN — LORAZEPAM 1 MG: 2 INJECTION INTRAMUSCULAR; INTRAVENOUS at 18:05

## 2021-01-01 RX ADMIN — ENOXAPARIN SODIUM 60 MG: 100 INJECTION SUBCUTANEOUS at 21:28

## 2021-01-01 RX ADMIN — METOPROLOL TARTRATE 12.5 MG: 25 TABLET, FILM COATED ORAL at 09:59

## 2021-01-01 RX ADMIN — MIRTAZAPINE 15 MG: 15 TABLET, FILM COATED ORAL at 23:13

## 2021-01-01 RX ADMIN — LORAZEPAM 1 MG: 2 INJECTION INTRAMUSCULAR; INTRAVENOUS at 17:19

## 2021-01-01 RX ADMIN — INSULIN GLARGINE 20 UNITS: 100 INJECTION, SOLUTION SUBCUTANEOUS at 22:04

## 2021-01-01 RX ADMIN — PIPERACILLIN AND TAZOBACTAM 4500 MG: 4; .5 INJECTION, POWDER, FOR SOLUTION INTRAVENOUS at 11:11

## 2021-01-01 RX ADMIN — IPRATROPIUM BROMIDE AND ALBUTEROL SULFATE 3 ML: .5; 3 SOLUTION RESPIRATORY (INHALATION) at 01:09

## 2021-01-01 RX ADMIN — GABAPENTIN 100 MG: 100 CAPSULE ORAL at 09:49

## 2021-01-01 RX ADMIN — DOCUSATE SODIUM 50 MG AND SENNOSIDES 8.6 MG 2 TABLET: 8.6; 5 TABLET, FILM COATED ORAL at 09:48

## 2021-01-01 RX ADMIN — DOCUSATE SODIUM 50 MG AND SENNOSIDES 8.6 MG 2 TABLET: 8.6; 5 TABLET, FILM COATED ORAL at 23:13

## 2021-01-01 RX ADMIN — LANSOPRAZOLE 30 MG: KIT at 06:13

## 2021-01-01 RX ADMIN — IPRATROPIUM BROMIDE AND ALBUTEROL SULFATE 3 ML: .5; 3 SOLUTION RESPIRATORY (INHALATION) at 12:52

## 2021-01-01 RX ADMIN — MORPHINE SULFATE 2 MG: 2 INJECTION, SOLUTION INTRAMUSCULAR; INTRAVENOUS at 18:16

## 2021-01-01 RX ADMIN — METOPROLOL TARTRATE 12.5 MG: 25 TABLET, FILM COATED ORAL at 23:13

## 2021-01-01 RX ADMIN — SODIUM CHLORIDE, PRESERVATIVE FREE 10 ML: 5 INJECTION INTRAVENOUS at 20:20

## 2021-01-01 RX ADMIN — BUDESONIDE 500 MCG: 0.5 SUSPENSION RESPIRATORY (INHALATION) at 20:50

## 2021-01-01 RX ADMIN — Medication 1 TABLET: at 09:49

## 2021-01-01 RX ADMIN — SODIUM CHLORIDE, PRESERVATIVE FREE 10 ML: 5 INJECTION INTRAVENOUS at 22:07

## 2021-01-01 RX ADMIN — ENOXAPARIN SODIUM 60 MG: 100 INJECTION SUBCUTANEOUS at 09:59

## 2021-01-01 RX ADMIN — SODIUM CHLORIDE, PRESERVATIVE FREE 10 ML: 5 INJECTION INTRAVENOUS at 17:19

## 2021-01-01 RX ADMIN — SODIUM CHLORIDE, PRESERVATIVE FREE 10 ML: 5 INJECTION INTRAVENOUS at 10:00

## 2021-01-01 RX ADMIN — BUDESONIDE 500 MCG: 0.5 SUSPENSION RESPIRATORY (INHALATION) at 08:35

## 2021-01-01 RX ADMIN — ONDANSETRON 4 MG: 2 INJECTION INTRAMUSCULAR; INTRAVENOUS at 20:20

## 2021-01-01 RX ADMIN — IPRATROPIUM BROMIDE AND ALBUTEROL SULFATE 3 ML: .5; 3 SOLUTION RESPIRATORY (INHALATION) at 08:35

## 2021-01-01 RX ADMIN — VANCOMYCIN HYDROCHLORIDE 1500 MG: 10 INJECTION, POWDER, LYOPHILIZED, FOR SOLUTION INTRAVENOUS at 23:03

## 2021-01-01 RX ADMIN — LEVOFLOXACIN 750 MG: 5 INJECTION, SOLUTION INTRAVENOUS at 21:29

## 2021-01-01 RX ADMIN — GABAPENTIN 100 MG: 100 CAPSULE ORAL at 23:13

## 2021-01-01 RX ADMIN — PIPERACILLIN AND TAZOBACTAM 3375 MG: 3; .375 INJECTION, POWDER, LYOPHILIZED, FOR SOLUTION INTRAVENOUS at 01:15

## 2021-01-01 RX ADMIN — INSULIN LISPRO 1 UNITS: 100 INJECTION, SOLUTION INTRAVENOUS; SUBCUTANEOUS at 22:04

## 2021-01-01 RX ADMIN — IPRATROPIUM BROMIDE AND ALBUTEROL SULFATE 3 AMPULE: .5; 3 SOLUTION RESPIRATORY (INHALATION) at 11:14

## 2021-01-01 RX ADMIN — MINERAL SUPPLEMENT IRON 300 MG / 5 ML STRENGTH LIQUID 100 PER BOX UNFLAVORED 300 MG: at 23:13

## 2021-01-01 ASSESSMENT — PULMONARY FUNCTION TESTS
PIF_VALUE: 32
PIF_VALUE: 33

## 2021-01-01 ASSESSMENT — ENCOUNTER SYMPTOMS: SHORTNESS OF BREATH: 1

## 2021-01-24 PROBLEM — J18.9 PNA (PNEUMONIA): Status: ACTIVE | Noted: 2021-01-01

## 2021-01-24 NOTE — ED NOTES
RN faxed SBAR to floor. Confirmation received and spoke with staff and updated staff on pt.  Pt ready for transport to room when room is clean       Gabriella Villarreal RN  01/24/21 0034

## 2021-01-24 NOTE — ED PROVIDER NOTES
Vahid Polanco is a 67year old female with chronic respiratory failure vent dependent  who presented to ED with respiratory distress that started this morning. Patient was found to be hypoxic on 5L and increased to 10L patient has a trach and is vent dependent. Patient was also having hypertension and was given 25mg lopressor. Patient was tachypnic. Patient denies any chest pain. Patient was having shortness of breath. Patient is vent dependent following a recent Covid illness. Patient was given duoneb at facility. Patient is awake and appears alert patient states she is short of breath but does not provide any additional HPI or ROS. Patient does have quezada in place. Patient does deny abdominal pain when asked. The history is provided by the patient and medical records. Shortness of Breath  Severity:  Moderate  Onset quality:  Gradual  Duration:  1 day  Timing:  Constant  Progression:  Worsening  Chronicity:  Recurrent  Relieved by:  Oxygen  Worsened by:  Nothing  Ineffective treatments:  None tried  Risk factors: prolonged immobilization         Review of Systems   Unable to perform ROS: Acuity of condition   Respiratory: Positive for shortness of breath. Physical Exam  Vitals signs and nursing note reviewed. Constitutional:       Appearance: She is ill-appearing. She is not diaphoretic. HENT:      Head: Normocephalic. Eyes:      Conjunctiva/sclera: Conjunctivae normal.      Pupils: Pupils are equal, round, and reactive to light. Neck:      Musculoskeletal: Normal range of motion and neck supple. No neck rigidity or muscular tenderness. Comments: Trach in place on vent  Cardiovascular:      Rate and Rhythm: Tachycardia present. Pulmonary:      Effort: Tachypnea present. Breath sounds: No stridor. Rhonchi and rales present. Chest:      Chest wall: No tenderness. Abdominal:      General: Bowel sounds are normal. There is no distension. Palpations: Abdomen is soft. Tenderness: There is no abdominal tenderness. There is no guarding or rebound. Musculoskeletal:      Right lower leg: No edema. Left lower leg: No edema. Skin:     General: Skin is warm and dry. Capillary Refill: Capillary refill takes less than 2 seconds. Neurological:      Mental Status: She is alert. Mental status is at baseline. Comments: Patient shake and nod head to answer some questions  Patient alert with answer some questions   Patient appears to be moving all extremities. Psychiatric:         Behavior: Behavior normal.          Procedures     MDM  Number of Diagnoses or Management Options  Diagnosis management comments: Amando Patten is a 67year old female who presented to ED from nursing home due to increasing oxygen demand. Patient was found to likely have pneumonia. Patient recently recovered from [facility]. Patient does have a leukocytosis and findings on CXR concerning for pneumonia. Blood cultures and urine cultures are pending. Patient does have a quezada from facility. Patient did have elevated BNP with also pleural effusion concerning for possible CHF or due to pneumonia. Patient was given 1.5 L IVF for sepsis and given zosyn and doxycycline. Patient will be admitted for further management. Amount and/or Complexity of Data Reviewed  Clinical lab tests: reviewed  Tests in the radiology section of CPT®: reviewed  Tests in the medicine section of CPT®: reviewed                  --------------------------------------------- PAST HISTORY ---------------------------------------------  Past Medical History:  has a past medical history of COPD (chronic obstructive pulmonary disease) ([facility] [address] Diabetes type 2, controlled ([facility] [address] Former smoker, Hyperlipidemia, Hypertension, Macular degeneration, Multiple thyroid nodules, and Pulmonary nodule. Past Surgical History:  has a past surgical history that includes Tubal ligation (1970'a); Foot surgery (1976);  Liposuction; tracheostomy (N/A, 11/28/2020); and Upper gastrointestinal endoscopy (N/A, 11/28/2020). Social History:  reports that she quit smoking about 7 years ago. Her smoking use included cigarettes. She started smoking about 47 years ago. She has a 120.00 pack-year smoking history. She has never used smokeless tobacco. She reports that she does not drink alcohol or use drugs. Family History: family history includes Coronary Art Dis in her father and mother; Diabetes in her mother; Heart Disease in her sister; High Blood Pressure in her father and mother; Velora Livings in her father and sister; Mult Sclerosis in her daughter; Other in an other family member. The patients home medications have been reviewed. Allergies: Patient has no known allergies.     -------------------------------------------------- RESULTS -------------------------------------------------    LABS:  Results for orders placed or performed during the hospital encounter of 01/24/21   Respiratory Panel, Molecular, with COVID-19 (Restricted: peds pts or suitable admitted adults)    Specimen: Nasopharyngeal   Result Value Ref Range    Adenovirus by PCR Not Detected Not Detected    Bordetella parapertussis by PCR Not Detected Not Detected    Bordetella pertussis by PCR Not Detected Not Detected    Chlamydophilia pneumoniae by PCR Not Detected Not Detected    Coronavirus 229E by PCR Not Detected Not Detected    Coronavirus HKU1 by PCR Not Detected Not Detected    Coronavirus NL63 by PCR Not Detected Not Detected    Coronavirus OC43 by PCR Not Detected Not Detected    SARS-CoV-2, PCR DETECTED (A) Not Detected    Human Metapneumovirus by PCR Not Detected Not Detected    Human Rhinovirus/Enterovirus by PCR Not Detected Not Detected    Influenza A by PCR Not Detected Not Detected    Influenza B by PCR Not Detected Not Detected    Mycoplasma pneumoniae by PCR Not Detected Not Detected    Parainfluenza Virus 1 by PCR Not Detected Not Detected    Parainfluenza Virus 2 by PCR Not Detected Not Detected    Parainfluenza Virus 3 by PCR Not Detected Not Detected    Parainfluenza Virus 4 by PCR Not Detected Not Detected    Respiratory Syncytial Virus by PCR Not Detected Not Detected   CBC auto differential   Result Value Ref Range    WBC 13.6 (H) 4.5 - 11.5 E9/L    RBC 2.65 (L) 3.50 - 5.50 E12/L    Hemoglobin 7.4 (L) 11.5 - 15.5 g/dL    Hematocrit 26.4 (L) 34.0 - 48.0 %    MCV 99.6 80.0 - 99.9 fL    MCH 27.9 26.0 - 35.0 pg    MCHC 28.0 (L) 32.0 - 34.5 %    RDW 16.8 (H) 11.5 - 15.0 fL    Platelets 503 857 - 534 E9/L    MPV 11.0 7.0 - 12.0 fL    Neutrophils % 84.0 (H) 43.0 - 80.0 %    Lymphocytes % 10.0 (L) 20.0 - 42.0 %    Monocytes % 4.0 2.0 - 12.0 %    Eosinophils % 1.0 0.0 - 6.0 %    Basophils % 0.0 0.0 - 2.0 %    Neutrophils Absolute 11.56 (H) 1.80 - 7.30 E9/L    Lymphocytes Absolute 1.36 (L) 1.50 - 4.00 E9/L    Monocytes Absolute 0.54 0.10 - 0.95 E9/L    Eosinophils Absolute 0.14 0.05 - 0.50 E9/L    Basophils Absolute 0.00 0.00 - 0.20 E9/L    Metamyelocytes Relative 1.0 0.0 - 1.0 %    Smudge Cells 2+     Anisocytosis 2+     Polychromasia 1+     Hypochromia 2+    Comprehensive Metabolic Panel w/ Reflex to MG   Result Value Ref Range    Sodium 138 132 - 146 mmol/L    Potassium reflex Magnesium 4.9 3.5 - 5.0 mmol/L    Chloride 98 98 - 107 mmol/L    CO2 35 (H) 22 - 29 mmol/L    Anion Gap 5 (L) 7 - 16 mmol/L    Glucose 257 (H) 74 - 99 mg/dL    BUN 30 (H) 8 - 23 mg/dL    CREATININE 0.4 (L) 0.5 - 1.0 mg/dL    GFR Non-African American >60 >=60 mL/min/1.73    GFR African American >60     Calcium 9.5 8.6 - 10.2 mg/dL    Total Protein 7.4 6.4 - 8.3 g/dL    Alb 3.0 (L) 3.5 - 5.2 g/dL    Total Bilirubin 0.2 0.0 - 1.2 mg/dL    Alkaline Phosphatase 148 (H) 35 - 104 U/L    ALT 20 0 - 32 U/L    AST 14 0 - 31 U/L   Troponin   Result Value Ref Range    Troponin 0.03 0.00 - 0.03 ng/mL   Brain Natriuretic Peptide   Result Value Ref Range    Pro-BNP 6,997 (H) 0 - 450 pg/mL   Lactate, Sepsis   Result Value Ref Range    Lactic Acid, Sepsis 1.8 0.5 - 1.9 mmol/L   Urinalysis with Microscopic   Result Value Ref Range    Color, UA DARK YELLOW (A) Straw/Yellow    Clarity, UA CLOUDY (A) Clear    Glucose, Ur Negative Negative mg/dL    Bilirubin Urine Negative Negative    Ketones, Urine Negative Negative mg/dL    Specific Gravity, UA 1.010 1.005 - 1.030    Blood, Urine Negative Negative    pH, UA >=9.0 5.0 - 9.0    Protein,  (A) Negative mg/dL    Urobilinogen, Urine 0.2 <2.0 E.U./dL    Nitrite, Urine Negative Negative    Leukocyte Esterase, Urine LARGE (A) Negative    WBC, UA 10-20 (A) 0 - 5 /HPF    RBC, UA NONE 0 - 2 /HPF    Bacteria, UA MANY (A) None Seen /HPF    Amorphous, UA MANY     Crystals, UA Many (A) None Seen /HPF   COVID-19   Result Value Ref Range    SARS-CoV-2, NAAT Not Detected Not Detected   Procalcitonin   Result Value Ref Range    Procalcitonin 0.30 (H) 0.00 - 0.08 ng/mL   Blood Gas, Arterial   Result Value Ref Range    Date Analyzed 20210124     Time Analyzed 1929     Source: Blood Arterial     pH, Blood Gas 7.346 (L) 7.350 - 7.450    PCO2 64.7 (H) 35.0 - 45.0 mmHg    PO2 56.1 (L) 75.0 - 100.0 mmHg    HCO3 34.6 (H) 22.0 - 26.0 mmol/L    B.E. 7.6 (H) -3.0 - 3.0 mmol/L    O2 Sat 87.0 (L) 92.0 - 98.5 %    PO2/FIO2 0.80 mmHg/%    AaDO2 356.0 mmHg    RI(T) 634 %    O2Hb 86.0 (L) 94.0 - 97.0 %    COHb 1.0 0.0 - 1.5 %    MetHb 0.2 0.0 - 1.5 %    O2 Content 10.8 mL/dL    HHb 12.8 (H) 0.0 - 5.0 %    tHb (est) 8.9 (L) 11.5 - 16.5 g/dL    Mode AC     FIO2 70.0 %    Rr Mechanical 14.0 b/min    Vt Mechanical 450.0 mL    Peep/Cpap 8.0 cmH2O    Date Of Collection      Time Collected      Pt Temp 37.0 C     ID [id number]     Lab [id number]     Critical(s) Notified .  No Critical Values    Blood Gas, Arterial   Result Value Ref Range    Date Analyzed 20210124     Time Analyzed 2327     Source: Blood Venous     pH, Blood Gas 7.368 7.300 - 7.420    PCO2 62.3 (H) 40.0 - 52.0 mmHg    PO2 52.7 (H) 30.0 - 50.0 mmHg    HCO3 35.0 mmol/L    B.E. 8.4 mmol/L    O2 Sat 86.3 %    PO2/FIO2 0.75 mmHg/%    AaDO2 361.9 mmHg    RI(T) 687 %    O2Hb 83.6 %    COHb 3.1 (H) 0.0 - 1.5 %    MetHb 0.0 0.0 - 1.5 %    O2 Content 10.7 mL/dL    HHb 13.3 %    tHb (est) 9.1 (L) 11.5 - 16.5 g/dL    Mode AC     FIO2 70.0 %    Rr Mechanical 14.0 b/min    Vt Mechanical 450.0 mL    Peep/Cpap 8.0 cmH2O    Comment       changed the PEEP from 5 to 8 (sent updated report to EMR 1-25-21 10:42 mmb)    Date Of Collection      Time Collected      Pt Temp 37.0 C     ID [id number]     Lab [id number]     Critical(s) Notified . No Critical Values    Blood Gas, Arterial   Result Value Ref Range    Date Analyzed 20210125     Time Analyzed 0528     Source: Blood Venous     pH, Blood Gas 7.484 (H) 7.300 - 7.420    PCO2 37.7 (L) 40.0 - 52.0 mmHg    PO2 50.6 (H) 30.0 - 50.0 mmHg    HCO3 27.7 mmol/L    B.E. 4.0 mmol/L    O2 Sat 89.3 %    PO2/FIO2 0.72 mmHg/%    AaDO2 390.5 mmHg    RI(T) 772 %    O2Hb 85.8 %    COHb 3.8 (H) 0.0 - 1.5 %    MetHb 0.1 0.0 - 1.5 %    O2 Content 9.5 mL/dL    HHb 10.3 %    tHb (est) 7.8 (L) 11.5 - 16.5 g/dL    Mode AC     FIO2 70.0 %    Rr Mechanical 14.0 b/min    Vt Mechanical 450.0 mL    Peep/Cpap 8.0 cmH2O    Date Of Collection      Time Collected      Pt Temp 37.0 C     ID [id number]     Lab [id number]     Critical(s) Notified .  No Critical Values    POCT Glucose   Result Value Ref Range    Glucose 269 mg/dL    QC OK? ok    POCT Glucose   Result Value Ref Range    Meter Glucose 269 (H) 74 - 99 mg/dL   POCT Glucose   Result Value Ref Range    Meter Glucose 194 (H) 74 - 99 mg/dL   POCT Glucose   Result Value Ref Range    Meter Glucose 188 (H) 74 - 99 mg/dL   POCT Glucose   Result Value Ref Range    Meter Glucose 107 (H) 74 - 99 mg/dL   POCT Glucose   Result Value Ref Range    Meter Glucose 121 (H) 74 - 99 mg/dL   EKG 12 Lead   Result Value Ref Range    Ventricular Rate 73 BPM    Atrial Rate 73 BPM    P-R Interval 126 ms    QRS Duration 80 ms    Q-T Interval 420 ms    QTc Calculation (Bazett) 462 ms    P Axis 65 degrees    R Axis 78 degrees    T Axis 47 degrees       RADIOLOGY:  XR CHEST PORTABLE   Final Result   Persistent bilateral lower lobe infiltrates unchanged   Probable small left pleural effusion          EKG: This EKG is signed and interpreted by me. Rate: 72  Rhythm: Sinus  Interpretation: no acute changes no ST elevations some mild depressions lateral present previous EKG  Comparison: stable as compared to patient's most recent EKG and no previous EKG available      ------------------------- NURSING NOTES AND VITALS REVIEWED ---------------------------  Date / Time Roomed:  1/24/2021  8:34 AM  ED Bed Assignment:  [id number]    The nursing notes within the ED encounter and vital signs as below have been reviewed.      Patient Vitals for the past 24 hrs:   BP Temp Temp src Pulse Resp SpO2   01/25/21 0940 (!) 99/55 97.6 °F (36.4 °C) Axillary 108 23 93 %   01/25/21 0836      94 %   01/25/21 0430    95 24 98 %   01/25/21 0109    95 21 99 %   01/24/21 2345 (!) 95/55 98.5 °F (36.9 °C) Axillary 94 18 100 %   01/24/21 2050      99 %   01/24/21 1915 (!) 165/65 97.5 °F (36.4 °C) Oral 89 18 91 %   01/24/21 1813 (!) 140/64   87  99 %   01/24/21 1638 (!) 149/88 97.9 °F (36.6 °C)  94 18 95 %   01/24/21 1603 (!) 148/75   97  95 %   01/24/21 1542 (!) 170/65   94  95 %   01/24/21 1505 (!) 176/89   97  94 %   01/24/21 1414 (!) 188/70   98 18 97 %   01/24/21 1201 (!) 132/56   76 18 100 %   01/24/21 1135 (!) 129/58   72  100 %   01/24/21 1055 (!) 140/69   78  100 %       Oxygen Saturation Interpretation: Abnormal    ------------------------------------------ PROGRESS NOTES ------------------------------------------  Re-evaluation(s):  Time: 2pm Patients symptoms are improving  Repeat physical examination is improved    Counseling:  I have spoken with the patient and discussed todays results, in addition to providing specific details for the plan of care and counseling regarding the diagnosis and prognosis. Their questions are answered at this time and they are agreeable with the plan of admission.    --------------------------------- ADDITIONAL PROVIDER NOTES ---------------------------------  Consultations:  Spoke with Dr. Rakesh Foreman. Discussed case. They will admit the patient. This patient's ED course included: a personal history and physicial examination, re-evaluation prior to disposition, multiple bedside re-evaluations, IV medications, cardiac monitoring, continuous pulse oximetry and complex medical decision making and emergency management    This patient has remained hemodynamically stable during their ED course. Diagnosis:  1. Acute on chronic respiratory failure with hypoxia (HCC)    2. Pneumonia due to organism    3. Septicemia ([address]    4. Hyperglycemia    5. Acute on chronic anemia        Disposition:  Patient's disposition: Admit to telemetry  Patient's condition is stable.                 Dalia Jain MD  Resident  01/25/21 8931

## 2021-01-24 NOTE — ED NOTES
Bed: 06  Expected date:   Expected time:   Means of arrival:   Comments:  deya Quiñonez, RN  01/24/21 0382

## 2021-01-24 NOTE — H&P
[facility] History and Physical      CHIEF COMPLAINT:  Sent in from [facility]    History of Present Illness:      Patient is a 68 yo female patient who follows with PCP Dr. Consuelo Gill with a past medical history of recent COVID 19 Viral infection with acute on chronic respiratory failure with hypoxia s/p trach/ vent, wore 3 L NC prior to COVID, emphysema/ COPD, and HTN. Pt recently had prolonged hospitalization with COVID at [facility], was then sent to [facility]. She was then discharged to [place] [address] on 1/7/21. She was discharged on SOHAM/ PSV. She was followed by ID during prior hospitalization for superimposed bacterial pneumonia on COVID. She received zosyn/ levaquin switched to invanz/ diflucan. Completed 12/10. Culture + oral adolph, serratia, klebsiella, candida. Please note limited HPI as pt trach/ vent. Assistance used from chart review, nursing. Apparently pt was sent to ER today due to worsening sob/ respiratory distress. Pt reported progressive worsening over the last few days. Increased secretions. Symptoms moderate, ongoing, and progressive. She was not able to tell me if she was on the vent there. Denies fevers or chills. Reporting abdominal discomfort around mid abdomen. She has been on TF via NGT. Mentioned she was considering hospice at [place] [address]. Patient resting in bed in no acute distress. Reporting breathing better. Able to mouth words and nod head yes/ no. Denies chest pain, nausea, vomiting, dysuria, hematuria, hematochezia.     Informant(s) for H&P: patient, chart review     REVIEW OF SYSTEMS:  A comprehensive review of systems was negative except for: what is in the HPI      PMH:  Past Medical History:   Diagnosis Date    [facility] obstructive [facility]) [facility] [address]     Diabetes type 2, controlled ([facility] [address]     Former smoker     Hyperlipidemia     Hypertension     Macular degeneration     OU    Multiple thyroid nodules 11/2017    Pulmonary nodule 05/2016       Surgical History:  Past Surgical History:   Procedure Laterality Date    FOOT SURGERY  1976    LIPOSUCTION      TRACHEOSTOMY N/A 11/28/2020    TRACHEOTOMY performed by Shilpa Munoz MD at [facility] ENDOSCOPY N/A 11/28/2020    EGD ESOPHAGOGASTRODUODENOSCOPY PEG TUBE INSERTION performed by Shilpa Munoz MD at [facility] OR       Medications Prior to Admission:    Prior to Admission medications    Medication Sig Start Date End Date Taking? Authorizing Provider   Arformoterol Tartrate (BROVANA) 15 MCG/2ML NEBU Take 2 mLs by nebulization 2 times daily 12/2/20   Trice Hernandez MD   budesonide (PULMICORT) 0.5 MG/2ML nebulizer suspension Take 2 mLs by nebulization 2 times daily 12/2/20   Trice Hernandez MD   ipratropium-albuterol (DUONEB) 0.5-2.5 (3) MG/3ML SOLN nebulizer solution Inhale 3 mLs into the lungs every 4 hours 12/2/20   Trice Hernandez MD   insulin glargine (LANTUS) 100 UNIT/ML injection vial Inject 25 Units into the skin 2 times daily 12/2/20   Trice Hernandez MD   insulin lispro (HUMALOG) 100 UNIT/ML injection vial Inject 0-18 Units into the skin every 6 hours 12/2/20   Trice Hernandez MD   vitamin B and C (TOTAL B-C) TABS tablet Take 1 tablet by mouth every morning 12/3/20   Trice Hernandez MD   lansoprazole 3 MG/ML SUSP 10 mLs by Per G Tube route every morning (before breakfast) 12/3/20   Trice Hernandez MD   Respiratory Therapy Supplies (FULL KIT NEBULIZER SET) MISC Use as directed with nebulized medication. 12/2/20   Trice Hernandez MD   albuterol sulfate  (90 Base) MCG/ACT inhaler Inhale 2 puffs into the lungs every 6 hours as needed for Wheezing    Historical Provider, MD       Allergies:    Patient has no known allergies. Social History:    reports that she quit smoking about 7 years ago. Her smoking use included cigarettes. She started smoking about 47 years ago. She has a 120.00 pack-year smoking history.  She has never used smokeless tobacco. She reports that she does not drink alcohol or use drugs. Family History:   family history includes Coronary Art Dis in her father and mother; Diabetes in her mother; Heart Disease in her sister; High Blood Pressure in her father and mother; Earnest Eron in her father and sister; Mult Sclerosis in her daughter; Other in an other family member. PHYSICAL EXAM:  Vitals:  BP (!) 132/56   Pulse 76   Temp 98.3 °F (36.8 °C) (Oral)   Resp 18   Ht 5' 6\" (1.676 m)   Wt 161 lb (73 kg)   SpO2 100%   BMI 25.99 kg/m²     General Appearance: awake and alert. Nods head yes/ no. Mouthing words   Skin: warm and dry  Head: normocephalic and atraumatic  Neck: neck supple and non tender without mass  trach  Pulmonary/Chest: diminished throughout to auscultation   Cardiovascular: normal rate, normal S1 and S2 and no carotid bruits  Abdomen: soft, non-tender, non-distended, normal bowel sounds  Extremities: no cyanosis, no clubbing and no edema  Neurologic: speech normal         LABS:  Recent Labs     01/24/21  0854 01/24/21  0919     --    K 4.9  --    CL 98  --    CO2 35*  --    BUN 30*  --    CREATININE 0.4*  --    GLUCOSE 257* 269   CALCIUM 9.5  --        Recent Labs     01/24/21  0854   WBC 13.6*   RBC 2.65*   HGB 7.4*   HCT 26.4*   MCV 99.6   MCH 27.9   MCHC 28.0*   RDW 16.8*      MPV 11.0       No results for input(s): POCGLU in the last 72 hours. Radiology:   XR CHEST PORTABLE   Final Result   Persistent bilateral lower lobe infiltrates unchanged   Probable small left pleural effusion              ASSESSMENT:      Active Problems:    PNA (pneumonia)  Resolved Problems:    * No resolved hospital problems. *      PLAN:    1. Acute on chronic respiratory failure with hypoxia- likely related to pneumonia: pt sent in from [place] [address] for worsening sob/ resp distress. She is s/p trach/ vent recently following prolonged COVID 19 hospitalization.  Prior to that she was on 3 L NC. She was discharged from [facility] early January. At time that she was on PSV/ SOHAM. Currently on vent. Consult pulmonology. Concern for pneumonia. Continue abx.    2. Suspected pneumonia: recent COVID pneumonia/ superimposed bacterial pneumonia. Culture at recent hospitalization + oral adolph, serratia, klebsiella, candida. ID followed and she was treated with zosyn/ levaquin switched to invanz/ diflucan. Consult ID/ pulmonology. Check procal/ resp panel. CXR reviewed. 3. Recent severe COVID 19 viral pneumonia    4. DM; lantus/ ssi    5. Recent  DVT; appears she was dc on lovenox therapeutic dosing- need to have meds reconciled by nursing    6. Dysphagia: s/p peg/ TF    7. Elevated probnp: does not have significant edema on exam. Monitor     8. Anemia: monitor     9. Deconditioning: PT/OT    10. Possible UTI: UA reviewed. Follow culture    11. Pt mentioning she was recently had been in conversation with hospice. Consult palliative    Code Status: FULL  DVT prophylaxis: lovenox      NOTE: This report was transcribed using voice recognition software. Every effort was made to ensure accuracy; however, inadvertent computerized transcription errors may be present.   Electronically signed by LILLIAN Mathis on 1/24/2021 at 12:41 PM

## 2021-01-25 NOTE — CARE COORDINATION
Social Work/Discharge Planning:  Hospice consult noted. Called patient daughter Eugene Gutierrez (ph: [phone]) and discussed hospice options. Eugene Gutierrez prefers referral to [address] [place] [address]. Referral made to [place] [facility] and she states Ramin Tavarez will contact patient daughter. Will continue to follow.   Electronically signed by SIDRA Agosto on 1/25/2021 at 2:57 PM

## 2021-01-25 NOTE — CONSULTS
Event Note    · Patient requesting comfort care. No indication for pulmonary consultation. ·   Rodrigo Vance. Blake Olsen M.D., F.C.C.P.     [facility], [address] [place], [place] [address]s, [address] [place] [address]
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
## 2021-01-25 NOTE — PROGRESS NOTES
Pharmacy Consultation Note  (Antibiotic Dosing and Monitoring)    Initial consult date:   Consulting physician: Dr. Verlie Ahumada  Drug(s): Vancomycin  Indication: VAP    Ht Readings from Last 1 Encounters:   21 5' 6\" (1.676 m)       Age/Gender IBW DW  Allergy Information   67 y.o.  female 59.3 kg 73 kg  Patient has no known allergies.                Date  WBC BUN/sCr Drug/Dose Time   Given Level(s)   (Time) Comments    13.6 30/0.4 Vancomycin 1.5 g x 1 2303      X X Vancomycin 1 g Q12H 0948         (#3)           (#4)           (#5)           (#6)           Other Antibiotics/Antifungals/Antivirals Start Date Stop Date Comments   Zosyn 3.375 g Q8H extended      Levofloxacin 750mg Q24H                      Estimated CrCL: 122 mL/min    No intake or output data in the 24 hours ending 21 1128    Temp max: Temp (24hrs), Av.9 °F (36.6 °C), Min:97.5 °F (36.4 °C), Max:98.5 °F (36.9 °C)      Cultures:    Culture Date Result    Blood cultures   Pending   Urine culture  Pending   Respiratory panel  SARS-CoV-2 positive             Assessment:  · Consulted by Dr. Verlie Ahumada to dose/monitor vancomycin  · Goal trough level:  15-20 mcg/mL  · Renal function at baseline with sCr of 0.4 and CrCl of 122 mL/min    Plan:  · Will continue vancomycin 1 g IV every 12 hours  · Plan for level prior to the 4th dose at steady state   · Pharmacist will follow and monitor/adjust dosing as necessary    Nilo Pruitt PharmD, BCCCP  2021  11:30 AM

## 2021-01-25 NOTE — PROGRESS NOTES
Pt  at [address] Dr Melquiades Jensen notified along with Daughter Myla. Daughter will be calling back with preferred  home.

## 2021-01-25 NOTE — PROGRESS NOTES
Dean Soni NP, covering for Dr. Brianna Pandya, notified of palliative care consult via perfect serve.

## 2021-01-25 NOTE — PROGRESS NOTES
Physician Progress Note      Rico Mc  CSN #:                  [id number]  :                       1945  ADMIT DATE:       2021 8:34 AM  DISCH DATE:  RESPONDING  PROVIDER #:        DIANNE ALVARENGA DO          QUERY TEXT:    Patient admitted with worsening shortness of breath. Noted initial testing   negative for COVID-19 on  and repeat  was positive. If possible,   please document in the progress notes and discharge summary if COVID-19 was: The medical record reflects the following:  Risk Factors: s/p recent COVID infection/trach  Clinical Indicators: initial COVID on admission negative with positive repeat   test, per palliative care \". Lisabarbara Almonte She is still positive for Covid? \" and per IM   \". ..recently had prolonged hospitalization with COVID at [facility], [facility], was   then sent to [facility]. She was then discharged to [place] [address] on 21. She   was discharged on SOHAM/ PSV. She was followed by ID during prior   hospitalization for superimposed bacterial pneumonia on COVID. Lisa Almonte \"  Treatment: Duonebs, vitamins, ID/palliative care consults, hospice to see    Thank you,  Carol Sheridan RN, BSN, CDIS  Clinical Documentation Improvement  [email or web address]  Options provided:  -- COVID-19 active infection confirmed after study  -- Personal history of COVID-19  -- Other - I will add my own diagnosis  -- Disagree - Not applicable / Not valid  -- Disagree - Clinically unable to determine / Unknown  -- Refer to Clinical Documentation Reviewer    PROVIDER RESPONSE TEXT:    After study, this patient has a personal history of COVID-19.     Query created by: Fadi Bansal on 2021 3:49 PM      Electronically signed by:  Gavin Moore DO 2021 3:55 PM

## 2021-01-25 NOTE — ED NOTES
Dr White Sensing made aware of pt being less active as earlier, okay to go up to room     Cosme Pimentel RN  01/24/21 8340
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
## 2021-01-25 NOTE — CONSULTS
Palliative Care Department  [phone]  Palliative Care Initial Consult  Provider Ashley Rasmussen MD      PATIENT: Nat Crowe  : 1945  MRN: [id number]  ADMISSION DATE: 2021  8:34 AM  Referring Provider: LILLIAN Chavez    Palliative Medicine was consulted on hospital day 1 for assistance with Goals of care, Code Status Discussion, Family support, Symptom management     HPI:     Imani Kinney is a 67 y.o. y/o female with a history of COPD, diabetes, hypertension, hyperlipidemia who presented to [facility]) on 2021 with patient was recently hospitalized in December with Covid pneumonia. She was rehospitalized 1 day ago with shortness of breath. She is still positive for Covid. ASSESSMENT/PLAN:     Pertinent Hospital Diagnoses     · Covid 19 pneumonia  · Acute respiratory failure with hypoxia  · COPD  · T2DM    Palliative Care Encounter / Counseling Regarding Goals of Care  Please see detailed goals of care discussion as below   At this time, Nat Crowe, Does Not have capacity for medical decision-making. Capacity is time limited and situation/question specific   Outcome of goals of care meeting: Spoke with daughter Yamile Garcia, she mentioned Juany told her that she did not want to be intubated previously. She wants to just be kept comfortable, Yamile Garcia is honoring her wishes.  Code status DNR-CC   Advanced Directives: living will in [facility]   Surrogate/Legal NOK: Thanh Padron,  [address] [place] [address], [phone], [phone] mobile. Symptom management  -Pain and dyspnea: Morphine IV 2mg q30 mins as needed  -Agitation anxiety: Ativan 1 mg every n02prbq as needed    Family would like to withdraw ventilator after her [facility] visit. Thank you for the opportunity to participate in the care of Nat Crowe. Ashley Rasmussen MD  Palliative Medicine     SUBJECTIVE:     Details of Conversation: Saw Juany from her window, spoke to daughter Yamile Garcia about Juany's condition. She has been readmitted due to shortness of breath. Introduced palliative medicine to Ludmila Ramos. Her mother discussed with them that what is the point of living in this condition, she had wished that they had let her go previously. Spoke to Ludmila Ramos about her condition, she does not want her to continue on like this. She wants to change her CODE STATUS to DNR CC comfort focused care. Prognosis: Poor    OBJECTIVE:     BP (!) 95/55   Pulse 95   Temp 98.5 °F (36.9 °C) (Axillary)   Resp 24   Ht 5' 6\" (1.676 m)   Wt 161 lb (73 kg)   SpO2 94%   BMI 25.99 kg/m²     Physical Examination:  Due to the current efforts to prevent transmission of COVID-19 and also the need to preserve PPE for other caregivers, a face-to-face encounter with the patient was not performed. That being said, all relevant records and diagnostic tests were reviewed, including laboratory results and imaging. Please reference any relevant documentation elsewhere. Care will be coordinated with the primary service and other specialties as appropriate. Objective data reviewed: labs, images, records, medication use, vitals and chart    Time/Communication  Greater than 50% of time spent, total 25 minutes in counseling and coordination of care at the bedside regarding goals of care and symptom management. Thank you for allowing Palliative Medicine to participate in the care of Chrystal Rivera. Note: This report was completed using [employer] voiced recognition software. Every effort has been made to ensure accuracy; however, inadvertent computerized transcription errors may be present.

## 2021-01-25 NOTE — PROGRESS NOTES
Pharmacy Consultation Note  (Antibiotic Dosing and Monitoring)    Initial consult date:   Consulting physician: Dr. Mayda Hidalgo  Drug(s): Vancomycin  Indication: VAP    Ht Readings from Last 1 Encounters:   21 5' 6\" (1.676 m)       Age/Gender IBW DW  Allergy Information   67 y.o.  female  68 kg  Patient has no known allergies. Date  WBC BUN/sCr UOP (mL/kg/hr) Drug/Dose Time   Given Level(s)   (Time) Comments   Day 1   13.6 30/0.4  Vancomycin 1.5gm x 1 <2200>     Day 2      Vancomycin 1gm Q12h <1000>  <2200>       (#3)            (#4)            (#5)            (#6)            Other Antibiotics/Antifungals/Antivirals Start Date Stop Date Comments   Zosyn 3.375gm Q8h extended      Levofloxacin 750mg Q24h                      Estimated CrCL: 122 mL/min    No intake or output data in the 24 hours ending 21 2110    Temp max: Temp (24hrs), Av.9 °F (36.6 °C), Min:97.5 °F (36.4 °C), Max:98.3 °F (36.8 °C)      Cultures:    Culture Date Result    Blood cultures   Pending   Urine culture  Pending                  Assessment:  · Consulted by Dr. Mayda Hidalgo to dose/monitor vancomycin  · Goal trough level:  15-20 mcg/mL  · Renal function at baseline with sCr of 0.4 and CrCl of 122 mL/min    Plan:  · Will give vancomycin 1.5gm x 1 then start 1gm Q12h regimen  · Plan for level prior to the 4th dose at steady state   · Pharmacist will follow and monitor/adjust dosing as necessary    Thank you for this consult, please page with questions.     Lindy Main, PharmD, BCCCP 2021 9:12 PM  Pharmacy Clinical Specialist, Emergency Medicine  [phone]

## 2021-01-25 NOTE — PROGRESS NOTES
Nutrition Note    Nutrition Evaluation: Pt with multiple nutrition triggers, however pt and family requesting comfort care measures only after family visits this afternoon. Provide comfort care measures. Dietitian available per consult.     Electronically signed by Argelia Hazel RD, MARICEL on 1/25/21 at 4:00 PM EST    Contact: [phone]

## 2021-01-25 NOTE — PROGRESS NOTES
Attempted to obtain repeat ABGs and was unsuccessful, respiratory therapist attempted x2 and was also unsuccessful. Dr. Mayda Hidalgo notified, no new orders at this time.

## 2021-01-25 NOTE — CARE COORDINATION
Social Work/Discharge Planning;  COVID Positive 1/24. Patient is ventilator, trach dependent admitted from [place] [address] at [facility] LOC. Called liaison Mo Davis from [place] [address] and confirmed patient will need a pre-cert at discharge. Mo Susan states patient will need a therapy evaluation. Patient had a recent stay at [address] [place] [address]. Electronic N-17 in Epic and Ambulance form in soft chart. Will continue to follow and assist with discharge planning.   Electronically signed by SIDRA Harris on 1/25/2021 at 11:45 AM

## 2021-01-25 NOTE — PROGRESS NOTES
[facility] Progress Note    Admitting Date and Time: 1/24/2021  8:34 AM  Admit Dx: PNA (pneumonia) [J18.9]    Subjective:  Patient is being followed for PNA (pneumonia) [J18.9]     Pt seen and askng \" help me\"  Denies pain  Reporting sob at times  Discussed with pt that I called her daughter Myla and daughter stating that pt has voiced that she wants hospice and does not want any agressive measures and pt nodding head and stating she does not want vent or any of this. Discussed with pt that her granddaughters would like to visit her. Discussed consulting hospice- pt agreeable. ROS: denies fever, chills, cp, sob, n/v, HA unless stated above.      sodium chloride flush  10 mL Intravenous 2 times per day    thiamine  100 mg Per G Tube Daily    vitamin B and C  1 tablet PEG Tube QAM    lansoprazole  30 mg Oral QAM AC    mirtazapine  15 mg PEG Tube Nightly    ipratropium-albuterol  3 mL Inhalation Q6H    gabapentin  100 mg Oral BID    ferrous sulfate  300 mg Per G Tube Daily    enoxaparin  60 mg Subcutaneous BID    budesonide  0.5 mg Nebulization BID    Arformoterol Tartrate  15 mcg Nebulization BID    metoprolol tartrate  12.5 mg PEG Tube BID    sennosides-docusate sodium  2 tablet PEG Tube Daily    levofloxacin  750 mg Intravenous Q24H    piperacillin-tazobactam  3,375 mg Intravenous Q8H    vancomycin  15 mg/kg Intravenous Q12H    insulin lispro  0-12 Units Subcutaneous Q6H    insulin glargine  20 Units Subcutaneous BID         sodium chloride flush, 10 mL, PRN      promethazine, 12.5 mg, Q6H PRN    Or      ondansetron, 4 mg, Q6H PRN      acetaminophen, 650 mg, Q6H PRN    Or      acetaminophen, 650 mg, Q6H PRN      polyethylene glycol, 17 g, Daily PRN      ipratropium-albuterol, 1 ampule, Q4H PRN      glucose, 15 g, PRN      dextrose, 12.5 g, PRN      glucagon (rDNA), 1 mg, PRN      dextrose, 100 mL/hr, PRN         Objective:    BP (!) 99/55   Pulse 108 Temp 97.6 °F (36.4 °C) (Axillary)   Resp 23   Ht 5' 6\" (1.676 m)   Wt 161 lb (73 kg)   SpO2 93%   BMI 25.99 kg/m²   General Appearance: awake and alert. Nods head yes/ no. Mouthing words [place]  Skin: warm and dry  Head: normocephalic and atraumatic  Neck: neck supple and non tender without mass  trach  Pulmonary/Chest: diminished throughout to auscultation   Cardiovascular: normal rate, normal S1 and S2 and no carotid bruits  Abdomen: soft, non-tender, non-distended, normal bowel sounds  Extremities: no cyanosis, no clubbing and no edema  Neurologic: speech normal             Recent Labs     01/24/21  0854 01/24/21  0919     --    K 4.9  --    CL 98  --    CO2 35*  --    BUN 30*  --    CREATININE 0.4*  --    GLUCOSE 257* 269   CALCIUM 9.5  --        Recent Labs     01/24/21  0854   WBC 13.6*   RBC 2.65*   HGB 7.4*   HCT 26.4*   MCV 99.6   MCH 27.9   MCHC 28.0*   RDW 16.8*      MPV 11.0           Assessment:    Active Problems:    PNA (pneumonia)  Resolved Problems:    * No resolved hospital problems. *      Plan:  1. Acute on chronic respiratory failure with hypoxia- likely related to pneumonia: pt sent in from [place] for worsening sob/ resp distress. She is s/p trach/ vent recently following prolonged COVID 19 hospitalization. Prior to that she was on 3 L NC. She was discharged from [facility] early January. At time that she was on PSV/ SOHAM. Currently on vent. Consult pulmonology. Concern for pneumonia. Continue abx.     2. Suspected pneumonia: recent COVID pneumonia/ superimposed bacterial pneumonia. Culture at recent hospitalization + oral adolph, serratia, klebsiella, candida. ID followed and she was treated with zosyn/ levaquin switched to invanz/ diflucan. Consult ID/ pulmonology. Check procal/ resp panel. CXR reviewed.     3. Recent severe COVID 19 viral pneumonia     4. DM; lantus/ ssi     5.  Recent  DVT; appears she was dc on lovenox therapeutic dosing- need to have meds reconciled by nursing     6. Dysphagia: s/p peg/ TF     7. Elevated probnp: does not have significant edema on exam. Monitor      8. Anemia: monitor      9. Deconditioning: PT/OT     10. Possible UTI: UA reviewed. Follow culture     11. Pt mentioning on admission- she wanted hospice. I did call daughter and discussed what pt was trying to tell me. Per daughter she had a conversation with daughter when she first arrived to [place] [address] that pt did not want any more aggressive measures or never wanted intubation/ trach. Daughter reporting at the time of intubation daughter did not know her wishes. Daughter asking if granddaughters can come visit. The family then wants to transition to DNR CC- withdrawal care. Consult hospice. Palliative care following.             Code Status: FULL  DVT prophylaxis: lovenox    NOTE: This report was transcribed using voice recognition software. Every effort was made to ensure accuracy; however, inadvertent computerized transcription errors may be present.   Electronically signed by LILLIAN Whelan on 1/25/2021 at 9:59 AM

## 2021-01-25 NOTE — CONSULTS
[facility]     Consult Note                                 [address] [facility] [address], [place], [address]                   Phone [phone]     Fax [phone]        Date:   1/25/2021  Patient name:  Richar Pat  Date of admission:  1/24/2021  8:34 AM  MRN:   [id number]  YOB: 1945    Reason for Consult: Ventilator associated pneumonia  CC:   Chief Complaint   Patient presents with    Abdominal Pain     abdominal distention     Shortness of Breath     gave duoneb at facility       HISTORY OF PRESENT ILLNESS:                The patient is a 67 y.o. female known to infectious disease service was seen in mid November till early December when she had a prolonged hospitalization because of Covid pneumonia. Was treated with 5 days of remdesivir and completed the course on 11/18/2020. Then she developed superimposed bacterial pneumonia with Serratia and Klebsiella that was treated with Zosyn followed by a ertapenem to complete 14-day course. Patient was discharged to [facility] hospital.  Patient was then moved to nursing home. Patient had to be kept on 10 L oxygen from 5 L. Is coming with shortness of breath, currently on ventilator, no fever. WBC count of 13.6, procalcitonin of 0.30, her Covid test is still positive. Shows 10-20 WBCs many crystals and bacteria. Chest x-ray shows persistent bilateral lower lobe infiltrates unchanged. Currently she is not alert and awake and most of the history obtained is from medical records. Patient was started on vancomycin plus Zosyn plus Levaquin by the primary team and ID has been consulted for further management.     Past Medical History:   has a past medical history of COPD (chronic obstructive pulmonary disease) ([facility] [address] Diabetes type 2, controlled ([facility] [address] Former smoker, Hyperlipidemia, Hypertension, Macular degeneration, Multiple thyroid nodules, and Pulmonary nodule. Past Surgical History:   has a past surgical history that includes Tubal ligation (1970'a); Foot surgery (1976); Liposuction; tracheostomy (N/A, 11/28/2020); and Upper gastrointestinal endoscopy (N/A, 11/28/2020). Home Medications:    Prior to Admission medications    Medication Sig Start Date End Date Taking? Authorizing Provider   acetaminophen (TYLENOL) 325 MG tablet Take 650 mg by mouth every 6 hours as needed for Pain or Fever   Yes Historical Provider, MD   docusate sodium (COLACE) 100 MG capsule Take 100 mg by mouth 2 times daily as needed for Constipation   Yes Historical Provider, MD   ascorbic acid (VITAMIN C) 500 MG tablet 1,000 mg by PEG Tube route daily   Yes Historical Provider, MD   ferrous sulfate 220 (44 Fe) MG/5ML solution 220 mg by Per G Tube route daily   Yes Historical Provider, MD   polyethylene glycol (GLYCOLAX) 17 GM/SCOOP powder 17 g by Per G Tube route daily   Yes Historical Provider, MD   mirtazapine (REMERON) 15 MG tablet 15 mg by PEG Tube route nightly   Yes Historical Provider, MD   pantoprazole sodium (PROTONIX) 40 MG PACK packet 40 mg by Per G Tube route every morning (before breakfast)   Yes Historical Provider, MD   senna-docusate (Rosendo Alford) 8.6-50 MG per tablet Take 2 tablets by mouth daily PEG tube   Yes Historical Provider, MD   zinc sulfate (ZINCATE) 220 (50 Zn) MG capsule Take 50 mg by mouth daily PEG tube   Yes Historical Provider, MD   enoxaparin (LOVENOX) 60 MG/0.6ML injection Inject into the skin 2 times daily   Yes Historical Provider, MD   insulin glargine (LANTUS) 100 UNIT/ML injection vial Inject 26 Units into the skin 2 times daily   Yes Historical Provider, MD   metoprolol tartrate (LOPRESSOR) 25 MG tablet Take 25 mg by mouth 2 times daily Give 0.5 tablet via PEG tube   Yes Historical Provider, MD   gabapentin (NEURONTIN) 100 MG capsule Take 100 mg by mouth 2 times daily.  PEG tube   Yes Historical Provider, MD   Arformoterol Tartrate (BROVANA) 15 MCG/2ML NEBU Take 2 mLs by nebulization 2 times daily 12/2/20  Yes Paula Grace MD   budesonide (PULMICORT) 0.5 MG/2ML nebulizer suspension Take 2 mLs by nebulization 2 times daily 12/2/20  Yes Paula Grace MD   ipratropium-albuterol (DUONEB) 0.5-2.5 (3) MG/3ML SOLN nebulizer solution Inhale 3 mLs into the lungs every 4 hours 12/2/20  Yes Paula Grace MD   insulin lispro (HUMALOG) 100 UNIT/ML injection vial Inject 0-18 Units into the skin every 6 hours 12/2/20  Yes Paula Grace MD   vitamin B and C (TOTAL B-C) TABS tablet Take 1 tablet by mouth every morning 12/3/20  Yes Paula Grace MD   Respiratory Therapy Supplies (FULL KIT NEBULIZER SET) MISC Use as directed with nebulized medication. 12/2/20  Yes Paula Grace MD   albuterol sulfate  (90 Base) MCG/ACT inhaler Inhale 2 puffs into the lungs every 6 hours as needed for Wheezing   Yes Historical Provider, MD       Allergies:  Patient has no known allergies. Social History:   reports that she quit smoking about 7 years ago. Her smoking use included cigarettes. She started smoking about 47 years ago. She has a 120.00 pack-year smoking history. She has never used smokeless tobacco. She reports that she does not drink alcohol or use drugs. Family History: family history includes Coronary Art Dis in her father and mother; Diabetes in her mother; Heart Disease in her sister; High Blood Pressure in her father and mother; Oniel Danker in her father and sister; Mult Sclerosis in her daughter; Other in an other family member.     REVIEW OF SYSTEMS:    Cannot be obtained as patient is intubated        PHYSICAL EXAM:    BP (!) 99/55   Pulse 108   Temp 97.6 °F (36.4 °C) (Axillary)   Resp 23   Ht 5' 6\" (1.676 m)   Wt 161 lb (73 kg)   SpO2 93%   BMI 25.99 kg/m²    VENT SETTINGS:   Vent Information  $Ventilation: $Subsequent Day  Skin Assessment: Clean, dry, & intact  Equipment ID: [id number]  Vent Type: 840  Vent Mode: AC/VC  Vt Ordered: 450 mL  Rate Set: 14 bmp  Peak Flow: 65 L/min  FiO2 : (S) 50 %(weaning per sat 94%)  SpO2: 93 %  SpO2/FiO2 ratio: 188  Sensitivity: 3  PEEP/CPAP: 8  Humidification Source: E    General appearance: Intubated, not awake or alert or responsive  Skin: Warm and dry  Eyes: Pink palpebral conjunctivae. PERRL  Ears: External ears normal.  Nose/Sinuses: Nares normal. Septum midline. Mucosa normal. No sinus tenderness. Oropharynx: Oropharynx clear with no exudates seen  Neck: Neck supple. Trach in place   lungs: Lungs clear to auscultation bilaterally. No rhonchi, crackles or wheezes  Heart: S1 S2  Regular rate and rhythm. No rub, murmur or gallop  Abdomen: Abdomen soft, PEG tube in place   extremities: No edema, Peripheral pulses palpable  Musculoskeletal: Muscular strength appears intact. No joint effusion, tenderness, swelling or warmth      DATA:    Labs:     Last 3 CBC:  Recent Labs     01/24/21  0854   WBC 13.6*   RBC 2.65*   HGB 7.4*      MPV 11.0       Last 3 BMP  Recent Labs     01/24/21  0854 01/24/21  0919     --    K 4.9  --    CL 98  --    CO2 35*  --    BUN 30*  --    CREATININE 0.4*  --    GLUCOSE 257* 269   CALCIUM 9.5  --        LIVER PROFILE:  Recent Labs     01/24/21  0854   AST 14   ALT 20   LABALBU 3.0*       Microbiology :  No results for input(s): BC in the last 72 hours. No results for input(s): Veda Brina in the last 72 hours. No results for input(s): LABURIN in the last 72 hours. No results for input(s): CULTRESP in the last 72 hours. No results for input(s): WNDABS in the last 72 hours.       Radiology :  XR CHEST PORTABLE   Final Result   Persistent bilateral lower lobe infiltrates unchanged   Probable small left pleural effusion              Assessment and Plan:      · Acute on chronic respiratory failure  · Covid pneumonia-treated in November 2020  · Ventilator dependent respiratory failure  · Diabetes mellitus    Plan  -Discontinue the antibiotics  -Right lower lung pneumonia is unchanged in last 1 month  -Low procalcitonin  -Could be mucous plugging causing her to have respiratory distress  -Respiratory culture  -Pulmonology has been consulted  -We will follow  -Would suggest for hospice/comfort care                  Dennis Monique MD

## 2021-01-26 LAB
ORGANISM: ABNORMAL
URINE CULTURE, ROUTINE: ABNORMAL

## 2021-01-26 NOTE — PROGRESS NOTES
[address] [facility] [place] [facility], [address] called at [phone] per request of patients daughter [place]. Answering service took the information and will have the  return phone call.

## 2021-01-26 NOTE — DISCHARGE SUMMARY
[facility] Physician Discharge Summary     Condition on discharge     Patient ID   Armand Jerez, 67 y. o.female /  1945  / MRN [id number]    Admit date   2021    Discharge date  2021  7:12 AM    Admission diagnoses Active Problems:    PNA (pneumonia)  Resolved Problems:    * No resolved hospital problems. *    Discharge diagnoses Same    Consults   IP CONSULT TO PULMONOLOGY  IP CONSULT TO INFECTIOUS DISEASES  IP CONSULT TO PALLIATIVE CARE  IP CONSULT TO HOSPICE    Procedures   See hospital course    Hospital Course     75F PMH recent COVID / bacterial pna requiring trach and subsequent LTAC admission w dc to [facility] a short time ago as well as chronic issues of COPD, DM, HPL, HTN who was brought to ED from SNF w SOB, hypoxia, abdominal pain.  Appeared to have worsened pna on imaging and questionable volume overload and pt admitted, however on investigation it appeared that pt had been requesting hospice and had not wanted trach and PEG. Pt maintained her stance that she did not want this and instead wanted hospice and peace. Hospice consulted, pt ultimately extubated evening of  and Juany  at [address] [place] [address] on 21; our condolences go out to the family. No intake/output data recorded. No intake/output data recorded.     Labs  Recent Labs     21  0854 21  0919 21  1318     --  137   K 4.9  --  4.1   CL 98  --  96*   CO2 35*  --  32*   BUN 30*  --  42*   CREATININE 0.4*  --  0.5   GLUCOSE 257* 269 191*   CALCIUM 9.5  --  9.2   WBC 13.6*  --  17.3*   RBC 2.65*  --  2.40*   HGB 7.4*  --  7.0*   HCT 26.4*  --  23.7*   MCV 99.6  --  98.8   MCH 27.9  --  29.2   MCHC 28.0*  --  29.5*   RDW 16.8*  --  17.2*     --  324   MPV 11.0  --  10.7       Imaging  Xr Chest Portable  Result Date: 2021  Persistent bilateral lower lobe infiltrates unchanged Probable small left pleural effusion    Patient Instructions     Medication List      ASK

## 2021-01-29 LAB
BLOOD CULTURE, ROUTINE: NORMAL
CULTURE, BLOOD 2: NORMAL

## 2021-02-04 NOTE — PROGRESS NOTES
Physician Progress Note      Harvey Cloud  CSN #:                  [id number]  :                       1945  ADMIT DATE:       2021 8:34 AM  DISCH DATE:        2021 10:03 PM  RESPONDING  PROVIDER #:        Mony ALVARENGA DO          QUERY TEXT:    Pt admitted with acute respiratory failure. Pt noted to have Pneumonia. If   possible, please document in the progress notes and discharge summary if you   are evaluating and/or treating any of the following: The medical record reflects the following:  Risk Factors: recent COVID infection  Clinical Indicators: lactic 1.8, WBC 13.6 on admission, procal 0.30, temp   98.3, BP 99//86, HR , per ID \". [facility] Acute on chronic respiratory   failure. [facility] Covid pneumonia-treated in 2020. [facility] Ventilator dependent   respiratory failure. [facility] \" and per ED \". [facility] Septicemia. [facility] \"  Treatment: IV Doxy, Levaquin, Zosyn and Vanc    Thank you,  April Desirae Vizcarra RN, BSN, CDIS  Clinical Documentation Improvement  [email or web address]  Options provided:  -- Sepsis, present on admission  -- No Sepsis, localized infection of pneumonia only  -- Other - I will add my own diagnosis  -- Disagree - Not applicable / Not valid  -- Disagree - Clinically unable to determine / Unknown  -- Refer to Clinical Documentation Reviewer    PROVIDER RESPONSE TEXT:    This patient has sepsis which was present on admission.     Query created by: Mónica White on 2021 1:45 PM      Electronically signed by:  Cynthia Goodwin DO 2021 7:23 AM

## (undated) DEVICE — SET SURG INSTR MINI VASC

## (undated) DEVICE — CIAGLIA BLUE RHINO PERCUTANEOUS TRACHEOSTOMY INTRODUCER TRAY: Brand: CIAGLIA BLUE RHINO

## (undated) DEVICE — COVER HNDL LT DISP

## (undated) DEVICE — PACK PROCEDURE SURG GEN CUST

## (undated) DEVICE — CONTROL SYRINGE LUER-LOCK TIP: Brand: MONOJECT

## (undated) DEVICE — SPONGE,DRAIN,NONWVN,4"X4",6PLY,STRL,LF: Brand: MEDLINE

## (undated) DEVICE — GOWN,SIRUS,FABRNF,2XL,18/CS: Brand: MEDLINE

## (undated) DEVICE — ELECTRODE PT RET AD L9FT HI MOIST COND ADH HYDRGEL CORDED

## (undated) DEVICE — TOWEL,OR,DSP,ST,BLUE,STD,6/PK,12PK/CS: Brand: MEDLINE

## (undated) DEVICE — CATHETER,SUCTION,18FR,WHISTL,STR PK,CAL: Brand: MEDLINE

## (undated) DEVICE — FORCEPS BONNEY

## (undated) DEVICE — Device

## (undated) DEVICE — DOUBLE BASIN SET: Brand: MEDLINE INDUSTRIES, INC.

## (undated) DEVICE — ELECTRODE ELECSURG NDL 2.8 INX7.2 CM COAT INSUL EDGE

## (undated) DEVICE — MARKER,SKIN,WI/RULER AND LABELS: Brand: MEDLINE

## (undated) DEVICE — SURGICAL PROCEDURE PACK EENT CUST

## (undated) DEVICE — MAGNETIC INSTR DRAPE 20X16: Brand: MEDLINE INDUSTRIES, INC.

## (undated) DEVICE — SYRINGE 20ML LL S/C 50

## (undated) DEVICE — INTENDED FOR TISSUE SEPARATION, AND OTHER PROCEDURES THAT REQUIRE A SHARP SURGICAL BLADE TO PUNCTURE OR CUT.: Brand: BARD-PARKER ® STAINLESS STEEL BLADES

## (undated) DEVICE — SET INSTR TRACH

## (undated) DEVICE — 4-PORT MANIFOLD: Brand: NEPTUNE 2

## (undated) DEVICE — SPONGE,DISSECTOR,ROUND CHERRY,XR,ST,5/PK: Brand: MEDLINE

## (undated) DEVICE — GLOVE ORANGE PI 7 1/2   MSG9075